# Patient Record
Sex: FEMALE | Race: WHITE | NOT HISPANIC OR LATINO | Employment: OTHER | URBAN - METROPOLITAN AREA
[De-identification: names, ages, dates, MRNs, and addresses within clinical notes are randomized per-mention and may not be internally consistent; named-entity substitution may affect disease eponyms.]

---

## 2016-11-17 LAB
ALT SERPL-CCNC: 19 U/L (ref 12–78)
AST SERPL-CCNC: 14 U/L (ref 15–37)
CHOLEST SERPL-MCNC: 193 MG/DL (ref 0–200)
CREAT SERPL-MCNC: 0.94 MG/DL (ref 0.55–1.02)
GFR SERPL CREATININE-BSD FRML MDRD: 56.73 ML/MIN/1.73M2
GLUCOSE SERPL-MCNC: 82 MG/DL (ref 70–100)
HDLC SERPL-MCNC: 50 MG/DL (ref 40–59)
LDLC SERPL CALC-MCNC: 125 MG/DL
NONHDLC SERPL-MCNC: 143 MG/DL
POTASSIUM SERPL-SCNC: 4.5 MMOL/ (ref 3.5–5.1)
TRIGL SERPL-MCNC: 90 MG/DL (ref 30–150)

## 2017-03-16 ENCOUNTER — TRANSFERRED RECORDS (OUTPATIENT)
Dept: HEALTH INFORMATION MANAGEMENT | Facility: CLINIC | Age: 82
End: 2017-03-16

## 2017-03-16 LAB
ALT SERPL-CCNC: 17 U/L (ref 12–78)
AST SERPL-CCNC: 16 U/L (ref 15–37)
CHOLEST SERPL-MCNC: 207 MG/DL (ref 0–200)
CREAT SERPL-MCNC: 0.93 MG/DL (ref 0.55–1.02)
GFR SERPL CREATININE-BSD FRML MDRD: 57.44 ML/MIN/1.73M2
GLUCOSE SERPL-MCNC: 92 MG/DL (ref 70–100)
HDLC SERPL-MCNC: 52 MG/DL (ref 40–59)
LDLC SERPL CALC-MCNC: ABNORMAL MG/DL
NONHDLC SERPL-MCNC: 155 MG/DL
POTASSIUM SERPL-SCNC: 4.6 MMOL/ (ref 3.5–5.1)
TRIGL SERPL-MCNC: 187 MG/DL (ref 30–150)

## 2017-05-30 ENCOUNTER — OFFICE VISIT (OUTPATIENT)
Dept: FAMILY MEDICINE | Facility: CLINIC | Age: 82
End: 2017-05-30
Payer: COMMERCIAL

## 2017-05-30 VITALS
OXYGEN SATURATION: 99 % | HEIGHT: 64 IN | TEMPERATURE: 97.1 F | DIASTOLIC BLOOD PRESSURE: 65 MMHG | SYSTOLIC BLOOD PRESSURE: 131 MMHG | BODY MASS INDEX: 22.53 KG/M2 | HEART RATE: 75 BPM | WEIGHT: 132 LBS

## 2017-05-30 DIAGNOSIS — H40.9 GLAUCOMA OF BOTH EYES, UNSPECIFIED GLAUCOMA: ICD-10-CM

## 2017-05-30 DIAGNOSIS — I10 BENIGN ESSENTIAL HYPERTENSION: ICD-10-CM

## 2017-05-30 DIAGNOSIS — M15.0 PRIMARY OSTEOARTHRITIS INVOLVING MULTIPLE JOINTS: Primary | ICD-10-CM

## 2017-05-30 DIAGNOSIS — Z79.899 CONTROLLED SUBSTANCE AGREEMENT SIGNED: ICD-10-CM

## 2017-05-30 DIAGNOSIS — K59.03 DRUG-INDUCED CONSTIPATION: ICD-10-CM

## 2017-05-30 DIAGNOSIS — G89.29 OTHER CHRONIC PAIN: ICD-10-CM

## 2017-05-30 DIAGNOSIS — Z79.890 HORMONE REPLACEMENT THERAPY: ICD-10-CM

## 2017-05-30 PROCEDURE — 99214 OFFICE O/P EST MOD 30 MIN: CPT | Performed by: INTERNAL MEDICINE

## 2017-05-30 RX ORDER — TRAMADOL HYDROCHLORIDE 200 MG/1
200 TABLET, EXTENDED RELEASE ORAL
Qty: 30 TABLET | Refills: 0 | Status: SHIPPED | OUTPATIENT
Start: 2017-08-30 | End: 2017-10-20

## 2017-05-30 RX ORDER — TRAMADOL HYDROCHLORIDE 200 MG/1
200 TABLET, EXTENDED RELEASE ORAL
Qty: 30 TABLET | Refills: 0 | Status: SHIPPED | OUTPATIENT
Start: 2017-07-30 | End: 2017-10-20

## 2017-05-30 RX ORDER — TRAMADOL HYDROCHLORIDE 200 MG/1
200 TABLET, EXTENDED RELEASE ORAL
Qty: 30 TABLET | Refills: 0 | Status: SHIPPED | OUTPATIENT
Start: 2017-06-30 | End: 2017-10-20

## 2017-05-30 NOTE — LETTER
[facility]    05[facility]17    Patient: Jamee Douglas  YOB: 1932  Medical Record Number: [id number]                                                                  Controlled Substance Agreement  I understand that my care provider has prescribed controlled substances (narcotics, tranquilizers, and/or stimulants) to help manage my condition(s).  I am taking this medicine to help me function or work.  I know that this is strong medicine.  It could have serious side effects and even cause a dependency on the drug.  If I stop these medicines suddenly, I could have severe withdrawal symptoms.    The risks, benefits, and side effects of these medication(s) were explained to me.  I agree that:  1. I will take part in other treatments as advised by my provider.  This may be psychiatry or counseling, physical therapy, behavioral therapy, group treatment, or a referral to a pain clinic.  I will reduce or stop my medicine when my provider tells me to do so.   2. I will take my medicines as prescribed.  I will not change the dose or schedule unless my provider tells me to.  There will be no refills if I  run out early.   I may be contacted at any time without warning and asked to complete a drug test or pill count.   3. I will keep all my appointments at the clinic.  If I miss appointments or fail to follow instructions, my provider may stop my medicine.  4. I will not ask other providers to prescribe controlled substances. And I will not accept controlled substances from other people. If I need another prescribed controlled substance for a new reason, I will notify my provider within one business day.  5. If I enroll in the [place] Medical Marijuana program, I will tell my provider.  I will also sign an agreement to share my medical records with my provider.  6. I will use one pharmacy to fill all of my controlled substance prescriptions.  If my prescription is mailed to my pharmacy, it may  take 5 to 7 days for my medicine to be ready.  7. I understand that my provider, clinic care team, and pharmacy can track controlled substance prescriptions from other providers through a central database (prescription monitoring program).  8. I will bring in my list of medications (or my medicine bottles) each time I come to the clinic.  REV- 04/2016                                                                                                                                            Page 1 of 2      [facility] [place]    05/30/17    Patient: Jamee Douglas  YOB: 1932  Medical Record Number: [id number]    9. Refills of controlled substances will be made only during office hours.  It is up to me to make sure that I do not run out of my medicines on weekends or holidays.    10. I am responsible for my prescriptions.  If the medicine is lost or stolen, it will not be replaced.   I also agree not to share these medicines with anyone.  11. I agree to not use ANY illegal or recreational drugs.  This includes marijuana, cocaine, bath salts or other drugs.  I agree not to use alcohol unless my provider says I may.  I agree to give urine samples whenever asked.  If I fail to give a urine sample, the provider may stop my medicine.     12. I will tell my nurse or provider right away if I become pregnant or have a new medical problem treated outside of [facility].  13. I understand that this medicine can affect my thinking and judgment.  It may be unsafe for me to drive, use machinery and do dangerous tasks.  I will not do any of these things until I know how the medicine affects me.  If my dose changes, I will wait to see how it affects me.  I will contact my provider if I have concerns about medicine side effects.  I understand that if I do not follow any of the conditions above, my prescriptions or treatment may be stopped.    I agree that my provider, clinic care team, and pharmacy may  work with any city, state or federal law enforcement agency that investigates the misuse, sale, or other diversion of my controlled medicine. I will allow my provider to discuss my care with or share a copy of this agreement with any other treating provider, pharmacy or emergency room where I receive care.  I agree to give up (waive) any right of privacy or confidentiality with respect to these authorizations.   I have read this agreement and have asked questions about anything I did not understand.   ___________________________________    ___________________________  Patient Signature                                                           Date and Time  ___________________________________     ____________________________  Witness                                                                            Date and Time  ___________________________________  Katlyn Mallory MD  REV-  04/2016                                                                                                                                                                 Page 2 of 2

## 2017-05-30 NOTE — PROGRESS NOTES
SUBJECTIVE:                                                    Jamee Douglas is a 84 year old female who presents to clinic today for the following health issues:      Pt would like to establish care. Previously living in [place] with her  but unfortunately he  from [facility] about 5 years ago. She now just relocated to [place] to be near her daughter.    1. Takes Tramadol for arthritis in her hips and low back. She says that she has been on this for years and has not had any problems with it. She is able to exercise regularly - 3 days per week and also does [employer] two days per week.     2. Has been on hormone replacement therapy for many years, since about 40 years old. Has tried coming off of it in the past. Her last mammogram was 2 years ago and was normal.    3.  Constipation  - been a problem for a long time. Has tried Miralax and colace.     4. Takes Diltiazem for hypertension.     Had blood work done just recently. Will bring in copies. She had her pneumonia shot recently.       Problem list and histories reviewed & adjusted, as indicated.  Additional history: as documented    Patient Active Problem List   Diagnosis     Low back pain     Hyperlipidemia with target LDL less than 130     Osteoarthritis     Glaucoma     Advanced directives, counseling/discussion     Past Surgical History:   Procedure Laterality Date     ARTHROPLASTY KNEE       HYSTERECTOMY       SURGICAL HISTORY OF -       rights shoulder     SURGICAL HISTORY OF -       glaucoma surgery     SURGICAL HISTORY OF -       back surgery       Social History   Substance Use Topics     Smoking status: Never Smoker     Smokeless tobacco: Never Used     Alcohol use Yes      Comment: Rare     Family History   Problem Relation Age of Onset     CANCER Mother      CANCER Father      CANCER Brother      CANCER Sister            Reviewed and updated as needed this visit by clinical staff  Allergies  Meds       Reviewed and updated as needed  "this visit by Provider         ROS:  Constitutional, HEENT, cardiovascular, pulmonary, gi and gu systems are negative, except as otherwise noted.    OBJECTIVE:                                                    /65 (BP Location: Left arm, Cuff Size: Adult Regular)  Pulse 75  Temp 97.1  F (36.2  C) (Oral)  Ht 5' 4.27\" (1.633 m)  Wt 132 lb (59.9 kg)  LMP 01/01/1970  SpO2 99%  BMI 22.47 kg/m2  Body mass index is 22.47 kg/(m^2).  GENERAL: healthy, alert and no distress  EYES: Eyes grossly normal to inspection, PERRL and conjunctivae and sclerae normal  HENT: ear canals and TM's normal, nose and mouth without ulcers or lesions  NECK: no adenopathy, no asymmetry, masses, or scars and thyroid normal to palpation  RESP: lungs clear to auscultation - no rales, rhonchi or wheezes  CV: regular rate and rhythm, normal S1 S2, no S3 or S4, no murmur, click or rub, no peripheral edema and peripheral pulses strong  MS: no gross musculoskeletal defects noted, no edema  SKIN: no suspicious lesions or rashes  NEURO: Normal strength and tone, mentation intact and speech normal  PSYCH: mentation appears normal, affect normal/bright    Diagnostic Test Results:  none      ASSESSMENT/PLAN:                                                      1. Primary osteoarthritis involving multiple joints  Discussed risks of controlled substances. Patient is aware. She exercises regularly and medication has been working well for her in that regards so will continue. Controlled substance agreement signed today.   - traMADol (ULTRAM-ER) 200 MG ER-tablet; Take 1 tablet (200 mg) by mouth nightly as needed for pain  Dispense: 30 tablet; Refill: 0  - traMADol (ULTRAM-ER) 200 MG ER-tablet; Take 1 tablet (200 mg) by mouth nightly as needed for pain  Dispense: 30 tablet; Refill: 0  - traMADol (ULTRAM-ER) 200 MG ER-tablet; Take 1 tablet (200 mg) by mouth nightly as needed for pain  Dispense: 30 tablet; Refill: 0    2. Benign essential " hypertension  Well controlled. Will refill diltiazem when needed.     3. Hormone replacement therapy  Discussed risks of HRT. Patient is aware. Has had normal mammograms.     4. Drug-induced constipation  Recommending Colace 200 mg BID plus Miralax PRN.     5. Controlled substance agreement signed    6. Other chronic pain    7. Glaucoma of both eyes, unspecified glaucoma      Patient will bring in records of her recent lab work and vaccinations. Will follow up after that.     Katlyn Mallory MD  [facility] [place]

## 2017-05-30 NOTE — MR AVS SNAPSHOT
"              After Visit Summary   5/30/2017    Jamee Douglas    MRN: [id number]           Patient Information     Date Of Birth          8/1/1932        Visit Information        Provider Department      5/30/2017 8:20 AM [facility]        Today's Diagnoses     Primary osteoarthritis involving multiple joints    -  1    Benign essential hypertension        Hormone replacement therapy        Drug-induced constipation        Controlled substance agreement signed        Other chronic pain        Glaucoma of both eyes, unspecified glaucoma           Follow-ups after your visit        Who to contact     If you have questions or need follow up information about today's clinic visit or your schedule please contact [facility] directly at [phone].  Normal or non-critical lab and imaging results will be communicated to you by MyChart, letter or phone within 4 business days after the clinic has received the results. If you do not hear from us within 7 days, please contact the clinic through MyChart or phone. If you have a critical or abnormal lab result, we will notify you by phone as soon as possible.  Submit refill requests through [employer] or call your pharmacy and they will forward the refill request to us. Please allow 3 business days for your refill to be completed.          Additional Information About Your Visit        MyChart Information     [employer] lets you send messages to your doctor, view your test results, renew your prescriptions, schedule appointments and more. To sign up, go to [email or web address] . Click on \"Log in\" on the left side of the screen, which will take you to the Welcome page. Then click on \"Sign up Now\" on the right side of the page.     You will be asked to enter the access code listed below, as well as some personal information. Please follow the directions to create your username and password.     Your access code is: " [email or web address]  Expires: 2017  8:53 AM     Your access code will  in 90 days. If you need help or a new code, please call your [place] clinic or [phone].        Care EveryWhere ID     This is your Care EveryWhere ID. This could be used by other organizations to access your [place] medical records  [id number]        Your Vitals Were     Pulse Temperature Height Last Period Pulse Oximetry BMI (Body Mass Index)    75 97.1  F (36.2  C) (Oral) 5' 4.27\" (1.633 m) 1970 99% 22.47 kg/m2       Blood Pressure from Last 3 Encounters:   17 131/65   14 124/70   14 120/77    Weight from Last 3 Encounters:   17 132 lb (59.9 kg)   14 136 lb (61.7 kg)   14 138 lb (62.6 kg)              Today, you had the following     No orders found for display         Today's Medication Changes          These changes are accurate as of: 17  8:53 AM.  If you have any questions, ask your nurse or doctor.               Start taking these medicines.        Dose/Directions    * traMADol 200 MG ER-tablet   Commonly known as:  ULTRAM-ER   Used for:  Primary osteoarthritis involving multiple joints   Started by:  Katlyn Mallory MD        Dose:  200 mg   Start taking on:  2017   Take 1 tablet (200 mg) by mouth nightly as needed for pain   Quantity:  30 tablet   Refills:  0       * traMADol 200 MG ER-tablet   Commonly known as:  ULTRAM-ER   Used for:  Primary osteoarthritis involving multiple joints   Started by:  Katlyn aMllory MD        Dose:  200 mg   Start taking on:  2017   Take 1 tablet (200 mg) by mouth nightly as needed for pain   Quantity:  30 tablet   Refills:  0       * traMADol 200 MG ER-tablet   Commonly known as:  ULTRAM-ER   Used for:  Primary osteoarthritis involving multiple joints   Started by:  Katlyn Mallory MD        Dose:  200 mg   Start taking on:  2017   Take 1 tablet (200 mg) by mouth nightly as needed for pain   Quantity:  30 tablet   Refills: "  0       * Notice:  This list has 3 medication(s) that are the same as other medications prescribed for you. Read the directions carefully, and ask your doctor or other care provider to review them with you.      Stop taking these medicines if you haven't already. Please contact your care team if you have questions.     traMADol-acetaminophen 37.5-325 MG per tablet   Commonly known as:  ULTRACET   Stopped by:  Katlyn Mallory MD                Where to get your medicines      Some of these will need a paper prescription and others can be bought over the counter.  Ask your nurse if you have questions.     Bring a paper prescription for each of these medications     traMADol 200 MG ER-tablet    traMADol 200 MG ER-tablet    traMADol 200 MG ER-tablet                Primary Care Provider Office Phone # Fax #    Katlyn Mallory [phone]       [facility] [address]  [place] [address]        Thank you!     Thank you for choosing [facility]  for your care. Our goal is always to provide you with excellent care. Hearing back from our patients is one way we can continue to improve our services. Please take a few minutes to complete the written survey that you may receive in the mail after your visit with us. Thank you!             Your Updated Medication List - Protect others around you: Learn how to safely use, store and throw away your medicines at www.disposemymeds.org.          This list is accurate as of: 5/30/17  8:53 AM.  Always use your most recent med list.                   Brand Name Dispense Instructions for use    aspirin 81 MG tablet      Take 1 tablet by mouth daily.       biotin 2.5 mg/mL Susp      Take by mouth daily       diltiazem 180 MG 24 hr capsule      1 capsule daily.       estradiol 0.5 MG tablet    ESTRACE     1 tablet daily.       Multi-vitamin Tabs tablet   Generic drug:  multivitamin, therapeutic with minerals      Take 1  tablet by mouth daily.       * traMADol 200 MG ER-tablet   Start taking on:  6/30/2017    ULTRAM-ER    30 tablet    Take 1 tablet (200 mg) by mouth nightly as needed for pain       * traMADol 200 MG ER-tablet   Start taking on:  7/30/2017    ULTRAM-ER    30 tablet    Take 1 tablet (200 mg) by mouth nightly as needed for pain       * traMADol 200 MG ER-tablet   Start taking on:  8/30/2017    ULTRAM-ER    30 tablet    Take 1 tablet (200 mg) by mouth nightly as needed for pain       TYLENOL PM EXTRA STRENGTH PO      Take 1 tablet by mouth At Bedtime.       VITAMIN B 12 PO      Take 1,000 mcg by mouth       VITAMIN D3 PO      Take by mouth daily       * Notice:  This list has 3 medication(s) that are the same as other medications prescribed for you. Read the directions carefully, and ask your doctor or other care provider to review them with you.

## 2017-10-20 ENCOUNTER — OFFICE VISIT (OUTPATIENT)
Dept: FAMILY MEDICINE | Facility: CLINIC | Age: 82
End: 2017-10-20
Payer: COMMERCIAL

## 2017-10-20 VITALS
TEMPERATURE: 98.1 F | HEART RATE: 74 BPM | DIASTOLIC BLOOD PRESSURE: 74 MMHG | OXYGEN SATURATION: 96 % | WEIGHT: 138 LBS | BODY MASS INDEX: 23.49 KG/M2 | SYSTOLIC BLOOD PRESSURE: 134 MMHG

## 2017-10-20 DIAGNOSIS — I10 BENIGN ESSENTIAL HYPERTENSION: Primary | ICD-10-CM

## 2017-10-20 DIAGNOSIS — Z23 NEED FOR VACCINATION: ICD-10-CM

## 2017-10-20 DIAGNOSIS — Z13.220 SCREENING FOR HYPERLIPIDEMIA: ICD-10-CM

## 2017-10-20 DIAGNOSIS — Z79.890 HORMONE REPLACEMENT THERAPY: ICD-10-CM

## 2017-10-20 PROCEDURE — G0009 ADMIN PNEUMOCOCCAL VACCINE: HCPCS | Performed by: INTERNAL MEDICINE

## 2017-10-20 PROCEDURE — 90732 PPSV23 VACC 2 YRS+ SUBQ/IM: CPT | Performed by: INTERNAL MEDICINE

## 2017-10-20 PROCEDURE — 99214 OFFICE O/P EST MOD 30 MIN: CPT | Mod: 25 | Performed by: INTERNAL MEDICINE

## 2017-10-20 ASSESSMENT — ANXIETY QUESTIONNAIRES
1. FEELING NERVOUS, ANXIOUS, OR ON EDGE: NOT AT ALL
6. BECOMING EASILY ANNOYED OR IRRITABLE: NOT AT ALL
IF YOU CHECKED OFF ANY PROBLEMS ON THIS QUESTIONNAIRE, HOW DIFFICULT HAVE THESE PROBLEMS MADE IT FOR YOU TO DO YOUR WORK, TAKE CARE OF THINGS AT HOME, OR GET ALONG WITH OTHER PEOPLE: NOT DIFFICULT AT ALL
GAD7 TOTAL SCORE: 0
7. FEELING AFRAID AS IF SOMETHING AWFUL MIGHT HAPPEN: NOT AT ALL
3. WORRYING TOO MUCH ABOUT DIFFERENT THINGS: NOT AT ALL
2. NOT BEING ABLE TO STOP OR CONTROL WORRYING: NOT AT ALL
5. BEING SO RESTLESS THAT IT IS HARD TO SIT STILL: NOT AT ALL

## 2017-10-20 ASSESSMENT — PATIENT HEALTH QUESTIONNAIRE - PHQ9
5. POOR APPETITE OR OVEREATING: NOT AT ALL
SUM OF ALL RESPONSES TO PHQ QUESTIONS 1-9: 0

## 2017-10-20 NOTE — MR AVS SNAPSHOT
"              After Visit Summary   10/20/2017    Jamee Douglas    MRN: [id number]           Patient Information     Date Of Birth          1932        Visit Information        Provider Department      10/20/2017 8:40 AM Katlyn Mallory MD [facility] [place]        Today's Diagnoses     Benign essential hypertension    -  1    Hormone replacement therapy        Need for vaccination        Screening for hyperlipidemia           Follow-ups after your visit        Who to contact     If you have questions or need follow up information about today's clinic visit or your schedule please contact [facility] [place] directly at [phone].  Normal or non-critical lab and imaging results will be communicated to you by MyChart, letter or phone within 4 business days after the clinic has received the results. If you do not hear from us within 7 days, please contact the clinic through [employer] or phone. If you have a critical or abnormal lab result, we will notify you by phone as soon as possible.  Submit refill requests through [employer] or call your pharmacy and they will forward the refill request to us. Please allow 3 business days for your refill to be completed.          Additional Information About Your Visit        MyChart Information     [employer] lets you send messages to your doctor, view your test results, renew your prescriptions, schedule appointments and more. To sign up, go to [email or web address] . Click on \"Log in\" on the left side of the screen, which will take you to the Welcome page. Then click on \"Sign up Now\" on the right side of the page.     You will be asked to enter the access code listed below, as well as some personal information. Please follow the directions to create your username and password.     Your access code is: [email or web address]  Expires: 2018  9:26 AM     Your access code will  in 90 days. If you need help or a new code, please call your [place] " clinic or [phone].        Care EveryWhere ID     This is your Care EveryWhere ID. This could be used by other organizations to access your [place] medical records  [id number]        Your Vitals Were     Pulse Temperature Last Period Pulse Oximetry BMI (Body Mass Index)       74 98.1  F (36.7  C) (Oral) 01/01/1970 96% 23.49 kg/m2        Blood Pressure from Last 3 Encounters:   10/20/17 134/74   05/30/17 131/65   08/01/14 124/70    Weight from Last 3 Encounters:   10/20/17 138 lb (62.6 kg)   05/30/17 132 lb (59.9 kg)   08/01/14 136 lb (61.7 kg)              We Performed the Following     ADMIN PNEUMOCOCCAL VACCINE (For MEDICARE Pt. ONLY) []     Pneumococcal vaccine 23 valent PPSV23  (Pneumovax) [69634]          Today's Medication Changes          These changes are accurate as of: 10/20/17  9:26 AM.  If you have any questions, ask your nurse or doctor.               Stop taking these medicines if you haven't already. Please contact your care team if you have questions.     traMADol 200 MG ER-tablet   Commonly known as:  ULTRAM-ER   Stopped by:  Katlyn Mallory MD                    Primary Care Provider Office Phone # Fax #    Katlyn Mallory [phone] [address]  [place] [address]        Equal Access to Services     [facility] AH: Hadii radha johnson hadasho Sosriramali, waaxda luqadaha, qaybta kaalmada adeegyada, shana valentino. So [facility] [phone].    ATENCIÓN: Si habla español, tiene a hills disposición servicios gratuitos de asistencia lingüística. Llame al [phone].    We comply with applicable federal civil rights laws and [place] laws. We do not discriminate on the basis of race, color, national origin, age, disability, sex, sexual orientation, or gender identity.            Thank you!     Thank you for choosing [facility] [place]  for your care. Our goal is always to provide you with excellent care. Hearing back from our  patients is one way we can continue to improve our services. Please take a few minutes to complete the written survey that you may receive in the mail after your visit with us. Thank you!             Your Updated Medication List - Protect others around you: Learn how to safely use, store and throw away your medicines at www.disposemymeds.org.          This list is accurate as of: 10/20/17  9:26 AM.  Always use your most recent med list.                   Brand Name Dispense Instructions for use Diagnosis    aspirin 81 MG tablet      Take 1 tablet by mouth daily.        biotin 2.5 mg/mL Susp      Take by mouth daily        diltiazem 180 MG 24 hr capsule      1 capsule daily.        estradiol 0.5 MG tablet    ESTRACE    90 tablet    TAKE 1 TABLET BY MOUTH EVERY DAY    Hormone replacement therapy       Multi-vitamin Tabs tablet   Generic drug:  multivitamin, therapeutic with minerals      Take 1 tablet by mouth daily.        Travoprost 0.004 % Soln      Apply 1 drop to eye        TYLENOL PM EXTRA STRENGTH PO      Take 1 tablet by mouth At Bedtime.        VITAMIN B 12 PO      Take 1,000 mcg by mouth        VITAMIN D3 PO      Take by mouth daily

## 2017-10-20 NOTE — PROGRESS NOTES
SUBJECTIVE:   Jamee Douglas is a 85 year old female who presents to clinic today for the following health issues:      Concern - lab results   Onset:  Pt coming in to show lab results taken in May and last November in [place]      Description:   Lab results     Intensity:     Progression of Symptoms:      Accompanying Signs & Symptoms:      Previous history of similar problem:       Precipitating factors:   Worsened by:     Alleviating factors:  Improved by:     Therapies Tried and outcome:     Labs done  Glucose 92  Creatinine 0.93    HDL 52  Uric acid 4.3  Hgb 13.8    Waking up every morning working out and does [employer]. Living at the [facility]. No longer taking Tramadol for her joint pains.     Problem list and histories reviewed & adjusted, as indicated.  Additional history: as documented    Patient Active Problem List   Diagnosis     Low back pain     Hyperlipidemia with target LDL less than 130     Osteoarthritis     Glaucoma     Advanced directives, counseling/discussion     Benign essential hypertension     Hormone replacement therapy     Drug-induced constipation     Controlled substance agreement signed     Other chronic pain     Past Surgical History:   Procedure Laterality Date     ARTHROPLASTY KNEE       HYSTERECTOMY       SURGICAL HISTORY OF -       rights shoulder     SURGICAL HISTORY OF -       glaucoma surgery     SURGICAL HISTORY OF -       back surgery       Social History   Substance Use Topics     Smoking status: Never Smoker     Smokeless tobacco: Never Used     Alcohol use Yes      Comment: Rare     Family History   Problem Relation Age of Onset     CANCER Mother      CANCER Father      CANCER Brother      CANCER Sister          Current Outpatient Prescriptions   Medication Sig Dispense Refill     Travoprost 0.004 % SOLN Apply 1 drop to eye       estradiol (ESTRACE) 0.5 MG tablet TAKE 1 TABLET BY MOUTH EVERY DAY 90 tablet 3     Cyanocobalamin (VITAMIN B 12 PO) Take 1,000 mcg by  mouth       biotin 2.5 mg/mL SUSP Take by mouth daily       Cholecalciferol (VITAMIN D3 PO) Take by mouth daily       diltiazem (CARDIZEM CD; CARTIA XT) 180 MG 24 hr capsule 1 capsule daily.       aspirin 81 MG tablet Take 1 tablet by mouth daily.       Diphenhydramine-APAP, sleep, (TYLENOL PM EXTRA STRENGTH PO) Take 1 tablet by mouth At Bedtime.       Multiple Vitamin (MULTI-VITAMIN) per tablet Take 1 tablet by mouth daily.           [facility] and [facility]  [facility]       Reviewed and updated as needed this visit by Provider         ROS:  Constitutional, HEENT, cardiovascular, pulmonary, gi and gu systems are negative, except as otherwise noted.      OBJECTIVE:   /74 (BP Location: Right arm, Cuff Size: Adult Regular)  Pulse 74  Temp 98.1  F (36.7  C) (Oral)  Wt 138 lb (62.6 kg)  LMP 01/01/1970  SpO2 96%  BMI 23.49 kg/m2  Body mass index is 23.49 kg/(m^2).  GENERAL: healthy, alert and no distress  NECK: no adenopathy, no asymmetry, masses, or scars and thyroid normal to palpation  RESP: lungs clear to auscultation - no rales, rhonchi or wheezes  CV: regular rate and rhythm, normal S1 S2, no S3 or S4, no murmur, click or rub, no peripheral edema and peripheral pulses strong  MS: no gross musculoskeletal defects noted, no edema  PSYCH: mentation appears normal, affect normal/bright    Diagnostic Test Results:  Reviewed from outside records (Abstracted into chart)    ASSESSMENT/PLAN:     1. Benign essential hypertension  Doing well on Diltiazem. No changes today.     2. Hormone replacement therapy  Continues on Estradiol. Have discussed risks and benefits.     3. Need for vaccination  - Pneumococcal vaccine 23 valent PPSV23  (Pneumovax) [07020]  - ADMIN PNEUMOCOCCAL VACCINE (For MEDICARE Pt. ONLY) []    4. Screening for hyperlipidemia  No indication for statin. Continue aspirin 81 mg for primary prevention.       Follow up in 1 year(s) or sooner if  needed      Katlyn Mallory MD  [facility] [place]

## 2017-10-21 ASSESSMENT — ANXIETY QUESTIONNAIRES: GAD7 TOTAL SCORE: 0

## 2018-02-14 ENCOUNTER — OFFICE VISIT (OUTPATIENT)
Dept: FAMILY MEDICINE | Facility: CLINIC | Age: 83
End: 2018-02-14
Payer: COMMERCIAL

## 2018-02-14 VITALS
SYSTOLIC BLOOD PRESSURE: 145 MMHG | BODY MASS INDEX: 24.28 KG/M2 | DIASTOLIC BLOOD PRESSURE: 68 MMHG | HEART RATE: 95 BPM | OXYGEN SATURATION: 98 % | TEMPERATURE: 96.9 F | WEIGHT: 142.2 LBS | HEIGHT: 64 IN

## 2018-02-14 DIAGNOSIS — I10 BENIGN ESSENTIAL HYPERTENSION: ICD-10-CM

## 2018-02-14 DIAGNOSIS — R30.0 DYSURIA: ICD-10-CM

## 2018-02-14 DIAGNOSIS — N30.00 ACUTE CYSTITIS WITHOUT HEMATURIA: Primary | ICD-10-CM

## 2018-02-14 DIAGNOSIS — R82.90 NONSPECIFIC FINDING ON EXAMINATION OF URINE: ICD-10-CM

## 2018-02-14 LAB
ALBUMIN UR-MCNC: NEGATIVE MG/DL
APPEARANCE UR: CLEAR
BACTERIA #/AREA URNS HPF: ABNORMAL /HPF
BILIRUB UR QL STRIP: NEGATIVE
COLOR UR AUTO: YELLOW
GLUCOSE UR STRIP-MCNC: NEGATIVE MG/DL
HGB UR QL STRIP: NEGATIVE
KETONES UR STRIP-MCNC: NEGATIVE MG/DL
LEUKOCYTE ESTERASE UR QL STRIP: ABNORMAL
NITRATE UR QL: NEGATIVE
NON-SQ EPI CELLS #/AREA URNS LPF: ABNORMAL /LPF
PH UR STRIP: 5.5 PH (ref 5–7)
RBC #/AREA URNS AUTO: ABNORMAL /HPF
SOURCE: ABNORMAL
SP GR UR STRIP: 1.01 (ref 1–1.03)
UROBILINOGEN UR STRIP-ACNC: 0.2 EU/DL (ref 0.2–1)
WBC #/AREA URNS AUTO: ABNORMAL /HPF

## 2018-02-14 PROCEDURE — 81001 URINALYSIS AUTO W/SCOPE: CPT | Performed by: FAMILY MEDICINE

## 2018-02-14 PROCEDURE — 87086 URINE CULTURE/COLONY COUNT: CPT | Performed by: FAMILY MEDICINE

## 2018-02-14 PROCEDURE — 99213 OFFICE O/P EST LOW 20 MIN: CPT | Performed by: FAMILY MEDICINE

## 2018-02-14 RX ORDER — SULFAMETHOXAZOLE/TRIMETHOPRIM 800-160 MG
1 TABLET ORAL 2 TIMES DAILY
Qty: 10 TABLET | Refills: 0 | Status: SHIPPED | OUTPATIENT
Start: 2018-02-14 | End: 2018-02-19

## 2018-02-14 NOTE — NURSING NOTE
"Chief Complaint   Patient presents with     UTI       Initial /68 (BP Location: Left arm, Patient Position: Chair, Cuff Size: Adult Regular)  Pulse 95  Temp 96.9  F (36.1  C)  Ht 5' 4.27\" (1.633 m)  Wt 142 lb 3.2 oz (64.5 kg)  LMP 01/01/1970  SpO2 98%  Breastfeeding? No  BMI 24.2 kg/m2 Estimated body mass index is 24.2 kg/(m^2) as calculated from the following:    Height as of this encounter: 5' 4.27\" (1.633 m).    Weight as of this encounter: 142 lb 3.2 oz (64.5 kg).  Medication Reconciliation: complete     Geneva Parham MA     "

## 2018-02-14 NOTE — PROGRESS NOTES
SUBJECTIVE:   Jamee Douglas is a 85 year old female who presents to clinic today for the following health issues:      URINARY TRACT SYMPTOMS    Onset: x 1 week     Description:   Painful urination (Dysuria): YES  Blood in urine (Hematuria): no   Delay in urine (Hesitency): YES    Intensity: 6 or 7/10    Progression of Symptoms:  Same     Accompanying Signs & Symptoms:  Fever/chills: no   Flank pain no   Nausea and vomiting: no   Any vaginal symptoms: vaginal discharge  Abdominal/Pelvic Pain: YES    History:   History of frequent UTI's: no   History of kidney stones: no   Sexually Active: no   Possibility of pregnancy: No    Precipitating factors:   None     Therapies Tried and outcome: None         Problem list and histories reviewed & adjusted, as indicated.  Additional history: as documented    Patient Active Problem List   Diagnosis     Low back pain     Hyperlipidemia with target LDL less than 130     Osteoarthritis     Glaucoma     Advanced directives, counseling/discussion     Benign essential hypertension     Hormone replacement therapy     Drug-induced constipation     Controlled substance agreement signed     Other chronic pain     Past Surgical History:   Procedure Laterality Date     ARTHROPLASTY KNEE       HYSTERECTOMY       SURGICAL HISTORY OF -       rights shoulder     SURGICAL HISTORY OF -       glaucoma surgery     SURGICAL HISTORY OF -       back surgery       Social History   Substance Use Topics     Smoking status: Never Smoker     Smokeless tobacco: Never Used     Alcohol use Yes      Comment: Rare     Family History   Problem Relation Age of Onset     CANCER Mother      CANCER Father      CANCER Brother      CANCER Sister          Current Outpatient Prescriptions   Medication Sig Dispense Refill     sulfamethoxazole-trimethoprim (BACTRIM DS/SEPTRA DS) 800-160 MG per tablet Take 1 tablet by mouth 2 times daily for 5 days 10 tablet 0     Travoprost 0.004 % SOLN Apply 1 drop to eye        "estradiol (ESTRACE) 0.5 MG tablet TAKE 1 TABLET BY MOUTH EVERY DAY 90 tablet 3     Cyanocobalamin (VITAMIN B 12 PO) Take 1,000 mcg by mouth       biotin 2.5 mg/mL SUSP Take by mouth daily       Cholecalciferol (VITAMIN D3 PO) Take by mouth daily       diltiazem (CARDIZEM CD; CARTIA XT) 180 MG 24 hr capsule 1 capsule daily.       aspirin 81 MG tablet Take 1 tablet by mouth daily.       Multiple Vitamin (MULTI-VITAMIN) per tablet Take 1 tablet by mouth daily.       Allergies   Allergen Reactions     Penicillins Swelling       Reviewed and updated as needed this visit by clinical staff  Tobacco  Allergies  Meds  Problems  Med Hx  Surg Hx  Fam Hx  Soc Hx        Reviewed and updated as needed this visit by Provider  Allergies  Meds  Problems         ROS:  C: NEGATIVE for fever, chills, change in weight  E/M: NEGATIVE for ear, mouth and throat problems  R: NEGATIVE for significant cough or SOB  CV: NEGATIVE for chest pain, palpitations or peripheral edema    OBJECTIVE:                                                    /68 (BP Location: Left arm, Patient Position: Chair, Cuff Size: Adult Regular)  Pulse 95  Temp 96.9  F (36.1  C)  Ht 5' 4.27\" (1.633 m)  Wt 142 lb 3.2 oz (64.5 kg)  LMP 01/01/1970  SpO2 98%  Breastfeeding? No  BMI 24.2 kg/m2  Body mass index is 24.2 kg/(m^2).   GENERAL: healthy, alert, well nourished, well hydrated, no distress  RESP: lungs clear to auscultation - no rales, no rhonchi, no wheezes  CV: regular rates and rhythm, normal S1 S2, no S3 or S4 and no murmur, no click or rub -  ABDOMEN: soft, no tenderness, no  hepatosplenomegaly, no masses, normal bowel sounds  MS: extremities- no gross deformities noted, no edema  BACK: no CVA tenderness, no paralumbar tenderness    Results for orders placed or performed in visit on 02/14/18   *UA reflex to Microscopic and Culture ([place] and [facility] (except [place] and [place])   Result Value Ref Range    Color Urine Yellow     " Appearance Urine Clear     Glucose Urine Negative NEG^Negative mg/dL    Bilirubin Urine Negative NEG^Negative    Ketones Urine Negative NEG^Negative mg/dL    Specific Gravity Urine 1.010 1.003 - 1.035    Blood Urine Negative NEG^Negative    pH Urine 5.5 5.0 - 7.0 pH    Protein Albumin Urine Negative NEG^Negative mg/dL    Urobilinogen Urine 0.2 0.2 - 1.0 EU/dL    Nitrite Urine Negative NEG^Negative    Leukocyte Esterase Urine Large (A) NEG^Negative    Source Midstream Urine    Urine Microscopic   Result Value Ref Range    WBC Urine 10-25 (A) OTO2^O - 2 /HPF    RBC Urine O - 2 OTO2^O - 2 /HPF    Squamous Epithelial /LPF Urine Few FEW^Few /LPF    Bacteria Urine Few (A) NEG^Negative /HPF          ASSESSMENT/PLAN:                                                        ICD-10-CM    1. Acute cystitis without hematuria N30.00 sulfamethoxazole-trimethoprim (BACTRIM DS/SEPTRA DS) 800-160 MG per tablet   2. Dysuria R30.0 *UA reflex to Microscopic and Culture ([place] and [facility] (except [place] and [place])     Urine Microscopic   3. Nonspecific finding on examination of urine R82.90 Urine Culture Aerobic Bacterial   4. Benign essential hypertension I10      Patient is noted to have urinary tract infection.  Recommended stay well-hydrated.  Starting on Bactrim DS twice daily for 5 days.  Await the culture results.  If symptoms are not improving in the next few days, instructed to notify us back.    Patient is noted to have elevated blood. She takes diltiazem for hypertension.  I have recommended to follow-up in 1 month for recheck on blood pressure and annual physical exam and fasting labs.      Farhat Casas MD  [facility]

## 2018-02-14 NOTE — MR AVS SNAPSHOT
"              After Visit Summary   2/14/2018    Jamee Douglas    MRN: [id number]           Patient Information     Date Of Birth          8/1/1932        Visit Information        Provider Department      2/14/2018 10:40 AM Farhat Casas MD [facility]        Today's Diagnoses     Acute cystitis without hematuria    -  1    Dysuria        Nonspecific finding on examination of urine           Follow-ups after your visit        Follow-up notes from your care team     Return in about 5 weeks (around 3/19/2018) for Annual Physical Exam.      Who to contact     If you have questions or need follow up information about today's clinic visit or your schedule please contact [facility] directly at [phone].  Normal or non-critical lab and imaging results will be communicated to you by [employer], letter or phone within 4 business days after the clinic has received the results. If you do not hear from us within 7 days, please contact the clinic through [employer] or phone. If you have a critical or abnormal lab result, we will notify you by phone as soon as possible.  Submit refill requests through [employer] or call your pharmacy and they will forward the refill request to us. Please allow 3 business days for your refill to be completed.          Additional Information About Your Visit        MyChart Information     [employer] lets you send messages to your doctor, view your test results, renew your prescriptions, schedule appointments and more. To sign up, go to [email or web address] . Click on \"Log in\" on the left side of the screen, which will take you to the Welcome page. Then click on \"Sign up Now\" on the right side of the page.     You will be asked to enter the access code listed below, as well as some personal information. Please follow the directions to create your username and password.     Your access code is: [email or web address]  Expires: 5/15/2018 10:52 AM     Your access code will " " in 90 days. If you need help or a new code, please call your [facility] clinic or [phone].        Care EveryWhere ID     This is your Care EveryWhere ID. This could be used by other organizations to access your [facility] medical records  [id number]        Your Vitals Were     Pulse Temperature Height Last Period Pulse Oximetry Breastfeeding?    95 96.9  F (36.1  C) 5' 4.27\" (1.633 m) 1970 98% No    BMI (Body Mass Index)                   24.2 kg/m2            Blood Pressure from Last 3 Encounters:   18 157/68   10/20/17 134/74   17 131/65    Weight from Last 3 Encounters:   18 142 lb 3.2 oz (64.5 kg)   10/20/17 138 lb (62.6 kg)   17 132 lb (59.9 kg)              We Performed the Following     *UA reflex to Microscopic and Culture ([place] and [facility] (except [place] and [place])     Urine Culture Aerobic Bacterial     Urine Microscopic          Today's Medication Changes          These changes are accurate as of 18 10:52 AM.  If you have any questions, ask your nurse or doctor.               Start taking these medicines.        Dose/Directions    sulfamethoxazole-trimethoprim 800-160 MG per tablet   Commonly known as:  BACTRIM DS/SEPTRA DS   Used for:  Acute cystitis without hematuria   Started by:  Farhat Casas MD        Dose:  1 tablet   Take 1 tablet by mouth 2 times daily for 5 days   Quantity:  10 tablet   Refills:  0         Stop taking these medicines if you haven't already. Please contact your care team if you have questions.     TYLENOL PM EXTRA STRENGTH PO   Stopped by:  Farhat Casas MD                Where to get your medicines      These medications were [facility] [address] [facility] [place] [facility] [place] [facility] [address]  [address] [place] [address]     Phone:  [phone]     sulfamethoxazole-trimethoprim 800-160 MG per tablet                Primary Care Provider Office Phone # Fax #    Katlyn Mallory [phone] " [phone]       [address]  [place] [address]        Equal Access to Services     DIMA GRACE : Hadii radha johnson zandrao Sosriramali, waaxda luqadaha, qaybta kaalmada lakeisha, shana juanjosein hayaanegro lindquistsurendra oxanazairayaquelin valentino. So [facility] [phone].    ATENCIÓN: Si habla español, tiene a hills disposición servicios gratuitos de asistencia lingüística. Llame al [phone].    We comply with applicable federal civil rights laws and [place] laws. We do not discriminate on the basis of race, color, national origin, age, disability, sex, sexual orientation, or gender identity.            Thank you!     Thank you for choosing [facility] [place]  for your care. Our goal is always to provide you with excellent care. Hearing back from our patients is one way we can continue to improve our services. Please take a few minutes to complete the written survey that you may receive in the mail after your visit with us. Thank you!             Your Updated Medication List - Protect others around you: Learn how to safely use, store and throw away your medicines at www.disposemymeds.org.          This list is accurate as of 2/14/18 10:52 AM.  Always use your most recent med list.                   Brand Name Dispense Instructions for use Diagnosis    aspirin 81 MG tablet      Take 1 tablet by mouth daily.        biotin 2.5 mg/mL Susp      Take by mouth daily        diltiazem 180 MG 24 hr capsule      1 capsule daily.        estradiol 0.5 MG tablet    ESTRACE    90 tablet    TAKE 1 TABLET BY MOUTH EVERY DAY    Hormone replacement therapy       Multi-vitamin Tabs tablet   Generic drug:  multivitamin, therapeutic with minerals      Take 1 tablet by mouth daily.        sulfamethoxazole-trimethoprim 800-160 MG per tablet    BACTRIM DS/SEPTRA DS    10 tablet    Take 1 tablet by mouth 2 times daily for 5 days    Acute cystitis without hematuria       Travoprost 0.004 % Soln      Apply 1 drop to eye        VITAMIN B 12 PO       Take 1,000 mcg by mouth        VITAMIN D3 PO      Take by mouth daily

## 2018-02-15 LAB
BACTERIA SPEC CULT: NORMAL
SPECIMEN SOURCE: NORMAL

## 2018-03-27 ENCOUNTER — OFFICE VISIT (OUTPATIENT)
Dept: FAMILY MEDICINE | Facility: CLINIC | Age: 83
End: 2018-03-27
Payer: COMMERCIAL

## 2018-03-27 ENCOUNTER — TELEPHONE (OUTPATIENT)
Dept: FAMILY MEDICINE | Facility: CLINIC | Age: 83
End: 2018-03-27

## 2018-03-27 VITALS
TEMPERATURE: 97.5 F | WEIGHT: 141 LBS | DIASTOLIC BLOOD PRESSURE: 77 MMHG | OXYGEN SATURATION: 99 % | HEART RATE: 77 BPM | BODY MASS INDEX: 24.07 KG/M2 | SYSTOLIC BLOOD PRESSURE: 139 MMHG | HEIGHT: 64 IN | RESPIRATION RATE: 14 BRPM

## 2018-03-27 DIAGNOSIS — Z00.00 ROUTINE GENERAL MEDICAL EXAMINATION AT A HEALTH CARE FACILITY: Primary | ICD-10-CM

## 2018-03-27 DIAGNOSIS — I10 BENIGN ESSENTIAL HYPERTENSION: ICD-10-CM

## 2018-03-27 DIAGNOSIS — Z79.890 HORMONE REPLACEMENT THERAPY: ICD-10-CM

## 2018-03-27 LAB
ALBUMIN SERPL-MCNC: 3.9 G/DL (ref 3.4–5)
ALP SERPL-CCNC: 51 U/L (ref 40–150)
ALT SERPL W P-5'-P-CCNC: 15 U/L (ref 0–50)
ANION GAP SERPL CALCULATED.3IONS-SCNC: 8 MMOL/L (ref 3–14)
AST SERPL W P-5'-P-CCNC: 18 U/L (ref 0–45)
BILIRUB SERPL-MCNC: 0.4 MG/DL (ref 0.2–1.3)
BUN SERPL-MCNC: 17 MG/DL (ref 7–30)
CALCIUM SERPL-MCNC: 9.5 MG/DL (ref 8.5–10.1)
CHLORIDE SERPL-SCNC: 105 MMOL/L (ref 94–109)
CHOLEST SERPL-MCNC: 238 MG/DL
CO2 SERPL-SCNC: 26 MMOL/L (ref 20–32)
CREAT SERPL-MCNC: 0.92 MG/DL (ref 0.52–1.04)
GFR SERPL CREATININE-BSD FRML MDRD: 58 ML/MIN/1.7M2
GLUCOSE SERPL-MCNC: 86 MG/DL (ref 70–99)
HDLC SERPL-MCNC: 53 MG/DL
HGB BLD-MCNC: 14 G/DL (ref 11.7–15.7)
LDLC SERPL CALC-MCNC: 157 MG/DL
NONHDLC SERPL-MCNC: 185 MG/DL
POTASSIUM SERPL-SCNC: 4.2 MMOL/L (ref 3.4–5.3)
PROT SERPL-MCNC: 7.3 G/DL (ref 6.8–8.8)
SODIUM SERPL-SCNC: 139 MMOL/L (ref 133–144)
TRIGL SERPL-MCNC: 141 MG/DL
TSH SERPL DL<=0.005 MIU/L-ACNC: 2.98 MU/L (ref 0.4–4)

## 2018-03-27 PROCEDURE — 84443 ASSAY THYROID STIM HORMONE: CPT | Performed by: FAMILY MEDICINE

## 2018-03-27 PROCEDURE — 80061 LIPID PANEL: CPT | Performed by: FAMILY MEDICINE

## 2018-03-27 PROCEDURE — 36415 COLL VENOUS BLD VENIPUNCTURE: CPT | Performed by: FAMILY MEDICINE

## 2018-03-27 PROCEDURE — 80053 COMPREHEN METABOLIC PANEL: CPT | Performed by: FAMILY MEDICINE

## 2018-03-27 PROCEDURE — 85018 HEMOGLOBIN: CPT | Performed by: FAMILY MEDICINE

## 2018-03-27 PROCEDURE — 99213 OFFICE O/P EST LOW 20 MIN: CPT | Mod: 25 | Performed by: FAMILY MEDICINE

## 2018-03-27 PROCEDURE — G0438 PPPS, INITIAL VISIT: HCPCS | Performed by: FAMILY MEDICINE

## 2018-03-27 RX ORDER — DILTIAZEM HYDROCHLORIDE 120 MG/1
120 CAPSULE, COATED, EXTENDED RELEASE ORAL DAILY
Qty: 90 CAPSULE | Refills: 3 | Status: SHIPPED | OUTPATIENT
Start: 2018-03-27 | End: 2019-03-11

## 2018-03-27 RX ORDER — DILTIAZEM HYDROCHLORIDE 180 MG/1
180 CAPSULE, COATED, EXTENDED RELEASE ORAL DAILY
Qty: 90 CAPSULE | Refills: 3 | Status: SHIPPED | OUTPATIENT
Start: 2018-03-27 | End: 2018-03-27

## 2018-03-27 NOTE — PROGRESS NOTES
SUBJECTIVE:   Jamee Dogulas is a 85 year old female who presents for Preventive Visit.      Are you in the first 12 months of your Medicare Part B coverage?  No    Healthy Habits:    Do you get at least three servings of calcium containing foods daily (dairy, green leafy vegetables, etc.)? no, taking calcium and/or vitamin D supplement: yes     Amount of exercise or daily activities, outside of work: 7 day(s) per week    Problems taking medications regularly No    Medication side effects: No    Have you had an eye exam in the past two years? yes    Do you see a dentist twice per year? yes    Do you have sleep apnea, excessive snoring or daytime drowsiness?no      Ability to successfully perform activities of daily living: Yes, no assistance needed    Home safety:  none identified     Hearing impairment: Yes, Difficulty understanding soft or whispered speech.    Fall risk:  Fallen 2 or more times in the past year?: No  Any fall with injury in the past year?: No        COGNITIVE SCREEN  1) Repeat 3 items (Banana, Sunrise, Chair)    2) Clock draw: NORMAL  3) 3 item recall: Recalls 3 objects  Results: 3 items recalled: COGNITIVE IMPAIRMENT LESS LIKELY    Mini-CogTM Copyright S Luz. Licensed by the author for use in [facility]; reprinted with permission (solindsay@.[facility]). All rights reserved.            Hypertension Follow-up      Outpatient blood pressures are being checked at home.  Results are normal .    Low Salt Diet: no added salt      Reviewed and updated as needed this visit by clinical staff  Tobacco  Allergies  Meds  Problems  Med Hx  Surg Hx  Fam Hx  Soc Hx          Reviewed and updated as needed this visit by Provider  Allergies  Problems  Med Hx  Surg Hx  Fam Hx        Social History   Substance Use Topics     Smoking status: Never Smoker     Smokeless tobacco: Never Used     Alcohol use Yes      Comment: Rare       If you drink alcohol do you typically have >3 drinks per  day or >7 drinks per week? No                        Today's PHQ-2 Score:   PHQ-2 ( 1999 [employer]) 3/27/2018 5/30/2017   Q1: Little interest or pleasure in doing things 0 0   Q2: Feeling down, depressed or hopeless 0 0   PHQ-2 Score 0 0       Do you feel safe in your environment - Yes    Do you have a Health Care Directive?: Yes: Patient states has Advance Directive and will bring in a copy to clinic.    Current providers sharing in care for this patient include:   Patient Care Team:  Katlyn Mallory MD as PCP - General (Pediatrics)    The following health maintenance items are reviewed in Epic and correct as of today:  Health Maintenance   Topic Date Due     WELLNESS VISIT Q1 YR  08/02/1932     FALL RISK ASSESSMENT  05/30/2018     INFLUENZA VACCINE (SYSTEM ASSIGNED)  09/01/2018     ADVANCE DIRECTIVE PLANNING Q5 YRS  07/11/2019     TETANUS IMMUNIZATION (SYSTEM ASSIGNED)  06/14/2024     PNEUMOCOCCAL  Completed     Labs reviewed in EPIC  BP Readings from Last 3 Encounters:   03/27/18 139/77   02/14/18 145/68   10/20/17 134/74    Wt Readings from Last 3 Encounters:   03/27/18 141 lb (64 kg)   02/14/18 142 lb 3.2 oz (64.5 kg)   10/20/17 138 lb (62.6 kg)                  Patient Active Problem List   Diagnosis     Low back pain     Hyperlipidemia with target LDL less than 130     Osteoarthritis     Glaucoma     Advanced directives, counseling/discussion     Benign essential hypertension     Past Surgical History:   Procedure Laterality Date     ARTHROPLASTY KNEE       HYSTERECTOMY       SURGICAL HISTORY OF -       rights shoulder     SURGICAL HISTORY OF -       glaucoma surgery     SURGICAL HISTORY OF -       back surgery       Social History   Substance Use Topics     Smoking status: Never Smoker     Smokeless tobacco: Never Used     Alcohol use Yes      Comment: Rare     Family History   Problem Relation Age of Onset     Other - See Comments Mother      POLYCYTHEMIA VERA     Prostate Cancer Father      Colon Cancer  "Brother      Lung Cancer Sister          Current Outpatient Prescriptions   Medication Sig Dispense Refill     diltiazem 180 MG 24 hr capsule Take 1 capsule (180 mg) by mouth daily 90 capsule 3     Travoprost 0.004 % SOLN Apply 1 drop to eye       Cyanocobalamin (VITAMIN B 12 PO) Take 1,000 mcg by mouth       biotin 2.5 mg/mL SUSP Take by mouth daily       Cholecalciferol (VITAMIN D3 PO) Take by mouth daily       aspirin 81 MG tablet Take 1 tablet by mouth daily.       Multiple Vitamin (MULTI-VITAMIN) per tablet Take 1 tablet by mouth daily.       [DISCONTINUED] diltiazem (CARDIZEM CD; CARTIA XT) 180 MG 24 hr capsule 1 capsule daily.       Allergies   Allergen Reactions     Penicillins Swelling           ROS:  CONSTITUTIONAL: NEGATIVE for fever, chills, change in weight  INTEGUMENTARY/SKIN: NEGATIVE for worrisome rashes, moles or lesions  EYES: NEGATIVE for vision changes or irritation  ENT/MOUTH: NEGATIVE for ear, mouth and throat problems  RESP: NEGATIVE for significant cough or SOB  BREAST: NEGATIVE for masses, tenderness or discharge  CV: NEGATIVE for chest pain, palpitations or peripheral edema  GI: NEGATIVE for nausea, abdominal pain, heartburn, or change in bowel habits  : NEGATIVE for frequency, dysuria, or hematuria  MUSCULOSKELETAL: NEGATIVE for significant arthralgias or myalgia  NEURO: NEGATIVE for weakness, dizziness or paresthesias  ENDOCRINE: NEGATIVE for temperature intolerance, skin/hair changes  HEME: NEGATIVE for bleeding problems  PSYCHIATRIC: NEGATIVE for changes in mood or affect    OBJECTIVE:   /77 (Cuff Size: Adult Regular)  Pulse 77  Temp 97.5  F (36.4  C) (Tympanic)  Resp 14  Ht 5' 3.5\" (1.613 m)  Wt 141 lb (64 kg)  LMP 01/01/1970  SpO2 99%  Breastfeeding? No  BMI 24.59 kg/m2 Estimated body mass index is 24.59 kg/(m^2) as calculated from the following:    Height as of this encounter: 5' 3.5\" (1.613 m).    Weight as of this encounter: 141 lb (64 kg).  EXAM:   GENERAL " APPEARANCE: healthy, alert and no distress  EYES: Eyes grossly normal to inspection, PERRL and conjunctivae and sclerae normal  HENT: ear canals and TM's normal, nose and mouth without ulcers or lesions, oropharynx clear and oral mucous membranes moist  NECK: no adenopathy, no asymmetry, masses, or scars and thyroid normal to palpation  RESP: lungs clear to auscultation - no rales, rhonchi or wheezes  BREAST: normal without masses, tenderness or nipple discharge and no palpable axillary masses or adenopathy  CV: regular rate and rhythm, normal S1 S2, no S3 or S4, no murmur, click or rub, no peripheral edema and peripheral pulses strong  ABDOMEN: soft, nontender, no hepatosplenomegaly, no masses and bowel sounds normal  MS: no musculoskeletal defects are noted and gait is age appropriate without ataxia  SKIN: no suspicious lesions or rashes  NEURO: Normal strength and tone, sensory exam grossly normal, mentation intact and speech normal  PSYCH: mentation appears normal and affect normal/bright    ASSESSMENT / PLAN:   1. Routine general medical examination at a health care facility  Screening labs ordered.  Patient tells that she has had the shingles vaccine when she was in [place].  Records are not available.  - Lipid Profile  - Comprehensive metabolic panel  - TSH with free T4 reflex  - Hemoglobin    2. Benign essential hypertension  Blood pressure is well controlled.  Resume diltiazem  - diltiazem 180 MG 24 hr capsule; Take 1 capsule (180 mg) by mouth daily  Dispense: 90 capsule; Refill: 3    3. Hormone replacement therapy  Recommended to stop estradiol.  Side effect profile of estradiol use reviewed.  Patient has been on hormone replacement therapy for several years now.  She is instructed to start taking estradiol tablets every other day for 2 weeks and then every third day for 2 weeks and then discontinue.  Patient is in agreement to that.      End of Life Planning:  Patient currently has an advanced directive:  "Yes.  Practitioner is supportive of decision.    COUNSELING:  Reviewed preventive health counseling, as reflected in patient instructions       Regular exercise       Healthy diet/nutrition        Estimated body mass index is 24.59 kg/(m^2) as calculated from the following:    Height as of this encounter: 5' 3.5\" (1.613 m).    Weight as of this encounter: 141 lb (64 kg).       reports that she has never smoked. She has never used smokeless tobacco.      Appropriate preventive services were discussed with this patient, including applicable screening as appropriate for cardiovascular disease, diabetes, osteopenia/osteoporosis, and glaucoma.  As appropriate for age/gender, discussed screening for colorectal cancer, prostate cancer, breast cancer, and cervical cancer. Checklist reviewing preventive services available has been given to the patient.    Reviewed patients plan of care and provided an AVS. The Intermediate Care Plan ( asthma action plan, low back pain action plan, and migraine action plan) for Jamee meets the Care Plan requirement. This Care Plan has been established and reviewed with the Patient.    Counseling Resources:  ATP IV Guidelines  Pooled Cohorts Equation Calculator  Breast Cancer Risk Calculator  FRAX Risk Assessment  ICSI Preventive Guidelines  Dietary Guidelines for Americans, 2010  [employer]'s MyPlate  ASA Prophylaxis  Lung CA Screening    Farhat Casas MD  [facility] [place]  "

## 2018-03-27 NOTE — TELEPHONE ENCOUNTER
Spoke with patient and informed of below. Patient/ parent verbalized understanding and agrees with plan.    Kelly Menard RN   [facility] - Triage

## 2018-03-27 NOTE — TELEPHONE ENCOUNTER
Patient calling, states she went to pharmacy to  diltiazem 180mg and pharmacy informed her she was previously taking 120mg. She is wondering if she should be on an increased dose or if provider would like to reorder what she was previously on? Please advise.     Triage to call with response [phone] okay to leave detailed message.     Kelly Menard RN   [facility] - Triage

## 2018-03-27 NOTE — LETTER
March 28, 2018      Jamee Douglas  [address]  [place] [address]        Dear ,    I have reviewed your recent labs. Here are the results:    -Liver and gallbladder tests (ALT,AST, Alk phos,bilirubin) are normal.  -Kidney function (GFR) is decreased, but stable as compared to before  ADVISE: recheck in 6 months  -Sodium is normal.  -Potassium is normal.  -Glucose (diabetic screening test) is normal.      -LDL(bad) cholesterol level is elevated,  A diet high in fat and simple carbohydrates, genetics and being overweight can contribute to this. ADVISE: Exercise, a low fat, low carbohydrate diet and weight control are helpful to improve this.  Rechecking your fasting cholesterol panel in 6 months is recommended     -TSH (thyroid stimulating hormone) level is normal which indicates normal thyroid function.  -Hemoglobin is normal.  There is no evidence of anemia.    Results for orders placed or performed in visit on 03/27/18   Lipid Profile   Result Value Ref Range    Cholesterol 238 (H) <200 mg/dL    Triglycerides 141 <150 mg/dL    HDL Cholesterol 53 >49 mg/dL    LDL Cholesterol Calculated 157 (H) <100 mg/dL    Non HDL Cholesterol 185 (H) <130 mg/dL   Comprehensive metabolic panel   Result Value Ref Range    Sodium 139 133 - 144 mmol/L    Potassium 4.2 3.4 - 5.3 mmol/L    Chloride 105 94 - 109 mmol/L    Carbon Dioxide 26 20 - 32 mmol/L    Anion Gap 8 3 - 14 mmol/L    Glucose 86 70 - 99 mg/dL    Urea Nitrogen 17 7 - 30 mg/dL    Creatinine 0.92 0.52 - 1.04 mg/dL    GFR Estimate 58 (L) >60 mL/min/1.7m2    GFR Estimate If Black 71 >60 mL/min/1.7m2    Calcium 9.5 8.5 - 10.1 mg/dL    Bilirubin Total 0.4 0.2 - 1.3 mg/dL    Albumin 3.9 3.4 - 5.0 g/dL    Protein Total 7.3 6.8 - 8.8 g/dL    Alkaline Phosphatase 51 40 - 150 U/L    ALT 15 0 - 50 U/L    AST 18 0 - 45 U/L   TSH with free T4 reflex   Result Value Ref Range    TSH 2.98 0.40 - 4.00 mU/L   Hemoglobin   Result Value Ref Range     Hemoglobin 14.0 11.7 - 15.7 g/dL         If you have any questions or concerns, please call the clinic at the number listed above.       Sincerely,        Farhat Casas MD

## 2018-03-27 NOTE — TELEPHONE ENCOUNTER
RN attempted to call the patient x 2 and patient was having difficulty hearing the RN.  WCB at a later time    Deann Yoo RN  Triage-[employer]

## 2018-03-27 NOTE — MR AVS SNAPSHOT
After Visit Summary   3/27/2018    Jamee Douglas    MRN: [id number]           Patient Information     Date Of Birth          8/1/1932        Visit Information        Provider Department      3/27/2018 8:40 AM Farhat Casas MD [facility]        Today's Diagnoses     Routine general medical examination at a health care facility    -  1    Benign essential hypertension          Care Instructions      Preventive Health Recommendations    Female Ages 65 +    Yearly exam:     See your health care provider every year in order to  o Review health changes.   o Discuss preventive care.    o Review your medicines if your doctor has prescribed any.      You no longer need a yearly Pap test unless you've had an abnormal Pap test in the past 10 years. If you have vaginal symptoms, such as bleeding or discharge, be sure to talk with your provider about a Pap test.      Every 1 to 2 years, have a mammogram.  If you are over 69, talk with your health care provider about whether or not you want to continue having screening mammograms.      Every 10 years, have a colonoscopy. Or, have a yearly FIT test (stool test). These exams will check for colon cancer.       Have a cholesterol test every 5 years, or more often if your doctor advises it.       Have a diabetes test (fasting glucose) every three years. If you are at risk for diabetes, you should have this test more often.       At age 65, have a bone density scan (DEXA) to check for osteoporosis (brittle bone disease).    Shots:    Get a flu shot each year.    Get a tetanus shot every 10 years.    Talk to your doctor about your pneumonia vaccines. There are now two you should receive - Pneumovax (PPSV 23) and Prevnar (PCV 13).    Talk to your doctor about the shingles vaccine.    Talk to your doctor about the hepatitis B vaccine.    Nutrition:     Eat at least 5 servings of fruits and vegetables each day.      Eat whole-grain bread, whole-wheat  "pasta and brown rice instead of white grains and rice.      Talk to your provider about Calcium and Vitamin D.     Lifestyle    Exercise at least 150 minutes a week (30 minutes a day, 5 days a week). This will help you control your weight and prevent disease.      Limit alcohol to one drink per day.      No smoking.       Wear sunscreen to prevent skin cancer.       See your dentist twice a year for an exam and cleaning.      See your eye doctor every 1 to 2 years to screen for conditions such as glaucoma, macular degeneration and cataracts.          Follow-ups after your visit        Follow-up notes from your care team     Return in about 1 year (around 3/27/2019) for Annual Physical Exam.      Who to contact     If you have questions or need follow up information about today's clinic visit or your schedule please contact [facility] [place] directly at [phone].  Normal or non-critical lab and imaging results will be communicated to you by Mapkinhart, letter or phone within 4 business days after the clinic has received the results. If you do not hear from us within 7 days, please contact the clinic through Mapkinhart or phone. If you have a critical or abnormal lab result, we will notify you by phone as soon as possible.  Submit refill requests through RebelMouse or call your pharmacy and they will forward the refill request to us. Please allow 3 business days for your refill to be completed.          Additional Information About Your Visit        MapkinharAddFleet Information     RebelMouse lets you send messages to your doctor, view your test results, renew your prescriptions, schedule appointments and more. To sign up, go to [email or web address] . Click on \"Log in\" on the left side of the screen, which will take you to the Welcome page. Then click on \"Sign up Now\" on the right side of the page.     You will be asked to enter the access code listed below, as well as some personal information. Please follow the " "directions to create your username and password.     Your access code is: [email or web address]  Expires: 5/15/2018 11:52 AM     Your access code will  in 90 days. If you need help or a new code, please call your [place] clinic or [phone].        Care EveryWhere ID     This is your [facility] EveryWhere ID. This could be used by other organizations to access your [place] medical records  [id number]        Your Vitals Were     Pulse Temperature Respirations Height Last Period Pulse Oximetry    77 97.5  F (36.4  C) (Tympanic) 14 5' 3.5\" (1.613 m) 1970 99%    Breastfeeding? BMI (Body Mass Index)                No 24.59 kg/m2           Blood Pressure from Last 3 Encounters:   18 139/77   18 145/68   10/20/17 134/74    Weight from Last 3 Encounters:   18 141 lb (64 kg)   18 142 lb 3.2 oz (64.5 kg)   10/20/17 138 lb (62.6 kg)              We Performed the Following     Comprehensive metabolic panel     Hemoglobin     Lipid Profile     TSH with free T4 reflex          Today's Medication Changes          These changes are accurate as of 3/27/18  9:06 AM.  If you have any questions, ask your nurse or doctor.               These medicines have changed or have updated prescriptions.        Dose/Directions    diltiazem 180 MG 24 hr capsule   This may have changed:  how to take this   Used for:  Benign essential hypertension   Changed by:  Farhat Casas MD        Dose:  180 mg   Take 1 capsule (180 mg) by mouth daily   Quantity:  90 capsule   Refills:  3         Stop taking these medicines if you haven't already. Please contact your care team if you have questions.     estradiol 0.5 MG tablet   Commonly known as:  ESTRACE   Stopped by:  Farhat Casas MD                Where to get your medicines      These medications were [facility] [id number] [address] [facility] [place] [facility] [place] [facility] [address]  [address] [address]     Phone:  [phone]     diltiazem 180 MG 24 hr capsule "                Primary Care Provider Office Phone # Fax #    Katlyn Mallory [phone] [address]  [place] [address]        Equal Access to Services     DIMA GRACE : Hadii radha ku zandrao Soomaali, waaxda luqadaha, qaybta kaalmada adeegyada, waxalthea reyezn lio duong laBevlynette valentino. So [facility] [phone].    ATENCIÓN: Si habla español, tiene a hills disposición servicios gratuitos de asistencia lingüística. Llame al [phone].    We comply with applicable federal civil rights laws and [place] laws. We do not discriminate on the basis of race, color, national origin, age, disability, sex, sexual orientation, or gender identity.            Thank you!     Thank you for choosing [facility] [place]  for your care. Our goal is always to provide you with excellent care. Hearing back from our patients is one way we can continue to improve our services. Please take a few minutes to complete the written survey that you may receive in the mail after your visit with us. Thank you!             Your Updated Medication List - Protect others around you: Learn how to safely use, store and throw away your medicines at www.disposemymeds.org.          This list is accurate as of 3/27/18  9:06 AM.  Always use your most recent med list.                   Brand Name Dispense Instructions for use Diagnosis    aspirin 81 MG tablet      Take 1 tablet by mouth daily.        biotin 2.5 mg/mL Susp      Take by mouth daily        diltiazem 180 MG 24 hr capsule     90 capsule    Take 1 capsule (180 mg) by mouth daily    Benign essential hypertension       Multi-vitamin Tabs tablet   Generic drug:  multivitamin, therapeutic with minerals      Take 1 tablet by mouth daily.        Travoprost 0.004 % Soln      Apply 1 drop to eye        VITAMIN B 12 PO      Take 1,000 mcg by mouth        VITAMIN D3 PO      Take by mouth daily

## 2018-03-27 NOTE — NURSING NOTE
"Chief Complaint   Patient presents with     Medicare Visit       Initial /77 (Cuff Size: Adult Regular)  Pulse 77  Temp 97.5  F (36.4  C) (Tympanic)  Resp 14  Ht 5' 3.5\" (1.613 m)  Wt 141 lb (64 kg)  LMP 01/01/1970  SpO2 99%  Breastfeeding? No  BMI 24.59 kg/m2 Estimated body mass index is 24.59 kg/(m^2) as calculated from the following:    Height as of this encounter: 5' 3.5\" (1.613 m).    Weight as of this encounter: 141 lb (64 kg).  Medication Reconciliation: complete   Idalia Gamez, CMA    "

## 2018-04-16 ENCOUNTER — TELEPHONE (OUTPATIENT)
Dept: FAMILY MEDICINE | Facility: CLINIC | Age: 83
End: 2018-04-16

## 2018-04-16 DIAGNOSIS — Z79.890 HORMONE REPLACEMENT THERAPY: ICD-10-CM

## 2018-04-16 NOTE — TELEPHONE ENCOUNTER
Left non-detailed message to call the clinic back at [phone] and ask to speak with a  triage nurse.   Ruthann Valles RN

## 2018-04-16 NOTE — TELEPHONE ENCOUNTER
Reason for Call:  Other call back    Detailed comments: pt would  like to talk to someone about being put back on estradiol    Phone Number Patient can be reached at: Home number on file [phone] (home)    Best Time: any    Can we leave a detailed message on this number? YES    Call taken on 4/16/2018 at 3:32 PM by Tricia Cook

## 2018-04-17 RX ORDER — ESTRADIOL 0.5 MG/1
0.5 TABLET ORAL DAILY
Qty: 90 TABLET | Refills: 3 | Status: SHIPPED | OUTPATIENT
Start: 2018-04-17 | End: 2019-07-03

## 2018-04-17 NOTE — TELEPHONE ENCOUNTER
Patient notified with information noted below from provider and agrees with plan.  Sheila Gordon RN - Triage  [facility]

## 2018-04-17 NOTE — TELEPHONE ENCOUNTER
Non-detailed message left to return our call.  Sheila Gordon RN - Triage  [facility]

## 2018-04-17 NOTE — TELEPHONE ENCOUNTER
Patient states that provider wanted her to get off of estriol and she notes that she tried for 3 weeks and states that she is too old to go though all this night sweats and stuff.  She reports that she does not feel as good as she used to.  States that she understands the risks and states that a provider in [place] told her that at her ae there is no reason for her to get off it.      She would like to go back on it.  Please advise, pharmacy and previous order pended  Sheila Gordon RN - Triage  [facility]

## 2018-05-21 ENCOUNTER — TRANSFERRED RECORDS (OUTPATIENT)
Dept: HEALTH INFORMATION MANAGEMENT | Facility: CLINIC | Age: 83
End: 2018-05-21

## 2019-03-11 DIAGNOSIS — I10 BENIGN ESSENTIAL HYPERTENSION: ICD-10-CM

## 2019-03-12 RX ORDER — DILTIAZEM HYDROCHLORIDE 120 MG/1
CAPSULE, COATED, EXTENDED RELEASE ORAL
Qty: 90 CAPSULE | Refills: 0 | Status: SHIPPED | OUTPATIENT
Start: 2019-03-12 | End: 2019-06-08

## 2019-03-12 NOTE — TELEPHONE ENCOUNTER
Prescription approved per [facility] Refill Protocol.    Tatum LOPEZ RN  EP Triage

## 2019-06-08 DIAGNOSIS — I10 BENIGN ESSENTIAL HYPERTENSION: ICD-10-CM

## 2019-06-10 RX ORDER — DILTIAZEM HYDROCHLORIDE 120 MG/1
CAPSULE, COATED, EXTENDED RELEASE ORAL
Qty: 30 CAPSULE | Refills: 0 | Status: SHIPPED | OUTPATIENT
Start: 2019-06-10 | End: 2021-01-29

## 2019-06-10 NOTE — TELEPHONE ENCOUNTER
Patient due for fasting office visit- 30 days supply given.  Routing to team to schedule appointment     Katelynn Jo RN  [facility]  [phone]

## 2019-06-10 NOTE — TELEPHONE ENCOUNTER
"Requested Prescriptions   Pending Prescriptions Disp Refills     diltiazem ER COATED BEADS (CARDIZEM CD/CARTIA XT) 120 MG 24 hr capsule [Pharmacy Med Name: DILTIAZEM 24HR  MG CAP] 90 capsule 0     Sig: TAKE 1 CAPSULE BY MOUTH DAILY       Calcium Channel Blockers Protocol  Failed - 6/8/2019 12:26 AM        Failed - Blood pressure under 140/90 in past 12 months     BP Readings from Last 3 Encounters:   03/27/18 139/77   02/14/18 145/68   10/20/17 134/74            Last Written Prescription Date:  3/12/2019  Last Fill Quantity: 90,  # refills: 0   Last office visit: 3/27/2018 with prescribing provider:  ALEXANDER Casas  Future Office Visit:         Failed - Normal ALT in past 12 months     Recent Labs   Lab Test 03/27/18  0909   ALT 15             Failed - Recent (12 mo) or future (30 days) visit within the authorizing provider's specialty     Patient had office visit in the last 12 months or has a visit in the next 30 days with authorizing provider or within the authorizing provider's specialty.  See \"Patient Info\" tab in inbasket, or \"Choose Columns\" in Meds & Orders section of the refill encounter.              Failed - Normal serum creatinine on file in past 12 months     Recent Labs   Lab Test 03/27/18  0909   CR 0.92             Passed - Medication is active on med list        Passed - Patient is age 18 or older        Passed - No active pregnancy on record        Passed - No positive pregnancy test in past 12 months          "

## 2019-06-12 NOTE — TELEPHONE ENCOUNTER
Spoke with patient over the phone and she informed me she will be getting her care from a different physician and refills in the future from that physician.  Mary Ann Zhao MA

## 2019-08-01 DIAGNOSIS — Z79.890 HORMONE REPLACEMENT THERAPY: ICD-10-CM

## 2019-08-01 NOTE — TELEPHONE ENCOUNTER
"Requested Prescriptions   Pending Prescriptions Disp Refills     estradiol (ESTRACE) 0.5 MG tablet [Pharmacy Med Name: ESTRADIOL 0.5 MG TABLET] 30 tablet 0     Sig: TAKE 1 TABLET BY MOUTH EVERY DAY       Hormone Replacement Therapy Failed - 8/1/2019  1:25 AM        Failed - Blood pressure under 140/90 in past 12 months     BP Readings from Last 3 Encounters:   03/27/18 139/77   02/14/18 145/68   10/20/17 134/74                 Failed - Recent (12 mo) or future (30 days) visit within the authorizing provider's specialty     Patient had office visit in the last 12 months or has a visit in the next 30 days with authorizing provider or within the authorizing provider's specialty.  See \"Patient Info\" tab in inbasket, or \"Choose Columns\" in Meds & Orders section of the refill encounter.              Passed - Medication is active on med list        Passed - Patient is 18 years of age or older        Passed - No active pregnancy on record        Passed - No positive pregnancy test on record in past 12 months        Last Written Prescription Date:  7/3/2019  Last Fill Quantity: 30,  # refills: 0   Last office visit: 3/27/2018 with prescribing provider:  3/27/2018   Future Office Visit:      "

## 2019-08-01 NOTE — TELEPHONE ENCOUNTER
Routing refill request to provider for review/approval because:  Yadira given x1 and patient did not follow up, please advise  Patient has already been called x3 and a letter sent from last request on 7/3/19 to come in for a fasting office visit.    PCP to make final decision.    Justyna Curtis RN, BSN  [facility]

## 2019-08-01 NOTE — TELEPHONE ENCOUNTER
I haven't see Jamee since 2017 so I will defer to Dr. Casas who has seen her twice more recently.

## 2019-08-02 RX ORDER — ESTRADIOL 0.5 MG/1
TABLET ORAL
Qty: 30 TABLET | Refills: 0 | OUTPATIENT
Start: 2019-08-02

## 2019-08-02 NOTE — TELEPHONE ENCOUNTER
On her annual examination in March, she was instructed to wean off of estradiol.  I am not comfortable with her being on Hormone replacement therapy at this age.  I would not be prescribing the medication.     She may need to follow-up with Dr. Mallory to get further refills.    Farhat Casas MD  [facility], [place] [place]

## 2021-01-29 ENCOUNTER — APPOINTMENT (OUTPATIENT)
Dept: CT IMAGING | Facility: CLINIC | Age: 86
DRG: 085 | End: 2021-01-29
Attending: EMERGENCY MEDICINE
Payer: COMMERCIAL

## 2021-01-29 ENCOUNTER — APPOINTMENT (OUTPATIENT)
Dept: GENERAL RADIOLOGY | Facility: CLINIC | Age: 86
DRG: 085 | End: 2021-01-29
Attending: EMERGENCY MEDICINE
Payer: COMMERCIAL

## 2021-01-29 ENCOUNTER — HOSPITAL ENCOUNTER (INPATIENT)
Facility: CLINIC | Age: 86
LOS: 6 days | Discharge: NURSING, REHAB OR LONG-TERM CARE | DRG: 085 | End: 2021-02-04
Attending: EMERGENCY MEDICINE | Admitting: INTERNAL MEDICINE
Payer: COMMERCIAL

## 2021-01-29 DIAGNOSIS — I63.9 ACUTE ISCHEMIC STROKE (H): Primary | ICD-10-CM

## 2021-01-29 DIAGNOSIS — S09.90XA INJURY OF HEAD, INITIAL ENCOUNTER: ICD-10-CM

## 2021-01-29 DIAGNOSIS — I60.9 SUBARACHNOID HEMORRHAGE (H): ICD-10-CM

## 2021-01-29 LAB
ANION GAP SERPL CALCULATED.3IONS-SCNC: 4 MMOL/L (ref 3–14)
BASOPHILS # BLD AUTO: 0.1 10E9/L (ref 0–0.2)
BASOPHILS NFR BLD AUTO: 0.7 %
BUN SERPL-MCNC: 13 MG/DL (ref 7–30)
CALCIUM SERPL-MCNC: 9 MG/DL (ref 8.5–10.1)
CHLORIDE SERPL-SCNC: 106 MMOL/L (ref 94–109)
CO2 SERPL-SCNC: 31 MMOL/L (ref 20–32)
CREAT SERPL-MCNC: 0.7 MG/DL (ref 0.52–1.04)
DIFFERENTIAL METHOD BLD: NORMAL
EOSINOPHIL # BLD AUTO: 0.5 10E9/L (ref 0–0.7)
EOSINOPHIL NFR BLD AUTO: 6.1 %
ERYTHROCYTE [DISTWIDTH] IN BLOOD BY AUTOMATED COUNT: 13.8 % (ref 10–15)
GFR SERPL CREATININE-BSD FRML MDRD: 77 ML/MIN/{1.73_M2}
GLUCOSE SERPL-MCNC: 101 MG/DL (ref 70–99)
HCT VFR BLD AUTO: 36.8 % (ref 35–47)
HGB BLD-MCNC: 11.7 G/DL (ref 11.7–15.7)
IMM GRANULOCYTES # BLD: 0 10E9/L (ref 0–0.4)
IMM GRANULOCYTES NFR BLD: 0.4 %
INR PPP: 1 (ref 0.86–1.14)
LABORATORY COMMENT REPORT: NORMAL
LYMPHOCYTES # BLD AUTO: 1.7 10E9/L (ref 0.8–5.3)
LYMPHOCYTES NFR BLD AUTO: 20.4 %
MCH RBC QN AUTO: 29.3 PG (ref 26.5–33)
MCHC RBC AUTO-ENTMCNC: 31.8 G/DL (ref 31.5–36.5)
MCV RBC AUTO: 92 FL (ref 78–100)
MONOCYTES # BLD AUTO: 0.6 10E9/L (ref 0–1.3)
MONOCYTES NFR BLD AUTO: 7.7 %
NEUTROPHILS # BLD AUTO: 5.4 10E9/L (ref 1.6–8.3)
NEUTROPHILS NFR BLD AUTO: 64.7 %
NRBC # BLD AUTO: 0 10*3/UL
NRBC BLD AUTO-RTO: 0 /100
PLATELET # BLD AUTO: 227 10E9/L (ref 150–450)
POTASSIUM SERPL-SCNC: 3.4 MMOL/L (ref 3.4–5.3)
RADIOLOGIST FLAGS: ABNORMAL
RBC # BLD AUTO: 3.99 10E12/L (ref 3.8–5.2)
SARS-COV-2 RNA RESP QL NAA+PROBE: NEGATIVE
SODIUM SERPL-SCNC: 141 MMOL/L (ref 133–144)
SPECIMEN SOURCE: NORMAL
WBC # BLD AUTO: 8.3 10E9/L (ref 4–11)

## 2021-01-29 PROCEDURE — 73562 X-RAY EXAM OF KNEE 3: CPT | Mod: RT

## 2021-01-29 PROCEDURE — 70450 CT HEAD/BRAIN W/O DYE: CPT

## 2021-01-29 PROCEDURE — 80048 BASIC METABOLIC PNL TOTAL CA: CPT | Performed by: EMERGENCY MEDICINE

## 2021-01-29 PROCEDURE — 87635 SARS-COV-2 COVID-19 AMP PRB: CPT | Performed by: EMERGENCY MEDICINE

## 2021-01-29 PROCEDURE — 120N000001 HC R&B MED SURG/OB

## 2021-01-29 PROCEDURE — 258N000003 HC RX IP 258 OP 636: Performed by: INTERNAL MEDICINE

## 2021-01-29 PROCEDURE — 99207 PR APP CREDIT; MD BILLING SHARED VISIT: CPT | Performed by: INTERNAL MEDICINE

## 2021-01-29 PROCEDURE — 85025 COMPLETE CBC W/AUTO DIFF WBC: CPT | Performed by: EMERGENCY MEDICINE

## 2021-01-29 PROCEDURE — 99285 EMERGENCY DEPT VISIT HI MDM: CPT | Mod: 25

## 2021-01-29 PROCEDURE — C9803 HOPD COVID-19 SPEC COLLECT: HCPCS

## 2021-01-29 PROCEDURE — 85610 PROTHROMBIN TIME: CPT | Performed by: EMERGENCY MEDICINE

## 2021-01-29 RX ORDER — HYDRALAZINE HYDROCHLORIDE 20 MG/ML
20 INJECTION INTRAMUSCULAR; INTRAVENOUS EVERY 4 HOURS PRN
Status: DISCONTINUED | OUTPATIENT
Start: 2021-01-29 | End: 2021-02-04

## 2021-01-29 RX ORDER — LIDOCAINE 40 MG/G
CREAM TOPICAL
Status: DISCONTINUED | OUTPATIENT
Start: 2021-01-29 | End: 2021-02-04 | Stop reason: HOSPADM

## 2021-01-29 RX ORDER — ONDANSETRON 2 MG/ML
4 INJECTION INTRAMUSCULAR; INTRAVENOUS EVERY 6 HOURS PRN
Status: DISCONTINUED | OUTPATIENT
Start: 2021-01-29 | End: 2021-02-04 | Stop reason: HOSPADM

## 2021-01-29 RX ORDER — ACETAMINOPHEN 325 MG/1
650 TABLET ORAL EVERY 4 HOURS PRN
Status: DISCONTINUED | OUTPATIENT
Start: 2021-01-29 | End: 2021-01-30

## 2021-01-29 RX ORDER — DORZOLAMIDE HYDROCHLORIDE AND TIMOLOL MALEATE 20; 5 MG/ML; MG/ML
1 SOLUTION/ DROPS OPHTHALMIC 2 TIMES DAILY
Status: ON HOLD | COMMUNITY
End: 2021-02-17

## 2021-01-29 RX ORDER — ONDANSETRON 4 MG/1
4 TABLET, ORALLY DISINTEGRATING ORAL EVERY 6 HOURS PRN
Status: DISCONTINUED | OUTPATIENT
Start: 2021-01-29 | End: 2021-02-04 | Stop reason: HOSPADM

## 2021-01-29 RX ORDER — DORZOLAMIDE HYDROCHLORIDE AND TIMOLOL MALEATE 20; 5 MG/ML; MG/ML
1 SOLUTION/ DROPS OPHTHALMIC 2 TIMES DAILY
Status: DISCONTINUED | OUTPATIENT
Start: 2021-01-29 | End: 2021-02-04 | Stop reason: HOSPADM

## 2021-01-29 RX ORDER — TRAVOPROST OPHTHALMIC SOLUTION 0.04 MG/ML
1 SOLUTION OPHTHALMIC AT BEDTIME
Status: DISCONTINUED | OUTPATIENT
Start: 2021-01-29 | End: 2021-02-04 | Stop reason: HOSPADM

## 2021-01-29 RX ORDER — SODIUM CHLORIDE 9 MG/ML
INJECTION, SOLUTION INTRAVENOUS CONTINUOUS
Status: DISCONTINUED | OUTPATIENT
Start: 2021-01-29 | End: 2021-01-31

## 2021-01-29 RX ORDER — ESTRADIOL 0.5 MG/1
0.5 TABLET ORAL
Status: ON HOLD | COMMUNITY
End: 2021-02-04

## 2021-01-29 RX ORDER — TRAVOPROST OPHTHALMIC SOLUTION 0.04 MG/ML
1 SOLUTION OPHTHALMIC AT BEDTIME
Status: ON HOLD | COMMUNITY
End: 2021-02-17

## 2021-01-29 RX ADMIN — SODIUM CHLORIDE: 9 INJECTION, SOLUTION INTRAVENOUS at 23:16

## 2021-01-29 ASSESSMENT — ENCOUNTER SYMPTOMS
NECK STIFFNESS: 0
ARTHRALGIAS: 1
BACK PAIN: 0
WOUND: 0
DIZZINESS: 0
NECK PAIN: 0

## 2021-01-29 ASSESSMENT — MIFFLIN-ST. JEOR: SCORE: 997.88

## 2021-01-30 ENCOUNTER — APPOINTMENT (OUTPATIENT)
Dept: CT IMAGING | Facility: CLINIC | Age: 86
DRG: 085 | End: 2021-01-30
Attending: PHYSICIAN ASSISTANT
Payer: COMMERCIAL

## 2021-01-30 ENCOUNTER — APPOINTMENT (OUTPATIENT)
Dept: OCCUPATIONAL THERAPY | Facility: CLINIC | Age: 86
DRG: 085 | End: 2021-01-30
Attending: INTERNAL MEDICINE
Payer: COMMERCIAL

## 2021-01-30 ENCOUNTER — APPOINTMENT (OUTPATIENT)
Dept: SPEECH THERAPY | Facility: CLINIC | Age: 86
DRG: 085 | End: 2021-01-30
Attending: INTERNAL MEDICINE
Payer: COMMERCIAL

## 2021-01-30 ENCOUNTER — APPOINTMENT (OUTPATIENT)
Dept: PHYSICAL THERAPY | Facility: CLINIC | Age: 86
DRG: 085 | End: 2021-01-30
Attending: INTERNAL MEDICINE
Payer: COMMERCIAL

## 2021-01-30 LAB
ANION GAP SERPL CALCULATED.3IONS-SCNC: 5 MMOL/L (ref 3–14)
BUN SERPL-MCNC: 10 MG/DL (ref 7–30)
CALCIUM SERPL-MCNC: 8.4 MG/DL (ref 8.5–10.1)
CHLORIDE SERPL-SCNC: 110 MMOL/L (ref 94–109)
CO2 SERPL-SCNC: 26 MMOL/L (ref 20–32)
CREAT SERPL-MCNC: 0.72 MG/DL (ref 0.52–1.04)
ERYTHROCYTE [DISTWIDTH] IN BLOOD BY AUTOMATED COUNT: 13.9 % (ref 10–15)
GFR SERPL CREATININE-BSD FRML MDRD: 75 ML/MIN/{1.73_M2}
GLUCOSE SERPL-MCNC: 90 MG/DL (ref 70–99)
HCT VFR BLD AUTO: 35.8 % (ref 35–47)
HGB BLD-MCNC: 11.4 G/DL (ref 11.7–15.7)
MCH RBC QN AUTO: 29.4 PG (ref 26.5–33)
MCHC RBC AUTO-ENTMCNC: 31.8 G/DL (ref 31.5–36.5)
MCV RBC AUTO: 92 FL (ref 78–100)
PLATELET # BLD AUTO: 215 10E9/L (ref 150–450)
POTASSIUM SERPL-SCNC: 3.5 MMOL/L (ref 3.4–5.3)
RBC # BLD AUTO: 3.88 10E12/L (ref 3.8–5.2)
SODIUM SERPL-SCNC: 141 MMOL/L (ref 133–144)
WBC # BLD AUTO: 6.8 10E9/L (ref 4–11)

## 2021-01-30 PROCEDURE — 99207 PR CDG-CHARGE REQUIRED MANUAL ENTRY: CPT | Performed by: INTERNAL MEDICINE

## 2021-01-30 PROCEDURE — 97165 OT EVAL LOW COMPLEX 30 MIN: CPT | Mod: GO

## 2021-01-30 PROCEDURE — 92526 ORAL FUNCTION THERAPY: CPT | Mod: GN

## 2021-01-30 PROCEDURE — 70450 CT HEAD/BRAIN W/O DYE: CPT

## 2021-01-30 PROCEDURE — 250N000013 HC RX MED GY IP 250 OP 250 PS 637: Performed by: INTERNAL MEDICINE

## 2021-01-30 PROCEDURE — 85027 COMPLETE CBC AUTOMATED: CPT | Performed by: INTERNAL MEDICINE

## 2021-01-30 PROCEDURE — 97530 THERAPEUTIC ACTIVITIES: CPT | Mod: GO

## 2021-01-30 PROCEDURE — 72125 CT NECK SPINE W/O DYE: CPT

## 2021-01-30 PROCEDURE — 258N000003 HC RX IP 258 OP 636: Performed by: INTERNAL MEDICINE

## 2021-01-30 PROCEDURE — 120N000001 HC R&B MED SURG/OB

## 2021-01-30 PROCEDURE — 36415 COLL VENOUS BLD VENIPUNCTURE: CPT | Performed by: INTERNAL MEDICINE

## 2021-01-30 PROCEDURE — 97116 GAIT TRAINING THERAPY: CPT | Mod: GP

## 2021-01-30 PROCEDURE — 97530 THERAPEUTIC ACTIVITIES: CPT | Mod: GP

## 2021-01-30 PROCEDURE — 80048 BASIC METABOLIC PNL TOTAL CA: CPT | Performed by: INTERNAL MEDICINE

## 2021-01-30 PROCEDURE — 99223 1ST HOSP IP/OBS HIGH 75: CPT | Performed by: PHYSICIAN ASSISTANT

## 2021-01-30 PROCEDURE — 97110 THERAPEUTIC EXERCISES: CPT | Mod: GP

## 2021-01-30 PROCEDURE — 92610 EVALUATE SWALLOWING FUNCTION: CPT | Mod: GN

## 2021-01-30 PROCEDURE — 99207 PR CDG-CHARGE REQUIRED MANUAL ENTRY: CPT | Performed by: STUDENT IN AN ORGANIZED HEALTH CARE EDUCATION/TRAINING PROGRAM

## 2021-01-30 PROCEDURE — 97161 PT EVAL LOW COMPLEX 20 MIN: CPT | Mod: GP

## 2021-01-30 PROCEDURE — 99222 1ST HOSP IP/OBS MODERATE 55: CPT | Mod: AI | Performed by: INTERNAL MEDICINE

## 2021-01-30 PROCEDURE — 250N000011 HC RX IP 250 OP 636: Performed by: INTERNAL MEDICINE

## 2021-01-30 RX ORDER — ACETAMINOPHEN 325 MG/1
650 TABLET ORAL EVERY 4 HOURS PRN
Status: DISCONTINUED | OUTPATIENT
Start: 2021-01-30 | End: 2021-02-04 | Stop reason: HOSPADM

## 2021-01-30 RX ORDER — ACETAMINOPHEN 650 MG/1
650 SUPPOSITORY RECTAL EVERY 4 HOURS PRN
Status: DISCONTINUED | OUTPATIENT
Start: 2021-01-30 | End: 2021-02-04 | Stop reason: HOSPADM

## 2021-01-30 RX ADMIN — HYDRALAZINE HYDROCHLORIDE 20 MG: 20 INJECTION INTRAMUSCULAR; INTRAVENOUS at 02:18

## 2021-01-30 RX ADMIN — DORZOLAMIDE HYDROCHLORIDE AND TIMOLOL MALEATE 1 DROP: 20; 5 SOLUTION/ DROPS OPHTHALMIC at 20:21

## 2021-01-30 RX ADMIN — SODIUM CHLORIDE: 9 INJECTION, SOLUTION INTRAVENOUS at 09:11

## 2021-01-30 RX ADMIN — ACETAMINOPHEN 650 MG: 325 TABLET, FILM COATED ORAL at 15:50

## 2021-01-30 RX ADMIN — ACETAMINOPHEN 650 MG: 650 SUPPOSITORY RECTAL at 02:06

## 2021-01-30 RX ADMIN — TRAVOPROST 1 DROP: 0.04 SOLUTION/ DROPS OPHTHALMIC at 20:21

## 2021-01-30 RX ADMIN — DORZOLAMIDE HYDROCHLORIDE AND TIMOLOL MALEATE 1 DROP: 20; 5 SOLUTION/ DROPS OPHTHALMIC at 08:28

## 2021-01-30 RX ADMIN — SODIUM CHLORIDE: 9 INJECTION, SOLUTION INTRAVENOUS at 20:21

## 2021-01-30 RX ADMIN — TRAVOPROST 1 DROP: 0.04 SOLUTION/ DROPS OPHTHALMIC at 00:02

## 2021-01-30 RX ADMIN — DORZOLAMIDE HYDROCHLORIDE AND TIMOLOL MALEATE 1 DROP: 20; 5 SOLUTION/ DROPS OPHTHALMIC at 00:02

## 2021-01-30 ASSESSMENT — ACTIVITIES OF DAILY LIVING (ADL)
ADLS_ACUITY_SCORE: 16
ADLS_ACUITY_SCORE: 16
ADLS_ACUITY_SCORE: 15
ADLS_ACUITY_SCORE: 13
ADLS_ACUITY_SCORE: 16
ADLS_ACUITY_SCORE: 13

## 2021-01-30 NOTE — PROGRESS NOTES
[facility]    Medicine Progress Note - Hospitalist Service       Date of Admission:  1/29/2021  Assessment & Plan       Jamee Douglas is 88 who had a stroke x2 in December was at rehab center and discharged back to her facility in assisted living, but who had a fall onto the back, striking her occipital area and sustaining multiple areas of subarachnoid hemorrhage, but overall stable neurological exam, being admitted for further treatment and monitoring.     Multiple acute SAH  Acute SDH  Noted on CT head 1/29/21 after presentation after a fall. Remains neurologically intact  - Appreciate neurosurgery consult  - hold ASA  - PT/OT/SLP    Mechanical Fall  Fall likely due to ongoing deficits from recent acute strokes in December. Nothing in history to suggest other cause.  - continue tele  - PT/OT    Recent acute ischemic stroke x2 in December 2020  Started on DAPT initially but transitioned to just ASA at time of admission.   - hold ASA in setting of acute SDH and SAH.     Glaucoma  - continue PTA eye gtts.        Diet: Regular Diet Adult    DVT Prophylaxis: Pneumatic Compression Devices  Pierce Catheter: not present  Code Status: Full Code           Disposition Plan   Expected discharge: 1-2days, recommended to TCU or ARU once safe disposition plan/ TCU bed available and neurosurg clearance  Entered: Jamari Ribeiro MD 01/30/2021, 3:08 PM       The patient's care was discussed with the Bedside Nurse and Patient.    >35min spent today with >50% in counseling or coordination of care.    Jamari Ribeiro MD  Hospitalist Service  [facility]  Contact information available via [facility] Paging/Directory    ______________________________________________________________________    Interval History   Patient feels well. No specific complaints. Only very mild HA.     ROS 8pt negative.    Data reviewed today: I reviewed all medications, new labs and imaging results over  the last 24 hours. I personally reviewed the head CT image(s) showing stable areas of acute hemorrhage.    Physical Exam   Vital Signs: Temp: 97.9  F (36.6  C) Temp src: Oral BP: (!) 148/67 Pulse: 78   Resp: 16 SpO2: 100 % O2 Device: None (Room air)    Weight: 132 lbs 0 oz  Constitutional: Awake, alert, cooperative, no apparent cardiopulmonary distress.  Eyes: Conjunctiva and pupils examined and normal.  HEENT: Moist mucous membranes, normal dentition.  Respiratory: Clear to auscultation bilaterally, no crackles or wheezing.  Cardiovascular: Regular rate and rhythm, normal S1 and S2, and no murmur noted.  GI: Soft, non-distended, non-tender, normal bowel sounds.  Lymph/Hematologic: No anterior cervical or supraclavicular adenopathy.  Skin: No rashes, no cyanosis, no edema noted on exposed skin.  Musculoskeletal: No joint swelling, erythema or tenderness. No gross bony abnormalities  Neurologic: Cranial nerves 2-12 grossly intact, normal strength and sensation.  Psychiatric: Alert, oriented to person, place and time, no obvious anxiety or depression.      Data   Recent Labs   Lab 01/30/21  1015 01/29/21 2010   WBC 6.8 8.3   HGB 11.4* 11.7   MCV 92 92    227   INR  --  1.00    141   POTASSIUM 3.5 3.4   CHLORIDE 110* 106   CO2 26 31   BUN 10 13   CR 0.72 0.70   ANIONGAP 5 4   POORNIMA 8.4* 9.0   GLC 90 101*     Recent Results (from the past 24 hour(s))   XR Knee Right 3 Views    Narrative    EXAM: XR KNEE RT 3 VW  LOCATION: [facility]  DATE/TIME: 1/29/2021 6:23 PM    INDICATION: Fall and pain.  COMPARISON: None.      Impression    IMPRESSION: Postoperative changes of right total knee arthroplasty and patellar resurfacing. Components appear well seated. No evidence for fracture. No significant effusion.   Head CT w/o contrast   Result Value    Radiologist flags Acute intracranial hemorrhage (AA)    Narrative    CT SCAN OF THE HEAD WITHOUT CONTRAST   1/29/2021 6:44 PM     HISTORY: Head trauma,  minor (Age >= 65y)    TECHNIQUE:  Axial images of the head and coronal reformations without  IV contrast material.  Radiation dose for this scan was reduced using  automated exposure control, adjustment of the mA and/or kV according  to patient size, or iterative reconstruction technique.    COMPARISON: None.    FINDINGS: Focal globular areas of acute appearing subarachnoid  hemorrhages are predominantly in the subarachnoid space are noted in  the medial inferior left frontal lobe, the anterior left frontal  region, the left frontoparietal vertex, and the right medial frontal  lobe are noted. There is also a small amount of acute subdural  hemorrhage along the right paramedian falx measuring approximately 3  mm in thickness. Mild to moderate cerebral atrophy is noted. There is  also some high density in the globus pallidus region which is felt to  be due to calcification. Moderately extensive white matter changes  also noted along with multiple old lacunar infarcts. There is no  evidence for acute infarct or skull fracture. Visualized paranasal  sinuses and mastoid air cells are clear. Calcification in proximal  right middle cerebral artery noted.      Impression    IMPRESSION:  1. Several acute-appearing areas of intracranial hemorrhage in  primarily the subarachnoid spaces. There is no associated mass effect.  2. Cerebral atrophy with chronic white matter changes and multiple old  lacunar infarcts. The latter most likely due to chronic small vessel  ischemic disease.  3.   [Critical Result: Acute intracranial hemorrhage]    Finding was identified on 1/29/2021 6:47 PM.     Dr. Earl was contacted by me on 1/29/2021 6:53 PM and verbalized  understanding of the critical result.     ROBERT HEREDIA MD   CT Head w/o Contrast    Narrative    CT HEAD WITHOUT CONTRAST 1/30/2021 9:51 AM    INDICATION: Parenchymal hemorrhage, follow-up.    TECHNIQUE: CT scan of the head without contrast. Dose reduction  techniques were  used.  CONTRAST: None.  COMPARISON: 1/29/2021    FINDINGS: Scattered foci of subarachnoid hemorrhage in the bifrontal  and left parietal region are stable versus prior. No new hyperdense  intracranial hemorrhage. Probably interval development of a shallow  subdural hygroma over the right frontal region measuring 2 mm in  thickness. Moderate presumed chronic small vessel ischemic changes.  Mild generalized volume loss. The ventricles are proportional to the  sulci. Osseous structures are intact. Unremarkable orbits. Paranasal  sinuses are free of significant disease. Clear mastoid air cells.      Impression    IMPRESSION:  1. Stable scattered foci of subarachnoid hemorrhage. No new hyperdense  hemorrhage.  2. Suspect interval development of a thin subdural hygroma over the  right frontal region. No intracranial mass effect.    TAIWO MILLER MD   CT Cervical Spine w/o Contrast    Narrative    CT CERVICAL SPINE WITHOUT CONTRAST 1/30/2021 9:52 AM    INDICATION: Neck trauma (Age >= 65y).    TECHNIQUE: CT scan of the cervical spine without contrast. Dose  reduction techniques were used.    COMPARISON: None.    FINDINGS: Normal alignment. Vertebral body heights preserved. No  fracture. No prevertebral soft tissue swelling. Multilevel  degenerative changes with central disc herniation at C3-C4 that  results in probably moderate central spinal canal narrowing. More mild  degenerative central spinal canal stenosis below this. Left-sided  neural foraminal narrowing most pronounced at C5-C6. Right-sided  neural foraminal narrowing most pronounced at C6-C7.      Impression    IMPRESSION:  No fracture or definite acute injury in the cervical  spine.    TAIWO MILLER MD     Medications     sodium chloride 100 mL/hr at 01/30/21 0911       dorzolamide-timolol  1 drop Right Eye BID     travoprost KORINA FREE  1 drop Both Eyes At Bedtime

## 2021-01-30 NOTE — PROGRESS NOTES
Patient's daughter Olinda would like to be updated with plan of care, as needed. Plans to visit patient in AM if possible.

## 2021-01-30 NOTE — PLAN OF CARE
Nursing shift note  Pt here with SAH after fall. A&O x4. Neuros intact except baseline L sided hemiparesis, L facial droop. Denies and N&T. VSS on RA and SBP <150. Tele NSR. Tolerating regular diet. Up A2/gb/walker. Voiding. Denies HA, c/o intermittent tenderness to knee. Discharge to TCU pending.   3-7 Addendum: Neuros remain unchanged. Voiding in BR, A2/gb/walker. Tylenol given for head tenderness from fall. Discharge to ARU pending insurance auth/approval.     Behavior & Aggression Tool color: Green      Pt's belongings:   (Belongings from admission day present in pt's room)               Home medications: N

## 2021-01-30 NOTE — ED NOTES
Bed: ED04  Expected date:   Expected time:   Means of arrival:   Comments:  436 - Fall, head lac 88F, ETA 12mins

## 2021-01-30 NOTE — PHARMACY-ADMISSION MEDICATION HISTORY
Pharmacy Medication History  Admission medication history interview status for the 1/29/2021  admission is complete. See EPIC admission navigator for prior to admission medications     Location of Interview: Phone  Medication history sources: Patient and Care Everywhere      Significant changes made to the medication list:  Removed: biotin, vitamin B12, diltiazem  Added: dorzolamide-timolol  Changed: estradiol frequency, travoprost instructions    In the past week, patient estimated taking medication this percent of the time: greater than 90%    Additional medication history information:   none    Medication reconciliation completed by provider prior to medication history? No    Time spent in this activity: 15 mins      Prior to Admission medications    Medication Sig Last Dose Taking? Auth Provider   aspirin 81 MG tablet Take 1 tablet by mouth daily. 1/29/2021 at AM Yes Reported, Patient   Cholecalciferol (VITAMIN D3) 10 MCG (400 UNIT) CAPS Take 400 Units by mouth daily  1/29/2021 at AM Yes Reported, Patient   dorzolamide-timolol (COSOPT) 2-0.5 % ophthalmic solution Place 1 drop into the right eye 2 times daily 1/29/2021 at AM Yes Unknown, Entered By History   estradiol (ESTRACE) 0.5 MG tablet Take 0.5 mg by mouth MWF 1/29/2021 at AM Yes Unknown, Entered By History   Multiple Vitamin (MULTI-VITAMIN) per tablet Take 1 tablet by mouth daily. 1/29/2021 at AM Yes Reported, Patient   travoprost KORINA FREE (TRAVATAN Z) 0.004 % ophthalmic solution Place 1 drop into both eyes At Bedtime 1/28/2021 at PM Yes Unknown, Entered By History     Deann Mcgee, SeD

## 2021-01-30 NOTE — PROVIDER NOTIFICATION
MD Notification    Notified Person: MD    Notified Person Name: valdo cortez    Notification Date/Time:0011    Notification Interaction: page    Purpose of Notification: Pt with Slight L facial droop on assessment. unsure if new or residual from previous CVA. all other neuros unchanged. thanks!    Orders Received:    Comments:

## 2021-01-30 NOTE — PROGRESS NOTES
Patient arrived from ED around 2300 for a SAH after a fall at home. VSS with the exception of slightly elevated BP. (Dr. Bañuelos paged for PRN meds). Denies pain. A&0x4. Bedrest. Seizure pads placed. Report given to oncoming RN.

## 2021-01-30 NOTE — PROGRESS NOTES
Contacted regarding small ICH.    Head CT:  IMPRESSION:  1. Several acute-appearing areas of intracranial hemorrhage in  primarily the subarachnoid spaces. There is no associated mass effect.  2. Cerebral atrophy with chronic white matter changes and multiple old  lacunar infarcts. The latter most likely due to chronic small vessel  ischemic disease.    RECOMMENDATIONS:  Admit for observation.  Every 2 hour neuro checks.  BP less than 150  Repeat head CT tomorrow morning.    EVAN Barron  [facility]  [address] [address]    Tel [phone]  Pager [phone]

## 2021-01-30 NOTE — ED NOTES
MD notified that patient does not have an IV placed at this time. Per MD, RN to wait to place IV; MD to speak to neuro then will speak with patient and family.

## 2021-01-30 NOTE — H&P
Admitted:     01/29/2021      PRIMARY CARE PHYSICIAN:  Sang Antonio MD       CHIEF COMPLAINT:  Fall with head injury to the back.      HISTORY OF PRESENT ILLNESS:  Jamee Douglas is an 88-year-old  female who was living independently.  The patient had multiple strokes in December of last year.  She was seen at an outside hospital where she was admitted with left hemiplegia and frontal headaches and diagnosed with a right ROBBY ischemic stroke confirmed on MRI.  She was outside the TPA window.  She was treated with Plavix.  The patient was at a rehab center when she unfortunately had another stroke a week later, at which time her Plavix was stopped and she was discharged to a rehab center.  The patient has been there for about 4 weeks and was working with therapy.  Her therapist told her that she needs probably 2 more weeks of therapy.  She also had another fall while she was in the therapy center.  She apparently got indication from her insurance company that she has improved enough to be discharged, so the patient was subsequently discharged home and was staying at an assisted living facility.  The patient has been home for the last 2 days and today the patient was using her walker and she fell backwards, lost her balance and hit the back of her head.  The patient complained of pain in the back of her head.  She denied any loss of consciousness.  She was brought to [facility] for further assessment.      In the Emergency Department, the patient was seen by Dr. Pamela Earl.  The patient was afebrile with elevated blood pressure, normal oxygen saturation at presentation.  The patient underwent blood work including BMP, which was normal with borderline low potassium 3.4.  CBC was normal.  Glucose is 101.  COVID test was negative.  She had complained of right knee pain.  X-ray revealed right knee arthroplasty and patellar resurfacing, no evidence of fracture.  No effusion.  Head CT was done  without contrast.  This showed several acute-appearing areas of intracranial hemorrhage, primarily in the subarachnoid space, no associated mass effect.  There is cerebral atrophy noted as well as small vessel ischemic disease.  This case was discussed with Neurosurgical PA, who recommended inpatient admission with frequent neuro checks and neurosurgical followup in the morning.      The patient was interviewed.  Currently, other than pain in the back of her scalp, she denies any nausea, vision changes, chest pain, shortness of breath or any other areas of discomfort other than the knee discomfort.  The patient is being admitted to neuro special care.      PAST MEDICAL HISTORY:   1.  History of multiple strokes in 12/2020, also with multiple falls.   2.  Glaucoma.   3.  Hyperlipidemia.   4.  Osteoarthritis.      PAST SURGICAL HISTORY:   1.  Back surgery.   2.  Cataract removal.   3.  Cholecystectomy.   4.  Hysterectomy.   5.  Right total knee arthroplasty.   6.  Shoulder surgery.   7.  Tonsillectomy.   8.  Thumb surgery.      FAMILY HISTORY:  Father with prostate cancer.  Sister with cancer.  Brother, colon cancer.      ALLERGIES:  PENICILLIN.      SOCIAL HISTORY:  No tobacco, no alcohol.  Next of kin is her daughter.  She is full code.      CURRENT MEDICATIONS:   1.  Aspirin.   2.  Vitamin D.   3.  Cosopt eyedrops.   4.  Estradiol.   5.  Multivitamin.   6. Travoprost eyedrops.      REVIEW OF SYSTEMS:  Ten points reviewed and as dictated in history of present illness.      PHYSICAL EXAMINATION:   HEENT:  The patient has some scalp tenderness in the back.  Head is otherwise atraumatic.  Pupils equal.  Slight facial droop.  Tongue is midline.  Mucous membranes are moist.   NECK:  No neck discomfort and intact range of motion.   CHEST:  Clear to auscultation.   CARDIOVASCULAR:  S1, S2, regular rate and rhythm.  No murmurs, gallops or rubs.   GASTROINTESTINAL:  Soft, nontender, normoactive bowel sounds.    MUSCULOSKELETAL:  No edema.  Some tenderness of the right knee.  No arm trauma.   NEUROLOGIC:  She is able to move all 4 extremities.  Cranial nerves are grossly intact, other than some slight droop.   PSYCHIATRIC:  Mood and affect, normal and stable.   SKIN:  Warm, dry, well perfused.      LABORATORY AND IMAGING STUDIES:  As dictated in the history of present illness.      ASSESSMENT:  Jamee Ramirez is 88 who had a stroke x2 in December was at rehab center and discharged back to her facility in assisted living, but who had a fall onto the back, striking her occipital area and sustaining what appears to be multiple areas of subarachnoid hemorrhage, but overall stable neurological exam, being admitted for further treatment and monitoring.      PLAN:   1.  Fall with subarachnoid hemorrhage.  The patient will be admitted to the neuro floor.  She will have neuro checks every 2 hours under neuro special care.  We will repeat a head CT in the morning and obtain a neurosurgical consultation.  The patient was seen by therapies.  Once again, would likely need to go back to a rehab facility.   2.  Recent strokes.  The patient had 2 strokes in the month of December.  She is on aspirin.  We are going to hold further blood thinning medications given her fall and subarachnoid hemorrhage.  As stated, the patient will be seen by PT and OT.   3.  Glaucoma.  The patient will continue her eyedrops.   4.  Deep venous thrombosis prophylaxis.  The patient will receive compression boots.      CODE STATUS:  Full.         JOSE LUIS SPARROW MD             D: 2021   T: 2021   MT: STALIN      Name:     JAMEE RAMIREZ   MRN:      [id number]        Account:      [id number]   :      1932        Admitted:     2021                   Document: [id number]       cc: Sagn Antonio MD

## 2021-01-30 NOTE — ED PROVIDER NOTES
History   Chief Complaint:  Fall and Head Injury     The history is provided by the patient.      Mason Douglas is a 88 year old female with history of stroke on 12/9 and 12/15, not on blood thinners, who presents with fall and head injury. EMS notes that the patient recent change from her rehab center for her prior stroke to her nursing home 2 days ago. She reports that she had fell today and hit her head but denies bleeding, wound or syncope. EMS report that mason had fell at her rehab center as well. She notes here that her right knee has some pain but denies any hip pain or neck pain.    Per chart review, the patient was seen on 12/9 for left sided weakness with noted stroke and was hospitalized until 12/11. She was then seen again on 12/17 for new stroke as well and her Plavix was stopped and was discharged to her rehab center on 12/18.  She is on no other blood thinning medication.  She takes a baby aspirin daily.      Review of Systems   Musculoskeletal: Positive for arthralgias (right knee pain). Negative for back pain, gait problem, neck pain and neck stiffness.   Skin: Negative for wound.   Neurological: Negative for dizziness and syncope.   All other systems reviewed and are negative.    Allergies:  Penicillins    Medications:  Aspirin   Diltiazem    Estrace     Past Medical History:    Glaucoma   Hyperlipidemia   Osteoarthritis   CVA x2    Past Surgical History:    Arthroplasty knee   Hysterectomy    Right shoulder surgery   Glaucoma surgery   Back surgery    Family History:    Prostate cancer, father   Colon cancer, brother   Lung cancer, sister    Social History:  Smoking status: never smoker  Alcohol use: no  Drug use: no  PCP: Katlyn Aguayo [place]  Presents to the ED via EMS with daughter on the way    Physical Exam     Patient Vitals for the past 24 hrs:   BP Temp Temp src Pulse Resp SpO2 Height Weight   01/29/21 2015 132/59 -- -- 76 -- 97 % -- --   01/29/21 1815 106/69 98.4  F (36.9  C)  "Temporal 81 16 98 % 1.6 m (5' 3\") 59.9 kg (132 lb)       Physical Exam  General: Well-nourished,  appears to be resting comfortably when I enter the room  Eyes: PERRL, conjunctivae pink no scleral icterus or conjunctival injection  ENT:  Moist mucus membranes, posterior oropharynx clear without erythema or exudates  Respiratory:  Lungs clear to auscultation bilaterally, no crackles/rubs/wheezes.  Good air movement  CV: Normal rate and rhythm, no murmurs/rubs/gallops  GI:  Abdomen soft and non-distended.  Normoactive BS.  No tenderness, guarding or rebound  Skin: Warm, dry.  No rashes or petechiae.   Musculoskeletal: No peripheral edema or calf tenderness.  Contusion over right occiput.  No lacerations  Neuro: Alert and oriented to person/place/time. PERRL, EOMI no nystagmus, no aphasia/facial droop/dysarthria, tongue midline, symmetric palatal elevation, normal strength at SCM/trapezius/BUE/BLE, normal coordination to FNF at BUE, gait deferred, negative romberg, sensation intact to LT over face/BUE/BLE  Psychiatric: Normal affect    Emergency Department Course     Imaging:  Head CT:  1. Several acute-appearing areas of intracranial hemorrhage in   primarily the subarachnoid spaces. There is no associated mass effect.   2. Cerebral atrophy with chronic white matter changes and multiple old   lacunar infarcts. The latter most likely due to chronic small vessel   ischemic disease.   3.   [Critical Result: Acute intracranial hemorrhage]     Finding was identified on 1/29/2021 6:47 PM.     Dr. Earl was contacted by me on 1/29/2021 6:53 PM and verbalized   understanding of the critical result.       Reading per radiology     XR knee right:  Postoperative changes of right total knee arthroplasty and patellar resurfacing. Components appear well seated. No evidence for fracture. No significant effusion.     Reading per radiology      Laboratory:  CBC: WBC: 8.3, HGB: 11.7, PLT: 227  BMP: Glucose 101(H0, o/w WNL (Creatinine: " 0.70)  INR: 1.00  COVID-19 virus Swab PCR: pending      Emergency Department Course:  Reviewed:  I reviewed the patient's nursing notes, vitals, past medical records, Care Everywhere.     Assessments:  1813 I performed an assessment and examination of the patient as noted above.     1951 Findings and plan explained to the Patient and daughter who consents to admission. Discussed the patient with Dr. Bañuelos, who will admit the patient to a ICU bed for further monitoring, evaluation, and treatment.     Consults:   1854 I spoke with radiology regarding CT head findings with Dr. Lynn     1857 I spoke with Dr. Lynn of the Radiology service regarding patient's presentation, findings, and plan of care.     1943 I spoke with JO Arevalo of the Neurosurgery service regarding patient's presentation, findings, and plan of care.     2124  I spoke to Dr. Bañuelos of the hospitalist service who accepts the patient for admission.      Disposition:  The patient was admitted to the hospital under the care of Dr. Bañuelos.       Impression & Plan   Medical Decision Making:  Jamee Douglas is a 88 year old female who presents with a fall and head injury.  She has a complicated past medical history including recurrent strokes, recurrent falls and subarachnoid hemorrhage anatomy was apparent head CT today appears to show new subarachnoid hemorrhage.  She has no signs of mass-effect.  She is neurologically intact.  I spoke with neurosurgery and they recommended admission with every 2 hours neuro checks.  She is not on anticoagulations to be reversed.  She and her daughter were in agreement with the plan for admission.  Dr. Bañuelos graciously agreed to admit her.    Covid-19  Jamee Douglas was evaluated during a global COVID-19 pandemic, which necessitated consideration that the patient might be at risk for infection with the SARS-CoV-2 virus that causes COVID-19.   Applicable protocols for evaluation were followed during the  patient's care.   COVID-19 was considered as part of the patient's evaluation. The plan for testing is:  a test was obtained during this visit.     National Institutes of Health Stroke Scale (Baseline)  Time Performed: 6:15pm     Score    Level of consciousness: (0)   Alert, keenly responsive    LOC questions: (0)   Answers both questions correctly    LOC commands: (0)   Performs both tasks correctly    Best gaze: (0)   Normal    Visual: (0)   No visual loss    Facial palsy: (0)   Normal symmetrical movements    Motor arm (left): (0)   No drift    Motor arm (right): (0)   No drift    Motor leg (left): (0)   No drift    Motor leg (right): (0)   No drift    Limb ataxia: (0)   Absent    Sensory: (0)   Normal- no sensory loss    Best language: (0)   Normal- no aphasia    Dysarthria: (0)   Normal    Extinction and inattention: (0)   No abnormality        Total Score:  0      Diagnosis:    ICD-10-CM    1. Subarachnoid hemorrhage (H)  I60.9 Basic metabolic panel   2. Injury of head, initial encounter  S09.90XA        Scribe Disclosure:  Rosalie OLIVARES, am serving as a scribe at 6:13 PM on 1/29/2021 to document services personally performed by Pamela Earl MD based on my observations and the provider's statements to me.              Pamela Earl MD  01/30/21 0136

## 2021-01-30 NOTE — DISCHARGE INSTRUCTIONS
Follow up with Dr. Brooke Finnegan at [facility] neurology within 4 weeks    You are discharging to:    [facility]  [address] [place]  [address]    [phone]    Your risk factors for stroke or TIA (transient ischemic attack):    Your Risk Factors Your Results Normal Ranges   High blood pressure BP Readings from Last 1 Encounters:   02/04/21 136/74    Less than 120/80   Cholesterol              Total Lab Results   Component Value Date    CHOL 238 03/27/2018      Less than 150    Triglycerides   Lab Results   Component Value Date    TRIG 141 03/27/2018    Less than 150   LDL Lab Results   Component Value Date     03/27/2018       Less than 70   HDL Lab Results   Component Value Date    HDL 53 03/27/2018            Greater than 40 (men)  Greater than 50 (women)   Diabetes Recent Labs   Lab 02/01/21  1006   GLC 85    Fasting blood glucose    Smoking/tobacco use  Quit smoking and tobacco   Overweight  Lose 1-2 pounds a week   Lack of exercise  30 minutes moderate activity each day   Other risk factors include carotid (neck) artery disease, atrial fibrillation and stress. You may be on new medicine to treat high blood pressure, cholesterol, diabetes or atrial fibrillation.    Understanding Stroke Booklet given to patient. Please refer to booklet for further information.    Stroke warning signs and symptoms - CALL 911 right away for:  - Sudden numbness or weakness in the face, arm or leg (often on one side of the body).  - Sudden confusion or trouble understanding what is going on.  - Sudden blurred or decreased vision in one or both eyes.  - Sudden trouble speaking, loss of balance, dizziness or problems with coordination.  - Sudden, severe headache for no reason.  - Fainting or seizures.  - Symptoms may go away then come back suddenly.

## 2021-01-30 NOTE — CONSULTS
Consult Date:  01/30/2021      ASSESSMENT:     1.  An 88-year-old female presenting after a fall with imaging findings revealing several acute-appearing intracranial hemorrhages, primarily in the subarachnoid spaces with a small right falx subdural hematoma   2.  Cervical pain.      PLAN:  From the neurosurgical standpoint for her intracranial hemorrhages will repeat a head CT, however no operative neurosurgical intervention indicated. For the cervical pain will obtain a cervical CT.     TYPE OF VISIT:  Neurosurgical Service was consulted to see Ms. Douglas for intracranial hemorrhage.      HISTORY OF PRESENT ILLNESS:  Ms. Douglas is an 88-year-old female with a past medical history of multiple strokes 12/2020.  Previously anticoagulated on Plavix, but that has been discontinued prior to this admission.  Also has a history of multiple falls.  Nevertheless, chart review indicates she was admitted to [facility] 12/2020 with left hemiplegia and frontal headache, diagnosed with a right ROBBY ischemic stroke, treated with Plavix.  She was discharged to rehab and had a second stroke approximately a week after the first one.  Plavix was stopped and she was again discharged to rehabilitation.  The patient was transferred back to rehab for about a month or so working with therapy.  She was discharged home to an assisted living facility where she had an unwitnessed fall and was brought to the emergency room.  Head CT revealed intracranial hemorrhage and Neurosurgery was consulted.  Currently, her primary complaint is posterior neck pain but denies headache or other complaints.      PAST MEDICAL HISTORY:   1.  Multiple strokes as described above, 12/2020.   2.  Frequent falls.   3.  Glaucoma.   4.  Osteoarthritis.   5.  Hyperlipidemia.      PAST SURGICAL HISTORY:  Hysterectomy, right total knee arthroplasty, back surgery, cataract surgery, cholecystectomy, tonsillectomy.      FAMILY HISTORY:  Father with prostate  cancer.  Sister with cancer, brother with colon cancer.      SOCIAL HISTORY:  Currently living in an assisted living facility.  Her daughter is involved in her care.  No tobacco or alcohol use.      ALLERGIES:  PENICILLIN.      MEDICATIONS:  Include aspirin.      PHYSICAL EXAMINATION:   VITAL SIGNS:  Temperature is 98.1, respiratory rate is 18, pulse is 82, blood pressure is 119/68.   GENERAL:  She is awake and alert, in no acute distress, pleasant during the exam.   MUSCULOSKELETAL:  She has pain on palpation of the posterior cervical spine, but otherwise she actually is able to move all 4 extremities fairly well.  She only has minimal left leg weakness primarily on hip flexion.     NEUROLOGIC: Otherwise, she is awake and alert and intact.      IMAGING STUDIES:  Included a head CT performed yesterday in the emergency room where she has acute subarachnoid hemorrhages, primarily in the subarachnoid space of the medial inferior left frontal lobe, anterior left frontal lobe and left frontotemporal vertex as well as right medial frontal lobe, small acute right falx subdural hematoma measuring 3 mm.      LABORATORY DATA:  COVID negative.  INR is 1.  Basic metabolic panel with sodium of 141.  CBC with a white count of 8.3 and a platelet count of 227.      Total time spent with the patient 70 minutes, including 50 minutes of counseling and coordination of care.  Discussed with Dr. Allison Nash.      As dictated by JO MULLER            D: 2021   T: 2021   MT: STALIN      Name:     ALVA RAMIREZ   MRN:      [id number]        Account:       [id number]   :      1932           Consult Date:  2021      Document: [id number]

## 2021-01-30 NOTE — PROGRESS NOTES
01/30/21 1427   Quick Adds   Type of Visit Initial PT Evaluation   Living Environment   People in home alone   Current Living Arrangements assisted living   Home Accessibility no concerns   Transportation Anticipated family or friend will provide   Living Environment Comments Pt recently moved to [facility]. Pt had been hospitalized in Dec 2020 with stroke x2, had been at TCU for rehab and moved to [facility] after, had previously lived independently in her own apartment.    Self-Care   Usual Activity Tolerance moderate   Current Activity Tolerance fair   Equipment Currently Used at Home shower chair;walker, rolling;wheelchair, manual   Activity/Exercise/Self-Care Comment Prior to strokes in Dec 2020, pt exercised daily (walking) and was very active. Completed all ADLs/IADLs independently. Reports using a walker recently.   Disability/Function   Hearing Difficulty or Deaf no   Patient's preferred means of communication English speaker with hearing loss, no speech problems.  (has hearing aids )   Use of hearing assistive devices bilateral hearing aids   Wear Glasses or Blind yes   Fall history within last six months yes   Number of times patient has fallen within last six months 2   Change in Functional Status Since Onset of Current Illness/Injury yes   General Information   Onset of Illness/Injury or Date of Surgery 01/29/21   Referring Physician Colby Bañuelos MD   Patient/Family Therapy Goals Statement (PT) To return to home   Pertinent History of Current Problem (include personal factors and/or comorbidities that impact the POC) 88 who had a stroke x2 in December was at rehab center and discharged back to her facility in assisted living, but who had a fall onto the back, striking her occipital area and sustaining what appears to be multiple areas of subarachnoid hemorrhage, but overall stable neurological exam, being admitted for further treatment and monitoring.    Existing Precautions/Restrictions fall   General  Observations Greeted patient supine in bed.   Cognition   Orientation Status (Cognition) oriented x 4   Affect/Mental Status (Cognition) WFL   Follows Commands (Cognition) WFL   Pain Assessment   Patient Currently in Pain No   Integumentary/Edema   Integumentary/Edema Comments scattered bruising from falls   Posture    Posture Forward head position;Protracted shoulders   Range of Motion (ROM)   ROM Comment B LE WFL   Strength   Strength Comments B LE functionally weak - L leg weaker than R leg   Bed Mobility   Comment (Bed Mobility) supine > EOB at modA   Transfers   Transfer Safety Comments sit <> stand with FWW at Anthony; posterior LOB, requires multiple attemps to stand due to difficulty with anterior weight shift   Gait/Stairs (Locomotion)   Comment (Gait/Stairs) 10ft with FWW at CGA; R sided path deviation and neglect of obstacles in R environment   Balance   Balance Comments requires use of FWW; a couple of minor LOB requiring Anthony to recover during session   Sensory Examination   Sensory Perception patient reports no sensory changes   Clinical Impression   Criteria for Skilled Therapeutic Intervention yes, treatment indicated   PT Diagnosis (PT) impaired functional mobility   Influenced by the following impairments B LE weakness (L side greater than R side); impaired balance; decreased activity tolerance   Functional limitations due to impairments bed mobility, transfers, ambulation   Clinical Presentation Stable/Uncomplicated   Clinical Presentation Rationale PMH, current presentation, clinical judgement   Clinical Decision Making (Complexity) low complexity   Therapy Frequency (PT) 2x/day   Predicted Duration of Therapy Intervention (days/wks) 3 days   Planned Therapy Interventions (PT) balance training;bed mobility training;gait training;home exercise program;patient/family education;neuromuscular re-education;strengthening;transfer training   Risk & Benefits of therapy have been explained  evaluation/treatment results reviewed;care plan/treatment goals reviewed;patient   PT Discharge Planning    PT Discharge Recommendation (DC Rec) Acute Rehab Center-Motivated patient will benefit from intensive, interdisciplinary therapy.  Anticipate will be able to tolerate 3 hours of therapy per day   PT Rationale for DC Rec PT - Patient has good family support, demonstrates motivation for participating in therapy and has potential to regain prior level of function to baseline independence. Patient appropriate for ARU discharge and would benefit from intense physical therapy in order to improve functional mobility to baseline, increase lower extremity strength and improve balance for decreasing fall risk. Anticipate that patient will be able to tolerate 3hrs of therapy per day.    Total Evaluation Time   Total Evaluation Time (Minutes) 8

## 2021-01-30 NOTE — PROGRESS NOTES
Bedside Swallow Evaluation      01/30/21 1100   General Information   Onset of Illness/Injury or Date of Surgery 01/29/21   Referring Physician Maricruz Yancey RN   Patient/Family Therapy Goal Statement (SLP) To re-assess swallow function   Pertinent History of Current Problem Jamee Douglas is 88 who had a stroke x2 in December was at rehab center and discharged back to her facility in assisted living, but who had a fall onto the back, striking her occipital area and sustaining what appears to be multiple areas of subarachnoid hemorrhage, but overall stable neurological exam, being admitted for further treatment and monitoring.    General Observations Pleasant and chatting with family member   Past History of Dysphagia Hx of speech therapy secondary to CVA. Last on regular diet with thin liquids at TCU. Per paperwork that daughter had, ST targeting exercises and swallow strategies,       Present no   Type of Evaluation   Type of Evaluation Swallow Evaluation   Oral Motor   Oral Musculature generally intact   Dentition (Oral Motor)   Dentition (Oral Motor) natural dentition   Facial Symmetry (Oral Motor)   Facial Symmetry (Oral Motor) WNL   Lip Function (Oral Motor)   Lip Range of Motion (Oral Motor) WNL   Tongue Function (Oral Motor)   Tongue ROM (Oral Motor) WNL   Jaw Function (Oral Motor)   Jaw Function (Oral Motor) WNL   Cough/Swallow/Gag Reflex (Oral Motor)   Soft Palate/Velum (Oral Motor) WNL   Volitional Throat Clear/Cough (Oral Motor) WNL   Vocal Quality/Secretion Management (Oral Motor)   Vocal Quality (Oral Motor) WFL   Secretion Management (Oral Motor) WNL   General Swallowing Observations   Current Diet/Method of Nutritional Intake (General Swallowing Observations, NIS) NPO   Respiratory Support (General Swallowing Observations) none   Swallowing Evaluation Clinical swallow evaluation   Clinical Swallow Evaluation   Feeding Assistance no assistance needed   Clinical Swallow  Evaluation Textures Trialed Thin Liquids;Puree Textures;Solid Foods   Clinical Swallow Eval: Thin Liquid Texture Trial   Mode of Presentation, Thin Liquids cup;straw;self-fed   Volume of Liquid or Food Presented 2 oz   Oral Phase of Swallow other (see comments)  (holding of bolus in oral cavity (baseline per daughter))   Pharyngeal Phase of Swallow intact   Diagnostic Statement per daughter, always holds liquids in her mouth before she swallows. no coughing, choking, or change in voice/respiratory status    Clinical Swallow Evaluation: Puree Solid Texture Trial   Mode of Presentation, Puree spoon;self-fed   Volume of Puree Presented 2 bites   Oral Phase, Puree WFL   Pharyngeal Phase, Puree intact   Diagnostic Statement dislike of pudding, no difficulties   Clinical Swallow Evaluation: Solid Food Texture Trial   Mode of Presentation, Solid self-fed   Volume of Solid Food Presented 1 cracker   Oral Phase, Solid Residue in oral cavity   Oral Residue, Solid soft palate   Pharyngeal Phase, Solid intact   Diagnostic Statement mild oral residue in teeth and palate, cleared with liquid rinse, adequate mastication, no coughing with liquid rinse   Esophageal Phase of Swallow   Patient reports or presents with symptoms of esophageal dysphagia No   Swallowing Recommendations   Diet Consistency Recommendations regular diet;thin liquids   Supervision Level for Intake patient independent   Mode of Delivery Recommendations slow rate of intake   Swallowing Maneuver Recommendations alternate food and liquid intake   Recommended Feeding/Eating Techniques (Swallow Eval) maintain upright sitting position for eating   Medication Administration Recommendations, Swallowing (SLP) Whole, 1 at a time   Comment, Swallowing Recommendations Tolerated thins via cup and straw, no coughing. She consumed 1 cracker with appropriate oral and pharyngeal phase.    SLP Therapy Assessment/Plan   Criteria for Skilled Therapeutic Interventions Met (SLP  Eval) no problems identified which require skilled intervention   SLP Diagnosis functional swallow   Rehab Potential (SLP Eval) good, to achieve stated therapy goals   Therapy Frequency (SLP Eval) one time eval and treatment only   Comment, Therapy Assessment/Plan (SLP) Patient presents with functional swallow during evaluation today. She consumed thin liquids, purees, and solids with no difficulties, coughing, or choking. Per daughter, no change since they had lunch yesterday. Recommend regular diet with thin liquids. Continue to eat slow, alternate consistencies, and sit upright when eating.   Therapy Plan Review/Discharge Plan (SLP)   Therapy Plan Review (SLP) evaluation/treatment results reviewed;care plan/treatment goals reviewed;risks/benefits reviewed;current/potential barriers reviewed;participants voiced agreement with care plan;participants included;patient;daughter   SLP Discharge Planning    SLP Discharge Recommendation (DC Rec) Transitional Care Facility   SLP Rationale for DC Rec No dysphagia need, may need cognitive/lingusitic eval and tx pending progress   SLP Brief overview of current status  Regular diet with thin liquids. Alternate consistencies, slow rate, upright for PO.     Total Evaluation Time   Total Evaluation Time (Minutes) 18

## 2021-01-30 NOTE — ED TRIAGE NOTES
Arrives via EMS from her assisted living home. Moved there 2 days ago. Daughter was visiting and within 2 minutes of when she left patient had a fall. Hit back of head. Has history of a prior stroke as well as a fall while at rehab a couple weeks ago. Now reports mild head pain and some left knee pain

## 2021-01-30 NOTE — PROGRESS NOTES
RECEIVING UNIT ED HANDOFF REVIEW    ED Nurse Handoff Report was reviewed by: Afua Felton RN on January 29, 2021 at 9:53 PM

## 2021-01-30 NOTE — ED NOTES
"[facility]  ED Nurse Handoff Report    ED Chief complaint: Fall and Head Injury      ED Diagnosis:   Final diagnoses:   Subarachnoid hemorrhage (H)   Injury of head, initial encounter       Code Status: Full Code    Allergies:   Allergies   Allergen Reactions     Penicillins Swelling       Patient Story: Per MD's note: \"Mason Douglas is a 88 year old female with history of stroke on 12/9 and 12/15, not on blood thinners, who presents with fall and head injury. EMS notes that the patient recent change from her rehab center for her prior stroke to her nursing home 2 days ago. She reports that she had fell today and hit her head but denies bleeding, wound or syncope. EMS report that mason had fell at her rehab center as well. She notes here that her right knee has some pain but denies any hip pain or neck pain.     Per chart review, the patient was seen on 12/9 for left sided weakness with noted stroke and was hospitalized until 12/11. She was then seen again on 12/17 for new stroke as well and her Plavix was stopped and was discharged to her rehab center on 12/18. \"    Focused Assessment:    Neuro; WNL, a&ox4, mild deficits to right side from previous strokes, report no new deficits today, denies HA   Resp : WNL  Cards: WNL  GI: WNL  : WNL  Musculo: uses wheelchair at rehab, has been bedrest in ED due to CT results of head bleed  Head: left jaw/temporal ecchymosis, pt reports she has bruising to her left cheek from previous fall    Treatments and/or interventions provided: labs, IV, CT, Xray of R knee  Patient's response to treatments and/or interventions: +    To be done/followed up on inpatient unit:  inpatient orders, neuro checks    Does this patient have any cognitive concerns?: Hx Head Trauma and A&Ox4    Activity level - Baseline/Home:  Wheelchair /pt was at rehab center   Activity Level - Current:   Total Care and bedrest in ED    Patient's Preferred language: English   " "Needed?: No    Isolation: None  Infection: Not Applicable  Patient tested for COVID 19 prior to admission: YES  Bariatric?: No    Vital Signs:   Vitals:    01/29/21 1815 01/29/21 2015   BP: 106/69 132/59   Pulse: 81 76   Resp: 16    Temp: 98.4  F (36.9  C)    TempSrc: Temporal    SpO2: 98% 97%   Weight: 59.9 kg (132 lb)    Height: 1.6 m (5' 3\")        Cardiac Rhythm:     Was the PSS-3 completed:   Yes  What interventions are required if any?               Family Comments: Daughter Olinda present at admission and while in ED  OBS brochure/video discussed/provided to patient/family: N/A              Name of person given brochure if not patient: n/a              Relationship to patient: n/a    For the majority of the shift this patient's behavior was Green.   Behavioral interventions performed were n/a.    ED NURSE PHONE NUMBER: [phone]         "

## 2021-01-30 NOTE — PROGRESS NOTES
01/30/21 1100   Quick Adds   Type of Visit Initial Occupational Therapy Evaluation   Living Environment   People in home alone   Current Living Arrangements assisted living   Home Accessibility no concerns   Transportation Anticipated family or friend will provide   Living Environment Comments Pt recently moved to [facility]. Pt had been hospitalized in Dec 2020 with stroke x2, had been at TCU for rehab and moved to [facility] after, had previously lived independently in her own apartment.    Self-Care   Usual Activity Tolerance moderate   Current Activity Tolerance fair   Regular Exercise Other (see comments)   Equipment Currently Used at Home shower chair;walker, rolling;wheelchair, manual   Activity/Exercise/Self-Care Comment Pt's daughter present for first part of eval, reports that prior to strokes in Dec 2020, pt exercised daily (walking) and was very active. Completed all ADLs/IADLs independently.    Disability/Function   Hearing Difficulty or Deaf no   Patient's preferred means of communication English speaker with hearing loss, no speech problems.  (has hearing aids )   Use of hearing assistive devices bilateral hearing aids   Wear Glasses or Blind yes   Vision Management glasses at all times    Fall history within last six months yes   Number of times patient has fallen within last six months 2   Change in Functional Status Since Onset of Current Illness/Injury yes   General Information   Onset of Illness/Injury or Date of Surgery 01/29/21   Referring Physician Colby Bañuelos MD   Patient/Family Therapy Goal Statement (OT) To go to TCU for further therapy after dc    Additional Occupational Profile Info/Pertinent History of Current Problem Jamee Douglas is 88 who had a stroke x2 in December was at rehab center and discharged back to her facility in assisted living, but who had a fall onto the back, striking her occipital area and sustaining what appears to be multiple areas of subarachnoid hemorrhage, but  overall stable neurological exam, being admitted for further treatment and monitoring.    Performance Patterns (Routines, Roles, Habits) Prior to Dec 2020 stroke, pt completing all ADL/IADL and mobility independently. Since then, pt has required assist with all ADL/IADL and mobility from either TCU or [facility] staff.    Existing Precautions/Restrictions fall;seizures   General Observations and Info Pt deconditioned, unsteady on feet, LUE with decreased strength and coordination compared to RUE.    Cognitive Status Examination   Orientation Status orientation to person, place and time   Affect/Mental Status (Cognitive) WFL   Follows Commands follows one-step commands   Safety Deficit insight into deficits/self-awareness   Cognitive Status Comments Pt's cognition appears to be grossly intact; however, further cognitive assessment warranted d/t multiple falls, at least one of which she hit her head.    Visual Perception   Visual Impairment/Limitations corrective lenses full-time  (Pt reports no visual changes at this time )   Sensory   Sensory Quick Adds No deficits were identified   Pain Assessment   Patient Currently in Pain No   Integumentary/Edema   Integumentary/Edema other (describe)   Integumentary/Edema Comments Pt with bruising on L side of face, per pt and daughter report this occurred after pt fell out of her wheelchair    Posture   Posture not impaired   Range of Motion Comprehensive   General Range of Motion bilateral upper extremity ROM WFL   Strength Comprehensive (MMT)   Comment, General Manual Muscle Testing (MMT) Assessment Pt with LUE slightly weaker than RUE, 4-/5 LUE and 4/5 RUE    Muscle Tone Assessment   Muscle Tone Quick Adds No deficits were identified   Coordination   Upper Extremity Coordination Left UE impaired   Coordination Comments Pt with mildly impaired LUE finger to thumb opposition    Bed Mobility   Assistive Device (Bed Mobility) bed rails   Comment (Bed Mobility) Pt moved supine to sit  EOB with max A at shoulders and verbal cues for sequencing.    Transfers   Transfer Comments Pt stood with mod A and GB, recommend walker for further mobility    Balance   Balance Comments Pt unsteady on feet when standing but no overt LOB noted    Activities of Daily Living   BADL Assessment/Intervention lower body dressing   Lower Body Dressing Assessment/Training   [place] Level (Lower Body Dressing) maximum assist (25% patient effort)   Position (Lower Body Dressing) edge of bed sitting   Comment (Lower Body Dressing) OT provided max A for pt to don shoes while seated EOB (already wearing socks.) Pt reports she has fallen forward out of her wheelchair and is unsteady with sitting balance during ADL tasks.    Instrumental Activities of Daily Living (IADL)   IADL Comments Pt recently moved to [facility] and has assist with all IADL    Clinical Impression   Criteria for Skilled Therapeutic Interventions Met (OT) yes;meets criteria   OT Diagnosis Decreased independence and safety with ADL/IADL and functional mobility    OT Problem List-Impairments impacting ADL activity tolerance impaired;balance;coordination;mobility;strength   Assessment of Occupational Performance 1-3 Performance Deficits   Identified Performance Deficits ADL, IADL, functional mobility    Planned Therapy Interventions (OT) ADL retraining;balance training;bed mobility training;cognition;fine motor coordination training;neuromuscular re-education;ROM;strengthening;stretching;transfer training;progressive activity/exercise   Clinical Decision Making Complexity (OT) low complexity   Therapy Frequency (OT) 5x/week   Predicted Duration of Therapy 1 week    Anticipated Equipment Needs Upon Discharge (OT) shower chair;walker, rolling   Risk & Benefits of therapy have been explained evaluation/treatment results reviewed;care plan/treatment goals reviewed;risks/benefits reviewed;current/potential barriers reviewed;participants voiced agreement with care  plan;participants included;patient;daughter   Comment-Clinical Impression Pt pleasant and cooperative, motivated to participate in therapy. Pt presents with overall weakness, deconditioning, impairments with functional mobility and ability to safely perform ADLs independently. Pt's LUE slightly weaker than R.    OT Discharge Planning    OT Discharge Recommendation (DC Rec) Transitional Care Facility   OT Rationale for DC Rec Pt is below baseline level of function with ADLs and functional mobility and would benefit from further therapy in TCU setting to maximize safety and independence in these areas. Pt reports she fell on her first night in her new [facility] and requires assist with all tasks at this time.    Total Evaluation Time (Minutes)   Total Evaluation Time (Minutes) 15       Filomena Robles, OT

## 2021-01-30 NOTE — PLAN OF CARE
Pt here with multiple traumatic SAH s/p fall at assisted living. A&Ox4.  Confused and forgetful. Slow to respond. Neuros with baseline R pupil oval and reactive, L pupil round and reactive and residual LUE/ LLE weakness from prior CVAs, LUE 4/5, LLE 3-4/5, RLE 4-5/5, L facial droop. VSS on RA: low grade temp,BP parameters less than 150- IV hydralazine given x1,. Tele NSR. NPO due to facial droop.  Up with A2- not up on shift. Turn and reposition q2 hr. Incontinent of urine. Tylenol and ice given for headache and ice given for back pain. Pt scoring green on the Aggression Stop Light Tool. Plan for head CT this am and nuerosurg consult, PT/OT/SLP. Seizure precaution no witnessed or reported seizure activity. Discharge pending PT/OT recs and work up. Green on stoplight tool.    Belongings: clothes, cell phone, , glasses visualized in room.

## 2021-01-31 ENCOUNTER — APPOINTMENT (OUTPATIENT)
Dept: PHYSICAL THERAPY | Facility: CLINIC | Age: 86
DRG: 085 | End: 2021-01-31
Payer: COMMERCIAL

## 2021-01-31 ENCOUNTER — APPOINTMENT (OUTPATIENT)
Dept: OCCUPATIONAL THERAPY | Facility: CLINIC | Age: 86
DRG: 085 | End: 2021-01-31
Payer: COMMERCIAL

## 2021-01-31 PROCEDURE — 120N000001 HC R&B MED SURG/OB

## 2021-01-31 PROCEDURE — 97112 NEUROMUSCULAR REEDUCATION: CPT | Mod: GP

## 2021-01-31 PROCEDURE — 97535 SELF CARE MNGMENT TRAINING: CPT | Mod: GO

## 2021-01-31 PROCEDURE — 97530 THERAPEUTIC ACTIVITIES: CPT | Mod: GO

## 2021-01-31 PROCEDURE — 250N000013 HC RX MED GY IP 250 OP 250 PS 637: Performed by: STUDENT IN AN ORGANIZED HEALTH CARE EDUCATION/TRAINING PROGRAM

## 2021-01-31 PROCEDURE — 97116 GAIT TRAINING THERAPY: CPT | Mod: GP

## 2021-01-31 PROCEDURE — 250N000011 HC RX IP 250 OP 636: Performed by: INTERNAL MEDICINE

## 2021-01-31 PROCEDURE — 99233 SBSQ HOSP IP/OBS HIGH 50: CPT | Performed by: STUDENT IN AN ORGANIZED HEALTH CARE EDUCATION/TRAINING PROGRAM

## 2021-01-31 PROCEDURE — 250N000013 HC RX MED GY IP 250 OP 250 PS 637: Performed by: INTERNAL MEDICINE

## 2021-01-31 PROCEDURE — 258N000003 HC RX IP 258 OP 636: Performed by: INTERNAL MEDICINE

## 2021-01-31 RX ORDER — SENNOSIDES 8.6 MG
8.6 TABLET ORAL 2 TIMES DAILY PRN
Status: DISCONTINUED | OUTPATIENT
Start: 2021-01-31 | End: 2021-02-04 | Stop reason: HOSPADM

## 2021-01-31 RX ORDER — AMLODIPINE BESYLATE 2.5 MG/1
2.5 TABLET ORAL DAILY
Status: DISCONTINUED | OUTPATIENT
Start: 2021-01-31 | End: 2021-02-02

## 2021-01-31 RX ORDER — POLYETHYLENE GLYCOL 3350 17 G/17G
17 POWDER, FOR SOLUTION ORAL 2 TIMES DAILY PRN
Status: DISCONTINUED | OUTPATIENT
Start: 2021-01-31 | End: 2021-02-04 | Stop reason: HOSPADM

## 2021-01-31 RX ADMIN — SODIUM CHLORIDE: 9 INJECTION, SOLUTION INTRAVENOUS at 06:12

## 2021-01-31 RX ADMIN — ACETAMINOPHEN 650 MG: 325 TABLET, FILM COATED ORAL at 11:40

## 2021-01-31 RX ADMIN — ONDANSETRON 4 MG: 2 INJECTION INTRAMUSCULAR; INTRAVENOUS at 10:53

## 2021-01-31 RX ADMIN — ACETAMINOPHEN 650 MG: 325 TABLET, FILM COATED ORAL at 16:13

## 2021-01-31 RX ADMIN — TRAVOPROST 1 DROP: 0.04 SOLUTION/ DROPS OPHTHALMIC at 21:09

## 2021-01-31 RX ADMIN — DORZOLAMIDE HYDROCHLORIDE AND TIMOLOL MALEATE 1 DROP: 20; 5 SOLUTION/ DROPS OPHTHALMIC at 08:12

## 2021-01-31 RX ADMIN — DORZOLAMIDE HYDROCHLORIDE AND TIMOLOL MALEATE 1 DROP: 20; 5 SOLUTION/ DROPS OPHTHALMIC at 21:09

## 2021-01-31 RX ADMIN — ACETAMINOPHEN 650 MG: 325 TABLET, FILM COATED ORAL at 06:43

## 2021-01-31 RX ADMIN — AMLODIPINE BESYLATE 2.5 MG: 2.5 TABLET ORAL at 11:39

## 2021-01-31 RX ADMIN — HYDRALAZINE HYDROCHLORIDE 20 MG: 20 INJECTION INTRAMUSCULAR; INTRAVENOUS at 08:09

## 2021-01-31 ASSESSMENT — ACTIVITIES OF DAILY LIVING (ADL)
ADLS_ACUITY_SCORE: 16
ADLS_ACUITY_SCORE: 17
ADLS_ACUITY_SCORE: 16
ADLS_ACUITY_SCORE: 17

## 2021-01-31 NOTE — PLAN OF CARE
Pt here with SAH post fall at assisted living. A&O, forgetful, slow to respond at times. Neuros with L droop, LUE/LLE 4/5, R pupil oval. VSS on RA- BP parameters less than 150 systolic. Tele NSR. Regular diet, thin liquids. Takes pills whole with water. Up with A1-2 GBW. Voiding bedpan overnight. Turn and reposition every 2 hours. Continent incontinent of urine. Denies pain. Pt scoring green on the Aggression Stop Light Tool. Plan pending progress. Discharge pending progress.

## 2021-01-31 NOTE — PROGRESS NOTES
Nursing shift note  Pt here with SAH after fall. Pt A&O x4. Neuros intact except baseline left sided hemiparesis and left facial droop, can be slow to respond at times, forgetful. VSS on RA and below given parameters of SBP <150. Tele NSR. Tolerating regular diet with thin liquid. Takes pill whole with water. Up A1-2/gb/walker. Negative BM, positive flatus.  Voiding without difficulty. C/o generalized pain, decreased with tylenol. Seizure precautions maintained, no activity noted.   Discharge to ARU pending insurance auth.     Behavior & Aggression Tool color: Green      Pt's belongings:   (Belongings from admission day present in pt's room)               Home medications: N

## 2021-01-31 NOTE — PLAN OF CARE
Nursing shift note  Pt here with SAH secondary to fall. Pt A&O x4, forgetful. Elevated BP, IV hydrazine given x1 for SBP>150, MD notified, oral Norvasc started. Tele NSR. Can be slow to respond at times. CMS and Neuro's intact except for baseline deficits from previous stroke include L facial droop and LUE/LLE weakness, 4/5 strength and has a R oval shaped pupil from previous eye surgery. Tylenol given for generalized pain, relief. Zofran given for nausea, relief. Up A1-2 GB and walker to BSC /chair. Had some voiding frequency in AM, MD notified, continue to monitor, Saline locked. Voiding adequately, can be incontinent at times. +BS, passing flatus, no BM, bowel meds requested. T&R q2hrs.  Blanchable redness, Mepilex CDI. Reg diet, takes pills whole with water. Seizure precaution maintained. Pt scoring green on Aggression Stop Light Tool. Recommending ARC, pending insurance auth., see SW note.     Behavior & Aggression Tool color:   Green      Pt's belongings:   (Belongings from admission day present in pt's room)                Home medications: No

## 2021-01-31 NOTE — PLAN OF CARE
Patient here with SAH after a fall. A&0x4, a little slow to respond at times. VSS on room air. CMS intact. Neuros with residual left hemiparesis and left facial droop from previous stoke, otherwise intact. Denies pain or headache this shift. Up with Ax2/GB/W. Tele NSR. Discharge pending to TCU vs ARU.

## 2021-01-31 NOTE — PROGRESS NOTES
Care Management Initial Consult    General Information  Assessment completed with: Care Team Member, Family,    Type of CM/SW Visit: Initial Assessment    Primary Care Provider verified and updated as needed:     Readmission within the last 30 days:        Reason for Consult: discharge planning  Advance Care Planning:            Communication Assessment  Patient's communication style: spoken language (English or Bilingual)    Hearing Difficulty or Deaf: no   Wear Glasses or Blind: yes    Cognitive  Cognitive/Neuro/Behavioral: WDL  Level of Consciousness: alert  Arousal Level: opens eyes spontaneously  Orientation: oriented x 4  Mood/Behavior: calm, cooperative  Best Language: 0 - No aphasia  Speech: slow(slow to respond )    Living Environment:   People in home: facility resident     Current living Arrangements: assisted living      Able to return to prior arrangements: no    Family/Social Support:  Care provided by:    Provides care for:       Description of Support System:    Daughter, facility staff     Current Resources:   Skilled Home Care Services:    Community Resources:    Equipment currently used at home: shower chair, walker, rolling, wheelchair, manual  Supplies currently used at home:      Emp       Lifestyle & Psychosocial Needs:        Socioeconomic History     Marital status:      Spouse name: Not on file     Number of children: Not on file     Years of education: Not on file     Highest education level: Not on file     Tobacco Use     Smoking status: Never Smoker     Smokeless tobacco: Never Used   Substance and Sexual Activity     Alcohol use: Yes     Comment: Rare     Drug use: No     Sexual activity: Never       Values/Beliefs:  Spiritual, Cultural Beliefs, Scientology Practices, Values that affect care:                 Additional Information:  Per  protocol for discharge planning. Patient admitted inpatient on 01/29/2020. Tentative date of discharge is TBD. Reviewed chart,  spoke with patient's daughter Melania to introduce self/role and discuss discharge planning. Per daughter, patient was discharged back to her correction following a 6 week stay at [facility]. Daughter notes that she does not feel patient got much therapy and is looking forward to ARU for patient. Patient was reportedly very active prior to this and patient's daughter hopes intensive therapy will assist in returning to baseline. SW sent referral via [facility], to check bed availability.     BRITTANI Pat    Addendum:  JUNE spoke with admissions at  ARU. Per Jose, patient looks appropriate pending insurance authorization.

## 2021-01-31 NOTE — PROGRESS NOTES
[facility]    Medicine Progress Note - Hospitalist Service       Date of Admission:  1/29/2021  Assessment & Plan       Jamee Douglas is 88 who had a stroke x2 in December was at rehab center and discharged back to her facility in assisted living, but who had a fall onto the back, striking her occipital area and sustaining multiple areas of subarachnoid hemorrhage, but overall stable neurological exam, being admitted for further treatment and monitoring.     Multiple acute SAH  Acute SDH  Noted on CT head 1/29/21 after presentation after a fall. Remains neurologically intact  - Appreciate neurosurgery consult, now signed off. Recommend f/u in 1 month with CT day of appt  - hold ASA  - PT/OT/SLP  - therapies recommending ARU due to recurrent falls, recent strokes. Prior to recent events she was extremely mobile/independent.    Mechanical Fall  Fall likely due to ongoing deficits from recent acute strokes in December. Nothing in history to suggest other cause.  - continue tele  - PT/OT    Accelerated Hypertension  Recently started on diltiazem 120mg daily after recent strokes. Developed some orthostatic symptoms and had some recrudescence of stroke symptoms with exertion. Diltiazem stopped and symptoms resolved. BP elevated here requiring hydralazine.  - start low dose amlodipine 2.5mg  - continue hydralazine PRN    Urinary frequency  Likely related to IVF. No other symptoms  - stop IVF, if ongoing frequency or other symptoms, will get UA.    Recent acute ischemic stroke x2 in December 2020  Started on DAPT initially but transitioned to just ASA at time of admission.   - hold ASA in setting of acute SDH and SAH.     Glaucoma  - continue PTA eye gtts.        Diet: Regular Diet Adult    DVT Prophylaxis: Pneumatic Compression Devices  Pierce Catheter: not present  Code Status: Full Code           Disposition Plan   Expected discharge: 1-2days, recommended to TCU or ARU once safe disposition  plan/ TCU bed available and neurosurg clearance  Entered: Jamari Ribeiro MD 01/31/2021, 9:20 AM       The patient's care was discussed with the Bedside Nurse, Patient, Patient's Family and therapy.      Jamari Ribeiro MD  Hospitalist Service  [facility]  Contact information available via [facility] Paging/Directory    ______________________________________________________________________    Interval History   Had some mild very transient change reported in her right visual field on exam today. Completely resolved. No other complaints. Would like to discharge ASAP to safe place.    RN reports urinary frequency. Urinary ROS neg.    ROS 8pt negative.    Data reviewed today: I reviewed all medications, new labs and imaging results over the last 24 hours. I personally reviewed the head CT image(s) showing stable areas of acute hemorrhage.    Physical Exam   Vital Signs: Temp: 98  F (36.7  C) Temp src: Oral BP: 106/51 Pulse: 102   Resp: 18 SpO2: 98 % O2 Device: None (Room air)    Weight: 132 lbs 0 oz  Constitutional: Awake, alert, cooperative, no apparent cardiopulmonary distress.  Eyes: Conjunctiva and pupils examined and normal.  HEENT: Moist mucous membranes, normal dentition.  Respiratory: Clear to auscultation bilaterally, no crackles or wheezing.  Cardiovascular: Regular rate and rhythm, normal S1 and S2, and no murmur noted.  GI: Soft, non-distended, non-tender, normal bowel sounds.  Lymph/Hematologic: No anterior cervical or supraclavicular adenopathy.  Skin: No rashes, no cyanosis, no edema noted on exposed skin.  Musculoskeletal: No joint swelling, erythema or tenderness. No gross bony abnormalities  Neurologic: visual fields intact to direct testing, CN II-XII intact to direct testing other than mild baseline L facial droop.   Psychiatric: Alert, oriented to person, place and time, no obvious anxiety or depression.      Data   Recent Labs   Lab 01/30/21  1015 01/29/21 2010   WBC  6.8 8.3   HGB 11.4* 11.7   MCV 92 92    227   INR  --  1.00    141   POTASSIUM 3.5 3.4   CHLORIDE 110* 106   CO2 26 31   BUN 10 13   CR 0.72 0.70   ANIONGAP 5 4   POORNIMA 8.4* 9.0   GLC 90 101*     Recent Results (from the past 24 hour(s))   CT Head w/o Contrast    Narrative    CT HEAD WITHOUT CONTRAST 1/30/2021 9:51 AM    INDICATION: Parenchymal hemorrhage, follow-up.    TECHNIQUE: CT scan of the head without contrast. Dose reduction  techniques were used.  CONTRAST: None.  COMPARISON: 1/29/2021    FINDINGS: Scattered foci of subarachnoid hemorrhage in the bifrontal  and left parietal region are stable versus prior. No new hyperdense  intracranial hemorrhage. Probably interval development of a shallow  subdural hygroma over the right frontal region measuring 2 mm in  thickness. Moderate presumed chronic small vessel ischemic changes.  Mild generalized volume loss. The ventricles are proportional to the  sulci. Osseous structures are intact. Unremarkable orbits. Paranasal  sinuses are free of significant disease. Clear mastoid air cells.      Impression    IMPRESSION:  1. Stable scattered foci of subarachnoid hemorrhage. No new hyperdense  hemorrhage.  2. Suspect interval development of a thin subdural hygroma over the  right frontal region. No intracranial mass effect.    TAIWO MILLER MD   CT Cervical Spine w/o Contrast    Narrative    CT CERVICAL SPINE WITHOUT CONTRAST 1/30/2021 9:52 AM    INDICATION: Neck trauma (Age >= 65y).    TECHNIQUE: CT scan of the cervical spine without contrast. Dose  reduction techniques were used.    COMPARISON: None.    FINDINGS: Normal alignment. Vertebral body heights preserved. No  fracture. No prevertebral soft tissue swelling. Multilevel  degenerative changes with central disc herniation at C3-C4 that  results in probably moderate central spinal canal narrowing. More mild  degenerative central spinal canal stenosis below this. Left-sided  neural foraminal narrowing  most pronounced at C5-C6. Right-sided  neural foraminal narrowing most pronounced at C6-C7.      Impression    IMPRESSION:  No fracture or definite acute injury in the cervical  spine.    TAIWO MILLER MD     Medications       dorzolamide-timolol  1 drop Right Eye BID     travoprost KORINA FREE  1 drop Both Eyes At Bedtime

## 2021-01-31 NOTE — PROGRESS NOTES
Patient denies complaints today.    Head CT 1/30/21  IMPRESSION:  1. Stable scattered foci of subarachnoid hemorrhage. No new hyperdense  hemorrhage.  2. Suspect interval development of a thin subdural hygroma over the  right frontal region. No intracranial mass effect.    Cervical CT without acute findings.    On exam, awake and pleasant, moving extremities well.    RECOMMENDATIONS:  Neurosurgery will sign off.  Follow up in NS clinic in one month with non-contrast head CT day of appt.    EVAN Barron  [facility] Neurosurgery  [address] [address]    Tel [phone]  Pager [phone]

## 2021-02-01 ENCOUNTER — APPOINTMENT (OUTPATIENT)
Dept: CT IMAGING | Facility: CLINIC | Age: 86
DRG: 085 | End: 2021-02-01
Attending: STUDENT IN AN ORGANIZED HEALTH CARE EDUCATION/TRAINING PROGRAM
Payer: COMMERCIAL

## 2021-02-01 ENCOUNTER — APPOINTMENT (OUTPATIENT)
Dept: SPEECH THERAPY | Facility: CLINIC | Age: 86
DRG: 085 | End: 2021-02-01
Attending: PHYSICIAN ASSISTANT
Payer: COMMERCIAL

## 2021-02-01 ENCOUNTER — APPOINTMENT (OUTPATIENT)
Dept: MRI IMAGING | Facility: CLINIC | Age: 86
DRG: 085 | End: 2021-02-01
Attending: PHYSICIAN ASSISTANT
Payer: COMMERCIAL

## 2021-02-01 ENCOUNTER — APPOINTMENT (OUTPATIENT)
Dept: PHYSICAL THERAPY | Facility: CLINIC | Age: 86
DRG: 085 | End: 2021-02-01
Payer: COMMERCIAL

## 2021-02-01 LAB
ALBUMIN UR-MCNC: NEGATIVE MG/DL
ANION GAP SERPL CALCULATED.3IONS-SCNC: 7 MMOL/L (ref 3–14)
APPEARANCE UR: CLEAR
BACTERIA #/AREA URNS HPF: ABNORMAL /HPF
BASOPHILS # BLD AUTO: 0.1 10E9/L (ref 0–0.2)
BASOPHILS NFR BLD AUTO: 0.7 %
BILIRUB UR QL STRIP: NEGATIVE
BUN SERPL-MCNC: 7 MG/DL (ref 7–30)
CALCIUM SERPL-MCNC: 8.6 MG/DL (ref 8.5–10.1)
CHLORIDE SERPL-SCNC: 109 MMOL/L (ref 94–109)
CO2 SERPL-SCNC: 25 MMOL/L (ref 20–32)
COLOR UR AUTO: YELLOW
CREAT SERPL-MCNC: 0.73 MG/DL (ref 0.52–1.04)
DIFFERENTIAL METHOD BLD: NORMAL
EOSINOPHIL # BLD AUTO: 0.2 10E9/L (ref 0–0.7)
EOSINOPHIL NFR BLD AUTO: 2.4 %
ERYTHROCYTE [DISTWIDTH] IN BLOOD BY AUTOMATED COUNT: 14.1 % (ref 10–15)
GFR SERPL CREATININE-BSD FRML MDRD: 74 ML/MIN/{1.73_M2}
GLUCOSE BLDC GLUCOMTR-MCNC: 88 MG/DL (ref 70–99)
GLUCOSE SERPL-MCNC: 85 MG/DL (ref 70–99)
GLUCOSE UR STRIP-MCNC: NEGATIVE MG/DL
HBA1C MFR BLD: 5.8 % (ref 0–5.6)
HCT VFR BLD AUTO: 35.9 % (ref 35–47)
HGB BLD-MCNC: 11.7 G/DL (ref 11.7–15.7)
HGB UR QL STRIP: NEGATIVE
IMM GRANULOCYTES # BLD: 0 10E9/L (ref 0–0.4)
IMM GRANULOCYTES NFR BLD: 0.2 %
INR PPP: 1.06 (ref 0.86–1.14)
KETONES UR STRIP-MCNC: 10 MG/DL
LEUKOCYTE ESTERASE UR QL STRIP: NEGATIVE
LYMPHOCYTES # BLD AUTO: 2.2 10E9/L (ref 0.8–5.3)
LYMPHOCYTES NFR BLD AUTO: 23.8 %
MCH RBC QN AUTO: 29.8 PG (ref 26.5–33)
MCHC RBC AUTO-ENTMCNC: 32.6 G/DL (ref 31.5–36.5)
MCV RBC AUTO: 91 FL (ref 78–100)
MONOCYTES # BLD AUTO: 0.6 10E9/L (ref 0–1.3)
MONOCYTES NFR BLD AUTO: 6.8 %
NEUTROPHILS # BLD AUTO: 6.1 10E9/L (ref 1.6–8.3)
NEUTROPHILS NFR BLD AUTO: 66.1 %
NITRATE UR QL: NEGATIVE
NRBC # BLD AUTO: 0 10*3/UL
NRBC BLD AUTO-RTO: 0 /100
PH UR STRIP: 5.5 PH (ref 5–7)
PLATELET # BLD AUTO: 257 10E9/L (ref 150–450)
POTASSIUM SERPL-SCNC: 3.1 MMOL/L (ref 3.4–5.3)
POTASSIUM SERPL-SCNC: 3.1 MMOL/L (ref 3.4–5.3)
RBC # BLD AUTO: 3.93 10E12/L (ref 3.8–5.2)
RBC #/AREA URNS AUTO: 1 /HPF (ref 0–2)
SODIUM SERPL-SCNC: 141 MMOL/L (ref 133–144)
SOURCE: ABNORMAL
SP GR UR STRIP: 1.01 (ref 1–1.03)
TROPONIN I SERPL-MCNC: 0.06 UG/L (ref 0–0.04)
UROBILINOGEN UR STRIP-MCNC: 0 MG/DL (ref 0–2)
WBC # BLD AUTO: 9.2 10E9/L (ref 4–11)
WBC #/AREA URNS AUTO: 1 /HPF (ref 0–5)

## 2021-02-01 PROCEDURE — 120N000001 HC R&B MED SURG/OB

## 2021-02-01 PROCEDURE — 80048 BASIC METABOLIC PNL TOTAL CA: CPT | Performed by: NURSE PRACTITIONER

## 2021-02-01 PROCEDURE — 83036 HEMOGLOBIN GLYCOSYLATED A1C: CPT | Performed by: PHYSICIAN ASSISTANT

## 2021-02-01 PROCEDURE — 250N000011 HC RX IP 250 OP 636: Performed by: PHYSICIAN ASSISTANT

## 2021-02-01 PROCEDURE — 99291 CRITICAL CARE FIRST HOUR: CPT | Performed by: NURSE PRACTITIONER

## 2021-02-01 PROCEDURE — 70553 MRI BRAIN STEM W/O & W/DYE: CPT

## 2021-02-01 PROCEDURE — A9585 GADOBUTROL INJECTION: HCPCS | Performed by: INTERNAL MEDICINE

## 2021-02-01 PROCEDURE — 250N000013 HC RX MED GY IP 250 OP 250 PS 637: Performed by: INTERNAL MEDICINE

## 2021-02-01 PROCEDURE — 81001 URINALYSIS AUTO W/SCOPE: CPT | Performed by: NURSE PRACTITIONER

## 2021-02-01 PROCEDURE — 84484 ASSAY OF TROPONIN QUANT: CPT | Performed by: NURSE PRACTITIONER

## 2021-02-01 PROCEDURE — 93005 ELECTROCARDIOGRAM TRACING: CPT

## 2021-02-01 PROCEDURE — 84132 ASSAY OF SERUM POTASSIUM: CPT | Performed by: STUDENT IN AN ORGANIZED HEALTH CARE EDUCATION/TRAINING PROGRAM

## 2021-02-01 PROCEDURE — 255N000002 HC RX 255 OP 636: Performed by: INTERNAL MEDICINE

## 2021-02-01 PROCEDURE — 97116 GAIT TRAINING THERAPY: CPT | Mod: GP

## 2021-02-01 PROCEDURE — 250N000011 HC RX IP 250 OP 636: Performed by: STUDENT IN AN ORGANIZED HEALTH CARE EDUCATION/TRAINING PROGRAM

## 2021-02-01 PROCEDURE — 92610 EVALUATE SWALLOWING FUNCTION: CPT | Mod: GN | Performed by: SPEECH-LANGUAGE PATHOLOGIST

## 2021-02-01 PROCEDURE — 0042T CT HEAD PERFUSION WITH CONTRAST: CPT

## 2021-02-01 PROCEDURE — 250N000009 HC RX 250: Performed by: INTERNAL MEDICINE

## 2021-02-01 PROCEDURE — 99233 SBSQ HOSP IP/OBS HIGH 50: CPT | Performed by: STUDENT IN AN ORGANIZED HEALTH CARE EDUCATION/TRAINING PROGRAM

## 2021-02-01 PROCEDURE — 97530 THERAPEUTIC ACTIVITIES: CPT | Mod: GP

## 2021-02-01 PROCEDURE — 36415 COLL VENOUS BLD VENIPUNCTURE: CPT | Performed by: STUDENT IN AN ORGANIZED HEALTH CARE EDUCATION/TRAINING PROGRAM

## 2021-02-01 PROCEDURE — 999N001017 HC STATISTIC GLUCOSE BY METER IP

## 2021-02-01 PROCEDURE — 83036 HEMOGLOBIN GLYCOSYLATED A1C: CPT | Performed by: NURSE PRACTITIONER

## 2021-02-01 PROCEDURE — 93010 ELECTROCARDIOGRAM REPORT: CPT | Performed by: INTERNAL MEDICINE

## 2021-02-01 PROCEDURE — 85610 PROTHROMBIN TIME: CPT | Performed by: NURSE PRACTITIONER

## 2021-02-01 PROCEDURE — 70450 CT HEAD/BRAIN W/O DYE: CPT

## 2021-02-01 PROCEDURE — 250N000011 HC RX IP 250 OP 636: Performed by: INTERNAL MEDICINE

## 2021-02-01 PROCEDURE — 36415 COLL VENOUS BLD VENIPUNCTURE: CPT | Performed by: NURSE PRACTITIONER

## 2021-02-01 PROCEDURE — 258N000003 HC RX IP 258 OP 636: Performed by: STUDENT IN AN ORGANIZED HEALTH CARE EDUCATION/TRAINING PROGRAM

## 2021-02-01 PROCEDURE — 250N000013 HC RX MED GY IP 250 OP 250 PS 637: Performed by: STUDENT IN AN ORGANIZED HEALTH CARE EDUCATION/TRAINING PROGRAM

## 2021-02-01 PROCEDURE — 97750 PHYSICAL PERFORMANCE TEST: CPT | Mod: GP

## 2021-02-01 PROCEDURE — 85025 COMPLETE CBC W/AUTO DIFF WBC: CPT | Performed by: NURSE PRACTITIONER

## 2021-02-01 PROCEDURE — 99222 1ST HOSP IP/OBS MODERATE 55: CPT | Performed by: PSYCHIATRY & NEUROLOGY

## 2021-02-01 PROCEDURE — 250N000013 HC RX MED GY IP 250 OP 250 PS 637: Performed by: PHYSICIAN ASSISTANT

## 2021-02-01 PROCEDURE — 70496 CT ANGIOGRAPHY HEAD: CPT

## 2021-02-01 RX ORDER — IOPAMIDOL 755 MG/ML
120 INJECTION, SOLUTION INTRAVASCULAR ONCE
Status: COMPLETED | OUTPATIENT
Start: 2021-02-01 | End: 2021-02-01

## 2021-02-01 RX ORDER — ATORVASTATIN CALCIUM 40 MG/1
40 TABLET, FILM COATED ORAL EVERY EVENING
Status: DISCONTINUED | OUTPATIENT
Start: 2021-02-01 | End: 2021-02-04 | Stop reason: HOSPADM

## 2021-02-01 RX ORDER — GADOBUTROL 604.72 MG/ML
6 INJECTION INTRAVENOUS ONCE
Status: COMPLETED | OUTPATIENT
Start: 2021-02-01 | End: 2021-02-01

## 2021-02-01 RX ORDER — POTASSIUM CHLORIDE 1500 MG/1
20 TABLET, EXTENDED RELEASE ORAL ONCE
Status: COMPLETED | OUTPATIENT
Start: 2021-02-01 | End: 2021-02-01

## 2021-02-01 RX ORDER — LABETALOL HYDROCHLORIDE 5 MG/ML
10 INJECTION, SOLUTION INTRAVENOUS ONCE
Status: COMPLETED | OUTPATIENT
Start: 2021-02-01 | End: 2021-02-01

## 2021-02-01 RX ORDER — SODIUM CHLORIDE 9 MG/ML
INJECTION, SOLUTION INTRAVENOUS CONTINUOUS
Status: DISCONTINUED | OUTPATIENT
Start: 2021-02-01 | End: 2021-02-04 | Stop reason: HOSPADM

## 2021-02-01 RX ORDER — POTASSIUM CHLORIDE 7.45 MG/ML
10 INJECTION INTRAVENOUS
Status: COMPLETED | OUTPATIENT
Start: 2021-02-01 | End: 2021-02-01

## 2021-02-01 RX ADMIN — SODIUM CHLORIDE 100 ML: 9 INJECTION, SOLUTION INTRAVENOUS at 10:15

## 2021-02-01 RX ADMIN — POTASSIUM CHLORIDE 20 MEQ: 1500 TABLET, EXTENDED RELEASE ORAL at 14:32

## 2021-02-01 RX ADMIN — DORZOLAMIDE HYDROCHLORIDE AND TIMOLOL MALEATE 1 DROP: 20; 5 SOLUTION/ DROPS OPHTHALMIC at 20:51

## 2021-02-01 RX ADMIN — DORZOLAMIDE HYDROCHLORIDE AND TIMOLOL MALEATE 1 DROP: 20; 5 SOLUTION/ DROPS OPHTHALMIC at 12:34

## 2021-02-01 RX ADMIN — SODIUM CHLORIDE 1000 ML: 9 INJECTION, SOLUTION INTRAVENOUS at 12:20

## 2021-02-01 RX ADMIN — AMLODIPINE BESYLATE 2.5 MG: 2.5 TABLET ORAL at 12:34

## 2021-02-01 RX ADMIN — POTASSIUM CHLORIDE 10 MEQ: 7.46 INJECTION, SOLUTION INTRAVENOUS at 22:56

## 2021-02-01 RX ADMIN — GADOBUTROL 6 ML: 604.72 INJECTION INTRAVENOUS at 18:17

## 2021-02-01 RX ADMIN — ATORVASTATIN CALCIUM 40 MG: 40 TABLET, FILM COATED ORAL at 20:45

## 2021-02-01 RX ADMIN — LABETALOL HYDROCHLORIDE 10 MG: 5 INJECTION, SOLUTION INTRAVENOUS at 12:34

## 2021-02-01 RX ADMIN — SODIUM CHLORIDE: 9 INJECTION, SOLUTION INTRAVENOUS at 14:02

## 2021-02-01 RX ADMIN — TRAVOPROST 1 DROP: 0.04 SOLUTION/ DROPS OPHTHALMIC at 21:48

## 2021-02-01 RX ADMIN — IOPAMIDOL 120 ML: 755 INJECTION, SOLUTION INTRAVENOUS at 10:15

## 2021-02-01 RX ADMIN — HYDRALAZINE HYDROCHLORIDE 20 MG: 20 INJECTION INTRAMUSCULAR; INTRAVENOUS at 01:21

## 2021-02-01 RX ADMIN — POTASSIUM CHLORIDE 10 MEQ: 7.46 INJECTION, SOLUTION INTRAVENOUS at 21:48

## 2021-02-01 ASSESSMENT — ACTIVITIES OF DAILY LIVING (ADL)
ADLS_ACUITY_SCORE: 16
ADLS_ACUITY_SCORE: 20
ADLS_ACUITY_SCORE: 20
ADLS_ACUITY_SCORE: 15

## 2021-02-01 NOTE — PROGRESS NOTES
PT holding PM session. AM stroke code given increased left hemiparesis and speech changes: CT noting suspicion for small acute SDHs, SDH from before without change.

## 2021-02-01 NOTE — CODE/RAPID RESPONSE
[facility]    Code Stroke  House Officer Note    ////////////////////////////////////////////////////////////////////////////////////////////////////////////////////////////////  Acute stroke evaluation:  Time of arrival: 0948   Time called: 0947   Glucose level 88mg/dL   Time patient seen by neurology: 0954am   Time onset of stroke symptoms / witnessed onset: 0945   Time last known well: 0830   IV tPA admistered: No    IA / Thrombolectomy no   Stroke Presentation Type Inhouse Stroke   Stroke Thrombolytic Ineligible Reason SAH or Intracranial, too distal location   Stroke Thrombolytic Treatments TBD   Neurologists Dr. Jazmyne Wilburn, Louise Vang, PAJOSSIE   Stroke Type of Vascular Event Cerebral Infarct, Ischemic, also SDH and SAH   Pre TPa Wts entered? na   Post TPa VS Neuro Checks na      IV tPA:  Patient was not treated with rt-TP due to the following reason(s):  History of any prior intracranial hemorrhage      IA thrombolectomy:  IA intervention was performed      [employer] Stroke Scale  Exam Interval: Baseline   Score    Level of consciousness: (0)   Alert, keenly responsive    LOC questions: (0)   Answers both questions correctly    LOC commands: (0)   Performs both tasks correctly    Best gaze: (0)   Normal    Visual: (0)   No visual loss    Facial palsy: (1)   Minor paralysis (flat nasolabial fold, smile asymmetry)    Motor arm (left): (1)   Drift    Motor arm (right): (0)   No drift    Motor leg (left): (0)   No drift    Motor leg (right): (3)   No effort against gravity    Limb ataxia: (1)   Present in one limb    Sensory: (0)   Normal- no sensory loss    Best language: (0)   Normal- no aphasia    Dysarthria: (0)   Normal    Extinction and inattention: (0)   No abnormality        Total Score:  6     Imaging:  No results found for this or any previous visit (from the past 24 hour(s)).    Physical Exam   Vital Signs with Ranges:  Temp:  [97.4  F (36.3  C)-99.3  F (37.4   C)] 99.3  F (37.4  C)  Pulse:  [83-99] 93  Resp:  [16-18] 16  BP: (128-156)/(56-67) 154/66  SpO2:  [97 %-100 %] 100 %    Assessment & Plan     Jamee Douglas is 89yo F w/ PMH HTN who had a stroke x2 in December 2020 completed, rehab and discharged back to her facility in assisted living but who had a fall onto the back, striking her occipital area and sustaining multiple areas of subarachnoid hemorrhage    On am 02/01/21, pt was able to walk a short distance with PT at 0830, RN paged nsgy and then hospitalist attending saw pt, noted L sided weakness and called code stroke.      On my arrival pt is minimally verbal and seemed to have difficulty following commands such as close eyes, ?aphasia, receptive & expressive.  She has a notable L facial droop and L sided weakness.  On arrival of stroke neuro team, speech much more fluent now able to FC. She's not on any asa, antiplt, anticoagulants and hasn't recently received any CNS affecting meds.  Pt does c/o frontal headache.    After imaging completed, pt's dght arrived to bedside, describes multiple periods of recurrence of similar events (L sided weakness, d/w speech) of unknown duration, that usually resolves spontaneously.  Dght did note that BP was closer to 115 systolic or other similarly relatively low BPs when these events occurred previously.     Stroke risk factors  HTN, prior strokes 12/2020    INTERVENTIONS:  -gluc 88mg/dL  -/64  -NIHSS as above  -seen by stroke neuro team who wished to proceed w/ CT imaging series.   -stat BMP, CBC, PT/INR  -will order UA given near sudden incontinence (no dysuria) which is relatively new per dght.  -code stroke deescalated at 1114am by stroke neuro team    Discussed with and defer further cares to hospitalist attending physician Dr. Ribeiro.  I've asked the stroke neuro team to review imaging results w/ pt & dght, given anticipated complexity in light of prior recent strokes and recent SAH and SDH.     Interval  History     Code Status: Full Code    Physical Exam   Constitutional: elderly woman lying in bed w/ minimal distress  Deferred further physical exam to focus on neuro exam & completion of neuro imaging    Data   Recent Labs   Lab 01/30/21  1015 01/29/21 2010   WBC 6.8 8.3   HGB 11.4* 11.7   MCV 92 92    227   INR  --  1.00       Time Spent on this Encounter   I spent 70 minutes ([id number]) of critical care time on the unit/floor managing the care of Jamee Douglas. Upon evaluation, this patient had a high probability of imminent or life-threatening deterioration due to need to rule out acute stroke, which required my direct attention, intervention, and personal management. 100% of my time was spent at the bedside counseling the patient and/or coordinating care regarding services listed in this note.    Kay Wolfe, CNP  Hospitalist - [place] [facility]  [phone]     Text Page           .

## 2021-02-01 NOTE — CONSULTS
"[facility]    Stroke Consult Note    Reason for Consult: Stroke Code    Chief Complaint: Fall and Head Injury      HPI  Jamee Douglas is a 88 year old female admitted after a fall. She has a known history of traumatic SAH as well as R ROBBY infarct in December. She was last seen normal at 0815 today while walking with PT, and around 0950 found to have left leg>arm weakness, and also left face weakness.    Stroke Evaluation summarized:  MRI/Head CT: see below in data section  Intracranial Vascular Imaging: see below, R ROBBY occlusion  Cervical Carotid and Vertebral Artery Vascular Imaging: L ICA 50-60% stenosis, R ICA with linear filling defect, see below for details  Echocardiogram: not yet done  EKG/Telemetry: not checked*  LDL: LDL 53 on 12/18/20 at [facility]  A1c: 5.8  Troponin: 0.058   Other testing: Not Applicable    TPA Treatment   Not given due to recent ICH.    Endovascular Treatment  ROBBY occlusion present, but endovascular surgical neuroradiology reviewed films and clot is too distal for intervention    Impression  Ischemic Stroke due to undetermined etiology    New R ROBBY occlusion causing R frontal stroke    Recommendations  - SBP <160  - q4 hr neuro checks  - MRI brain  - Echo, no bubble due to age  - Resume home lipitor 40mg (was on it as of [facility] neuro visit 1/21/21)  - no antiplatelet agents due to bleed  - repeat head CT in am  - EKG    Patient Follow-up    - final recommendation pending work-up    Thank you for this consult. We will continue to follow.     Katlyn Vang PA-C  Neurology  02/01/2021 6:44 PM  To page me or covering stroke neurology team member, click here: AMCOM   Choose \"On Call\" tab at top, then search dropdown box for \"Neurology Adult\", select location, press Enter, then look for stroke/neuro ICU/telestroke.    ______________________________________________________    Past Medical History   Past Medical History:   Diagnosis Date     " Glaucoma      Hyperlipidemia LDL goal < 130      LBP (low back pain)      Osteoarthritis      Past Surgical History   Past Surgical History:   Procedure Laterality Date     ARTHROPLASTY KNEE       HYSTERECTOMY       SURGICAL HISTORY OF -       rights shoulder     SURGICAL HISTORY OF -       glaucoma surgery     SURGICAL HISTORY OF -       back surgery     Medications   Home Meds  Prior to Admission medications    Medication Sig Start Date End Date Taking? Authorizing Provider   aspirin 81 MG tablet Take 1 tablet by mouth daily.   Yes Reported, Patient   Cholecalciferol (VITAMIN D3) 10 MCG (400 UNIT) CAPS Take 400 Units by mouth daily    Yes Reported, Patient   dorzolamide-timolol (COSOPT) 2-0.5 % ophthalmic solution Place 1 drop into the right eye 2 times daily   Yes Unknown, Entered By History   estradiol (ESTRACE) 0.5 MG tablet Take 0.5 mg by mouth MWF   Yes Unknown, Entered By History   Multiple Vitamin (MULTI-VITAMIN) per tablet Take 1 tablet by mouth daily.   Yes Reported, Patient   travoprost KORINA FREE (TRAVATAN Z) 0.004 % ophthalmic solution Place 1 drop into both eyes At Bedtime   Yes Unknown, Entered By History       Scheduled Meds    amLODIPine  2.5 mg Oral Daily     atorvastatin  40 mg Oral QPM     dorzolamide-timolol  1 drop Right Eye BID     travoprost KORINA FREE  1 drop Both Eyes At Bedtime       Infusion Meds    sodium chloride 100 mL/hr at 02/01/21 1402       PRN Meds  acetaminophen **OR** acetaminophen, hydrALAZINE, lidocaine 4%, lidocaine (buffered or not buffered), melatonin, ondansetron **OR** ondansetron, polyethylene glycol, sennosides, sodium chloride (PF), sodium chloride (PF)    Allergies   Allergies   Allergen Reactions     Penicillins Swelling     Family History   Family History   Problem Relation Age of Onset     Other - See Comments Mother         POLYCYTHEMIA VERA     Prostate Cancer Father      Colon Cancer Brother      Lung Cancer Sister      Social History   Social History     Tobacco  Use     Smoking status: Never Smoker     Smokeless tobacco: Never Used   Substance Use Topics     Alcohol use: Yes     Comment: Rare     Drug use: No       Review of Systems   Review of systems not obtained due to patient factors - confusion       PHYSICAL EXAMINATION  Temp:  [97.6  F (36.4  C)-99.3  F (37.4  C)] 97.6  F (36.4  C)  Pulse:  [] 93  Resp:  [16-18] 18  BP: (115-156)/(54-69) 115/54  SpO2:  [95 %-100 %] 95 %     General:  patient lying in bed without any acute distress    HEENT:  normocephalic/atraumatic  Pulmonary:  no respiratory distress    Neurologic  Mental Status:  alert, oriented x 3, follows commands, speech clear and fluent, naming and repetition normal  Cranial Nerves:  visual fields intact, PERRL, EOMI with normal smooth pursuit, facial sensation intact and symmetric, hearing not formally tested but intact to conversation, palate elevation symmetric and uvula midline, no dysarthria, shoulder shrug strong bilaterally, tongue protrusion midline, left facial droop  Motor:  normal muscle tone and bulk, no abnormal movements, L arm 4-/5, L leg not antigravity, trace toe wiggle. strength normal on R  Reflexes:  toes down-going  Sensory:  L side sensory neglect. normal equal sensation in all extremities otherwise  Coordination:  no ataxia on R. no ataxia LUE out of proportion to weakness  Station/Gait:  deferred      Dysphagia Screen  Per Nursing    Stroke Scales    NIHSS  Interval     Interval Comments     1a. Level of Consciousness 0-->Alert, keenly responsive   1b. LOC Questions 0-->Answers both questions correctly   1c. LOC Commands 1-->Performs one task correctly   2.   Best Gaze 0-->Normal   3.   Visual 0-->No visual loss   4.   Facial Palsy 1-->Minor paralysis (flattened nasolabial fold, asymmetry on smiling)   5a. Motor Arm, Left 2-->Some effort against gravity, limb cannot get to or maintain (if cued) 90 (or 45) degrees, drifts down to bed, but has some effort against gravity   5b.  Motor Arm, Right 0-->No drift, limb holds 90 (or 45) degrees for full 10 secs   6a. Motor Leg, Left 3-->No effort against gravity, leg falls to bed immediately   6b. Motor Leg, right 0-->No drift, leg holds 30 degree position for full 5 secs   7.   Limb Ataxia 0-->Absent   8.   Sensory 0-->Normal, no sensory loss   9.   Best Language 0-->No aphasia, normal   10. Dysarthria 0-->Normal   11. Extinction and Inattention  2-->Profound pollo-inattention/extinction more than 1 modality   Total 9 (02/01/21 1000)       Imaging  Head CT:    IMPRESSION:  1. Mildly increased prominence of the bilateral extra-axial spaces  along the frontal convexities/frontal poles, suspicious for small  recent/acute hypodense subdural hematomas.  2. Unchanged extent of small volume scattered subarachnoid hemorrhage.  3. Otherwise unchanged chronic intracranial findings, as detailed    CTA  IMPRESSION:  1. Focal occlusion of the right anterior cerebral artery precallosal  segment.  2. Advanced intracranial atherosclerosis with multifocal stenoses,  including severe focal stenoses involving the left middle cerebral  artery M1 segment and both posterior cerebral arteries.  3. Bilateral carotid bifurcation atherosclerosis. There is a linear  filling defect in the proximal cervical right internal carotid artery  that could represent a superimposed web or linear atherosclerosis, or  less likely a chronic intimal flap/dissection. There is no definite  flow-limiting stenosis in the right carotid artery. There is 50-60%  stenosis of the proximal cervical LEFT internal carotid artery.  4. The left vertebral artery arises directly from the aortic arch and  demonstrates mild to moderate stenosis at its origin. The cervical  vertebral arteries are otherwise patent.    CTP:  IMPRESSION: There is a small core infarct involving the right anterior  cerebral artery territory with a medium-sized area of  oligemia/penumbra (mismatch volume 23 mL, mismatch ratio  4.8 according  to the RAPID maps). No other gross focal or regional cerebral  perfusion asymmetry identified.      Lab Results Data   CBC  Recent Labs   Lab 02/01/21  1006 01/30/21  1015 01/29/21 2010   WBC 9.2 6.8 8.3   RBC 3.93 3.88 3.99   HGB 11.7 11.4* 11.7   HCT 35.9 35.8 36.8    215 227     Basic Metabolic Panel    Recent Labs   Lab 02/01/21  1006 01/30/21  1015 01/29/21 2010    141 141   POTASSIUM 3.1* 3.5 3.4   CHLORIDE 109 110* 106   CO2 25 26 31   BUN 7 10 13   CR 0.73 0.72 0.70   GLC 85 90 101*   POORNIMA 8.6 8.4* 9.0     Liver Panel  No results for input(s): PROTTOTAL, ALBUMIN, BILITOTAL, ALKPHOS, AST, ALT, BILIDIRECT in the last 168 hours.  INR    Recent Labs   Lab Test 02/01/21  1006 01/29/21 2010   INR 1.06 1.00      Lipid Profile    Recent Labs   Lab Test 03/27/18  0909 03/16/17 11/17/16   CHOL 238* 207* 193   HDL 53 52 50   * HIGH 125*   TRIG 141 187* 90     A1C    Recent Labs   Lab Test 02/01/21  1006   A1C 5.8*     Troponin I    Recent Labs   Lab 02/01/21  1006   TROPI 0.058*          Stroke Code / Stroke Consult Data Data   Stroke Code Data  (for stroke code without tele)  Stroke code activated 02/01/21   0948   First stroke provider response 02/01/21   0950   Last known normal 02/01/21   0815   Time of discovery   (or onset of symptoms) 02/01/21   0930   Head CT read by me 02/01/21   1019   Was stroke code de-escalated? Yes 02/01/21 1115  presence of contraindications for both intravenous and intra-arterial stroke treatments

## 2021-02-01 NOTE — PROGRESS NOTES
Daughter Melania notified of patient's neuro changes. Melania opted to come to the hospital for better communication with care team and to be at patient's bedside.

## 2021-02-01 NOTE — CONSULTS
Stroke Education Note    The following information has been reviewed with the patient and family:    1. Warning signs of stroke    2. Calling 911 if having warning signs of stroke    3. All modifiable risk factors: hypertension, CAD, atrial fib, diabetes, hypercholesterolemia, smoking, substance abuse, diet, physical inactivity, obesity, sleep apnea.    4. Patient's risk factors for stroke which include: previous stroke, HTN    5. Follow-up plan for after discharge    6. Discharge medications which include: norvasc, lipitor?    In addition, the PLC Stroke Class Handout has been given to the patient and family.    Learner's response to risk factors / lifestyle modification education: Taking steps     Nessa Patrick RN    Met with patient and daughter via iPad for stroke teaching. Patient had strokes in 12/2020 at another hospital, but never received education. Good participants of learning, able to teach back.

## 2021-02-01 NOTE — PROGRESS NOTES
[facility]    Medicine Progress Note - Hospitalist Service       Date of Admission:  1/29/2021  Assessment & Plan       Jamee Douglas is 88 who had a stroke x2 in December was at rehab center and discharged back to her facility in assisted living, but who had a fall onto the back, striking her occipital area and sustaining multiple areas of subarachnoid hemorrhage, but overall stable neurological exam, being admitted for further treatment and monitoring.     Acute ischemic stroke, uncertain etiology  Developed new dense hemiparesis on left with some speech difficulties on 2/1 in the AM. Stroke code called, identified acute stroke.  - options limited due to recent strokes/SAH/SDH, location of lesions preclude IR, etc.   - consulted neuro    Multiple acute SAH  Acute SDH  Noted on CT head 1/29/21 after presentation after a fall. Remains neurologically intact  - Appreciate neurosurgery consult, now signed off. Recommend f/u in 1 month with CT day of appt  - hold ASA  - PT/OT/SLP    Mechanical Fall  Fall likely due to ongoing deficits from recent acute strokes in December. Nothing in history to suggest other cause.  - continue tele  - PT/OT    Accelerated Hypertension  Recently started on diltiazem 120mg daily after recent strokes. Developed some orthostatic symptoms and had some recrudescence of stroke symptoms with exertion. Diltiazem stopped and symptoms resolved. BP elevated here requiring hydralazine.  - start low dose amlodipine 2.5mg  - continue hydralazine PRN    Urinary frequency  Likely related to IVF. No other symptoms  - stop IVF, if ongoing frequency or other symptoms, will get UA.    Recent acute ischemic stroke x2 in December 2020  Started on DAPT initially but transitioned to just ASA at time of admission.   - hold ASA in setting of acute SDH and SAH.     Glaucoma  - continue PTA eye gtts.        Diet: Regular Diet Adult    DVT Prophylaxis: Pneumatic Compression  Devices  Pierec Catheter: not present  Code Status: No CPR- Do NOT Intubate           Disposition Plan   Expected discharge: TBD, recommended to TBD once safe disposition plan/ TCU bed available and neuro clearance  Entered: Jamari Ribeiro MD 02/01/2021, 3:37 PM       The patient's care was discussed with the Bedside Nurse, Patient, Patient's Family, neurology Consultant and therapy.    >65minutes spent today in care with >50% at bedside in counseling and coordination. Seen multiple times, discussed with family, goals of care discussions, patient and multiple consultants.       Jamari Ribeiro MD  Hospitalist Service  [facility]  Contact information available via [facility] Paging/Directory    ______________________________________________________________________    Interval History   Noted in AM to have new L sided deficits with some speech changes. Stroke code called. L arm improved through day by L leg is very weak. Discussed in PM that other than weakness, she has no symptoms she can note.     ROS 8pt negative.    Data reviewed today: I reviewed all medications, new labs and imaging results over the last 24 hours. I personally reviewed the head CT image(s) showing area of acute ischemic stroke.    Physical Exam   Vital Signs: Temp: 97.6  F (36.4  C) Temp src: Oral BP: 115/54 Pulse: 93   Resp: 18 SpO2: 95 % O2 Device: None (Room air)    Weight: 132 lbs 0 oz  Constitutional: Awake, alert, cooperative, no apparent cardiopulmonary distress.  Eyes: Conjunctiva and pupils examined and normal.  HEENT: Moist mucous membranes, normal dentition.  Respiratory: Clear to auscultation bilaterally, no crackles or wheezing.  Cardiovascular: Regular rate and rhythm, normal S1 and S2, and no murmur noted.  GI: Soft, non-distended, non-tender, normal bowel sounds.  Lymph/Hematologic: No anterior cervical or supraclavicular adenopathy.  Skin: No rashes, no cyanosis, no edema noted on exposed  skin.  Musculoskeletal: No joint swelling, erythema or tenderness. No gross bony abnormalities  Neurologic: visual fields intact to direct testing, CN II-XII intact to direct testing other than mild baseline L facial droop. Dense left hemiparesis of arm and leg.  Psychiatric: Alert, oriented to person, place and time, no obvious anxiety or depression.      Data   Recent Labs   Lab 02/01/21  1006 01/30/21  1015 01/29/21 2010   WBC 9.2 6.8 8.3   HGB 11.7 11.4* 11.7   MCV 91 92 92    215 227   INR 1.06  --  1.00    141 141   POTASSIUM 3.1* 3.5 3.4   CHLORIDE 109 110* 106   CO2 25 26 31   BUN 7 10 13   CR 0.73 0.72 0.70   ANIONGAP 7 5 4   POORNIMA 8.6 8.4* 9.0   GLC 85 90 101*   TROPI 0.058*  --   --      Recent Results (from the past 24 hour(s))   CT Head w/o Contrast    Narrative    CT SCAN OF THE HEAD WITHOUT CONTRAST  February 1, 2021 10:19 AM     HISTORY: Code Stroke.    TECHNIQUE: Axial images of the head and coronal reformations without  IV contrast material. Radiation dose for this scan was reduced using  automated exposure control, adjustment of the mA and/or kV according  to patient size, or iterative reconstruction technique.    COMPARISON: CT head dated 1/30/2021.    FINDINGS: Small volume scattered subarachnoid hemorrhage is again  present. This does not appear overall changed in extent compared to  the most recent studies. There is mildly increased prominence of the  extra-axial space along the left frontal pole, concerning for a small  hypodense subdural fluid collection. This extends along the left  frontal convexity. There also appears to be a small right frontal  convexity subdural fluid collection. These are both somewhat  suspicious for subdural hematomas. There is mild associated mass  effect on the bilateral frontal convexities and frontal poles without  midline shift/herniation.    No definite CT findings of extended acute infarct. Bilateral chronic  basal ganglia lacunar infarcts are  again noted. Unchanged scattered  moderately extensive patchy areas of hypoattenuation in the  subcortical and deep cerebral white matter, likely sequela of chronic  small vessel ischemic disease. The ventricles are normal in size and  configuration. Scattered intracranial atherosclerotic calcifications.    Stigmata of previous left scleral buckle procedure. Bilateral lens  implants. Visualized orbits otherwise appear normal. Trace scattered  paranasal sinus mucosal thickening. The mastoid and middle ear  cavities are clear. The bony calvarium and bones of the skull base  appear intact.       Impression    IMPRESSION:  1. Mildly increased prominence of the bilateral extra-axial spaces  along the frontal convexities/frontal poles, suspicious for small  acute hypodense subdural hematomas.  2. Unchanged extent of small volume scattered subarachnoid hemorrhage.  3. Otherwise unchanged chronic intracranial findings, as detailed.   CTA Head Neck with Contrast    Narrative    CT ANGIOGRAM OF THE HEAD AND NECK WITH CONTRAST  2/1/2021 10:20 AM     HISTORY: Code Stroke     TECHNIQUE:  CT angiography with an injection of 70 mL Isovue-370 IV  with scans through the head and neck. Images were transferred to a  separate 3-D workstation where multiplanar reformations and 3-D images  were created. Estimates of carotid stenoses are made relative to the  distal internal carotid artery diameters except as noted. Radiation  dose for this scan was reduced using automated exposure control,  adjustment of the mA and/or kV according to patient size, or iterative  reconstruction technique.      COMPARISON: CT head of same day.    CT ANGIOGRAM HEAD FINDINGS:  There is focal occlusion of the  precallosal segment of the right anterior cerebral artery (series 9  image 544). There does appear to be some reconstitution of flow into  the more distal aspect of the right anterior cerebral artery. The left  anterior cerebral artery appears grossly  patent with mild to moderate  scattered luminal irregularity and stenosis. There several areas of  significant stenosis involving the anterior and posterior circulation.  This is likely related to intracranial atherosclerosis. Specifically,  there is a moderate to severe focal stenosis involving the M1 segment  of the left middle cerebral artery (series 9 image 508). There are  also moderate to severe stenosis involving a small proximal M2 branch  of the superior division of the left MCA (series 9 image 509).  Mild/moderate multifocal stenoses involving the M1 and M2 branches of  the right middle cerebral artery. Mild to moderate diffuse stenosis of  the left vertebral artery intracranially. There is a severe stenosis  involving the P2 segment of the left posterior cerebral artery (series  9 image 497). There are moderate/moderate to severe multifocal  stenoses elsewhere involving the P2 and P2 segments of both posterior  cerebral arteries.    CT ANGIOGRAM NECK FINDINGS:  There is a four-vessel arch, with the  left vertebral artery arising directly from the aortic arch. Scattered  atherosclerotic disease involving the aortic arch with at least mild  to moderate stenosis at the left vertebral artery origin. No  significant stenoses of the other origins of the great vessels.    Right carotid artery: There is mixed atherosclerotic disease of the  right carotid bifurcation and proximal cervical internal carotid  artery, without definite flow-limiting stenosis. There is a linear  filling defect in the proximal cervical right internal carotid artery  that could be a superimposed web or linear plaque (series 9 image  341). Tortuous internal carotid artery.    Left carotid artery: There is mixed atherosclerosis involving the left  carotid bifurcation and proximal cervical internal carotid artery  resulting in up to approximately 50-60% stenosis by NASCET criteria.  Retropharyngeal course of the left internal carotid  artery.    Vertebral arteries: The left vertebral artery arises directly from the  aortic arch, a normal variant. Mild to moderate stenosis at the origin  of the left vertebral artery. Otherwise, no significant/definite  flow-limiting stenosis identified involving the cervical vertebral  arteries.    Other findings: There appear to be small bilateral pleural effusions  with some dependent atelectasis. Atherosclerosis of the coronary  arteries is partially visualized. Multilevel degenerative changes in  the spine.      Impression    IMPRESSION:  1. Focal occlusion of the right anterior cerebral artery precallosal  segment.  2. Advanced intracranial atherosclerosis with multifocal stenoses,  including severe focal stenoses involving the left middle cerebral  artery M1 segment and both posterior cerebral arteries.  3. Bilateral carotid bifurcation atherosclerosis. There is a linear  filling defect in the proximal cervical right ICA that could represent  a superimposed web or linear atherosclerosis. There is no definite  flow-limiting stenosis in the right carotid artery. There is 50-60%  stenosis of the proximal cervical LEFT internal carotid artery.  4. The left vertebral artery arises directly from the aortic arch and  demonstrates mild to moderate stenosis at its origin. The cervical  vertebral arteries are otherwise patent.   CT Head Perfusion w Contrast    Narrative    CT BRAIN PERFUSION February 1, 2021 10:36 AM    HISTORY: Code Stroke.    TECHNIQUE: Time sequential axial CT images of the head were acquired  during the administration of 120mL Isovue-370 IV. Color perfusion maps  of the brain were created from this time sequential axial source data.      Radiation dose for this scan was reduced using automated exposure  control, adjustment of the mA and/or kV according to patient size, or  iterative reconstruction technique.    COMPARISON: CTA head and neck of same day.      Impression    IMPRESSION: There is a  small core infarct involving the right anterior  cerebral artery territory with a medium-sized area of  oligemia/penumbra (mismatch volume 23 mL, mismatch ratio 4.8 according  to the RAPID maps). No other gross focal or regional cerebral  perfusion asymmetry identified.    The findings on the noncontrast head CT, CTA head and neck and CT head  perfusion were discussed with Dr. Wilburn by myself at approximately 10:38  AM on 2/1/2021.     Medications     sodium chloride 100 mL/hr at 02/01/21 1402       amLODIPine  2.5 mg Oral Daily     atorvastatin  40 mg Oral QPM     dorzolamide-timolol  1 drop Right Eye BID     travoprost KORINA FREE  1 drop Both Eyes At Bedtime

## 2021-02-01 NOTE — PROVIDER NOTIFICATION
"Text page to Louise Vang: \"FYI patient appearing to have neuro changes. LUE and LLE significantly weaker, difficulty command following, unable to walk with PT--PT noted same changes. Please advise. Thanks\"  "

## 2021-02-01 NOTE — PLAN OF CARE
A&O. Forgetful. Prn Hydralazine given for SBP > 150, improved. Neuro's unchanged. Tele SR-ST. Repositioned in bed q2h.

## 2021-02-01 NOTE — INTERIM SUMMARY
[facility] Pre-Admission Screen    Referral Source:  [place] [address] [facility]  [address] [facility] [address]  Admit date to referring facility: 1/29/2021    Physical Medicine and Rehab Consult Completed: No    Rehab Diagnosis:    Stroke Ischemic 01.1 (L) Body Involvement (R) Brain Stroke; s/p Right ROBBY strokes    Justification for Acute Inpatient Rehabilitation  Jamee Douglas is an 88-year-old  female who was living independently who admitted to the hospital on 1/29/2021 following a fall at her Assisted Living facility in which she hit the back of her head. In December, pt was admitted to the hospital with multiple strokes and was discharged to a TCU at that time. She was at the TCU for about 4 weeks and had been home for 2 days PTA and was using her walker and lost her balance, fell backwards, and hit the back of her head. Upon admission, pt found to have imaging findings revealing several acute-appearing intracranial hemorrhages, primarily in the subarachnoid spaces with a small right falx subdural hematoma. Per Neurosurgery, she was not a candidate for surgical intervention.     On Monday 2/1/2021 the patient was noted to have an increase in L sided weakness, control, and placement during her PT session. RN was notified, and a code stroke was called. The patient was found to have a new Subacute infarct involving the right anterior cerebral artery distribution with diffusion restriction and enhancement as well as multiple smaller infarcts involving the left frontal lobe, bilateral parietal lobes, and bilateral temporal lobes. Neurology was consulted and recs were made to maintain SBP <160, EKG and ultimately a 14 day monitor, resume statin, but that the pt is not appropriate for antiplatelet agents due to recent bleed.     The patient is medically ready for transfer to [facility] for rehabilitation. Patient requires an intensive inpatient rehab program to address  the following acute impairments:impaired ROM, impaired strength, impaired activity tolerance, impaired tone, impaired balance, impaired coordination, impaired cognition, impaired judgement and safety awareness, impulsiveness, impaired weight shifting and dysarthria    Current Active Medical Management Needs/Risks for Clinical Complications  The patient requires the high level of rehabilitation physician supervision that accompanies the provision of intensive rehabilitation therapy.  The patient needs the services of the rehabilitation physician to assess the patient medically and functionally and to modify the course of treatment as needed to maximize the patient's capacity to benefit from the rehabilitation process. The patient requires physician involvement at least 3 days per week to manage:   Neurologic: in patient with Hemiplegia since strokes in December 2020, worsened after new stroke on 2/1/2021, assess neuro status regularly-- currently on aspirin   Cardiac: in setting of HLD, HTN at risk for further strokes, assess HTN regularly to ensure within parameters-- currently on Lipitor, will need Ziopatch placed at hospital prior to admit to [facility]   The patient is at risk for further falls, further strokes, hospital acquired confusion, skin breakdown due to decreased mobility.       Past Medical/Surgical History   Surgery in the past 100 days: No   Additional relevant past medical history: 2 strokes in December with mild residual L sided weakness, glaucoma, HLD, OA, falls    Level of Functioning Prior to Admission:    LIVING ENVIRONMENT   People in home: alone   Current Living Arrangements: assisted living   Home Accessibility: no concerns   Transportation Anticipated: family or friend will provide   Living Environment Comments: Pt recently moved to [facility]. Pt had been hospitalized in Dec 2020 with stroke x2, [facility] and moved to [facility] after, had previously lived independently in her own apartment.      SELF-CARE   Usual Activity Tolerance: moderate   Regular Exercise: Other (see comments)   Equipment Currently Used at Home: shower chair, walker, rolling, wheelchair, manual   Activity/Exercise/Self-Care Comment: Posterior LOBs / poor bioawareness. At [facility] 2 days and had 2 falls, was walking ~10ft bouts with RW Nayeli in the morning, but by the evening was getting Ax2 given fatigue from basic in-unit mobility (no PT yet). Had DC'd from rehab at [facility] to [facility] this last time walking 40' RW - tx and gait with Ax1. The [facility] was a la carte: contracted in AM & PM for clothes & bed mobility.     DISABILITY/FUNCTION   Concentrating, Remembering or Making Decisions Difficulty: yes   Difficulty Communicating: no   Difficulty Eating/Swallowing: no   Walking or Climbing Stairs Difficulty: no   Dressing/Bathing Difficulty: no   Toileting issues: no   Doing Errands Independently Difficulty (such as shopping): no   Fall history within last six months: yes; Number of times patient has fallen within last six months: 2   Change in Functional Status Since Onset of Current Illness/Injury: yes  Additional Comments: Prior to strokes in Dec 2020, pt exercised daily (walking) and was very active. Completed all ADLs/IADLs independently. Reports using a walker recently.      Level of Function: GG Scale (Section GG Functional Ability and Goals; CMS's HANNA Version 3.0 Manual effective 10.1.2019):  PT Current Function Goals for Rehab   Bed Rolling 2 Substantial/maximal assistance 6 Independent   Supine to Sit 2 Substantial/maximal assistance 6 Independent   Sit to Stand 3 Partial/moderate assistance 6 Independent   Transfer 3 Partial/moderate assistance 6 Independent   Ambulation 1 Dependent 6 Independent   Stairs Not completed Not Applicable     OT Current Function Goals for Rehab   Feeding 5 Setup or clean-up assistance 6 Independent   Grooming 3 Partial/moderate assistance 6 Independent   Bathing 3 Partial/moderate assistance 4  Supervision or touching assitance   Upper Body Dressing 3 Partial/moderate assistance 6 Independent   Lower Body Dressing 2 Substantial/maximal assistance 3 Partial/moderate assistance   Toileting 1 Dependent 3 Partial/moderate assistance   Toilet Transfer 1 Dependent 3 Partial/moderate assistance   Tub/Shower Transfer Not completed 3 Partial/moderate assistance   Cognition Not Assessed Independent     SLP Current Function Goals for Rehab   Swallow Not Impaired Not applicable   Communication Not Assessed Communicate basic needs effectively       Current Diet:  Regular diet and Thin liquids    Summary Statement:  The patient is participating well in PT/OT/SLP and will tolerate 3 hours of therapy per day. The patient currently performs bed mobility with Max/Mod A, sitting balance with SBA/Min A, and sit to stands with Min/Mod A. She is able to ambulate 50 feet with Min A, FWW, and WC follow. She requires Min A with navigating the FWW, heavy cues for staying inside the walker. She performs seated grooming and hygiene tasks with min verbal cues for sequencing. She requires Mod A for standing tasks. SLP assessed swallow which is WFL, however recommends a full cognitive/linguistic assessment at next level of care.     Expected Therapies and Services Required During Inpatient Rehab Admission  Intensity of Therapy: Patient requires intensive therapies not available in a lesser level of care. Patient is motivated, making gains, and can tolerate 3 hours of therapy a day.  Physical Therapy: 60 minutes per day, 7 days a week for 18 days  Occupational Therapy: 60 minutes per day, 7 days a week for 18 days  Speech and Language Therapy: 60 minutes per day, 7 days a week for 18 days  Rehabilitation Nursing Needs: Patient requires 24 hour Rehab Nursing to manage vitals, medication education, tone management, positioning, carryover of new rehab techniques, care coordination, assess neurologic status, stroke education and provide safe  environment for patient at falls risk.    Precautions/restrictions/special needs:   Precautions: fall precautions   Restrictions: NA   Special Needs: NA   Designated Visitor: Melania Cohen (daughter)    Expected Level of Improvement: Anticipate with intensive therapies, close medical management, and rehabilitative nursing the patient will improve mobility to ensure independence with basic mobility and ADLs, assist for bathing, or mobility outside the [facility] apartment.   Expected Length of time to achieve: 18 days    Anticipated Discharge Needs:  Anticipated Discharge Destination: Assisted Living Facility, increased services  Anticipated Discharge Support: Family member and PCA  24/7 support available : Yes  Identified caregiver(s):  [facility] staff, daughter  Anticipated Discharge Needs: Home with homecare    Identified challenges/barriers:  SLADE    Liaison Signature/date/time:       Physician statement of review and agreement:  I have reviewed and am in agreement of the need for IRF stay to address above functional and medical needs. In addition to above statements address, Patient requires intensive active and ongoing therapeutic intervention and multiple therapies; Patient requires medical supervision; Expected to actively participate in the intensive rehab program; Sufficiently stable to actively participate; Expectation for measurable improvement in functional capacity or adaption to impairments.    MD signature/date/time:

## 2021-02-01 NOTE — PROGRESS NOTES
Neuro IR note    Paged about stroke code.  Reviewed imaging and discussed with staff.  Right ROBBY occlusion appears too distal for safe, low risk intervention.  Arch with calcification may add to higher risk of intervention.  At this time, neuro IR will defer intervention.      Discussed with staff on call.  Relayed information to neurology team.     Sylvia Riley   Neuro-endovascular fellow  Neurosurgery Graduate      Please, for questions or concerns, do not hesitate to call NeuroIR on call pager.    * * * [phone] then job code [phone]

## 2021-02-01 NOTE — PROGRESS NOTES
02/01/21 1159   General Information   Onset of Illness/Injury or Date of Surgery 01/29/21   Referring Physician Maricruz Yancey RN   Patient/Family Therapy Goal Statement (SLP) To re-assess swallow function   Pertinent History of Current Problem Jamee Douglas is 88 who had a stroke x2 in December was at rehab center and discharged back to her facility in assisted living, but who had a fall onto the back, striking her occipital area and sustaining what appears to be multiple areas of subarachnoid hemorrhage, but overall stable neurological exam, being admitted for further treatment and monitoring.    General Observations New neuro findings. Asked to re-assess swallow. Pleasant and chatting with family member   Type of Evaluation   Type of Evaluation Swallow Evaluation   Oral Motor   Oral Musculature anomalies present   Dentition (Oral Motor)   Dentition (Oral Motor) natural dentition   Facial Symmetry (Oral Motor)   Facial Symmetry (Oral Motor) left side impairment   Left Side Facial Asymmetry minimal impairment   Right Side Facial Asymmetry minimal impairment   Comment, Facial Symmetry (Oral Motor) left side facial droop; minimal impairment   Lip Function (Oral Motor)   Lip Range of Motion (Oral Motor) WNL   Tongue Function (Oral Motor)   Tongue ROM (Oral Motor) WNL   Jaw Function (Oral Motor)   Jaw Function (Oral Motor) WNL   Cough/Swallow/Gag Reflex (Oral Motor)   Soft Palate/Velum (Oral Motor) WNL   Vocal Quality/Secretion Management (Oral Motor)   Vocal Quality (Oral Motor) WFL   General Swallowing Observations   Current Diet/Method of Nutritional Intake (General Swallowing Observations, NIS) NPO   Clinical Swallow Evaluation   Feeding Assistance minimal assistance required   Clinical Swallow Evaluation Textures Trialed Thin Liquids;Solid Foods   Clinical Swallow Eval: Thin Liquid Texture Trial   Mode of Presentation, Thin Liquids cup;straw;self-fed   Volume of Liquid or Food Presented 2 oz   Oral  Phase of Swallow other (see comments)   Pharyngeal Phase of Swallow intact   Diagnostic Statement per daughter, always holds liquids in her mouth before she swallows. no coughing, choking, or change in voice/respiratory status    Clinical Swallow Evaluation: Solid Food Texture Trial   Mode of Presentation, Solid self-fed   Volume of Solid Food Presented 1 cracker   Oral Phase, Solid Residue in oral cavity   Oral Residue, Solid soft palate   Pharyngeal Phase, Solid intact   Diagnostic Statement mild oral residue in teeth and palate, cleared with liquid rinse, adequate mastication, no coughing with liquid rinse   Esophageal Phase of Swallow   Patient reports or presents with symptoms of esophageal dysphagia No   Swallowing Recommendations   Diet Consistency Recommendations regular diet;thin liquids   Supervision Level for Intake patient independent   Mode of Delivery Recommendations slow rate of intake;bolus size, small   Swallowing Maneuver Recommendations alternate food and liquid intake   Recommended Feeding/Eating Techniques (Swallow Eval) maintain upright sitting position for eating   Medication Administration Recommendations, Swallowing (SLP) whole with water   Comment, Swallowing Recommendations Tolerated thing liquids via straw, no coughing. She consumed 1 cracker with approrpaite oral and pharyngeal phase. Tolerated one ice chip via spoon. no s/sx of aspiration.   SLP Therapy Assessment/Plan   Criteria for Skilled Therapeutic Interventions Met (SLP Eval) no problems identified which require skilled intervention   SLP Diagnosis functional swallow   Rehab Potential (SLP Eval) good, to achieve stated therapy goals   Therapy Frequency (SLP Eval) one time eval and treatment only   Comment, Therapy Assessment/Plan (SLP) New neuro findings, asked to re-assess swallow. Pt presents with functional swallow during evaluation. She consumed thin liquids, one ice chip, and solids with no difficulties, coughing or choking.  Per daughter, no change since yesterday. Recommend regular diet with thin liquids. Continue to eat slow, alternate consistencies, and sit upright during meals.    Therapy Plan Review/Discharge Plan (SLP)   Therapy Plan Review (SLP) evaluation/treatment results reviewed;care plan/treatment goals reviewed;risks/benefits reviewed;current/potential barriers reviewed;participants voiced agreement with care plan;participants included;patient;daughter   SLP Discharge Planning    SLP Discharge Recommendation (DC Rec) Transitional Care Facility   SLP Rationale for DC Rec No dysphagia need, may need cognitive/lingusitic eval and tx pending progress   SLP Brief overview of current status  Regular diet with thin liquids. Alternate consistencies, slow rate, upright for PO.     Total Evaluation Time   Total Evaluation Time (Minutes) 10

## 2021-02-01 NOTE — PLAN OF CARE
OT Note: Unable to see patient this AM during scheduled time due to Code Stroke called. Will schedule for next date. Thank you.

## 2021-02-01 NOTE — PROGRESS NOTES
Pt. With appeared neuro changes, contacted neurosurg and discussed with Dr. Ribeiro. Code stroke called.

## 2021-02-01 NOTE — PROGRESS NOTES
Pt here with SAH after fall. Pt A&O x4. Neuros intact except baseline left sided hemiparesis and left facial droop, can be slow to respond at times, forgetful. A2 GB W. VSS on RA. SBP <150 hydralazine avaliable. Tele NSR. Regular diet. Takes pill whole with water.  +BS. Denies pain: Tylenol available. Seizure precautions.  Discharge to ARU pending insurance auth.

## 2021-02-01 NOTE — PLAN OF CARE
Nursing shift note  Pt here with SAH. A&O. Neuros intact ex for left droop, LUE and LLE weakness. LLE 2-3/5 and LUE 4/5. VSS. Tele ST. Regular diet, thin liquids. Takes pills whole with water. Incontinent, turn and reposition Q2hrs. Up with A2/GB/W. Denies pain. Code stroke called for new onset LLE weakness/WFD. Potassium replaced, recheck at 6:30pm. Pt scoring green on the Aggression Stop Light Tool. Plan to discharge pending workup.     Behavior & Aggression Tool color:  green    Pt's belongings:   remains with patient      Home medications: none

## 2021-02-02 ENCOUNTER — APPOINTMENT (OUTPATIENT)
Dept: OCCUPATIONAL THERAPY | Facility: CLINIC | Age: 86
DRG: 085 | End: 2021-02-02
Payer: COMMERCIAL

## 2021-02-02 ENCOUNTER — APPOINTMENT (OUTPATIENT)
Dept: CARDIOLOGY | Facility: CLINIC | Age: 86
DRG: 085 | End: 2021-02-02
Attending: PHYSICIAN ASSISTANT
Payer: COMMERCIAL

## 2021-02-02 ENCOUNTER — APPOINTMENT (OUTPATIENT)
Dept: PHYSICAL THERAPY | Facility: CLINIC | Age: 86
DRG: 085 | End: 2021-02-02
Payer: COMMERCIAL

## 2021-02-02 LAB — POTASSIUM SERPL-SCNC: 3.5 MMOL/L (ref 3.4–5.3)

## 2021-02-02 PROCEDURE — 97116 GAIT TRAINING THERAPY: CPT | Mod: GP

## 2021-02-02 PROCEDURE — 36415 COLL VENOUS BLD VENIPUNCTURE: CPT | Performed by: STUDENT IN AN ORGANIZED HEALTH CARE EDUCATION/TRAINING PROGRAM

## 2021-02-02 PROCEDURE — 99233 SBSQ HOSP IP/OBS HIGH 50: CPT | Performed by: STUDENT IN AN ORGANIZED HEALTH CARE EDUCATION/TRAINING PROGRAM

## 2021-02-02 PROCEDURE — 250N000013 HC RX MED GY IP 250 OP 250 PS 637: Performed by: STUDENT IN AN ORGANIZED HEALTH CARE EDUCATION/TRAINING PROGRAM

## 2021-02-02 PROCEDURE — 84132 ASSAY OF SERUM POTASSIUM: CPT | Performed by: STUDENT IN AN ORGANIZED HEALTH CARE EDUCATION/TRAINING PROGRAM

## 2021-02-02 PROCEDURE — 999N000208 ECHOCARDIOGRAM COMPLETE

## 2021-02-02 PROCEDURE — 120N000001 HC R&B MED SURG/OB

## 2021-02-02 PROCEDURE — 93306 TTE W/DOPPLER COMPLETE: CPT | Mod: 26 | Performed by: INTERNAL MEDICINE

## 2021-02-02 PROCEDURE — 97530 THERAPEUTIC ACTIVITIES: CPT | Mod: GO

## 2021-02-02 PROCEDURE — 250N000013 HC RX MED GY IP 250 OP 250 PS 637: Performed by: PHYSICIAN ASSISTANT

## 2021-02-02 PROCEDURE — 97530 THERAPEUTIC ACTIVITIES: CPT | Mod: GP

## 2021-02-02 PROCEDURE — 258N000003 HC RX IP 258 OP 636: Performed by: STUDENT IN AN ORGANIZED HEALTH CARE EDUCATION/TRAINING PROGRAM

## 2021-02-02 RX ORDER — ASPIRIN 81 MG/1
81 TABLET, CHEWABLE ORAL DAILY
Status: DISCONTINUED | OUTPATIENT
Start: 2021-02-02 | End: 2021-02-04 | Stop reason: HOSPADM

## 2021-02-02 RX ADMIN — TRAVOPROST 1 DROP: 0.04 SOLUTION/ DROPS OPHTHALMIC at 23:14

## 2021-02-02 RX ADMIN — DORZOLAMIDE HYDROCHLORIDE AND TIMOLOL MALEATE 1 DROP: 20; 5 SOLUTION/ DROPS OPHTHALMIC at 23:14

## 2021-02-02 RX ADMIN — DORZOLAMIDE HYDROCHLORIDE AND TIMOLOL MALEATE 1 DROP: 20; 5 SOLUTION/ DROPS OPHTHALMIC at 10:44

## 2021-02-02 RX ADMIN — ASPIRIN 81 MG CHEWABLE TABLET 81 MG: 81 TABLET CHEWABLE at 16:04

## 2021-02-02 RX ADMIN — SODIUM CHLORIDE: 9 INJECTION, SOLUTION INTRAVENOUS at 05:38

## 2021-02-02 RX ADMIN — SODIUM CHLORIDE: 9 INJECTION, SOLUTION INTRAVENOUS at 16:02

## 2021-02-02 RX ADMIN — AMLODIPINE BESYLATE 2.5 MG: 2.5 TABLET ORAL at 10:45

## 2021-02-02 RX ADMIN — ATORVASTATIN CALCIUM 40 MG: 40 TABLET, FILM COATED ORAL at 19:18

## 2021-02-02 ASSESSMENT — ACTIVITIES OF DAILY LIVING (ADL)
ADLS_ACUITY_SCORE: 17
ADLS_ACUITY_SCORE: 17
ADLS_ACUITY_SCORE: 16
ADLS_ACUITY_SCORE: 20
ADLS_ACUITY_SCORE: 16
ADLS_ACUITY_SCORE: 16

## 2021-02-02 NOTE — PLAN OF CARE
A&OX4, VSS on RA. Denies pain, n&V. Poor appetite. IVF at 100mL/hr. K+ recheck was low again. Being replaced, recheck an hour after infusion complete. Neuro deficit noted in the LLE, left hemiparesis, left pronator drift, left facial droop. Some movement in the LUE though weaker than the RUE. Purewick in place. MRI done, please see results, repeat CT in the AM. Tele has been SR. Echo pending. Turned and repoed Q 2hrs. Continue to monitor.

## 2021-02-02 NOTE — PLAN OF CARE
A&O. Neuros L droop, LUE/LLE extremity weakness (LUE 3/5, LLE2/5), L leg pronator drift. VSS. Tele NSR. Reg diet, thin liquids. Takes pills whole. Up with Ax2, GB/walker; Q2H turn and repo. Purewick in place with adequate urine output. Denies pain. Pt scoring green on the Aggression Stop Light Tool. Plan to continue to monitor.

## 2021-02-02 NOTE — PROGRESS NOTES
Care Management Follow Up    Length of Stay (days): 4    Expected Discharge Date: 02/02/21(ARU)     Concerns to be Addressed: all concerns addressed in this encounter, discharge planning     Patient plan of care discussed at interdisciplinary rounds: yes    Anticipated Discharge Disposition: Acute Rehab, Transitional Care     Anticipated Discharge Services:  [facility] is following pt and will need to assess for appropriateness of ARU following code stroke from yesterday per ARU liaison.    Patient/family educated on Medicare website which has current facility and service quality ratings: yes  Referrals Placed by CM/JUNE:  ARU    Additional Information:  JUNE continuing.    BRITTANI Walter  [facility]  [phone]

## 2021-02-02 NOTE — PLAN OF CARE
Ischemic Stroke(R ROBBY territory, R frontal infarct). Neuros - alert &oriented/forgetful, making her needs known, right eyelid weakness/slight left facial weakness noted,lower jaw quiver noted (hospitalist stated present since admission & aware) left lower extremity significantly impaired/weak dorsi plantar flexion, right field cut/baseline vision deficits/corrective lenses utilized. VSS.  Tele monitored-SR. Regular diet/set up provided/takes pills whole with water. Up with  moderate 2 assist/gait belt/walker to bedside commode/tolerated up in chair for a few hours over lunchtime.  Incontinent of urine/purwik utilized overnight. Denies pain. Pt scoring green on the Aggression Stop Light Tool. TCU at discharge. Seizure precautions maintained.

## 2021-02-02 NOTE — PROGRESS NOTES
[facility]    Stroke Progress Note    Interval Events  No events overnight. BP has been in 120-160 range. PT reports her strength is much better today. MRI showed stroke last night as expected    Stroke Evaluation summarized:  MRI/Head CT: MRI brain shows R ROBBY infarct, with multiple areas of smaller infarcts, and unchanged bilateral subdural hematomas and areas of scattered SAH  Intracranial Vascular Imaging: R ROBBY occlusion, advanced intracranial atherosclerosis with multifcal severe stenoses  Cervical Carotid and Vertebral Artery Vascular Imaging: L ICA 50-60% stenosis, R ICA with linear filling defect: web, linear atherosclerosis, or less likely chronic dissection flap. No definite flow-limiting stenosis on the R however  Echocardiogram: EF 60%, normal LA size, bubble study negative  EKG/Telemetry: Sinus rhythm  LDL: [facility]  A1c: 5.8  Troponin: 0.058   Other testing: Not Applicable    Impression  Ischemic Stroke (R ROBBY territory, R frontal infarct) due to symptomatic intracranial atherosclerosis versus atheroembolic from severe aortic arch plaque.   Could be due to symptomatic intracranial atherosclerotic disease given her multifocal intracranial stenoses, however the additional areas of stroke seen suggest central embolic source   Unfortunately no antiplatelets yet due to traumatic SDH/SAH    Plan  - Resume home lipitor 40mg (was on it as of [facility] neuro visit 1/21/21)  - SBP <160. Would not recommend treating aggressively as outpatient given her multiple intracranial stenoses, d/c'd norvasc today, recommend restarting at 2/5 at soonest if BP trending up, treat with PRN meds until then  - 14 day ziopatch on discharge to screen for afib  - neuro checks q4 hours  - Start aspirin 81mg daily, stat head CT if any change in neuro exam    Follow up:  Follow up with Dr. Brooke Finnegan at [facility] neurology within 4 weeks    Will sign off, please  "call with any questions    Katlyn Vang PA-C  Neurology  02/02/2021 7:42 AM  To page me or covering stroke neurology team member, click here: AMCOM   Choose \"On Call\" tab at top, then search dropdown box for \"Neurology Adult\", select location, press Enter, then look for stroke/neuro ICU/telestroke.    ______________________________________________________    Medications   Home Meds  Prior to Admission medications    Medication Sig Start Date End Date Taking? Authorizing Provider   aspirin 81 MG tablet Take 1 tablet by mouth daily.   Yes Reported, Patient   Cholecalciferol (VITAMIN D3) 10 MCG (400 UNIT) CAPS Take 400 Units by mouth daily    Yes Reported, Patient   dorzolamide-timolol (COSOPT) 2-0.5 % ophthalmic solution Place 1 drop into the right eye 2 times daily   Yes Unknown, Entered By History   estradiol (ESTRACE) 0.5 MG tablet Take 0.5 mg by mouth MWF   Yes Unknown, Entered By History   Multiple Vitamin (MULTI-VITAMIN) per tablet Take 1 tablet by mouth daily.   Yes Reported, Patient   travoprost BAK FREE (TRAVATAN Z) 0.004 % ophthalmic solution Place 1 drop into both eyes At Bedtime   Yes Unknown, Entered By History       Scheduled Meds    amLODIPine  2.5 mg Oral Daily     atorvastatin  40 mg Oral QPM     dorzolamide-timolol  1 drop Right Eye BID     travoprost BAK FREE  1 drop Both Eyes At Bedtime       Infusion Meds    sodium chloride 100 mL/hr at 02/02/21 0538       PRN Meds  acetaminophen **OR** acetaminophen, hydrALAZINE, lidocaine 4%, lidocaine (buffered or not buffered), melatonin, ondansetron **OR** ondansetron, polyethylene glycol, sennosides, sodium chloride (PF), sodium chloride (PF)       PHYSICAL EXAMINATION  Temp:  [97.6  F (36.4  C)-99.3  F (37.4  C)] 97.8  F (36.6  C)  Pulse:  [] 84  Resp:  [16-18] 16  BP: (115-164)/(54-70) 164/70  SpO2:  [95 %-100 %] 97 %     General:  patient lying in bed without any acute distress    HEENT:  normocephalic/atraumatic  Pulmonary:  no respiratory " distress     Neurologic  Mental Status:  alert, oriented x 3, follows commands, speech clear and fluent, naming and repetition normal  Cranial Nerves:  visual fields intact, PERRL, EOMI with normal smooth pursuit, facial sensation intact and symmetric, hearing not formally tested but intact to conversation, palate elevation symmetric and uvula midline, no dysarthria, shoulder shrug strong bilaterally, tongue protrusion midline, left facial droop  Motor:  normal muscle tone and bulk, no abnormal movements, L arm 4+/5, L leg 4-/5, good toe wiggle. strength normal on R  Reflexes:  toes down-going  Sensory:  L side sensory neglect resolved- now normal. normal equal sensation in all extremities otherwise  Coordination:  no ataxia on R. no ataxia LUE out of proportion to weakness  Station/Gait:  deferred      Imaging  I personally reviewed all imaging; relevant findings per HPI.     Lab Results Data   CBC  Recent Labs   Lab 02/01/21  1006 01/30/21  1015 01/29/21 2010   WBC 9.2 6.8 8.3   RBC 3.93 3.88 3.99   HGB 11.7 11.4* 11.7   HCT 35.9 35.8 36.8    215 227     Basic Metabolic Panel    Recent Labs   Lab 02/02/21  0111 02/01/21  2030 02/01/21  1006 01/30/21  1015 01/29/21 2010   NA  --   --  141 141 141   POTASSIUM 3.5 3.1* 3.1* 3.5 3.4   CHLORIDE  --   --  109 110* 106   CO2  --   --  25 26 31   BUN  --   --  7 10 13   CR  --   --  0.73 0.72 0.70   GLC  --   --  85 90 101*   POORNIMA  --   --  8.6 8.4* 9.0     Liver Panel  No results for input(s): PROTTOTAL, ALBUMIN, BILITOTAL, ALKPHOS, AST, ALT, BILIDIRECT in the last 168 hours.  INR    Recent Labs   Lab Test 02/01/21  1006 01/29/21 2010   INR 1.06 1.00      Lipid Profile    Recent Labs   Lab Test 03/27/18  0909 03/16/17 11/17/16   CHOL 238* 207* 193   HDL 53 52 50   * HIGH 125*   TRIG 141 187* 90     A1C    Recent Labs   Lab Test 02/01/21  1006   A1C 5.8*     Troponin I    Recent Labs   Lab 02/01/21  1006   TROPI 0.058*

## 2021-02-03 ENCOUNTER — APPOINTMENT (OUTPATIENT)
Dept: OCCUPATIONAL THERAPY | Facility: CLINIC | Age: 86
DRG: 085 | End: 2021-02-03
Payer: COMMERCIAL

## 2021-02-03 ENCOUNTER — APPOINTMENT (OUTPATIENT)
Dept: PHYSICAL THERAPY | Facility: CLINIC | Age: 86
DRG: 085 | End: 2021-02-03
Payer: COMMERCIAL

## 2021-02-03 DIAGNOSIS — I60.9 SUBARACHNOID HEMORRHAGE (H): Primary | ICD-10-CM

## 2021-02-03 LAB
GLUCOSE BLDC GLUCOMTR-MCNC: 78 MG/DL (ref 70–99)
INTERPRETATION ECG - MUSE: NORMAL

## 2021-02-03 PROCEDURE — 97112 NEUROMUSCULAR REEDUCATION: CPT | Mod: GP

## 2021-02-03 PROCEDURE — 97112 NEUROMUSCULAR REEDUCATION: CPT | Mod: GO

## 2021-02-03 PROCEDURE — 97530 THERAPEUTIC ACTIVITIES: CPT | Mod: GP

## 2021-02-03 PROCEDURE — 99232 SBSQ HOSP IP/OBS MODERATE 35: CPT | Performed by: INTERNAL MEDICINE

## 2021-02-03 PROCEDURE — 250N000013 HC RX MED GY IP 250 OP 250 PS 637: Performed by: STUDENT IN AN ORGANIZED HEALTH CARE EDUCATION/TRAINING PROGRAM

## 2021-02-03 PROCEDURE — 250N000011 HC RX IP 250 OP 636: Performed by: INTERNAL MEDICINE

## 2021-02-03 PROCEDURE — 97116 GAIT TRAINING THERAPY: CPT | Mod: GP

## 2021-02-03 PROCEDURE — 258N000003 HC RX IP 258 OP 636: Performed by: STUDENT IN AN ORGANIZED HEALTH CARE EDUCATION/TRAINING PROGRAM

## 2021-02-03 PROCEDURE — 999N001017 HC STATISTIC GLUCOSE BY METER IP

## 2021-02-03 PROCEDURE — 250N000013 HC RX MED GY IP 250 OP 250 PS 637: Performed by: PHYSICIAN ASSISTANT

## 2021-02-03 PROCEDURE — 120N000001 HC R&B MED SURG/OB

## 2021-02-03 PROCEDURE — 97535 SELF CARE MNGMENT TRAINING: CPT | Mod: GO

## 2021-02-03 RX ADMIN — POLYETHYLENE GLYCOL 3350 17 G: 17 POWDER, FOR SOLUTION ORAL at 15:57

## 2021-02-03 RX ADMIN — DORZOLAMIDE HYDROCHLORIDE AND TIMOLOL MALEATE 1 DROP: 20; 5 SOLUTION/ DROPS OPHTHALMIC at 22:00

## 2021-02-03 RX ADMIN — SODIUM CHLORIDE: 9 INJECTION, SOLUTION INTRAVENOUS at 02:12

## 2021-02-03 RX ADMIN — SODIUM CHLORIDE: 9 INJECTION, SOLUTION INTRAVENOUS at 12:34

## 2021-02-03 RX ADMIN — DORZOLAMIDE HYDROCHLORIDE AND TIMOLOL MALEATE 1 DROP: 20; 5 SOLUTION/ DROPS OPHTHALMIC at 08:31

## 2021-02-03 RX ADMIN — ASPIRIN 81 MG CHEWABLE TABLET 81 MG: 81 TABLET CHEWABLE at 08:31

## 2021-02-03 RX ADMIN — TRAVOPROST 1 DROP: 0.04 SOLUTION/ DROPS OPHTHALMIC at 22:00

## 2021-02-03 RX ADMIN — ATORVASTATIN CALCIUM 40 MG: 40 TABLET, FILM COATED ORAL at 21:59

## 2021-02-03 RX ADMIN — HYDRALAZINE HYDROCHLORIDE 20 MG: 20 INJECTION INTRAMUSCULAR; INTRAVENOUS at 16:06

## 2021-02-03 ASSESSMENT — ACTIVITIES OF DAILY LIVING (ADL)
ADLS_ACUITY_SCORE: 16
ADLS_ACUITY_SCORE: 21
ADLS_ACUITY_SCORE: 21
ADLS_ACUITY_SCORE: 20

## 2021-02-03 NOTE — PLAN OF CARE
Nursing shift note  Pt here with R. Ischemic CVA and a SAH following a fall. Alert, oriented x4. Neuros intact ex: L. Sided weakness, L. Pronator drift and facial droop on the L. side. VSS on RA ex: HTN, but within parameters of SBP <160. Tele NSR. Regular diet, thin liquids. Takes pills whole with water. Up with A1-2 using GB/walker. Purewick in place with adequate output. Denies pain. Pt scoring green on the Aggression Stop Light Tool. Plan to continue working with therapies. Discharge to TCU pending.  Behavior & Aggression Tool color:  Green    Pt's belongings:   (Belongings from admission day present in pt's room)            Home medications: (N)

## 2021-02-03 NOTE — PROGRESS NOTES
Care Management Follow Up    Length of Stay (days): 5    Expected Discharge Date: 02/03/21(ARU)     Concerns to be Addressed: all concerns addressed in this encounter, discharge planning     Patient plan of care discussed at interdisciplinary rounds: Yes    Anticipated Discharge Disposition: Acute Rehab, Transitional Care   Pt has been assessed by  ARU and found to be appropriate for ARU placement.  SW contacted  ARU liaison regarding admission status and they were able to obtain insurance authorization for pt's stay this am.  SW called for transportation and the earliest available ride is tomorrow at 11:45 am.  Anticipated Discharge Services:   ARU   Anticipated Discharge DME:      Patient/family educated on Medicare website which has current facility and service quality ratings: yes  Education Provided on the Discharge Plan:  SW met with pt' and her daughter regarding discharge plan and they are in agreement.  Pt has had a difficult recovery so far but is committed to do her best to get back to independence  Patient/Family in Agreement with the Plan: yes    Referrals Placed by CM/SW:  W/C transport through E  Private pay costs discussed: transportation costs    Additional Information:   ARU liaison and hospitalist updated of discharge pending tomorrow.    BRITTANI Walter  [facility]  [phone]

## 2021-02-03 NOTE — PROGRESS NOTES
[facility]    Medicine Progress Note - Hospitalist Service       Date of Admission:  1/29/2021  Assessment & Plan       Jamee Douglas is 88 who had a stroke x2 in December was at rehab center and discharged back to her facility in assisted living, but who had a fall onto the back, striking her occipital area and sustaining multiple areas of subarachnoid hemorrhage, but overall stable neurological exam, being admitted for further treatment and monitoring.     Acute ischemic stroke, R ROBBY, due to symptomatic intracranial atherosclerosis vs atheroembolic event from aortic arch plaque   Developed new dense hemiparesis on left with some speech difficulties on 2/1 in the AM. Stroke code called, identified acute stroke.  - options limited due to recent strokes/SAH/SDH, location of lesions preclude IR, etc.   - consulted neuro, now signed off  - start ASA per neuro  - 14 day ziopatch at discharge  - SBP <160, hold scheduled BP meds until 2/5 at the soonest, PRN meds until then.  - plan 4 week follow up with Dr. Finnegan at [facility] on discharge.     Multiple acute SAH  Acute SDH  Noted on CT head 1/29/21 after presentation after a fall. Remains neurologically intact  - Appreciate neurosurgery consult, now signed off. Recommend f/u in 1 month with CT day of appt  - PT/OT/SLP    Mechanical Fall  Fall likely due to ongoing deficits from recent acute strokes in December. Nothing in history to suggest other cause.  - continue tele  - PT/OT    Accelerated Hypertension  Recently started on diltiazem 120mg daily after recent strokes. Developed some orthostatic symptoms and had some recrudescence of stroke symptoms with exertion. Diltiazem stopped and symptoms resolved. BP elevated here requiring hydralazine.  - start low dose amlodipine 2.5mg, now stopped after stroke on 2/1  - continue hydralazine PRN    Urinary frequency, resolved  Likely related to IVF. No other symptoms  - stop IVF, if  ongoing frequency or other symptoms, will get UA.    Recent acute ischemic stroke x2 in December 2020  Started on DAPT initially but transitioned to just ASA at time of admission.   - hold ASA in setting of acute SDH and SAH.     Glaucoma  - continue PTA eye gtts.        Diet: Regular Diet Adult    DVT Prophylaxis: Pneumatic Compression Devices  Pierce Catheter: not present  Code Status: No CPR- Do NOT Intubate           Disposition Plan   Expected discharge: TBD, recommended to TBD once safe disposition plan/ TCU bed available and neuro clearance  Entered: Jamari Ribeiro MD 02/02/2021, 6:05 PM       The patient's care was discussed with the Bedside Nurse, Patient, Patient's Family and therapy.    >35minutes spent today in care with >50% at bedside in counseling and coordination.        Jamari Ribeiro MD  Hospitalist Service  [facility]  Contact information available via [facility] Paging/Directory    ______________________________________________________________________    Interval History   Seen in AM. Able to move the Left leg much better. Working with OT oat beginning of exam. Very tearful today about her mortality and fear of leaving her family.     ROS 8pt negative.    Data reviewed today: I reviewed all medications, new labs and imaging results over the last 24 hours. I personally reviewed the head CT image(s) showing area of acute ischemic stroke and the brain MRI image(s) showing ischemic stroke, multivessel stenosis.    Physical Exam   Vital Signs: Temp: 98.4  F (36.9  C) Temp src: Oral BP: (!) 141/71 Pulse: 82   Resp: 16 SpO2: 99 % O2 Device: None (Room air)    Weight: 132 lbs 0 oz  Constitutional: Awake, alert, cooperative, no apparent cardiopulmonary distress.  Eyes: Conjunctiva and pupils examined and normal.  HEENT: Moist mucous membranes, normal dentition.  Respiratory: Clear to auscultation bilaterally, no crackles or wheezing.  Cardiovascular: Regular rate and rhythm,  normal S1 and S2, and no murmur noted.  GI: Soft, non-distended, non-tender, normal bowel sounds.  Lymph/Hematologic: No anterior cervical or supraclavicular adenopathy.  Skin: No rashes, no cyanosis, no edema noted on exposed skin.  Musculoskeletal: No joint swelling, erythema or tenderness. No gross bony abnormalities  Neurologic: visual fields intact to direct testing, CN II-XII intact to direct testing other than mild baseline L facial droop. Left sided weakness but improved from prior.  Psychiatric: Alert, oriented to person, place and time, no obvious anxiety or depression.      Data   Recent Labs   Lab 02/02/21  0111 02/01/21  2030 02/01/21  1006 01/30/21  1015 01/29/21 2010   WBC  --   --  9.2 6.8 8.3   HGB  --   --  11.7 11.4* 11.7   MCV  --   --  91 92 92   PLT  --   --  257 215 227   INR  --   --  1.06  --  1.00   NA  --   --  141 141 141   POTASSIUM 3.5 3.1* 3.1* 3.5 3.4   CHLORIDE  --   --  109 110* 106   CO2  --   --  25 26 31   BUN  --   --  7 10 13   CR  --   --  0.73 0.72 0.70   ANIONGAP  --   --  7 5 4   POORNIMA  --   --  8.6 8.4* 9.0   GLC  --   --  85 90 101*   TROPI  --   --  0.058*  --   --      Recent Results (from the past 24 hour(s))   MR Brain w/o & w Contrast    Narrative    MRI BRAIN WITHOUT AND WITH CONTRAST  2/1/2021 7:08 PM    HISTORY:  Stroke, follow-up. New left leg weakness.    TECHNIQUE:  Multiplanar, multisequence MRI of the brain without and  with 6 mL Gadavist.    COMPARISON: Head CT 2/1/2021    FINDINGS:  Patchy/linear areas of diffusion restriction are present corresponding  to the right anterior cerebral artery distribution involving the right  cingulate gyrus, superior frontal gyrus, and superior parietal lobule  with areas of postcontrast enhancement. Additional subtle areas of  intrinsic T1 hyperintensity suggesting components of laminar necrosis.    Small area of diffusion restriction is present involving the left  superior parietal lobule and left frontal white matter.  Small areas of  diffusion restriction are present corresponding to the bilateral  temporal lobes near the hippocampus on the right and within the left  temporal stem on the left.    Small areas of subarachnoid hemorrhage are present along the left  frontal pole bilaterally along the vertex. Small volume hemorrhage  within the anterior interhemispheric fissure. Thin bilateral subdural  fluid collections are present which demonstrate a few scattered areas  of susceptibility related signal loss consistent with subdural  hematomas. The collections measure up to approximately 5 to 6 mm  bilaterally. Bilateral dural thickening and enhancement, likely  reactive.    Multiple old basal ganglia and thalamic lacunar infarcts are present.  Patchy and confluent white matter T2 hyperintensities likely represent  advanced chronic small vessel ischemic change. Parenchyma is otherwise  unremarkable.    The visualized calvarium is unremarkable. Trace opacification of the  bilateral mastoid cavities, likely inflammatory. Trace paranasal sinus  mucosal thickening. Bilateral lens replacements are present.      Impression    IMPRESSION:    1. Subacute infarct involving the right anterior cerebral artery  distribution with diffusion restriction and enhancement.  2. Multiple smaller infarcts involving the left frontal lobe,  bilateral parietal lobes, and bilateral temporal lobes.  3. Scattered areas of subarachnoid hemorrhage, not significantly  changed.  4. Bilateral subdural hematomas with mild mass effect, not  significantly changed.  5. Multiple old lacunar infarcts.  6. Patchy confluent white matter T2 hyperintensities likely represent  advanced chronic small vessel ischemic change.    GABY ADASM MD   Echocardiogram Complete    Narrative    [id number]  [facility]  [id number]  [id number]^^[id number]^AMY           [facility]  Echocardiography Laboratory  [address] [place] [id number]        Name: ASHLEY  ALVA MARQUIS  MRN: [id number]  : 1932  Study Date: 2021 08:44 AM  Age: 88 yrs  Gender: Female  Patient Location: [facility]  Reason For Study: CVA  Ordering Physician: GARTH GARCIA  Referring Physician: <No Referring Physician Selected>  Performed By: Tess Yancey     BSA: 1.6 m2  Height: 63 in  Weight: 132 lb  HR: 83  BP: 164/70 mmHg  _____________________________________________________________________________  __        Procedure  Complete Portable Bubble Echo Adult.  _____________________________________________________________________________  __        Interpretation Summary     1. The left ventricle is normal in structure, function and size. The visual  ejection fraction is estimated at 60%.  2. The right ventricle is normal in structure, function and size.  3. There is no atrial shunt seen. A contrast injection (Bubble Study) was  performed that was negative for flow across the interatrial septum.  4. No valve disease.     No previous echo for comparison.  _____________________________________________________________________________  __        Left Ventricle  The left ventricle is normal in structure, function and size. There is mild  concentric left ventricular hypertrophy. The visual ejection fraction is  estimated at 60%. Grade I or early diastolic dysfunction. Normal left  ventricular wall motion.     Right Ventricle  The right ventricle is normal in structure, function and size.     Atria  Normal left atrial size. Right atrial size is normal. There is no atrial shunt  seen. A contrast injection (Bubble Study) was performed that was negative for  flow across the interatrial septum.     Mitral Valve  The mitral valve is normal in structure and function.        Tricuspid Valve  There is mild (1+) tricuspid regurgitation. The right ventricular systolic  pressure is approximated at 22.8 mmHg plus the right atrial pressure.     Aortic Valve  The aortic valve is normal in structure and function.      Pulmonic Valve  The pulmonic valve is normal in structure and function.     Vessels  Normal ascending, transverse (arch), and descending aorta. The inferior vena  cava was normal in size with preserved respiratory variability.     Pericardium  There is no pericardial effusion.        Rhythm  Sinus rhythm was noted.  _____________________________________________________________________________  __  MMode/2D Measurements & Calculations  IVSd: 1.3 cm     LVIDd: 3.9 cm  LVIDs: 2.0 cm  LVPWd: 1.4 cm  FS: 47.5 %  LV mass(C)d: 182.0 grams  LV mass(C)dI: 112.3 grams/m2  Ao root diam: 3.2 cm  LA dimension: 2.8 cm  asc Aorta Diam: 3.4 cm  LA/Ao: 0.86  LVOT diam: 2.3 cm  LVOT area: 4.2 cm2  LA Volume (BP): 45.8 ml  LA Volume Index (BP): 28.3 ml/m2  RWT: 0.71           Doppler Measurements & Calculations  MV E max wilbert: 74.7 cm/sec  MV A max wilbert: 102.8 cm/sec  MV E/A: 0.73  MV max P.7 mmHg  MV mean P.8 mmHg  MV V2 VTI: 28.9 cm  MV dec slope: 402.9 cm/sec2  PA acc time: 0.12 sec  TR max wilbert: 238.9 cm/sec  TR max P.8 mmHg  E/E' av.2  Lateral E/e': 15.3  Medial E/e': 13.2           _____________________________________________________________________________  __           Report approved by: Xiang Santiago 2021 10:56 AM        Medications     sodium chloride 100 mL/hr at 21 1602       aspirin  81 mg Oral Daily     atorvastatin  40 mg Oral QPM     dorzolamide-timolol  1 drop Right Eye BID     travoprost KORINA FREE  1 drop Both Eyes At Bedtime

## 2021-02-03 NOTE — PROGRESS NOTES
Rehab Admissions:  Spoke with pt's daughter Melania via telephone at request of SW who states the pt's daughter will likely be the designated visitor. Melania was educated in benefits of ARC including intensive therapies, close medical management, and rehabilitative nursing care. Educated her on the location of the unit, parking options, and visitor policy. Melania states understanding and reports she and her mother are in agreement with ARC upon discharge.     Thank you for the referral, we will continue to follow this patient for post acute placement.     Determination of admission is based upon the patient's need for an intensive, interdisciplinary approach to rehabilitation, their ability to progress, their ability to tolerate intensive therapies, their need for daily physician supervision, their need for twenty four hour nursing assistance, and their ability and willingness to participate in such a program.    Jose Stringer CM  Rehab Liaison/  [facility]  2/3/2021    4:12 PM

## 2021-02-03 NOTE — PLAN OF CARE
Ischemic Stroke(R ROBBY territory, R frontal infarct). Neuros - alert &oriented/forgetful, making her needs known, right eyelid weakness/left facial weakness noted present/correctable - lower jaw quiver noted intermittently (hospitalist aware yesterday), left lower extremity significantly impaired/weak dorsi plantar flexion/able to lift leg off bed today, right field cut/baseline vision deficits/corrective lenses utilized. VSS.  Tele monitored-SR. Regular diet/set up provided/takes pills whole with water - declined lunch. Up with  moderate 1 assist/gait belt & walker to bathroom this am/sat in chair for breakfast. Incontinent of urine & continent. Denies pain. Pt scoring green on the Aggression Stop Light Tool. TCU at discharge. Seizure precautions maintained. Daughter Olinda visited/supportive at bedside. Plan for Acute Rehab tomorrow 2/4/21, patient agrees with plan.

## 2021-02-03 NOTE — PROGRESS NOTES
[facility]    Medicine Progress Note - Hospitalist Service       Date of Admission:  1/29/2021  Assessment & Plan   Jamee Douglas is an 89 yo female with history of recent stroke x2 in December who presents with fall. Admitted 1/29/2021.     Acute ischemic stroke, right ROBBY, due to symptomatic intracranial atherosclerosis with possible atheroembolic event from aortic arch plaque   Developed new dense hemiparesis on left with some speech difficulties on 2/1 in the AM. Stroke code called, identified acute stroke. Options limited due to recent strokes/SAH/SDH, location of lesions preclude IR, etc.   * Neurology consulted, now signed off  - Continue aspirin  - 14 day Ziopatch at discharge  - SBP goal <160, though would not treat more aggressively than this due to risk of precipitating recurrent stroke symptoms. Start BP meds 2/5 at the earliest, PRN meds until then.  - Plan 4 week follow up with Dr. Finnegan at [facility] neurology on discharge.   - PT/OT/SLP. Anticipate discharge to ARU if authorization obtained.      Multiple acute SAH  Acute SDH  Noted on CT head 1/29/21 after presentation after a fall. Remains neurologically intact  * Neurosurgery consult, now signed off. Recommend f/u in 1 month with CT day of appt  - PT/OT/SLP. Anticipate discharge to ARU if authorization obtained.      Mechanical Fall  Fall likely due to ongoing deficits from recent acute strokes in December. Nothing in history to suggest other cause.  - Telemetry while hospitalized   - PT/OT     Accelerated Hypertension  Recently started on diltiazem 120mg daily after recent strokes. Developed some orthostatic symptoms and had some recrudescence of stroke symptoms with exertion. Diltiazem stopped and symptoms resolved. BP elevated here requiring hydralazine intermittently. Re-challenged with amlodipine here, now stopped after stroke on 2/1  - Blood pressure managed as above      Urinary frequency,  resolved  Likely related to IVF. No other symptoms  - Check UA if develops new symptoms.     Recent acute ischemic stroke x2 in December 2020  Started on DAPT initially but transitioned to just ASA at time of admission.   - Aspirin as above     Glaucoma  Continue prior to admission eye drops     Diet: Regular Diet Adult    DVT Prophylaxis: Pneumatic Compression Devices  Pierce Catheter: not present  Code Status: No CPR- Do NOT Intubate      Disposition Plan   Expected discharge: Anticipate discharge to ARU if authorization obtained. Otherwise, anticipate discharge to TCU. Medically stable for discharge once placement found.   Entered: Russ Be MD 02/03/2021, 12:13 PM       The patient's care was discussed with the Care Coordinator/, Patient and Patient's daughter .    Russ Be MD  Hospitalist Service  [facility]    ______________________________________________________________________    Interval History   No acute events overnight. Denies any new vision changes, focal numbness, tingling, weakness.  Denies any chest pain or shortness of breath.  Tolerating diet.  Continues to be emotional about her recurrent symptoms.    Data reviewed today: I reviewed all medications, new labs and imaging results over the last 24 hours. I personally reviewed no images or EKG's today.    Physical Exam   Vital Signs: Temp: 97.7  F (36.5  C) Temp src: Oral BP: 134/56 Pulse: 85   Resp: 16 SpO2: 96 % O2 Device: None (Room air)    Weight: 132 lbs 0 oz    Constitutional: Well-appearing, NAD  Respiratory: Clear to auscultation bilaterally, good air movement bilaterally  Cardiovascular: RRR, no m/r/g. No peripheral edema.  GI: Soft, non-tender, non-distended.   Skin/Integumen: Warm, dry  Neuro: Alert.  Responding to questions appropriately.  Left upper and lower extremity weakness compared to the right.    Data   Recent Labs   Lab 02/02/21  0111 02/01/21  2030 02/01/21  1006  01/30/21  1015 01/29/21 2010   WBC  --   --  9.2 6.8 8.3   HGB  --   --  11.7 11.4* 11.7   MCV  --   --  91 92 92   PLT  --   --  257 215 227   INR  --   --  1.06  --  1.00   NA  --   --  141 141 141   POTASSIUM 3.5 3.1* 3.1* 3.5 3.4   CHLORIDE  --   --  109 110* 106   CO2  --   --  25 26 31   BUN  --   --  7 10 13   CR  --   --  0.73 0.72 0.70   ANIONGAP  --   --  7 5 4   POORNIMA  --   --  8.6 8.4* 9.0   GLC  --   --  85 90 101*   TROPI  --   --  0.058*  --   --        No results found for this or any previous visit (from the past 24 hour(s)).  Medications     sodium chloride 100 mL/hr at 02/03/21 0808       aspirin  81 mg Oral Daily     atorvastatin  40 mg Oral QPM     dorzolamide-timolol  1 drop Right Eye BID     travoprost KORINA FREE  1 drop Both Eyes At Bedtime

## 2021-02-03 NOTE — PLAN OF CARE
Nursing shift note  Pt here with subarachnoid hemorrhage ischemic stroke.  A&O x4. Neuros intact.  Has left sided weakness, weak plantar/dorsiflexion noted, tingling in blilateral UE, denies numbness/tingling in LE bilaterally. VSS. Tele NSR.  Regular diet, thin liquids. Takes pills whole with water. Up with Assist x2 GBW. C/o slight headache 2/10 pain but resolved with scheduled aspirin, denied pain shortly after administration and 0/10 throughout remainder of shift.  Incontinent of b/b, ambulated to bathroom twice on shift and purewick in place at HS.   Seizure precautions maintained on shift.  Plan to discharge to TCU.      Behavior & Aggression Tool color:  Pt scoring green on the Aggression Stop Light Tool.     Pt's belongings:   Belongings from admission day present in pt's room      Home medications: NA

## 2021-02-04 ENCOUNTER — HOSPITAL ENCOUNTER (INPATIENT)
Facility: CLINIC | Age: 86
LOS: 14 days | Discharge: HOME HEALTH | DRG: 057 | End: 2021-02-18
Attending: PHYSICAL MEDICINE & REHABILITATION | Admitting: PHYSICAL MEDICINE & REHABILITATION
Payer: COMMERCIAL

## 2021-02-04 ENCOUNTER — APPOINTMENT (OUTPATIENT)
Dept: CARDIOLOGY | Facility: CLINIC | Age: 86
DRG: 085 | End: 2021-02-04
Attending: INTERNAL MEDICINE
Payer: COMMERCIAL

## 2021-02-04 VITALS
RESPIRATION RATE: 16 BRPM | TEMPERATURE: 97.7 F | HEIGHT: 63 IN | SYSTOLIC BLOOD PRESSURE: 136 MMHG | HEART RATE: 98 BPM | DIASTOLIC BLOOD PRESSURE: 74 MMHG | BODY MASS INDEX: 23.39 KG/M2 | OXYGEN SATURATION: 98 % | WEIGHT: 132 LBS

## 2021-02-04 DIAGNOSIS — H40.50X0 GLAUCOMA ASSOCIATED WITH ANOMALIES OF IRIS: Primary | ICD-10-CM

## 2021-02-04 DIAGNOSIS — I63.511 ACUTE ISCHEMIC RIGHT MCA STROKE (H): ICD-10-CM

## 2021-02-04 DIAGNOSIS — K59.00 CONSTIPATION, UNSPECIFIED CONSTIPATION TYPE: ICD-10-CM

## 2021-02-04 DIAGNOSIS — I63.9 ACUTE ISCHEMIC STROKE (H): ICD-10-CM

## 2021-02-04 DIAGNOSIS — Q13.2 GLAUCOMA ASSOCIATED WITH ANOMALIES OF IRIS: Primary | ICD-10-CM

## 2021-02-04 DIAGNOSIS — Z00.00 PREVENTATIVE HEALTH CARE: ICD-10-CM

## 2021-02-04 DIAGNOSIS — I60.9 SUBARACHNOID HEMORRHAGE (H): ICD-10-CM

## 2021-02-04 PROCEDURE — 93248 EXT ECG>7D<15D REV&INTERPJ: CPT | Performed by: INTERNAL MEDICINE

## 2021-02-04 PROCEDURE — 93246 EXT ECG>7D<15D RECORDING: CPT

## 2021-02-04 PROCEDURE — 258N000003 HC RX IP 258 OP 636: Performed by: STUDENT IN AN ORGANIZED HEALTH CARE EDUCATION/TRAINING PROGRAM

## 2021-02-04 PROCEDURE — 250N000013 HC RX MED GY IP 250 OP 250 PS 637: Performed by: PHYSICIAN ASSISTANT

## 2021-02-04 PROCEDURE — 99239 HOSP IP/OBS DSCHRG MGMT >30: CPT | Performed by: INTERNAL MEDICINE

## 2021-02-04 PROCEDURE — 250N000011 HC RX IP 250 OP 636: Performed by: INTERNAL MEDICINE

## 2021-02-04 PROCEDURE — 128N000003 HC R&B REHAB

## 2021-02-04 PROCEDURE — 99223 1ST HOSP IP/OBS HIGH 75: CPT | Performed by: PHYSICAL MEDICINE & REHABILITATION

## 2021-02-04 RX ORDER — MULTIPLE VITAMINS W/ MINERALS TAB 9MG-400MCG
1 TAB ORAL DAILY
Status: DISCONTINUED | OUTPATIENT
Start: 2021-02-05 | End: 2021-02-18 | Stop reason: HOSPADM

## 2021-02-04 RX ORDER — POLYETHYLENE GLYCOL 3350 17 G/17G
1 POWDER, FOR SOLUTION ORAL 2 TIMES DAILY PRN
Status: ON HOLD | COMMUNITY
End: 2021-02-17

## 2021-02-04 RX ORDER — ATORVASTATIN CALCIUM 40 MG/1
40 TABLET, FILM COATED ORAL EVERY EVENING
Status: ON HOLD | DISCHARGE
Start: 2021-02-04 | End: 2021-02-17

## 2021-02-04 RX ORDER — ATORVASTATIN CALCIUM 40 MG/1
40 TABLET, FILM COATED ORAL EVERY EVENING
Status: DISCONTINUED | OUTPATIENT
Start: 2021-02-04 | End: 2021-02-18 | Stop reason: HOSPADM

## 2021-02-04 RX ORDER — ASPIRIN 81 MG/1
81 TABLET ORAL DAILY
Status: DISCONTINUED | OUTPATIENT
Start: 2021-02-05 | End: 2021-02-18 | Stop reason: HOSPADM

## 2021-02-04 RX ORDER — ACETAMINOPHEN 325 MG/1
650 TABLET ORAL EVERY 4 HOURS PRN
Status: DISCONTINUED | OUTPATIENT
Start: 2021-02-04 | End: 2021-02-18 | Stop reason: HOSPADM

## 2021-02-04 RX ORDER — POLYETHYLENE GLYCOL 3350 17 G/17G
17 POWDER, FOR SOLUTION ORAL DAILY
Status: DISCONTINUED | OUTPATIENT
Start: 2021-02-04 | End: 2021-02-04

## 2021-02-04 RX ORDER — TRAVOPROST OPHTHALMIC SOLUTION 0.04 MG/ML
1 SOLUTION OPHTHALMIC AT BEDTIME
Status: DISCONTINUED | OUTPATIENT
Start: 2021-02-04 | End: 2021-02-18 | Stop reason: HOSPADM

## 2021-02-04 RX ORDER — ONDANSETRON 4 MG/1
4 TABLET, ORALLY DISINTEGRATING ORAL EVERY 8 HOURS PRN
Status: DISCONTINUED | OUTPATIENT
Start: 2021-02-04 | End: 2021-02-18 | Stop reason: HOSPADM

## 2021-02-04 RX ORDER — POLYETHYLENE GLYCOL 3350 17 G/17G
17 POWDER, FOR SOLUTION ORAL DAILY PRN
Status: DISCONTINUED | OUTPATIENT
Start: 2021-02-04 | End: 2021-02-18 | Stop reason: HOSPADM

## 2021-02-04 RX ORDER — ONDANSETRON 4 MG/1
4 TABLET, ORALLY DISINTEGRATING ORAL EVERY 6 HOURS PRN
Status: ON HOLD | COMMUNITY
End: 2021-02-17

## 2021-02-04 RX ORDER — DORZOLAMIDE HYDROCHLORIDE AND TIMOLOL MALEATE 20; 5 MG/ML; MG/ML
1 SOLUTION/ DROPS OPHTHALMIC 2 TIMES DAILY
Status: DISCONTINUED | OUTPATIENT
Start: 2021-02-04 | End: 2021-02-18 | Stop reason: HOSPADM

## 2021-02-04 RX ORDER — ESTRADIOL 0.5 MG/1
0.5 TABLET ORAL
Status: CANCELLED | OUTPATIENT
Start: 2021-02-05

## 2021-02-04 RX ORDER — AMOXICILLIN 250 MG
1-2 CAPSULE ORAL 2 TIMES DAILY PRN
Status: DISCONTINUED | OUTPATIENT
Start: 2021-02-04 | End: 2021-02-18 | Stop reason: HOSPADM

## 2021-02-04 RX ADMIN — ASPIRIN 81 MG CHEWABLE TABLET 81 MG: 81 TABLET CHEWABLE at 09:15

## 2021-02-04 RX ADMIN — DORZOLAMIDE HYDROCHLORIDE AND TIMOLOL MALEATE 1 DROP: 20; 5 SOLUTION/ DROPS OPHTHALMIC at 20:13

## 2021-02-04 RX ADMIN — ONDANSETRON 4 MG: 4 TABLET, ORALLY DISINTEGRATING ORAL at 11:01

## 2021-02-04 RX ADMIN — ATORVASTATIN CALCIUM 40 MG: 40 TABLET, FILM COATED ORAL at 20:12

## 2021-02-04 RX ADMIN — SODIUM CHLORIDE: 9 INJECTION, SOLUTION INTRAVENOUS at 00:40

## 2021-02-04 RX ADMIN — DORZOLAMIDE HYDROCHLORIDE AND TIMOLOL MALEATE 1 DROP: 20; 5 SOLUTION/ DROPS OPHTHALMIC at 09:15

## 2021-02-04 RX ADMIN — TRAVOPROST 1 DROP: 0.04 SOLUTION/ DROPS OPHTHALMIC at 21:42

## 2021-02-04 ASSESSMENT — ACTIVITIES OF DAILY LIVING (ADL)
ADLS_ACUITY_SCORE: 21

## 2021-02-04 ASSESSMENT — MIFFLIN-ST. JEOR: SCORE: 1015.57

## 2021-02-04 NOTE — PHARMACY-MEDICATION REGIMEN REVIEW
Pharmacy Medication Regimen Review  Jamee Douglas is a 88 year old female who is currently in the Acute Rehab Unit.    Assessment: All medications have an appropriate indications, durations and no unnecessary use was found    Plan:   No actions needed at this time.  Attending provider will be sent this note for review.  If there are any emergent issues noted above, pharmacist will contact provider directly by phone.      Pharmacy will periodically review the resident's medication regimen for any PRN medications not administered in > 72 hours and discontinue them. The pharmacist will discuss gradual dose reductions of psychopharmacologic medications with interdisciplinary team on a regular basis.    Please contact pharmacy if the above does not answer specific medication questions/concerns.    Background:  A pharmacist has reviewed all medications and pertinent medical history today.  Medications were reviewed for appropriate use and any irregularities found are listed with recommendations.      Current Facility-Administered Medications:      acetaminophen (TYLENOL) tablet 650 mg, 650 mg, Oral, Q4H PRN, Rosalie Stokes PA-C     [START ON 2/5/2021] aspirin EC tablet 81 mg, 81 mg, Oral, Daily, Rosalie Stokes PA-C     atorvastatin (LIPITOR) tablet 40 mg, 40 mg, Oral, QPM, Rosalie Stokes PA-C     [START ON 2/5/2021] cholecalciferol (VITAMIN D3) 10 mcg (400 units) tablet 400 Units, 400 Units, Oral, Daily, Rosalie Stokes PA-C     dorzolamide-timolol (COSOPT) ophthalmic solution 1 drop, 1 drop, Right Eye, BID, Rosalie Stokes PA-C     [START ON 2/5/2021] multivitamin w/minerals (THERA-VIT-M) tablet 1 tablet, 1 tablet, Oral, Daily, Rosalie Stokes PA-C     ondansetron (ZOFRAN-ODT) ODT tab 4 mg, 4 mg, Oral, Q8H PRN, Rosalie Stokes PA-C     polyethylene glycol (MIRALAX) Packet 17 g, 17 g, Oral, Daily PRN, Rosalie Stokes PA-C     senna-docusate (SENOKOT-S/PERICOLACE) 8.6-50 MG  per tablet 1-2 tablet, 1-2 tablet, Oral, BID PRN, Rosalie Stokes PA-C     travoprost KORINA FREE (TRAVATAN Z) 0.004 % ophthalmic solution 1 drop, 1 drop, Both Eyes, At Bedtime, Rosalie Stokes PA-C  No current outpatient prescriptions on file.   CC: Acute ischemic stroke, right ROBBY, due to symptomatic intracranial atherosclerosis with possible atheroembolic event from aortic arch plaque      MPH:   Multiple acute SAH   Acute SDH   Mechanical fall   Accelerated hypertension   Urinary frequency   Recent acute ischemic stroke   Glaucoma     Emperatriz Garcia, PharmD, BCPS February 4, 2021

## 2021-02-04 NOTE — PLAN OF CARE
Problem: Goal/Outcome  Goal: Goal Outcome Summary  Outcome: No Change   FOCUS/GOAL  Nutrition/Feeding/Swallowing precautions and Mobility    ASSESSMENT, INTERVENTIONS AND CONTINUING PLAN FOR GOAL:  Pt is new admit to unit from [facility]. Arrived via w/c transport. She needed assist of 1 with transfer. Pt alert and oriented x 4. She does admit that she is forgetful at times. Speaker needs to speak louder as pt is [place] and is not wearing her keerthi HAs. She doesn't remember if it is at home in her KAYLIE or in her suitcase. Nted left sided weakness, left facial droop and left sided visual neglect. Needed cuing to scan left side. Given orientation to room and unit routine. She had a late lunch and then wanted to lay down to rest.

## 2021-02-04 NOTE — PLAN OF CARE
Nursing shift note  Pt here with subarachnoid hemorrhage.  A&O x4. Left sided weakness, weak plantar/dorsiflexion noted, tingling in blilateral UE, denies numbness/tingling in LE bilaterally. VSS. Tele NSR.  Regular diet, thin liquids. Takes pills whole with water. Up with Assist x2 GBW.  Incontinent of b/b, ambulated to bathroom twice on shift, voided without difficulty. Seizure precautions maintained on shift.  Plan to discharge tomorrow to TCU.       Behavior & Aggression Tool color:  Pt scoring green on the Aggression Stop Light Tool.      Pt's belongings:   Belongings from admission day present in pt's room        Home medications: NA

## 2021-02-04 NOTE — H&P
[facility]   Acute Rehabilitation Unit  Admission History and Physical    CHIEF COMPLAINT   Stroke Ischemic 01.1 (L) Body Involvement (R) Brain Stroke; s/p Right ROBBY strokes    HISTORY OF PRESENT ILLNESS  Jamee Douglas is an 88-year-old R-hand dominant female who was living independently who admitted to the hospital on 1/29/2021 following a fall at her Assisted Living facility in which she hit the back of her head. In December, pt was admitted to the hospital with multiple strokes (mentined having leg weakness after these strokes) and was discharged to a TCU at that time. She was at the TCU for about 4 weeks and had been home for 2 days PTA and was using her walker and lost her balance, fell backwards, and hit the back of her head. Upon admission, pt found to have imaging findings revealing several acute-appearing intracranial hemorrhages, primarily in the subarachnoid spaces with a small right falx subdural hematoma. Per Neurosurgery, she was not a candidate for surgical intervention.      On Monday 2/1/2021 the patient was noted to have an increase in L sided weakness, control, and placement during her PT session. RN was notified, and a code stroke was called. The patient was found to have a new Subacute infarct involving the right anterior cerebral artery distribution with diffusion restriction and enhancement as well as multiple smaller infarcts involving the left frontal lobe, bilateral parietal lobes, and bilateral temporal lobes. Neurology was consulted and recs were made to maintain SBP <160, EKG and ultimately a 14 day monitor, resume statin, but that the pt is not appropriate for antiplatelet agents due to recent bleed.      During acute hospitalization, patient was seen and evaluated by PT and OT, who collectively recommended that patient would benefit from ongoing therapies in the acute inpatient rehabilitation setting. The patient is medically ready for  "transfer to [facility] for rehabilitation. Patient requires an intensive inpatient rehab program to address the following acute impairments:impaired ROM, impaired strength, impaired activity tolerance, impaired tone, impaired balance, impaired coordination, impaired cognition, impaired judgement and safety awareness, impulsiveness, impaired weight shifting and dysarthria.    Today, she reports that she is \"good\" and that she is looking forward to starting her therapies as she used to bike a lot before her strokes back in December. She denies fever, chills, vomiting, chest pain, or dyspnea. She endorses some nausea and was given Zofran before coming to the acute rehab unit today.     In review of the therapy notes:  The patient is participating well in PT/OT/SLP and will tolerate 3 hours of therapy per day. The patient currently performs bed mobility with Max/Mod A, sitting balance with SBA/Min A, and sit to stands with Min/Mod A. She is able to ambulate 50 feet with Min A, FWW, and WC follow. She requires Min A with navigating the FWW, heavy cues for staying inside the walker. She performs seated grooming and hygiene tasks with min verbal cues for sequencing. She requires Mod A for standing tasks. SLP assessed swallow which is WFL, however recommends a full cognitive/linguistic assessment at next level of care.       PAST MEDICAL HISTORY   Reviewed and updated in Epic.  Past Medical History:   Diagnosis Date     Glaucoma      Hyperlipidemia LDL goal < 130      LBP (low back pain)      Osteoarthritis        SURGICAL HISTORY  Reviewed and updated in Epic.  Past Surgical History:   Procedure Laterality Date     ARTHROPLASTY KNEE       HYSTERECTOMY       SURGICAL HISTORY OF -       rights shoulder     SURGICAL HISTORY OF -       glaucoma surgery     SURGICAL HISTORY OF -       back surgery       SOCIAL HISTORY  Reviewed and updated in Epic.  Marital Status:   Living situation: alone in assisted living. There are no " stairs to get in her building and there are elevators inside. She has a walk-in shower in her living space.  Family support: family and friends will provide support  Vocational History: retired  Tobacco use: none  Alcohol use: yes, rarely  Illicit drug use: no  Social History     Socioeconomic History     Marital status:      Spouse name: Not on file     Number of children: Not on file     Years of education: Not on file     Highest education level: Not on file   Occupational History     Not on file   Social Needs     Financial resource strain: Not on file     Food insecurity     Worry: Not on file     Inability: Not on file     Transportation needs     Medical: Not on file     Non-medical: Not on file   Tobacco Use     Smoking status: Never Smoker     Smokeless tobacco: Never Used   Substance and Sexual Activity     Alcohol use: Yes     Comment: Rare     Drug use: No     Sexual activity: Never   Lifestyle     Physical activity     Days per week: Not on file     Minutes per session: Not on file     Stress: Not on file   Relationships     Social connections     Talks on phone: Not on file     Gets together: Not on file     Attends Holiness service: Not on file     Active member of club or organization: Not on file     Attends meetings of clubs or organizations: Not on file     Relationship status: Not on file     Intimate partner violence     Fear of current or ex partner: Not on file     Emotionally abused: Not on file     Physically abused: Not on file     Forced sexual activity: Not on file   Other Topics Concern     Parent/sibling w/ CABG, MI or angioplasty before 65F 55M? Not Asked   Social History Narrative     Not on file       FAMILY HISTORY  Reviewed and updated in Epic.  Family History   Problem Relation Age of Onset     Other - See Comments Mother         POLYCYTHEMIA VERA     Prostate Cancer Father      Colon Cancer Brother      Lung Cancer Sister          PRIOR FUNCTIONAL HISTORY   Posterior LOBs  "/ poor bioawareness. At [facility] 2 days and had 2 falls, was walking ~10ft bouts with RW Nayeli in the morning, but by the evening was getting Ax2 given fatigue from basic in-unit mobility (no PT yet). Had DC'd from rehab at [facility] to [facility] this last time walking 40' RW - tx and gait with Ax1. The [facility] was a la carte: contracted in AM & PM for clothes & bed mobility.     MEDICATIONS  Scheduled meds  Medications Prior to Admission   Medication Sig Dispense Refill Last Dose     aspirin 81 MG tablet Take 1 tablet by mouth daily.   2/4/2021 at 0915     atorvastatin (LIPITOR) 40 MG tablet Take 1 tablet (40 mg) by mouth every evening   2/3/2021 at 2159     dorzolamide-timolol (COSOPT) 2-0.5 % ophthalmic solution Place 1 drop into the right eye 2 times daily   2/4/2021 at 0915     hydrALAZINE (APRESOLINE) 20 mg/mL Inject 20 mg into the vein every 4 hours as needed for high blood pressure   2/3/2021 at Unknown time     ondansetron (ZOFRAN-ODT) 4 MG ODT tab Take 4 mg by mouth every 6 hours as needed for nausea   2/4/2021 at Unknown time     polyethylene glycol (MIRALAX) 17 g packet Take 1 packet by mouth 2 times daily as needed for constipation   2/3/2021 at Unknown time     travoprost BAK FREE (TRAVATAN Z) 0.004 % ophthalmic solution Place 1 drop into both eyes At Bedtime   2/3/2021 at 2200       ALLERGIES     Allergies   Allergen Reactions     Penicillins Swelling         REVIEW OF SYSTEMS  A 10 point ROS was performed and negative unless otherwise noted in HPI.    PHYSICAL EXAM  VITAL SIGNS:  BP (!) 148/60 (BP Location: Right arm)   Pulse 81   Temp 96.9  F (36.1  C) (Oral)   Resp 16   Ht 1.6 m (5' 3\")   Wt 61.6 kg (135 lb 14.4 oz)   LMP 01/01/1970   SpO2 95%   BMI 24.07 kg/m    BMI:  Estimated body mass index is 24.07 kg/m  as calculated from the following:    Height as of this encounter: 1.6 m (5' 3\").    Weight as of this encounter: 61.6 kg (135 lb 14.4 oz).     General: alert, pleasant, in NAD  HEENT: NCAT, " MMM, clouding of R eye.  Pulmonary: non-labored on room air, lungs CTA bilaterally  Cardiovascular: RRR, no murmurs, has Ziopatch  Abdominal: soft, non-tender, bowel sounds present  Extremities: warm, well perfused, no edema or calf tenderness bilaterally.  MSK/neuro:               Mental Status:  alert and oriented x3               Cranial Nerves:   1. 2nd CN: pupils reactive to light. R eye cloudy.  2. 3rd,4th,6th CN: unable to follow finger with eyes in any direction. Impaired smooth pursuit of eyes. Does not follow finger with eyes but can move eyes when asked to look either R or L. Misses top quadrant of visual fields on both sides.  3. 5th CN: facial sensation intact  4. 7th CN: slight left-sided facial drooping  5. 8th CN: functional hearing bilaterally  6. 9th, 10th CN: palate elevates symmetrically   7. 11th CN: trapezii strong   8. 12th CN: tongue mdline, without fasciculations                 Sensory: Normal to light touch in bilateral upper and lower extremities               Strength:                SF         EF         EE         WE        G         HF         KE         DF         EHL      PF   R           5           5           5           5           5             5            5            5           5          5  L            4          5           5           4           5             4            3             3           3         3               Reflexes: brachioradialis, biceps, and triceps 2+ bilaterally. Patellar reflex 1+ bilaterally. Achilles 0 bilaterally.               Dumas's test: negative bilaterally               Abnormal movements: None               Coordination: intact on the R, but noted LUE incoordination               Speech: no dysarthria               Cognition: responds appropriately, follows commands               Gait: not tested  Skin: no rashes or bleeding on visible skin. Visible green bruise on L side of face near the jaw      LABS  Pertinent labs :  Potassium 3.1  (2/1/2021) 3.5 (2/2/2021)  Hemoglobin A1C 5.8 on 2/1/2021  Troponin I ED 0.058 on 2/1/2021  Urine ketones 10 on 2/1/2021      IMPRESSION/PLAN:  Jamee Douglas is an 88-year-old R-hand dominant female who was living independently who admitted to the hospital on 1/29/2021 following a fall at her Assisted Living facility in which she hit the back of her head. In December, pt was admitted to the hospital with multiple strokes (mentined having leg weakness after these strokes) and was discharged to a TCU at that time. She was at the TCU for about 4 weeks and had been home for 2 days PTA and was using her walker and lost her balance, fell backwards, and hit the back of her head. Upon admission, pt found to have imaging findings revealing several acute-appearing intracranial hemorrhages, primarily in the subarachnoid spaces with a small right falx subdural hematoma. Per Neurosurgery, she was not a candidate for surgical intervention. On Monday 2/1/2021 the patient was noted to have an increase in L sided weakness, control, and placement during her PT session. RN was notified, and a code stroke was called. The patient was found to have a new Subacute infarct involving the right anterior cerebral artery distribution with diffusion restriction and enhancement as well as multiple smaller infarcts involving the left frontal lobe, bilateral parietal lobes, and bilateral temporal lobes. Neurology was consulted and recs were made to maintain SBP <160, EKG and ultimately a 14 day monitor, resume statin, but that the pt is not appropriate for antiplatelet agents due to recent bleed.      Patient requires an intensive inpatient rehab program to address the following acute impairments:impaired ROM, impaired strength, impaired activity tolerance, impaired tone, impaired balance, impaired coordination, impaired cognition, impaired judgement and safety awareness, impulsiveness, impaired weight shifting and dysarthria.      Admission  to acute inpatient rehab: February 4, 2021  Impairment group code: 01.1      1. PT, OT and SLP 60 minutes of each on a daily basis, in addition to rehab nursing and close management of physiatrist.    2. Impairment of ADL's: Noted to have impaired strength, impaired activity tolerance, and impaired grooming, bathing, dressing, and toileting, leading to decreased ability to independently complete ADL's.  Will benefit from ongoing OT with goal for MOD I with basic ADLs. assist for bathing, or mobility outside the longterm apartment.    3. Impairment of mobility:  Noted to have impaired strength, impaired activity tolerance, impaired bed rolling, impaired supine to sit, impaired transfers and sit to stand, and impaired ambulation leading to decreased mobility.  Will benefit from ongoing PT with goal for JIMMIE with basic mobility, assist for bathing, or mobility outside the longterm apartment.    4. Impairment of cognition/language/swallow:  Noted to have impaired concentrating, remembering, and making decisions. Will benefit from ongoing SLP to improve these deficits.    5. Medical Conditions  Acute ischemic stroke, right ROBBY, due to symptomatic intracranial atherosclerosis with possible atheroembolic event from aortic arch plaque   Developed new dense hemiparesis on left with some speech difficulties on 2/1 in the AM. Stroke code called, identified acute stroke. Options limited due to recent strokes/SAH/SDH, location of lesions preclude IR, etc.   * Neurology consulted, now signed off  - Continue aspirin  - 14 day Ziopatch ordered discharge  - SBP goal <160, though would not treat more aggressively than this due to risk of precipitating recurrent stroke symptoms. Start BP meds 2/5 at the earliest if persistent elevations.  - Plan 4 week follow up with Dr. Finnegan at [facility] neurology on discharge.   - PT/OT. Discharged to ARU for ongoing therapies  - Regular diet      Multiple acute SAH  Acute SDH  Noted on CT head 1/29/21  after presentation after a fall. Remains neurologically intact  * Neurosurgery consult, now signed off. Recommend f/u in 1 month with CT day of appt  - Discharged to ARU for ongoing therapies     Mechanical fall  Fall likely due to ongoing deficits from recent acute strokes in December. Nothing in history to suggest other cause.  - Discharged to ARU for ongoing therapies     Accelerated hypertension  Recently started on diltiazem 120mg daily after recent strokes. Developed some orthostatic symptoms and had some recrudescence of stroke symptoms with exertion. Diltiazem stopped and symptoms resolved. BP elevated here requiring hydralazine intermittently. Re-challenged with amlodipine here, now stopped after stroke on 2/1  - Blood pressure managed as above      Urinary frequency, resolved  Likely related to IVF. No other symptoms  - Check UA if develops new symptoms.     Recent acute ischemic stroke x2 in December 2020  Started on DAPT initially but transitioned to just ASA at time of admission.   - Aspirin as above     Glaucoma  Continue prior to admission eye drops    6. Adjustment to disability:  Clinical psychology to eval and treat as indicated  7. FEN: Regular diet and Thin liquids  8. Bowel: continent  9. Bladder: continent  10. DVT Prophylaxis: mechanical  11. GI Prophylaxis: none  12. Code: no CPR-Do NOT Intubate  13. Disposition: assisted living  14. ELOS:  18 days  15. Rehab prognosis: good  16. Follow up Appointments on Discharge: Follow up with ARU physician. - Plan 4 week follow up with Dr. Finnegan at [facility] neurology on discharge. Neurosurgery consult recommended f/u in 1 month with CT day of appt.      Britta Bran, MS3  Discussed with Dr. Melody Haywood, PM&R staff physician    Physician Attestation   I, Melody Haywood, was present with the medical/MICHEL student who participated in the service and in the documentation of the note.  I have verified the history and personally performed the  physical exam and medical decision making.  I agree with the assessment and plan of care as documented in the note.      I personally reviewed vital signs, medications, labs, imaging and other providers' and consultants' notes.    Jamee Douglas is a 89 yo female who was admitted on 1/29 after a fall resulting in SAH, SDH and IPH. 2-3 days after admission she suffered a new infarct involving the right anterior cerebral artery distribution as well as multiple smaller infarcts involving the left frontal lobe, bilateral parietal lobes, and bilateral temporal lobes.    She has had very complicated course since 12/2020 with 3 strokes and 2 episodes of traumatic IPH/SAH. She is definitely at risk for more complications and re-admission. Functional deficits include L hemiparesis, incoordination, dysconjugate eye movement, visual deficits and cognitive / linguistic deficits. She clearly had slow processing speech, likely due to frontal lobe involvement.     She can potentially improve to mod I level and return to retirement. Will need supervision with iADLs and close follow up after discharge.     Medical prognosis is fair. Good rehab potentials as above. ELOS is 18 days.       Melody Haywood MD  Date of Service (when I saw the patient): 2/4/21

## 2021-02-04 NOTE — LETTER
Health Information Management Services               Recipient: Nghia           Sender: Julia Verdugo SW PH:           Date: February 18, 2021  Patient Name:  Jamee Douglas  Routing Message:            The documents accompanying this notice contain confidential information belonging to the sender.  This information is intended only for the use of the individual or entity named above.  The authorized recipient of this information is prohibited from disclosing this information to any other party and is required to destroy the information after its stated need has been fulfilled, unless otherwise required by state law.      If you are not the intended recipient, you are hereby notified that any disclosure, copy, distribution or action taken in reliance on the contents of these documents is strictly prohibited.  If you have received this document in error, please return it by fax to [phone] with a note on the cover sheet explaining why you are returning it (e.g. not your patient, not your provider, etc.).  If you need further assistance, please call [facility] Centralized Transcription at [phone].  Documents may also be returned by mail to [employer], , [facility] [address], [place], [place] [address].

## 2021-02-04 NOTE — PLAN OF CARE
Nursing shift note  Pt here with right ROBBY CVA, acute SDH, and multiple SAH. A&O x4. Neuros include left sided weakness, left facial droop, and left field cut. VSS. Tele SR. Regular diet, thin liquids. Takes pills whole. Up with A1 + GB + walker. Denies pain. Pt scoring green on the Aggression Stop Light Tool. Plan to discharge to ARU at 1145am.     Behavior & Aggression Tool color:  green    Pt's belongings:   Belongings from admission day present in pt's room sent with patient to ARU      Home medications: no

## 2021-02-04 NOTE — PHARMACY
dmission Medication History Completed by Pharmacy    See [facility] Admission Navigator for allergy information, preferred outpatient pharmacy, prior to admission medications and immunization status.     Medication history sources:  previous unit's MAR    Changes made to PTA medication list (reason)  Added: hydralazine, Zofran, Miralax   Deleted: cholecalciferol, estradiol, MVI  Changed: none    Additional medication history information:   none  - Patient denies taking any additional Rx/OTC medications other than the ones listed below.    Prior to Admission medications    Medication Sig Last Dose Taking? Auth Provider   aspirin 81 MG tablet Take 1 tablet by mouth daily. 2/4/2021 at 0915 Yes Reported, Patient   atorvastatin (LIPITOR) 40 MG tablet Take 1 tablet (40 mg) by mouth every evening 2/3/2021 at 2159 Yes Russ Be MD   dorzolamide-timolol (COSOPT) 2-0.5 % ophthalmic solution Place 1 drop into the right eye 2 times daily 2/4/2021 at 0915 Yes Unknown, Entered By History   hydrALAZINE (APRESOLINE) 20 mg/mL Inject 20 mg into the vein every 4 hours as needed for high blood pressure 2/3/2021 at Unknown time Yes Unknown, Entered By History   ondansetron (ZOFRAN-ODT) 4 MG ODT tab Take 4 mg by mouth every 6 hours as needed for nausea 2/4/2021 at Unknown time Yes Unknown, Entered By History   polyethylene glycol (MIRALAX) 17 g packet Take 1 packet by mouth 2 times daily as needed for constipation 2/3/2021 at Unknown time Yes Unknown, Entered By History   travoprost BAK FREE (TRAVATAN Z) 0.004 % ophthalmic solution Place 1 drop into both eyes At Bedtime 2/3/2021 at 2200 Yes Unknown, Entered By History          Date completed: 02/04/21    Doris Garcia [facility]

## 2021-02-04 NOTE — PLAN OF CARE
Occupational Therapy Discharge Summary    Reason for therapy discharge:    Discharged to acute rehabilitation facility.    Progress towards therapy goal(s). See goals on Care Plan in [facility] electronic health record for goal details.  Goals not met.  Barriers to achieving goals:   discharge from facility.    Therapy recommendation(s):    Continued therapy is recommended.  Rationale/Recommendations:  at ARU to improve ind/safety w/ ADL's.

## 2021-02-04 NOTE — DISCHARGE SUMMARY
[facility]  Hospitalist Discharge Summary       Date of Admission:  1/29/2021  Date of Discharge:  2/4/2021  Discharging Provider: Russ Be MD      Discharge Diagnoses   Acute ischemic stroke, right ROBBY, due to symptomatic intracranial atherosclerosis with possible atheroembolic event from aortic arch plaque   Multiple acute SAH  Acute SDH  Mechanical fall  Accelerated hypertension  Urinary frequency  Recent acute ischemic stroke  Glaucoma    Follow-ups Needed After Discharge   Follow-up Appointments     Follow Up and recommended labs and tests      Follow up with ARU physician.  No follow up labs or test are needed.             Hospital Course   Jamee Douglas is an 89 yo female with history of recent stroke x2 in December who presents with fall. Admitted 1/29/2021.     Acute ischemic stroke, right ROBBY, due to symptomatic intracranial atherosclerosis with possible atheroembolic event from aortic arch plaque   Developed new dense hemiparesis on left with some speech difficulties on 2/1 in the AM. Stroke code called, identified acute stroke. Options limited due to recent strokes/SAH/SDH, location of lesions preclude IR, etc.   * Neurology consulted, now signed off  - Continue aspirin  - 14 day Ziopatch ordered discharge  - SBP goal <160, though would not treat more aggressively than this due to risk of precipitating recurrent stroke symptoms. Start BP meds 2/5 at the earliest if persistent elevations.  - Plan 4 week follow up with Dr. Finnegan at [facility] neurology on discharge.   - PT/OT. Discharged to ARU for ongoing therapies  - Regular diet      Multiple acute SAH  Acute SDH  Noted on CT head 1/29/21 after presentation after a fall. Remains neurologically intact  * Neurosurgery consult, now signed off. Recommend f/u in 1 month with CT day of appt  - Discharged to ARU for ongoing therapies     Mechanical fall  Fall likely due to ongoing deficits from recent acute strokes  in December. Nothing in history to suggest other cause.  - Discharged to ARU for ongoing therapies     Accelerated hypertension  Recently started on diltiazem 120mg daily after recent strokes. Developed some orthostatic symptoms and had some recrudescence of stroke symptoms with exertion. Diltiazem stopped and symptoms resolved. BP elevated here requiring hydralazine intermittently. Re-challenged with amlodipine here, now stopped after stroke on 2/1  - Blood pressure managed as above      Urinary frequency, resolved  Likely related to IVF. No other symptoms  - Check UA if develops new symptoms.     Recent acute ischemic stroke x2 in December 2020  Started on DAPT initially but transitioned to just ASA at time of admission.   - Aspirin as above     Glaucoma  Continue prior to admission eye drops     Consultations This Hospital Stay   PHYSICAL THERAPY ADULT IP CONSULT  OCCUPATIONAL THERAPY ADULT IP CONSULT  NEUROSURGERY IP CONSULT  SWALLOW EVAL SPEECH PATH AT BEDSIDE IP CONSULT  PATIENT LEARNING CENTER IP CONSULT  SWALLOW EVAL SPEECH PATH AT BEDSIDE IP CONSULT  PHYSICAL THERAPY ADULT IP CONSULT  OCCUPATIONAL THERAPY ADULT IP CONSULT    Code Status   No CPR- Do NOT Intubate    Time Spent on this Encounter   I, Russ Be MD, personally saw the patient today and spent greater than 30 minutes discharging this patient.       Russ Be MD  [facility]  ______________________________________________________________________    Physical Exam   Vital Signs: Temp: 98.5  F (36.9  C) Temp src: Oral BP: 136/72 Pulse: 88   Resp: 15 SpO2: 95 % O2 Device: None (Room air)    Weight: 132 lbs 0 oz    Constitutional: NAD  Respiratory: Clear to auscultation bilaterally, good air movement bilaterally  Cardiovascular: RRR. Trace pedal edema.  GI: Soft, non-tender, non-distended.   Skin/Integumen: Warm, dry  Neuro: Alert. Responding to questions appropriately. Left facial droop. Left upper and lower  extremity weakness compared to the right.        Primary Care Physician   ERINN BRYAN    Discharge Disposition   Discharged to ARU  Condition at discharge: Stable      Discharge Orders      Mantoux instructions    Give two-step Mantoux (PPD) Per Facility Policy Yes     Reason for your hospital stay    You were hospitalized for a fall with a brain bleed, subsequently you had a stroke.     Follow Up and recommended labs and tests    Follow up with ARU physician.  No follow up labs or test are needed.     Activity - Up with nursing assistance     No CPR- Do NOT Intubate    Per previous documentation     Physical Therapy Adult Consult    Evaluate and treat as clinically indicated.    Reason:  Physical deconditioning     Occupational Therapy Adult Consult    Evaluate and treat as clinically indicated.    Reason:  Physical deconditioning     Advance Diet as Tolerated    Follow this diet upon discharge: Orders Placed This Encounter      Regular Diet Adult     Discharge Medications   Current Discharge Medication List      START taking these medications    Details   atorvastatin (LIPITOR) 40 MG tablet Take 1 tablet (40 mg) by mouth every evening  Qty:      Associated Diagnoses: Acute ischemic stroke (H)         CONTINUE these medications which have NOT CHANGED    Details   aspirin 81 MG tablet Take 1 tablet by mouth daily.      Cholecalciferol (VITAMIN D3) 10 MCG (400 UNIT) CAPS Take 400 Units by mouth daily       dorzolamide-timolol (COSOPT) 2-0.5 % ophthalmic solution Place 1 drop into the right eye 2 times daily      estradiol (ESTRACE) 0.5 MG tablet Take 0.5 mg by mouth MWF      Multiple Vitamin (MULTI-VITAMIN) per tablet Take 1 tablet by mouth daily.      travoprost KROINA FREE (TRAVATAN Z) 0.004 % ophthalmic solution Place 1 drop into both eyes At Bedtime             Significant Results and Procedures   Most Recent 3 CBC's:  Recent Labs   Lab Test 02/01/21  1006 01/30/21  1015 01/29/21 2010   WBC 9.2 6.8 8.3   HGB  11.7 11.4* 11.7   MCV 91 92 92    215 227     Most Recent 3 BMP's:  Recent Labs   Lab Test 02/02/21  0111 02/01/21  2030 02/01/21  1006 01/30/21  1015 01/29/21 2010   NA  --   --  141 141 141   POTASSIUM 3.5 3.1* 3.1* 3.5 3.4   CHLORIDE  --   --  109 110* 106   CO2  --   --  25 26 31   BUN  --   --  7 10 13   CR  --   --  0.73 0.72 0.70   ANIONGAP  --   --  7 5 4   POORNIMA  --   --  8.6 8.4* 9.0   GLC  --   --  85 90 101*     Most Recent 2 LFT's:  Recent Labs   Lab Test 03/27/18  0909 03/16/17   AST 18 16   ALT 15 17   ALKPHOS 51  --    BILITOTAL 0.4  --      Most Recent 3 INR's:  Recent Labs   Lab Test 02/01/21  1006 01/29/21 2010   INR 1.06 1.00     Most Recent 3 Troponin's:  Recent Labs   Lab Test 02/01/21  1006   TROPI 0.058*     Most Recent 3 BNP's:No lab results found.  Most Recent Cholesterol Panel:  Recent Labs   Lab Test 03/27/18  0909   CHOL 238*   *   HDL 53   TRIG 141     Most Recent 6 Bacteria Isolates From Any Culture (See EPIC Reports for Culture Details):  Recent Labs   Lab Test 02/14/18  1036   CULT >100,000 colonies/mL  mixed urogenital parth       Most Recent TSH and T4:  Recent Labs   Lab Test 03/27/18  0909   TSH 2.98     Most Recent Hemoglobin A1c:  Recent Labs   Lab Test 02/01/21  1006   A1C 5.8*     Most Recent Urinalysis:  Recent Labs   Lab Test 02/01/21  1730 02/14/18  1031   COLOR Yellow Yellow   APPEARANCE Clear Clear   URINEGLC Negative Negative   URINEBILI Negative Negative   URINEKETONE 10* Negative   SG 1.010 1.010   UBLD Negative Negative   URINEPH 5.5 5.5   PROTEIN Negative Negative   UROBILINOGEN  --  0.2   NITRITE Negative Negative   LEUKEST Negative Large*   RBCU 1 O - 2   WBCU 1 10-25*     Most Recent ABG:No lab results found.  Most Recent ESR & CRP:No lab results found.,   Results for orders placed or performed during the hospital encounter of 01/29/21   Head CT w/o contrast     Value    Radiologist flags Acute intracranial hemorrhage (AA)    Narrative    CT SCAN OF  THE HEAD WITHOUT CONTRAST   1/29/2021 6:44 PM     HISTORY: Head trauma, minor (Age >= 65y)    TECHNIQUE:  Axial images of the head and coronal reformations without  IV contrast material.  Radiation dose for this scan was reduced using  automated exposure control, adjustment of the mA and/or kV according  to patient size, or iterative reconstruction technique.    COMPARISON: None.    FINDINGS: Focal globular areas of acute appearing subarachnoid  hemorrhages are predominantly in the subarachnoid space are noted in  the medial inferior left frontal lobe, the anterior left frontal  region, the left frontoparietal vertex, and the right medial frontal  lobe are noted. There is also a small amount of acute subdural  hemorrhage along the right paramedian falx measuring approximately 3  mm in thickness. Mild to moderate cerebral atrophy is noted. There is  also some high density in the globus pallidus region which is felt to  be due to calcification. Moderately extensive white matter changes  also noted along with multiple old lacunar infarcts. There is no  evidence for acute infarct or skull fracture. Visualized paranasal  sinuses and mastoid air cells are clear. Calcification in proximal  right middle cerebral artery noted.      Impression    IMPRESSION:  1. Several acute-appearing areas of intracranial hemorrhage in  primarily the subarachnoid spaces. There is no associated mass effect.  2. Cerebral atrophy with chronic white matter changes and multiple old  lacunar infarcts. The latter most likely due to chronic small vessel  ischemic disease.  3.   [Critical Result: Acute intracranial hemorrhage]    Finding was identified on 1/29/2021 6:47 PM.     Dr. Earl was contacted by me on 1/29/2021 6:53 PM and verbalized  understanding of the critical result.     ROBERT HEREDIA MD   XR Knee Right 3 Views    Narrative    EXAM: XR KNEE RT 3 VW  LOCATION: [facility]  DATE/TIME: 1/29/2021 6:23 PM    INDICATION: Fall and  pain.  COMPARISON: None.      Impression    IMPRESSION: Postoperative changes of right total knee arthroplasty and patellar resurfacing. Components appear well seated. No evidence for fracture. No significant effusion.   CT Head w/o Contrast    Narrative    CT HEAD WITHOUT CONTRAST 1/30/2021 9:51 AM    INDICATION: Parenchymal hemorrhage, follow-up.    TECHNIQUE: CT scan of the head without contrast. Dose reduction  techniques were used.  CONTRAST: None.  COMPARISON: 1/29/2021    FINDINGS: Scattered foci of subarachnoid hemorrhage in the bifrontal  and left parietal region are stable versus prior. No new hyperdense  intracranial hemorrhage. Probably interval development of a shallow  subdural hygroma over the right frontal region measuring 2 mm in  thickness. Moderate presumed chronic small vessel ischemic changes.  Mild generalized volume loss. The ventricles are proportional to the  sulci. Osseous structures are intact. Unremarkable orbits. Paranasal  sinuses are free of significant disease. Clear mastoid air cells.      Impression    IMPRESSION:  1. Stable scattered foci of subarachnoid hemorrhage. No new hyperdense  hemorrhage.  2. Suspect interval development of a thin subdural hygroma over the  right frontal region. No intracranial mass effect.    TAIWO MILLER MD   CT Cervical Spine w/o Contrast    Narrative    CT CERVICAL SPINE WITHOUT CONTRAST 1/30/2021 9:52 AM    INDICATION: Neck trauma (Age >= 65y).    TECHNIQUE: CT scan of the cervical spine without contrast. Dose  reduction techniques were used.    COMPARISON: None.    FINDINGS: Normal alignment. Vertebral body heights preserved. No  fracture. No prevertebral soft tissue swelling. Multilevel  degenerative changes with central disc herniation at C3-C4 that  results in probably moderate central spinal canal narrowing. More mild  degenerative central spinal canal stenosis below this. Left-sided  neural foraminal narrowing most pronounced at C5-C6.  Right-sided  neural foraminal narrowing most pronounced at C6-C7.      Impression    IMPRESSION:  No fracture or definite acute injury in the cervical  spine.    TAIWO MILLER MD   CT Head w/o Contrast    Narrative    CT SCAN OF THE HEAD WITHOUT CONTRAST  February 1, 2021 10:19 AM     HISTORY: Code Stroke.    TECHNIQUE: Axial images of the head and coronal reformations without  IV contrast material. Radiation dose for this scan was reduced using  automated exposure control, adjustment of the mA and/or kV according  to patient size, or iterative reconstruction technique.    COMPARISON: CT head dated 1/30/2021.    FINDINGS: Small volume scattered subarachnoid hemorrhage is again  present. This does not appear overall changed in extent compared to  the most recent studies. There is mildly increased prominence of the  extra-axial space along the left frontal pole, concerning for a small  hypodense subdural fluid collection. This extends along the left  frontal convexity. There also appears to be a small right frontal  convexity subdural fluid collection. These are both somewhat  suspicious for subdural hematomas. There is mild associated mass  effect on the bilateral frontal convexities and frontal poles without  midline shift/herniation.    No definite CT findings of extended acute infarct. Bilateral chronic  basal ganglia lacunar infarcts are again noted. Unchanged scattered  moderately extensive patchy areas of hypoattenuation in the  subcortical and deep cerebral white matter, likely sequela of chronic  small vessel ischemic disease. The ventricles are normal in size and  configuration. Scattered intracranial atherosclerotic calcifications.    Stigmata of previous left scleral buckle procedure. Bilateral lens  implants. Visualized orbits otherwise appear normal. Trace scattered  paranasal sinus mucosal thickening. The mastoid and middle ear  cavities are clear. The bony calvarium and bones of the skull  base  appear intact.       Impression    IMPRESSION:  1. Mildly increased prominence of the bilateral extra-axial spaces  along the frontal convexities/frontal poles, suspicious for small  recent/acute hypodense subdural hematomas.  2. Unchanged extent of small volume scattered subarachnoid hemorrhage.  3. Otherwise unchanged chronic intracranial findings, as detailed.     JOSELUIS CADET MD   CT Head Perfusion w Contrast    Narrative    CT BRAIN PERFUSION February 1, 2021 10:36 AM    HISTORY: Code Stroke.    TECHNIQUE: Time sequential axial CT images of the head were acquired  during the administration of 120mL Isovue-370 IV. Color perfusion maps  of the brain were created from this time sequential axial source data.      Radiation dose for this scan was reduced using automated exposure  control, adjustment of the mA and/or kV according to patient size, or  iterative reconstruction technique.    COMPARISON: CTA head and neck of same day.      Impression    IMPRESSION: There is a small core infarct involving the right anterior  cerebral artery territory with a medium-sized area of  oligemia/penumbra (mismatch volume 23 mL, mismatch ratio 4.8 according  to the RAPID maps). No other gross focal or regional cerebral  perfusion asymmetry identified.    The findings on the noncontrast head CT, CTA head and neck and CT head  perfusion were discussed with Dr. Wilburn by myself at approximately 10:38  AM on 2/1/2021.    JOSELUIS CADET MD   CTA Head Neck with Contrast    Narrative    CT ANGIOGRAM OF THE HEAD AND NECK WITH CONTRAST  2/1/2021 10:20 AM     HISTORY: Code Stroke     TECHNIQUE:  CT angiography with an injection of 70 mL Isovue-370 IV  with scans through the head and neck. Images were transferred to a  separate 3-D workstation where multiplanar reformations and 3-D images  were created. Estimates of carotid stenoses are made relative to the  distal internal carotid artery diameters except as noted. Radiation  dose for  this scan was reduced using automated exposure control,  adjustment of the mA and/or kV according to patient size, or iterative  reconstruction technique.      COMPARISON: CT head of same day.    CT ANGIOGRAM HEAD FINDINGS:  There is focal occlusion of the  precallosal segment of the right anterior cerebral artery (series 9  image 544). There does appear to be some reconstitution of flow into  the more distal aspect of the right anterior cerebral artery. The left  anterior cerebral artery appears grossly patent with mild to moderate  scattered luminal irregularity and stenosis. There several areas of  significant stenosis involving the anterior and posterior circulation.  This is likely related to intracranial atherosclerosis. Specifically,  there is a moderate to severe focal stenosis involving the M1 segment  of the left middle cerebral artery (series 9 image 508). There are  also moderate to severe stenosis involving a small proximal M2 branch  of the superior division of the left MCA (series 9 image 509).  Mild/moderate multifocal stenoses involving the M1 and M2 branches of  the right middle cerebral artery. Mild to moderate diffuse stenosis of  the left vertebral artery intracranially. There is a severe stenosis  involving the P2 segment of the left posterior cerebral artery (series  9 image 497). There are moderate/moderate to severe multifocal  stenoses elsewhere involving the P2 and P2 segments of both posterior  cerebral arteries.    CT ANGIOGRAM NECK FINDINGS:  There is a four-vessel arch, with the  left vertebral artery arising directly from the aortic arch. Scattered  atherosclerotic disease involving the aortic arch with at least mild  to moderate stenosis at the left vertebral artery origin. No  significant stenoses of the other origins of the great vessels.    Right carotid artery: There is mixed atherosclerotic disease of the  right carotid bifurcation and proximal cervical internal carotid  artery,  without definite flow-limiting stenosis. There is a linear  filling defect in the proximal cervical right internal carotid artery  that could be a superimposed web or linear plaque (series 9 image  341). Tortuous internal carotid artery.    Left carotid artery: There is mixed atherosclerosis involving the left  carotid bifurcation and proximal cervical internal carotid artery  resulting in up to approximately 50-60% stenosis by NASCET criteria.  Retropharyngeal course of the left internal carotid artery.    Vertebral arteries: The left vertebral artery arises directly from the  aortic arch, a normal variant. Mild to moderate stenosis at the origin  of the left vertebral artery. Otherwise, no significant/definite  flow-limiting stenosis identified involving the cervical vertebral  arteries.    Other findings: There appear to be small bilateral pleural effusions  with some dependent atelectasis. Atherosclerosis of the coronary  arteries is partially visualized. Multilevel degenerative changes in  the spine.      Impression    IMPRESSION:  1. Focal occlusion of the right anterior cerebral artery precallosal  segment.  2. Advanced intracranial atherosclerosis with multifocal stenoses,  including severe focal stenoses involving the left middle cerebral  artery M1 segment and both posterior cerebral arteries.  3. Bilateral carotid bifurcation atherosclerosis. There is a linear  filling defect in the proximal cervical right internal carotid artery  that could represent a superimposed web or linear atherosclerosis, or  less likely a chronic intimal flap/dissection. There is no definite  flow-limiting stenosis in the right carotid artery. There is 50-60%  stenosis of the proximal cervical LEFT internal carotid artery.  4. The left vertebral artery arises directly from the aortic arch and  demonstrates mild to moderate stenosis at its origin. The cervical  vertebral arteries are otherwise patent.    JOSELUIS CADET MD   MR  Brain w/o & w Contrast    Narrative    MRI BRAIN WITHOUT AND WITH CONTRAST  2/1/2021 7:08 PM    HISTORY:  Stroke, follow-up. New left leg weakness.    TECHNIQUE:  Multiplanar, multisequence MRI of the brain without and  with 6 mL Gadavist.    COMPARISON: Head CT 2/1/2021    FINDINGS:  Patchy/linear areas of diffusion restriction are present corresponding  to the right anterior cerebral artery distribution involving the right  cingulate gyrus, superior frontal gyrus, and superior parietal lobule  with areas of postcontrast enhancement. Additional subtle areas of  intrinsic T1 hyperintensity suggesting components of laminar necrosis.    Small area of diffusion restriction is present involving the left  superior parietal lobule and left frontal white matter. Small areas of  diffusion restriction are present corresponding to the bilateral  temporal lobes near the hippocampus on the right and within the left  temporal stem on the left.    Small areas of subarachnoid hemorrhage are present along the left  frontal pole bilaterally along the vertex. Small volume hemorrhage  within the anterior interhemispheric fissure. Thin bilateral subdural  fluid collections are present which demonstrate a few scattered areas  of susceptibility related signal loss consistent with subdural  hematomas. The collections measure up to approximately 5 to 6 mm  bilaterally. Bilateral dural thickening and enhancement, likely  reactive.    Multiple old basal ganglia and thalamic lacunar infarcts are present.  Patchy and confluent white matter T2 hyperintensities likely represent  advanced chronic small vessel ischemic change. Parenchyma is otherwise  unremarkable.    The visualized calvarium is unremarkable. Trace opacification of the  bilateral mastoid cavities, likely inflammatory. Trace paranasal sinus  mucosal thickening. Bilateral lens replacements are present.      Impression    IMPRESSION:    1. Subacute infarct involving the right  anterior cerebral artery  distribution with diffusion restriction and enhancement.  2. Multiple smaller infarcts involving the left frontal lobe,  bilateral parietal lobes, and bilateral temporal lobes.  3. Scattered areas of subarachnoid hemorrhage, not significantly  changed.  4. Bilateral subdural hematomas with mild mass effect, not  significantly changed.  5. Multiple old lacunar infarcts.  6. Patchy confluent white matter T2 hyperintensities likely represent  advanced chronic small vessel ischemic change.    GABY ADAMS MD   Echocardiogram Complete    Narrative    [id number]  [facility]  [id number]  [id number]^^GARTH^E           [facility]  Echocardiography Laboratory  [address] [place] [address]        Name: ALAV RAMIREZ  MRN: [id number]  : 1932  Study Date: 2021 08:44 AM  Age: 88 yrs  Gender: Female  Patient Location: [facility]  Reason For Study: CVA  Ordering Physician: GARTH GARCIA  Referring Physician: <No Referring Physician Selected>  Performed By: Tess Yancey     BSA: 1.6 m2  Height: 63 in  Weight: 132 lb  HR: 83  BP: 164/70 mmHg  _____________________________________________________________________________  __        Procedure  Complete Portable Bubble Echo Adult.  _____________________________________________________________________________  __        Interpretation Summary     1. The left ventricle is normal in structure, function and size. The visual  ejection fraction is estimated at 60%.  2. The right ventricle is normal in structure, function and size.  3. There is no atrial shunt seen. A contrast injection (Bubble Study) was  performed that was negative for flow across the interatrial septum.  4. No valve disease.     No previous echo for comparison.  _____________________________________________________________________________  __        Left Ventricle  The left ventricle is normal in structure, function and size. There is mild  concentric  left ventricular hypertrophy. The visual ejection fraction is  estimated at 60%. Grade I or early diastolic dysfunction. Normal left  ventricular wall motion.     Right Ventricle  The right ventricle is normal in structure, function and size.     Atria  Normal left atrial size. Right atrial size is normal. There is no atrial shunt  seen. A contrast injection (Bubble Study) was performed that was negative for  flow across the interatrial septum.     Mitral Valve  The mitral valve is normal in structure and function.        Tricuspid Valve  There is mild (1+) tricuspid regurgitation. The right ventricular systolic  pressure is approximated at 22.8 mmHg plus the right atrial pressure.     Aortic Valve  The aortic valve is normal in structure and function.     Pulmonic Valve  The pulmonic valve is normal in structure and function.     Vessels  Normal ascending, transverse (arch), and descending aorta. The inferior vena  cava was normal in size with preserved respiratory variability.     Pericardium  There is no pericardial effusion.        Rhythm  Sinus rhythm was noted.  _____________________________________________________________________________  __  MMode/2D Measurements & Calculations  IVSd: 1.3 cm     LVIDd: 3.9 cm  LVIDs: 2.0 cm  LVPWd: 1.4 cm  FS: 47.5 %  LV mass(C)d: 182.0 grams  LV mass(C)dI: 112.3 grams/m2  Ao root diam: 3.2 cm  LA dimension: 2.8 cm  asc Aorta Diam: 3.4 cm  LA/Ao: 0.86  LVOT diam: 2.3 cm  LVOT area: 4.2 cm2  LA Volume (BP): 45.8 ml  LA Volume Index (BP): 28.3 ml/m2  RWT: 0.71           Doppler Measurements & Calculations  MV E max wilbert: 74.7 cm/sec  MV A max wilbert: 102.8 cm/sec  MV E/A: 0.73  MV max P.7 mmHg  MV mean P.8 mmHg  MV V2 VTI: 28.9 cm  MV dec slope: 402.9 cm/sec2  PA acc time: 0.12 sec  TR max wilbert: 238.9 cm/sec  TR max P.8 mmHg  E/E' av.2  Lateral E/e': 15.3  Medial E/e': 13.2            _____________________________________________________________________________  __           Report approved by: Xiang Santiago 02/02/2021 10:56 AM          Allergies   Allergies   Allergen Reactions     Penicillins Swelling

## 2021-02-04 NOTE — PLAN OF CARE
OT: attempted, pt getting cleaned up in bed w/ nursing assistants for prolonged time. Per chart, pt to discharge today to ARU today at 11:45

## 2021-02-04 NOTE — PROGRESS NOTES
Care Management Discharge Note    Discharge Date: 02/04/21(ARU)       Discharge Disposition: Acute Rehab, Transitional Care    Discharge Services:      Discharge DME:      Discharge Transportation: family or friend will provide    Private pay costs discussed: transportation costs    PAS Confirmation Code:  N/A  Patient/family educated on Medicare website which has current facility and service quality ratings: yes    Education Provided on the Discharge Plan:  Yes  Persons Notified of Discharge Plans: Yes  Patient/Family in Agreement with the Plan: yes    Handoff Referral Completed: Yes    Additional Information:    Orders and discharge summary have been placed. Pt in agreement w/plan and has agreed to transport at 11:45 via [facility] w/c, accepting costs. No further  interventions anticipated.          BRITTANI Cosme   [facility]  [phone]

## 2021-02-04 NOTE — CONSULTS
" 21 1300   Living Arrangements   People in home alone   Able to Return to Prior Arrangements other (see comments)   Living Arrangement Comments [facility] with AM/PM cares, unclear what level of assist pt will require at D/C   Home Safety   Patient Feels Safe Living in Home? yes   CM/SW Discharge Planning   Patient/Family Anticipates Transition to   (Pending progress)   Concerns to be Addressed discharge planning   Patient/family educated on Medicare website which has current facility and service quality ratings no   Transportation Anticipated family or friend will provide   Anticipated Discharge Disposition (CM/SW) Long Term Care;Transitional Care;Assisted Living       Social Work: Initial Assessment with Discharge Plan    Patient Name: Jamee Douglas  : 1932  Age: 88 year old  MRN: [id number]  Completed assessment with: Chart review and interview with pt at bedside   Admitted to ARU: TODAY 2021    Presenting Information   Date of SW assessment: 2021  Health Care Directive: Health Care Directive Agent (if patient not able to make decisions) and Power of  for Health Care  Primary Health Care Agent: Patient/self until further assessed   Secondary Health Care Agent: Pt stated that she has completed a POA document and that her dtr is her decision-maker. Will f/u with dtr to get copy for this ppwk. Pt dtr manages finances.   Living Situation: [facility] ([facility]) alone with support from staff. There are no stairs to get in her building and there are elevators inside. She has a walk-in shower in her living space.  Previous Functional Status: Prior to first stroke in December pt was described as \"super woman\", living alone and very independent with ADLs and IADLs. Used to bike often and stated that she exercised daily for 1 hour in the morning. Had 6 week LOS at [facility] and moved into [facility]. Was getting AM/PM cares for clothing and bed mobility assistance. 2 falls recently. Was managing her " "own medications but dtr manages her finances. Not driving, dtr provides her with assistance with transport.   DME available: shower chair, walker, rolling, wheelchair, manual  Patient and family understanding of hospitalization: Appropriate, appears A&Ox4 and pleasant. Pt scored 15/15 on BIMs.   Cultural/Language/Spiritual Considerations: 87 y/o woman, , , english-speaking and [employer].       Physical Health  Reason for admission: Stroke Ischemic 01.1 (L) Body Involvement (R) Brain Stroke; s/p Right ROBBY strokes    Jamee Douglas is an 88-year-old R-hand dominant female who was living independently who admitted to the hospital on 1/29/2021 following a fall at her Assisted Living facility in which she hit the back of her head. In December, pt was admitted to the hospital with multiple strokes (mentined having leg weakness after these strokes) and was discharged to a TCU at that time. She was at the TCU for about 4 weeks and had been home for 2 days PTA and was using her walker and lost her balance, fell backwards, and hit the back of her head. Upon admission, pt found to have imaging findings revealing several acute-appearing intracranial hemorrhages, primarily in the subarachnoid spaces with a small right falx subdural hematoma. Per Neurosurgery, she was not a candidate for surgical intervention.     Provider Information   Primary Care Physician:Sang Antonio  [address], [place] [address]  PH: [phone]  : None reported     Mental Health/Chemical Dependency:   Diagnosis: Pt denied. When asked about her mood, pt stated \"up and down\" in regards to her long rehab course and recent hospitalizations. Pt stated that her family is very supportive and facetimes with her daily.   Alcohol/Tobacco/Narcotis: None reported  Support/Services in Place: None reported   Services Needed/Recommended: Supportive services available by consult (Health Psychology and " "Ham services)   Sexuality/Intimacy: Not discussed     Support System  Marital Status:    Family support: Dtr Melania Cohen (designated visitor) lives local and pt stated \"does everything for me\". Pt has 3 sons who live out-of-state. 10 grandchildren and pt stated 3 of them are local. Siblings have all passed away.   Other support available: Multiple friends, has not had opportunity to meet neighbors or other residence in her building     Community Resources  Current in home services: [employer] [facility] was set up after TCU stay  Previous services: Recent 6 week stay at [facility]. Pt stated that her dtr is fighting with Medicare because Medicare told her she needed to discharge but pt, family and therapist did not feel that she was ready. Pt son is an  who is assisting with this process.     Financial/Employment/Education  Employment Status: Retired  Income Source: [employer]  Education: Some college   Financial Concerns:  None reported   Insurance: [facility] MEDICARE ADVANTAGE      Discharge Plan   Patient and family discharge goal: Pending progress  Provided Education on discharge plan: Yes  Patient agreeable to discharge plan:  Yes  Provided education and attained signature for Medicare IM and IRF Patient Rights and Privacy Information provided to patient : YES  Provided patient with [place] [facility] Resources: Stroke info added to AVS  Barriers to discharge: None at this time     Discharge Recommendations   Disposition: See above   Transportation Needs: Family assistance   Name of Transportation Company and Phone: N/A     Additional comments   STEVIE 18 days. Met with pt at bedside. Pt appeared A&Ox4 and pleasant. Pt denied any immediate needs or concerns at this time. Pt anxious to get therapy and work on road to recovery. Pt excited to hear about team rounds and the opportunities at [facility] vs her experience at [facility]. JUNE plans to call pt dtr early next week to " introduce self, pt plans to notify her dtr of this. Discharge plans are pending. Evals to take place tomorrow. No immediate needs, SW will continue to follow.     Please invite to Care Conference:  Dtr STEPHANIE RAMIREZ PH: [phone]    RYAN Santos, [facility] Acute Rehab Unit   Phone: [phone]  I   Pager: [phone]

## 2021-02-04 NOTE — PLAN OF CARE
Physical Therapy Discharge Summary    Reason for therapy discharge:    Discharged to acute rehabilitation facility.    Progress towards therapy goal(s). See goals on Care Plan in [facility] electronic health record for goal details.  Goals not met.  Barriers to achieving goals:   discharge from facility.    Therapy recommendation(s):    Continued therapy is recommended.  Rationale/Recommendations:  Patient would benefit from continued skilled PT to progress her safety and independence with all mobility prior to discharging to home. .

## 2021-02-04 NOTE — PLAN OF CARE
Nursing shift note  Pt here with fall leading to a SAH and a R. Ischemic CVA. Lethargic around midnight, but more alert upon second assessment, oriented x4, but forgetful at times. Neuros intact ex: L. Sided weakness, facial droop on the L. Side and R. field cut. Quivering of the lower jaw noted on this shift. VSS on RA. Tele NSR. Regular diet, thin liquids. Takes pills whole with water. Up with A1-2 using GB/walker. Encouraging pt to get OOB to use the bathroom,  but can be incontinent of B/B. Denied pain overnight. Pt scoring green on the Aggression Stop Light Tool. Plan to continue working with therapies. Pt will discharge to ARU today.    Behavior & Aggression Tool color:  Green    Pt's belongings:   (Belongings from admission day present in pt's room)              Home medications: (N)

## 2021-02-05 ENCOUNTER — APPOINTMENT (OUTPATIENT)
Dept: OCCUPATIONAL THERAPY | Facility: CLINIC | Age: 86
End: 2021-02-05
Payer: COMMERCIAL

## 2021-02-05 ENCOUNTER — APPOINTMENT (OUTPATIENT)
Dept: SPEECH THERAPY | Facility: CLINIC | Age: 86
End: 2021-02-05
Payer: COMMERCIAL

## 2021-02-05 ENCOUNTER — APPOINTMENT (OUTPATIENT)
Dept: PHYSICAL THERAPY | Facility: CLINIC | Age: 86
End: 2021-02-05
Attending: PHYSICIAN ASSISTANT
Payer: COMMERCIAL

## 2021-02-05 LAB
ANION GAP SERPL CALCULATED.3IONS-SCNC: 6 MMOL/L (ref 3–14)
BUN SERPL-MCNC: 7 MG/DL (ref 7–30)
CALCIUM SERPL-MCNC: 8.4 MG/DL (ref 8.5–10.1)
CHLORIDE SERPL-SCNC: 110 MMOL/L (ref 94–109)
CO2 SERPL-SCNC: 26 MMOL/L (ref 20–32)
CREAT SERPL-MCNC: 0.6 MG/DL (ref 0.52–1.04)
GFR SERPL CREATININE-BSD FRML MDRD: 81 ML/MIN/{1.73_M2}
GLUCOSE SERPL-MCNC: 93 MG/DL (ref 70–99)
MAGNESIUM SERPL-MCNC: 1.7 MG/DL (ref 1.6–2.3)
POTASSIUM SERPL-SCNC: 3.2 MMOL/L (ref 3.4–5.3)
SODIUM SERPL-SCNC: 142 MMOL/L (ref 133–144)

## 2021-02-05 PROCEDURE — 128N000003 HC R&B REHAB

## 2021-02-05 PROCEDURE — 36415 COLL VENOUS BLD VENIPUNCTURE: CPT | Performed by: PHYSICIAN ASSISTANT

## 2021-02-05 PROCEDURE — 250N000013 HC RX MED GY IP 250 OP 250 PS 637: Performed by: PHYSICIAN ASSISTANT

## 2021-02-05 PROCEDURE — 92523 SPEECH SOUND LANG COMPREHEN: CPT | Mod: GN | Performed by: SPEECH-LANGUAGE PATHOLOGIST

## 2021-02-05 PROCEDURE — 97162 PT EVAL MOD COMPLEX 30 MIN: CPT | Mod: GP

## 2021-02-05 PROCEDURE — 83735 ASSAY OF MAGNESIUM: CPT | Performed by: PHYSICIAN ASSISTANT

## 2021-02-05 PROCEDURE — 97530 THERAPEUTIC ACTIVITIES: CPT | Mod: GP

## 2021-02-05 PROCEDURE — 97129 THER IVNTJ 1ST 15 MIN: CPT | Performed by: SPEECH-LANGUAGE PATHOLOGIST

## 2021-02-05 PROCEDURE — 80048 BASIC METABOLIC PNL TOTAL CA: CPT | Performed by: PHYSICIAN ASSISTANT

## 2021-02-05 PROCEDURE — 97165 OT EVAL LOW COMPLEX 30 MIN: CPT | Mod: GO | Performed by: OCCUPATIONAL THERAPIST

## 2021-02-05 PROCEDURE — 97535 SELF CARE MNGMENT TRAINING: CPT | Mod: GO | Performed by: OCCUPATIONAL THERAPIST

## 2021-02-05 PROCEDURE — 97130 THER IVNTJ EA ADDL 15 MIN: CPT | Performed by: SPEECH-LANGUAGE PATHOLOGIST

## 2021-02-05 PROCEDURE — 99232 SBSQ HOSP IP/OBS MODERATE 35: CPT | Performed by: PHYSICAL MEDICINE & REHABILITATION

## 2021-02-05 RX ORDER — POTASSIUM CHLORIDE 1.5 G/1.58G
20 POWDER, FOR SOLUTION ORAL DAILY
Status: DISCONTINUED | OUTPATIENT
Start: 2021-02-05 | End: 2021-02-06

## 2021-02-05 RX ADMIN — TRAVOPROST 1 DROP: 0.04 SOLUTION/ DROPS OPHTHALMIC at 21:00

## 2021-02-05 RX ADMIN — ATORVASTATIN CALCIUM 40 MG: 40 TABLET, FILM COATED ORAL at 20:55

## 2021-02-05 RX ADMIN — MULTIPLE VITAMINS W/ MINERALS TAB 1 TABLET: TAB at 07:56

## 2021-02-05 RX ADMIN — CHOLECALCIFEROL TAB 10 MCG (400 UNIT) 400 UNITS: 10 TAB at 07:56

## 2021-02-05 RX ADMIN — ASPIRIN 81 MG: 81 TABLET, COATED ORAL at 07:56

## 2021-02-05 RX ADMIN — POTASSIUM CHLORIDE 20 MEQ: 1.5 POWDER, FOR SOLUTION ORAL at 07:56

## 2021-02-05 RX ADMIN — DORZOLAMIDE HYDROCHLORIDE AND TIMOLOL MALEATE 1 DROP: 20; 5 SOLUTION/ DROPS OPHTHALMIC at 20:55

## 2021-02-05 RX ADMIN — DORZOLAMIDE HYDROCHLORIDE AND TIMOLOL MALEATE 1 DROP: 20; 5 SOLUTION/ DROPS OPHTHALMIC at 07:57

## 2021-02-05 ASSESSMENT — ACTIVITIES OF DAILY LIVING (ADL): PREVIOUS_RESPONSIBILITIES: MEDICATION MANAGEMENT

## 2021-02-05 NOTE — PROGRESS NOTES
Pt gave SW permission yesterday during assessment to call dtr and introduce self. SWer called dtr Melania PH: [phone]. SW introduced self, provided RN and SW contact information, notified dtr about team rounds and SW role RE: discharge planning. Pt dtr stated that she plans to move pt out of the [facility] where pt was recently moved to due to staffing issues. Pt dtr stated that when she originally moved there, pt dtr 'didn't ask enough questions' and now has a better understanding of questions to ask and care that pt may need. Dtr does not plan to coordinate this until she has a better understanding from ARU staff RE: pt needs. Pt dtr very pleasant. Stated that 'whatever she needs, I get her'. Notified pt dtr that pt spoke very highly of her dtr and the support she provides and pt dtr became tearful. Pt dtr stated that prior to her stroke, pt was very active and exercised 6 days/week for an hour. She enjoys yoga, card games and did a lot of baking. Pt family has been in good contact with pt. Dtr notified that pt has a break from 12-1:15pm daily and that would be a good time to call. Pt dtr plans to visit with pt this afternoon and drop off some items that pt requested. Pt dtr stated that pt has difficulty seeing out of her R eye and that was prior to the stroke. Pt dtr stated that she may not be able to read her whiteboard. SW notified RN and asked that her therapy schedule be written down on paper so that pt is able to read better. Pt dtr appreciative. Pt dtr also stated that pt is not typically a crier but on T/W of this last week and leading up to ARU admission, pt was very tearful and anxious. Pt dtr stated that the pt called her dtr and asked her to follow the transport to ARU and wanted to make sure family knew where she was going. SW notified dtr that Ham has been consulted and plans to meet with pt, pt dtr very happy to hear that. Pt dtr stated that pt has a very wonderful experience at the TCU and when she  left, the staff all hugged and celebrated with her. SWer notified dtr that ARU staff can do a similar celebration with the bell and confetti. Pt dtr appreciative of time, denied any major concerns or needs at this time. SW plans to f/u with pt and pt dtr as needs arise.     Julia Verdugo, RYAN, [facility] Acute Rehab Unit   Phone: [phone]  I   Pager: [phone]

## 2021-02-05 NOTE — PROGRESS NOTES
02/05/21 0827   Quick Adds   Type of Visit Initial Occupational Therapy Evaluation   Living Environment   People in home alone   Current Living Arrangements assisted living   Home Accessibility no concerns   Transportation Anticipated family or friend will provide   Living Environment Comments Pt recently moved to [facility]. Pt had been hospitalized in Dec 2020 with stroke x2, had been at U for rehab and moved to [facility] after, had previously lived independently in her own apartment.  walk in shower with grab bars and shower chair at AL.  May d/c to daughters home after rehab dtr. has a step in shower.    Self-Care   Usual Activity Tolerance good   Current Activity Tolerance fair   Regular Exercise Yes   Activity/Exercise Type biking;strength training   Exercise Amount/Frequency 3-5 times/wk   Equipment Currently Used at Home shower chair;walker, rolling;grab bar, toilet;grab bar, tub/shower   Activity/Exercise/Self-Care Comment prior to Dec. biked 3miles, wt. machines 5X/wk. at the gym   Disability/Function   Hearing Difficulty or Deaf yes   Patient's preferred means of communication English speaker with hearing loss, no speech problems.   Describe hearing loss bilateral hearing loss   Use of hearing assistive devices bilateral hearing aids   Hearing Management hearing aids   Wear Glasses or Blind yes   Vision Management glasses   Concentrating, Remembering or Making Decisions Difficulty yes   Difficulty Communicating no   Difficulty Eating/Swallowing no   Walking or Climbing Stairs Difficulty no   Dressing/Bathing Difficulty no   Toileting issues no   Doing Errands Independently Difficulty (such as shopping) no   Fall history within last six months yes   Number of times patient has fallen within last six months 2   Change in Functional Status Since Onset of Current Illness/Injury yes   General Information   Onset of Illness/Injury or Date of Surgery 02/04/21   Referring Physician Rosalie Stokes PA-C   Additional  Occupational Profile Info/Pertinent History of Current Problem  per chart, pt found to have imaging findings revealing several acute-appearing intracranial hemorrhages, primarily in the subarachnoid spaces with a small right falx subdural hematoma. Per Neurosurgery, she was not a candidate for surgical intervention.    Existing Precautions/Restrictions fall;seizures   General Observations and Info activity orders; up with A   Cognitive Status Examination   Orientation Status orientation to person, place and time   Affect/Mental Status (Cognitive) WFL   Follows Commands WFL   Cognitive Status Comments continue to assess cogntion prn   Visual Perception   Impact of Vision Impairment on Function (Vision) wears glasses, continue to assess prn   Pain Assessment   Patient Currently in Pain No   Strength Comprehensive (MMT)   Comment, General Manual Muscle Testing (MMT) Assessment BUE grossly 4/5   Coordination   Upper Extremity Coordination Left UE impaired   ARC Assessment Only   Acute Rehab Functional Assessment See IP Rehab Daily Documentation Flowsheet for Functional Mobility/ADL Assessment   Instrumental Activities of Daily Living (IADL)   Previous Responsibilities medication management  (per chart dtr. A with finances, driving; Pt. I prior to Dec.)   Clinical Impression   Criteria for Skilled Therapeutic Interventions Met (OT) yes   OT Diagnosis impaired ADls/mobility   OT Problem List-Impairments impacting ADL activity tolerance impaired;balance;cognition;strength   ADL comments/analysis below baseline with ADls/mobility   Assessment of Occupational Performance 3-5 Performance Deficits   Identified Performance Deficits dressing, toileting, bathing, home mgmt.   Planned Therapy Interventions (OT) ADL retraining;IADL retraining;balance training;cognition;strengthening;transfer training;visual perception;home program guidelines;progressive activity/exercise;motor coordination training   Clinical Decision Making  Complexity (OT) low complexity   Therapy Frequency (OT) Daily   Predicted Duration of Therapy ~ 2 weeks/18 days   Anticipated Equipment Needs Upon Discharge (OT)   (TBD)   Risk & Benefits of therapy have been explained evaluation/treatment results reviewed;care plan/treatment goals reviewed;risks/benefits reviewed;current/potential barriers reviewed;participants voiced agreement with care plan;patient   Total Evaluation Time (Minutes)   Total Evaluation Time (Minutes) 15

## 2021-02-05 NOTE — PROGRESS NOTES
CLINICAL NUTRITION SERVICES - ASSESSMENT NOTE     Nutrition Prescription    RECOMMENDATIONS FOR MDs/PROVIDERS TO ORDER:  None today    Malnutrition Status:    Patient does not meet two of the established criteria necessary for diagnosing malnutrition    Recommendations already ordered by Registered Dietitian (RD):  RD will order a one time send of cherry Gelatein     Future/Additional Recommendations:  Monitor meal intakes, snack and supplement acceptance  Monitor weight trends      REASON FOR ASSESSMENT  Alva Ramirez is a/an 88 year old female assessed by the dietitian for RN Consult - Pt stated she is not often hungry, eating less than 50% of meals since admission    NUTRITION/MEDICAL HISTORY  Per chart review: Pt admits to ARU for rehabilitation in the setting of acute ischemic stroke, right ROBBY, due to symptomatic intracranial atherosclerosis with possible atheroembolic event from aortic arch plaque, multiple acute SAH, acute SDH and mechanical fall prior to hospital admit. Past medical history noted for HTN, stroke in Dec 2020, hyperlipidemia, osteoarthritis, and glaucoma.     Per pt visit: RD reviewed reason for visit, discussed nursing concern of less than adequate po intakes, pt reports attempt meals as best she can, enjoys foods on menu, reports likely consuming 50% of meals thus far. Reviewed weight trends below, no noted weight loss. RD reviewed the importance of adequate nutritional intakes for working with therapies, reviewed options for supplement or snack, pt has tried some supplements in the past and had dislike for them, is agreeing to trying Gelatein, reviewed option for food snack, pt agreeing to rotating food snack as ordered above.     CURRENT NUTRITION ORDERS  Diet: Regular  Intake/Tolerance: 25-50% thus far per flow sheets     LABS  Results for ALVA RAMIREZ (MRN [id number]) as of 2/5/2021 11:03   2/1/2021 10:06 2/1/2021 20:30 2/2/2021 01:11 2/5/2021 05:21   Sodium 141   142  "  Potassium 3.1 (L) 3.1 (L) 3.5 3.2 (L)   Urea Nitrogen 7   7   Creatinine 0.73   0.60   Calcium 8.6   8.4 (L)   Magnesium    1.7   Hemoglobin A1C 5.8 (H)        MEDICATIONS  Lipitor, Klor-con, MVI, Vit D    ANTHROPOMETRICS  Height: 160 cm (5' 3\")  Most Recent Weight: 61.6 kg (135 lb 14.4 oz)    IBW: 52.3 kg  BMI: Normal BMI  UBW: Pt reports around 132 lbs   Weight History:   Wt Readings from Last 10 Encounters:   02/04/21 61.6 kg (135 lb 14.4 oz)   01/29/21 59.9 kg (132 lb)     Dosing Weight: 61.6 kg    ASSESSED NUTRITION NEEDS  Estimated Energy Needs: 4678-0883 kcals/day (25 - 30 kcals/kg)  Justification: Maintenance  Estimated Protein Needs: 50-60 grams protein/day (0.8 - 1 grams of pro/kg)  Justification: Maintenance  Estimated Fluid Needs: 1893-4815 mL/day (25 - 30 mL/kg)   Justification: Maintenance    MALNUTRITION  % Intake: Decreased intake does not meet criteria  % Weight Loss: None noted  Subcutaneous Fat Loss: Facial region: mild vs age related   Muscle Loss: Facial & jaw region: mild vs age related   Fluid Accumulation/Edema: None noted  Malnutrition Diagnosis: Patient does not meet two of the established criteria necessary for diagnosing malnutrition    NUTRITION DIAGNOSIS  Predicted inadequate nutrient intake related to decreased appetite as evidenced by pt taking 25-50% of meals thus far       INTERVENTIONS  Implementation  Nutrition Education: RD reviewed the importance of adequate nutritional intakes for improved health status    Medical food supplement therapy - ordered as above  Modify composition of meals/snacks - ordered as above     Goals  Patient to consume % of nutritionally adequate meal trays TID, or the equivalent with supplements/snacks.     Monitoring/Evaluation  Progress toward goals will be monitored and evaluated per protocol.    Luz Kang RD, CNSC, LD  ARU RD pager: [phone]  "

## 2021-02-05 NOTE — PLAN OF CARE
FOCUS/GOAL  Medical management, Mobility, and Safety management    ASSESSMENT, INTERVENTIONS AND CONTINUING PLAN FOR GOAL:    Pt is alert and oriented x3. Disoriented to time. Is forgetful, reminded of using the call light for assistance. Pt would be awake during rounds and call at staff for the bathroom. Incontinent of bladder this shift. Unable to remember last LBM. Ambulates to the bathroom for toileting. A mod A1 w/ the walker and gait belt for transfers. Needing directions and guidance w/ walker. Tends to walk towards the left. Unsteady gait noted. Denies any pain, sob, dizziness and light headedness. BP is slightly elevated at 148/60 (asymptomatic) and all other vitals are stable. Will continue POC.

## 2021-02-05 NOTE — PLAN OF CARE
Discharge Planner Post-Acute Rehab SLP:     Discharge Plan: likely HH sptx    Precautions: mild to mod cognition     Current Status:  Communication: language skills intact  Cognition: mild  to moderate  impairments in ST memory and problem solving and rasoning   Swallow: regular diet and thin lqiuids    Assessment: Compelted cognitive linguistic eval per MD santos. Pt presents with intact language skills and mild to moderate cognitive impairments in the dioni of St memory and mod to complex level reasoning and problem solving . Pt's current level of cognitive function appears to be below baseline and pt will benefit from skilled SLP intervention to imrpove functional cognition for safety and independence.    Pt had swallow eval completed while in hospital and found to have functional oral pharyngeal swallow- no further swallow intervention was indicated. Pt is noted to have delayed swallow initiaion but despite this is managing thin liquids well-- pt states that she has had this since her old strokes so appears to not be new. Will continue to monitor informally     Other Barriers to Discharge (Family Training, etc): TBD

## 2021-02-05 NOTE — PROGRESS NOTES
SPIRITUAL HEALTH SERVICES  SPIRITUAL ASSESSMENT Progress Note  [facility] ([facility]) 5R ARU     REFERRAL SOURCE: Consult Order    While visiting with Jamee, she shared her story of challenges over the past couple of months.  At times she was teary; especially as she described her falls and the lack of help. She said the doctors are hopeful that she will regain her strength and she said she's hopeful too.  And, she wonders if it's true. Jamee shared that it was good for her to talk and invited me back.  I offered to pray, which she accepted.    PLAN: I will continue to follow Jamee.  SHS remains available per pt/request.    Ro Rosario  Chaplain Resident  Pager: [phone]

## 2021-02-05 NOTE — PLAN OF CARE
Discharge Planner Post-Acute Rehab PT:     Discharge Plan: daughter's house versus new [facility] facility,  PT    Precautions: falls    Current Status:  Bed Mobility: NT, pt up in chair   Transfer: min-modA sit<>stands to FWW; benefits from cues in set up for stand. Noted difficulty with safe approach to sit, LOB x2 in session needing up to modA to correct. Pt cued to fully align self with chair prior to sit and instructed to wait for cues prior to initiating sit.   Gait: grossly Anthony w/ FWW, difficulty problem solving turns and navigating tight spaces  Stairs: NT  Balance: SBA sitting balance, CGA-modA for dynamic balance, variable.     Assessment:  Pt presents with fair LLE strength (grossly 4/5) but impaired functional strength, balance and coordination 2/2 cognitive deficits, L sided apraxia and inattention. Pt noted to be variable with level of A, is CGA-Anthony for forward ambulation w/ FWW but needs step by step cues for turns and problem solving approach to sit, needing up to modA for LOB 2/2 sitting prior to safe positioning. Pt oriented and demonstrates good safety awareness but functionally challenged by mild impulsivity and apraxia; does respond to safety cueing. ELOS 18 days. Pt's goal for mod I mobility, anticipate will require some level of supervision with mobility at discharge.     Other Barriers to Discharge (DME, Family Training, etc): Has FWW, will need family training.

## 2021-02-05 NOTE — PROGRESS NOTES
02/05/21 1000   General Information   Onset of Illness/Injury or Date of Surgery 02/01/21   Referring Physician Dr Yobany MD   Patient/Family Therapy Goal Statement (SLP) to retrun to former level of independence   Pertinent History of Current Problem Jamee Douglas is an 88-year-old R-hand dominant female who was living independently who admitted to the hospital on 1/29/2021 following a fall at her Assisted Living facility in which she hit the back of her head. In December, pt was admitted to the hospital with multiple strokes (mentined having leg weakness after these strokes) and was discharged to a TCU at that time. She was at the TCU for about 4 weeks and had been home for 2 days PTA and was using her walker and lost her balance, fell backwards, and hit the back of her head. Upon admission, pt found to have imaging findings revealing several acute-appearing intracranial hemorrhages, primarily in the subarachnoid spaces with a small right falx subdural hematoma. Per Neurosurgery, she was not a candidate for surgical intervention. On Monday 2/1/2021 the patient was noted to have an increase in L sided weakness, control, and placement during her PT session. RN was notified, and a code stroke was called. The patient was found to have a new Subacute infarct involving the right anterior cerebral artery distribution with diffusion restriction and enhancement as well as multiple smaller infarcts involving the left frontal lobe, bilateral parietal lobes, and bilateral temporal lobes. Neurology was consulted and recs were made to maintain SBP <160, EKG and ultimately a 14 day monitor, resume statin, but that the pt is not appropriate for antiplatelet agents due to recent bleed.    General Observations flat affect but pleasaant and cooperative    Type of Evaluation   Type of Evaluation Speech, Language, Cognition   Auditory Comprehension   Paragraph Comprehension (Auditory Comprehension) WFL  (paragraph level  comprehension - complex at 7/8 )   Functional Assessment Scale (Auditory Comprehension) No Impairment   Verbal Expression   Word Finding Skills (Verbal Expression) WFL  (10/10 complex level defining words)   Conversational Speech (Verbal Expression) WFL   Functional Assessment Scale (Verbal Expression) No Impairment   Reading Comprehension   Comprehension Level (Reading Comprehension) complex/lengthy information   Functional Assessment Scale (Reading Comprehension) No Impairment   Complex/Lengthy Information, Comprehension Level (Reading Comprehension) intact  (9/10 Sentences and paragraphs)   Cognition   Cognitive Function executive function deficit;memory deficit   Additional cognitive-linguistic evaluation indicated  Recommended   Cognitive Status Exam Comments pt scored 100% with sustained attention; 2 errors with flexible attention and 100% with alternating attention. With ST memory- completed delayed recall task with 2/3 accuracy and able to recall 5/15 details with paragraph level recall. Completed 4/4 verbal probelm sovling- mod level; with mod to complex verbal reasoning- pt at 4/6 and 1/2 for numerical reasoning   Executive Function Deficit (Cognition) problem-solving/reasoning   Memory Deficit (Cognition) moderate deficit;short-term memory   General Therapy Interventions   Planned Therapy Interventions Cognitive Treatment   Cognitive treatment Internal memory strategy training;External memory strategy training   SLP Therapy Assessment/Plan   Criteria for Skilled Therapeutic Interventions Met (SLP Eval) yes;treatment indicated   SLP Diagnosis Mld to moderate cognitive impairements   Rehab Potential (SLP Eval) good, to achieve stated therapy goals   Therapy Frequency (SLP Eval) daily   Predicted Duration of Therapy Intervention (SLP Eval) 2 weeks   Comment, Therapy Assessment/Plan (SLP) Compelted cognitive linguistic eval per MD ordchris. Pt presents with intact language skills and mild to moderate cognitive  impairments in the dioni of St memory and mod to complex level reasoning and problem solving . Pt's current level of cognitive function appears to be below baseline and pt will benefit from skilled SLP intervention to imrpove functional cognition for safety and iindependence   Therapy Plan Review/Discharge Plan (SLP)   Therapy Plan Review (SLP) evaluation/treatment results reviewed;care plan/treatment goals reviewed;risks/benefits reviewed;current/potential barriers reviewed;participants voiced agreement with care plan;participants included;patient   Demonstrates Need for Referral to Another Service (SLP) occupational therapist;physical therapist    Total Evaluation Time   Total Evaluation Time (Minutes) 60

## 2021-02-05 NOTE — PROGRESS NOTES
"  [facility]   Acute Rehabilitation Unit  Daily progress note    INTERVAL HISTORY  Jamee Douglas was seen and examined today while she was sitting in her chair. Nursing note mentions that she was incontinent of her bladder this morning, her blood pressure was up to 148/60 (asymptomatic), and she was a bit forgetful. She reports that she is feeling \"good\" and slept well last night. She had two therapy session this morning before we saw her. She denies nausea, headaches, fever, chills, or fatigue. She has no questions for us today and is looking forward to her therapies and getting better. Her labs noted a decreased potassium of 3.2, so daily potassium chloride was ordered.    Functionally, she is being evaluated by PT, OT, and SP today. Her functioning at her hospital discharge were as follows:  Currently performs bed mobility with Max/Mod A, sitting balance with SBA/Min A, and sit to stands with Min/Mod A. She is able to ambulate 50 feet with Min A, FWW, and WC follow. She requires Min A with navigating the FWW, heavy cues for staying inside the walker. She performs seated grooming and hygiene tasks with min verbal cues for sequencing. She requires Mod A for standing tasks. SLP assessed swallow which is WFL, however recommends a full cognitive/linguistic assessment at next level of care.         MEDICATIONS    aspirin  81 mg Oral Daily     atorvastatin  40 mg Oral QPM     cholecalciferol  400 Units Oral Daily     dorzolamide-timolol  1 drop Right Eye BID     multivitamin w/minerals  1 tablet Oral Daily     potassium chloride  20 mEq Oral Daily     travoprost KORINA FREE  1 drop Both Eyes At Bedtime        acetaminophen, ondansetron, polyethylene glycol, senna-docusate     PHYSICAL EXAM  /50 (BP Location: Right arm)   Pulse 90   Temp 97.9  F (36.6  C) (Oral)   Resp 16   Ht 1.6 m (5' 3\")   Wt 61.6 kg (135 lb 14.4 oz)   LMP 01/01/1970   SpO2 97%   BMI 24.07 kg/m  "   Gen: alert, pleasant, sitting upright in her chair, no acute distress  HEENT: R eye cloudy, NCAT,, MMM  Cardio: RRR  Pulm: CTA bilaterally, non-labored breathing on room air  Ext: warm, well-perfused  Neuro/MSK: left-sided weakness. Had trouble wiggling toes on the L side. Strength of L hip flexor 4/5, dorsiflexion 3/5. Speech clear/fluent, responds appropriately, follows commands  Skin: small green bruise on L side of face near the jaw    LABS  K 3.2 on 2/5/2021  Ca 8.4 On 2/5/2021    Rehabilitation - continue comprehensive acute inpatient rehabilitation program with multidisciplinary approach including therapies, rehab nursing, and physiatry following. See interval history for updates.      ASSESSMENT AND PLAN  Jamee Dogulas is an 88-year-old R-hand dominant female who was living independently who admitted to the hospital on 1/29/2021 following a fall at her Assisted Living facility in which she hit the back of her head. In December, pt was admitted to the hospital with multiple strokes (mentined having leg weakness after these strokes) and was discharged to a TCU at that time. She was at the TCU for about 4 weeks and had been home for 2 days PTA and was using her walker and lost her balance, fell backwards, and hit the back of her head. Upon admission, pt found to have imaging findings revealing several acute-appearing intracranial hemorrhages, primarily in the subarachnoid spaces with a small right falx subdural hematoma. Per Neurosurgery, she was not a candidate for surgical intervention. On Monday 2/1/2021 the patient was noted to have an increase in L sided weakness, control, and placement during her PT session. RN was notified, and a code stroke was called. The patient was found to have a new Subacute infarct involving the right anterior cerebral artery distribution with diffusion restriction and enhancement as well as multiple smaller infarcts involving the left frontal lobe, bilateral parietal  lobes, and bilateral temporal lobes. Neurology was consulted and recs were made to maintain SBP <160, EKG and ultimately a 14 day monitor, resume statin, but that the pt is not appropriate for antiplatelet agents due to recent bleed.     Medical Conditions  Acute ischemic stroke, right ROBBY, due to symptomatic intracranial atherosclerosis with possible atheroembolic event from aortic arch plaque   Developed new dense hemiparesis on left with some speech difficulties on 2/1 in the AM. Stroke code called, identified acute stroke. Options limited due to recent strokes/SAH/SDH, location of lesions preclude IR, etc.   * Neurology consulted, now signed off  - Continue aspirin  - 14 day Ziopatch ordered discharge  - SBP goal <160, though would not treat more aggressively than this due to risk of precipitating recurrent stroke symptoms. Start BP meds 2/5 at the earliest if persistent elevations.  - Plan 4 week follow up with Dr. Finnegan at [facility] neurology on discharge.   - PT/OT. Discharged to ARU for ongoing therapies  - Regular diet   - Started potassium chloride on 2/5 for low potassium level. Recheck K tomorrow (2/6).    Multiple acute SAH  Acute SDH  Noted on CT head 1/29/21 after presentation after a fall. Remains neurologically intact  * Neurosurgery consult, now signed off. Recommend f/u in 1 month with CT day of appt  - Discharged to ARU for ongoing therapies     Mechanical fall  Fall likely due to ongoing deficits from recent acute strokes in December. Nothing in history to suggest other cause.  - Discharged to ARU for ongoing therapies     Accelerated hypertension  Recently started on diltiazem 120mg daily after recent strokes. Developed some orthostatic symptoms and had some recrudescence of stroke symptoms with exertion. Diltiazem stopped and symptoms resolved. BP elevated here requiring hydralazine intermittently. Re-challenged with amlodipine here, now stopped after stroke on 2/1  - Blood pressure  managed as above  - Discussed her blood pressure with the discharge team on 2/5. They did not want her to take any new scheduled or PRN medications for her blood pressure as long as her systolic pressure stays below 160.     Urinary frequency, resolved  Likely related to IVF. No other symptoms  - Check UA if develops new symptoms.     Recent acute ischemic stroke x2 in December 2020  Started on DAPT initially but transitioned to just ASA at time of admission.   - Aspirin as above     Glaucoma  Continue prior to admission eye drops     1. Adjustment to disability:  Clinical psychology to eval and treat as indicated  2. FEN: Regular diet and Thin liquids  3. Bowel: continent  4. Bladder: continent  5. DVT Prophylaxis: mechanical  6. GI Prophylaxis: none  7. Code: no CPR-Do NOT Intubate  8. Disposition: assisted living  9. ELOS:  18 days  10. Rehab prognosis: good  11. Follow up Appointments on Discharge: Follow up with ARU physician. - Plan 4 week follow up with Dr. Finnegan at [facility] neurology on discharge. Neurosurgery consult recommended f/u in 1 month with CT day of appt.      Britta Bran, MS3    Patient seen and discussed with Dr. Casimiro Saldivar, PM&R Staff Physician      Physician Attestation   I, Casimiro Saldivar, was present with the medical/MICHEL student who participated in the service and in the documentation of the note.  I have verified the history and personally performed the physical exam and medical decision making.  I agree with the assessment and plan of care as documented in the note.      I personally reviewed vital signs, medications, labs and imaging.    Patient awake and alert, interacting quite well.  There is L hemiparesis, leg worse than arm today.  Some left neglect as well.  Looking forward to getting stronger and safer.     Casimiro Saldivar,   Date of Service (when I saw the patient): 02/05/21      I spent a total of 25 minutes face to face and coordinating care of  Jamee Douglas. Over 50% of my time on the unit was spent counseling the patient and /or coordinating care regarding recent CVA.

## 2021-02-05 NOTE — PLAN OF CARE
FOCUS/GOAL  Bowel management, Bladder management, Pain management, Mobility, Skin integrity, and Safety management    ASSESSMENT, INTERVENTIONS AND CONTINUING PLAN FOR GOAL:  A/O, VS stable. Ate 25% of meal. Continent of bladder, no BM this shift. No complaints of pain this shift. Minimum to moderate assist of 1 with walker and gaitbelt. Facial bruise from fall, and scabs on arm, all on the right side. Other skin clear. Calls appropriately with call light. Continue POC.

## 2021-02-05 NOTE — PLAN OF CARE
Discharge Planner Post-Acute Rehab OT:     Discharge Plan: pt. reporting that she may discharge to dtr.s home initially, but would eventually like to find her own place.    Precautions: fall, seizure    Current Status:  ADLs: min A with UB /LB dressing, Anthony with transf.s/mobility using FWW and cues for safety/sequencing task.  IADLs: not yet tested  Vision/Cognition: continue to assess    Assessment: Pt. Min A with BADLs and functional mobility, amb. with FWW.  Pt. Anthony with mod v/cs for toilet transf./turning with FWW. Notable L lean with standing at sink for g/h tasks, cues and use of mirror to stand up/midline.    Other Barriers to Discharge (DME, Family Training, etc): TBD

## 2021-02-06 ENCOUNTER — APPOINTMENT (OUTPATIENT)
Dept: PHYSICAL THERAPY | Facility: CLINIC | Age: 86
End: 2021-02-06
Payer: COMMERCIAL

## 2021-02-06 ENCOUNTER — APPOINTMENT (OUTPATIENT)
Dept: SPEECH THERAPY | Facility: CLINIC | Age: 86
End: 2021-02-06
Payer: COMMERCIAL

## 2021-02-06 ENCOUNTER — APPOINTMENT (OUTPATIENT)
Dept: OCCUPATIONAL THERAPY | Facility: CLINIC | Age: 86
End: 2021-02-06
Payer: COMMERCIAL

## 2021-02-06 LAB — POTASSIUM SERPL-SCNC: 3.4 MMOL/L (ref 3.4–5.3)

## 2021-02-06 PROCEDURE — 97116 GAIT TRAINING THERAPY: CPT | Mod: GP

## 2021-02-06 PROCEDURE — 84132 ASSAY OF SERUM POTASSIUM: CPT | Performed by: PHYSICIAN ASSISTANT

## 2021-02-06 PROCEDURE — 99231 SBSQ HOSP IP/OBS SF/LOW 25: CPT | Performed by: PHYSICAL MEDICINE & REHABILITATION

## 2021-02-06 PROCEDURE — 97130 THER IVNTJ EA ADDL 15 MIN: CPT

## 2021-02-06 PROCEDURE — 97750 PHYSICAL PERFORMANCE TEST: CPT | Mod: GP

## 2021-02-06 PROCEDURE — 128N000003 HC R&B REHAB

## 2021-02-06 PROCEDURE — 36415 COLL VENOUS BLD VENIPUNCTURE: CPT | Performed by: PHYSICIAN ASSISTANT

## 2021-02-06 PROCEDURE — 250N000013 HC RX MED GY IP 250 OP 250 PS 637: Performed by: PHYSICIAN ASSISTANT

## 2021-02-06 PROCEDURE — 97110 THERAPEUTIC EXERCISES: CPT | Mod: GP

## 2021-02-06 PROCEDURE — 97535 SELF CARE MNGMENT TRAINING: CPT | Mod: GO | Performed by: OCCUPATIONAL THERAPIST

## 2021-02-06 PROCEDURE — 97530 THERAPEUTIC ACTIVITIES: CPT | Mod: GP

## 2021-02-06 PROCEDURE — 97129 THER IVNTJ 1ST 15 MIN: CPT

## 2021-02-06 RX ORDER — POTASSIUM CHLORIDE 750 MG/1
20 TABLET, EXTENDED RELEASE ORAL DAILY
Status: DISCONTINUED | OUTPATIENT
Start: 2021-02-07 | End: 2021-02-11

## 2021-02-06 RX ADMIN — CHOLECALCIFEROL TAB 10 MCG (400 UNIT) 400 UNITS: 10 TAB at 08:35

## 2021-02-06 RX ADMIN — TRAVOPROST 1 DROP: 0.04 SOLUTION/ DROPS OPHTHALMIC at 20:54

## 2021-02-06 RX ADMIN — SENNOSIDES AND DOCUSATE SODIUM 2 TABLET: 8.6; 5 TABLET ORAL at 17:30

## 2021-02-06 RX ADMIN — MULTIPLE VITAMINS W/ MINERALS TAB 1 TABLET: TAB at 08:35

## 2021-02-06 RX ADMIN — ATORVASTATIN CALCIUM 40 MG: 40 TABLET, FILM COATED ORAL at 20:45

## 2021-02-06 RX ADMIN — DORZOLAMIDE HYDROCHLORIDE AND TIMOLOL MALEATE 1 DROP: 20; 5 SOLUTION/ DROPS OPHTHALMIC at 20:45

## 2021-02-06 RX ADMIN — ASPIRIN 81 MG: 81 TABLET, COATED ORAL at 08:35

## 2021-02-06 RX ADMIN — DORZOLAMIDE HYDROCHLORIDE AND TIMOLOL MALEATE 1 DROP: 20; 5 SOLUTION/ DROPS OPHTHALMIC at 08:36

## 2021-02-06 RX ADMIN — POTASSIUM CHLORIDE 20 MEQ: 1.5 POWDER, FOR SOLUTION ORAL at 08:35

## 2021-02-06 NOTE — PROGRESS NOTES
Repeatable Battery for the Assessment of Neuropsychological Status (RBANS) FORM A   Immediate Memory Visuospatial/  Constructional Language Attention Delayed Memory Total Scale   Index Score 106 84 89 85 101 90   Percentile Rank 66 14 23 16 53 25               SLP:  Pt seen for administration of RBANS Form A. Results are based on a mean of 100 and a standard deviation of +/- 15.   Interpretation: Patient recognizing slower processing speed which correlates to lower scores in attention subtests. Suspect current function is below her baseline level of function.   Face to Face Administration: 35 minutes  Scoring/Interpretation: 20 minutes  Total Time: 55 minutes

## 2021-02-06 NOTE — PROGRESS NOTES
Individualized Overall Plan Of Care (IOPOC)      Rehab diagnosis/Impairment Group Code: Stroke ischemic 01.1 (l) body involvement (r) brain stroke; s/p right dale strokes  Acute ischemic right mca stroke (h)       Expected functional outcome: improvement to mod I level with mobility and ADLs  Clinical Impression Comments:     Mobility: PT: Pt presents with fair LLE strength (grossly 4/5) but impaired functional strength, balance and coordination 2/2 cognitive deficits, L sided apraxia and inattention. Will benefit from skilled PT to progress towards safe discharge.     ADL:  Pt. Min A with BADLs and functional mobility, amb. with FWW.  Pt. Leo with mod v/cs for toilet transf./turning with FWW. Notable L lean with standing at sink for g/h tasks, cues and use of mirror to stand up/midline.    Communication/Cognition/Swallow: Compelted cognitive linguistic eval per MD ordes. Pt presents with intact language skills and mild to moderate cognitive impairments in the dioni of St memory and mod to complex level reasoning and problem solving . Pt's current level of cognitive function appears to be below baseline and pt will benefit from skilled SLP intervention to imrpove functional cognition for safety and iindependence     Intensity of therapy:   PT 60 minutes, Daily(60min), for 18 days  OT 60 minutes, Daily, for ~ 2 weeks/18 days  SLP 60 minutes, daily, for 2 weeks    Orthotics none for now  Education stroke, anticoagulation and fall risk  Neuropsychology Testing: Yes; maybe as outpatient pending her progress      Medical Prognosis: she is very high risk given her multiples strokes and fall with traumatic SAH.     Physician summary statement: She can potentially improve to mod I level and return to [facility]. Will need supervision with iADLs and close follow up after discharge.      Discharge destination: family and [facility]  Discharge rehabilitation needs: home care, RN, PT, OT and SLP      Estimated length of stay: 14-18  days      Rehabilitation Physician Melody Haywood MD

## 2021-02-06 NOTE — PROGRESS NOTES
02/05/21 1603   Quick Adds   Type of Visit Initial PT Evaluation       Present no   Living Environment   People in home alone   Current Living Arrangements assisted living   Home Accessibility no concerns   Transportation Anticipated family or friend will provide   Living Environment Comments PT: Pt was living alone in [facility] up until her first strokes in Dec 2020. Pt completed course of rehab at [facility] and discharged to [facility] for 2 days prior to falls leading to most recent admit. Pt reports will likely discharge to daughter's house intially who has 1 KIRAN and then all needs met on main level, but would eventually like to find her own place.    Self-Care   Usual Activity Tolerance good   Current Activity Tolerance fair   Regular Exercise Yes   Activity/Exercise Type biking;strength training   Exercise Amount/Frequency 3-5 times/wk   Equipment Currently Used at Home walker, rolling;grab bar, toilet;grab bar, tub/shower   Activity/Exercise/Self-Care Comment PT: Prior to Dec 2020 pt very active, worked out 5 x/week and IND. Has FWW, has been using recently post discharge from TCU. Also has a w/c   Disability/Function   Hearing Difficulty or Deaf yes   Patient's preferred means of communication English speaker with hearing loss, no speech problems.   Describe hearing loss bilateral hearing loss   Use of hearing assistive devices bilateral hearing aids   Were auxiliary aids offered? no   Hearing Management hearing aids   Wear Glasses or Blind yes   Vision Management glasses  (baseline R eye vision deficits)   Concentrating, Remembering or Making Decisions Difficulty yes   Difficulty Communicating no   Difficulty Eating/Swallowing no   Walking or Climbing Stairs Difficulty no   Dressing/Bathing Difficulty no   Toileting issues no   Doing Errands Independently Difficulty (such as shopping) no   Fall history within last six months yes   Number of times patient has fallen within last six months 4  (twice at  TCU and twice at [facility])   Change in Functional Status Since Onset of Current Illness/Injury yes   General Information   Onset of Illness/Injury or Date of Surgery 02/04/21   Referring Physician Dr. Saldivar   Patient/Family Therapy Goals Statement (PT) to walk independently and not have falls at home   Pertinent History of Current Problem (include personal factors and/or comorbidities that impact the POC) PMH of multiple strokes in Dec 2020, recently admitted for falls and acute-appearing intracranial hemorrhages   Existing Precautions/Restrictions fall   Heart Disease Risk Factors Age;Medical history;High blood pressure   General Observations PT: up w/ assist   Cognition   Orientation Status (Cognition) oriented x 4   Affect/Mental Status (Cognition) WNL   Follows Commands (Cognition) 75-90% accuracy;follows one-step commands;follows two-step commands;delayed response/completion;repetition of directions required;verbal cues/prompting required   Safety Deficit (Cognition) moderate deficit;at risk behavior observed;problem-solving;safety precautions follow-through/compliance   Cognitive Status Comments PT: Pt noted to have apraxia and difficulty problem solving transfers. intermittent L sided inattention   Pain Assessment   Patient Currently in Pain No   Integumentary/Edema   Integumentary/Edema Comments small bruises over body (cheek, shoulder) from recent falls   Posture    Posture Forward head position   Range of Motion (ROM)   ROM Comment to neutral in ankle DF, otherwise grossly WFL   Strength Comprehensive (MMT)   Comment, General Manual Muscle Testing (MMT) Assessment diminished L  strength but otherwise grossly 4/5 BUEs. RLE 5/5. LLE MMT impacted by apraxia    MMT: Hip, Rehab Eval   Hip Flexion - Left Side (3-/5) fair minus, left  (limited by apraxia)   MMT: Knee, Rehab Eval   Knee Flexion - Left Side (3/5) fair, left   Knee Extension - Left Side (4/5) good, left   MMT: Ankle, Rehab Eval   Ankle  Dorsiflexion - Left Side (4/5) good, left   ARC Assessment Only   Acute Rehab Functional Assessment See IP Rehab Daily Documentation Flowsheet for Functional Mobility/ADL Assessment   Gait/Stairs (Locomotion)   Comment (Gait/Stairs) mild L trunk lean, decreased toe clearance LLE.    Balance   Balance Comments SBA for static sitting balance, CGA-Anthony needed to static standing and CGA-modA for dynamic standing balance/ gait w/ FWW   Sensory Examination   Sensory Perception Comments intact light touch assessment BLEs. baseline neuropathy BLEs   Coordination   Coordination Comments intact proprioception L ankle, Impaired alternating movements, does not initiate toe tapping on LLE and dysmetria, dysdiadokokinesia noted with LUE in alternating UE task   Muscle Tone   Muscle Tone Comments appears WFL   Clinical Impression   Criteria for Skilled Therapeutic Intervention yes, treatment indicated   PT Diagnosis (PT) impaired functional mobility   Influenced by the following impairments cognition, apraxia, activity tolerance, functional strength, functional balance   Functional limitations due to impairments decreased (I) w/ transfers, gait. decreased safety with ADLs   Clinical Presentation Evolving/Changing   Clinical Presentation Rationale current status, clinical judgement, [facility] and home set up, social support   Clinical Decision Making (Complexity) moderate complexity   Therapy Frequency (PT) Daily  (60min)   Predicted Duration of Therapy Intervention (days/wks) 18 days   Planned Therapy Interventions (PT) balance training;bed mobility training;gait training;home exercise program;neuromuscular re-education;patient/family education;ROM (range of motion);stair training;strengthening;stretching;transfer training   Anticipated Equipment Needs at Discharge (PT)   (has FWW)   Risk & Benefits of therapy have been explained evaluation/treatment results reviewed;care plan/treatment goals reviewed;risks/benefits  reviewed;current/potential barriers reviewed;participants voiced agreement with care plan;participants included;patient   Clinical Impression Comments PT: Pt presents with fair LLE strength (grossly 4/5) but impaired functional strength, balance and coordination 2/2 cognitive deficits, L sided apraxia and inattention. Will benefit from skilled PT to progress towards safe discharge.    Total Evaluation Time   Total Evaluation Time (Minutes) 20

## 2021-02-06 NOTE — PLAN OF CARE
Timed Up and Go (TUG): TUG is a test of basic mobility skills. It is used to screen individuals prone to falls.  Gait assistive device used: FWW     Patient score 43 seconds  ?13.5 seconds indicate at risk for falls in older adults according to Alvaro et al 2000.  ?30 seconds - indicates dependency in most ADL and mobility skills according to Carine & Lino 1991  >11.5 seconds indicate at risk for falls in adults with Parkinson's Disease    Minimal Detectable Change for patients with Alzheimer s = 4.09 sec   Minimal Detectable Change for patients with Parkinson s Disease = 3.5 sec   according to Doris & Julio Cesar Langley 2011    Assessment (rationale for performing, application to patient s function & care plan): To assess falls risk and gait speed  (Minutes billed as physical performance test) 8    Please see rehab flowsheet for details.

## 2021-02-06 NOTE — PLAN OF CARE
FOCUS/GOAL  Bladder management, Pain management, Mobility, and Safety management    ASSESSMENT, INTERVENTIONS AND CONTINUING PLAN FOR GOAL:  Patient is alert and oriented x 4. Able to make her needs known. Left side is weak. Hard of hearing and wears bilateral hearing aids. Denied pain. Bruises noted on back of head, upper back and left side of face. Transfers with maximum assist of 1 using gait belt and walker. Was both continent and incontinent of bladder. Uses the bedside commode.  Last bowel movement was yesterday per patient report. Bed alarm in place.

## 2021-02-06 NOTE — PROGRESS NOTES
"  [facility]   Acute Rehabilitation Unit  Daily progress note    INTERVAL HISTORY  Jamee Douglas was seen during her PT session today. She was doing well. Denied any pain or discomfort. She was pleased with her progress and happy that we have therapies on Sunday too.     K wnl today.  BP at goal.         MEDICATIONS    aspirin  81 mg Oral Daily     atorvastatin  40 mg Oral QPM     cholecalciferol  400 Units Oral Daily     dorzolamide-timolol  1 drop Right Eye BID     multivitamin w/minerals  1 tablet Oral Daily     potassium chloride  20 mEq Oral Daily     travoprost KORINA FREE  1 drop Both Eyes At Bedtime        acetaminophen, ondansetron, polyethylene glycol, senna-docusate     PHYSICAL EXAM  /64 (BP Location: Right arm)   Pulse 99   Temp 97.6  F (36.4  C) (Oral)   Resp 16   Ht 1.6 m (5' 3\")   Wt 61.6 kg (135 lb 14.4 oz)   LMP 01/01/1970   SpO2 94%   BMI 24.07 kg/m      Gen: NAD, pleasant   Pulm: non-labored in room air   Ext: no edema in bilateral lower extremities    Neuro/MSK: left hemiparesis and incoordination       ASSESSMENT AND PLAN  Jamee Douglas is an 88-year-old R-hand dominant female who was admitted to the hospital on 1/29/2021 following a fall resulting in SAH and SDH. She has h/o multiple strokes in Dec 2020 and suffered from another stroke after this admission on 2/1. Admitted to ARU 2/4.    --Vitals stable. Labs: K wnl.   --Continue ongoing medical management. No change in meds; BP at goal.   --Continue therapies and plan of care. Participating well. Requires min to mod A for transfers and most ADLs.     Melody Haywood MD  Physical Medicine & Rehabilitation     Time Spent on this Encounter   I spent a total of 20 minutes face to face and coordinating care of Jamee Douglas.  Over 50% of my time on the unit was spent counseling the patient and /or coordinating care; see note for details.          "

## 2021-02-06 NOTE — PLAN OF CARE
FOCUS/GOAL  Bladder management    ASSESSMENT, INTERVENTIONS AND CONTINUING PLAN FOR GOAL:  Patient A&O x4. Assist of 1 w/ walker. Continent and incontinent of bladder. No complaints of pain. K+ level at 3.4, scheduled K+ still given. Pt asked to be switched over to K+ tablets instead of powder packet. Provider notified and order changed. Continue w/ plan of care.      Refill - birth control - send to her new mail order pharmacy. Britt will call back with the name of the pharmacy.

## 2021-02-06 NOTE — PLAN OF CARE
FOCUS/GOAL  Bladder management, Pain management, Mobility, Cognition/Memory/Judgment/Problem solving, and Safety management    ASSESSMENT, INTERVENTIONS AND CONTINUING PLAN FOR GOAL:  Pt is alert and oriented. Continent of bladder using toilet x 1, incontinent of bladder x 1. Denied pain or discomfort overnight. Pt appeared to be sleeping well during rounds. Uses call light appropriately, able to make needs known. Bed alarm on for safety. Will continue with POC.

## 2021-02-06 NOTE — PLAN OF CARE
Discharge Planner Post-Acute Rehab OT:      Discharge Plan: pt. reporting that she may discharge to dtr.s home initially, but would eventually like to find her own place.     Precautions: fall, seizure     Current Status:  ADLs: min A with UB /LB dressing, Anthony with transf.s/mobility using FWW and cues for safety/sequencing task.  IADLs: not yet tested  Vision/Cognition: continue to assess     Assessment: Pt. Completed full shower.  See flowsheet for details.     Other Barriers to Discharge (DME, Family Training, etc): TBD

## 2021-02-06 NOTE — PLAN OF CARE
Discharge Planner Post-Acute Rehab PT:      Discharge Plan: daughter's house versus new [facility] facility,  PT     Precautions: falls     Current Status:  Bed Mobility: Anthony for LEs sit<>supine   Transfer: CGA-Anthony sit<>stands to FWW; benefits from cues in set up for stand. Pt cued to fully align self with chair prior to sit and instructed to wait for cues prior to initiating sit.   Gait: grossly Anthony w/ FWW, difficulty problem solving turns and navigating tight spaces. Focused on making safe turns with gait today-some improvement noted. 30 ft x 3 with FWW and CGA.  Stairs: NT  Balance: SBA sitting balance, CGA-modA for dynamic balance, variable.      Assessment:  Pt presents with fair LLE strength (grossly 4/5) but impaired functional strength, balance and coordination 2/2 cognitive deficits, L sided apraxia and inattention. Pt noted to be variable with level of A, is CGA-Anthony for forward ambulation w/ FWW but needs step by step cues for turns and problem solving approach to sit, needing up to modA for LOB 2/2 sitting prior to safe positioning.    2/6/2021-TUG 43 seconds, high falls risk. Focus today was on improving safety with turns during gait as well as forced use of LLE and strengthening thereof. Pt participates well and is motivated.     Other Barriers to Discharge (DME, Family Training, etc): Has FWW, will need family training.

## 2021-02-07 ENCOUNTER — APPOINTMENT (OUTPATIENT)
Dept: PHYSICAL THERAPY | Facility: CLINIC | Age: 86
End: 2021-02-07
Payer: COMMERCIAL

## 2021-02-07 ENCOUNTER — APPOINTMENT (OUTPATIENT)
Dept: SPEECH THERAPY | Facility: CLINIC | Age: 86
End: 2021-02-07
Payer: COMMERCIAL

## 2021-02-07 ENCOUNTER — APPOINTMENT (OUTPATIENT)
Dept: OCCUPATIONAL THERAPY | Facility: CLINIC | Age: 86
End: 2021-02-07
Payer: COMMERCIAL

## 2021-02-07 PROCEDURE — 97116 GAIT TRAINING THERAPY: CPT | Mod: GP

## 2021-02-07 PROCEDURE — 128N000003 HC R&B REHAB

## 2021-02-07 PROCEDURE — 97130 THER IVNTJ EA ADDL 15 MIN: CPT | Performed by: SPEECH-LANGUAGE PATHOLOGIST

## 2021-02-07 PROCEDURE — 97530 THERAPEUTIC ACTIVITIES: CPT | Mod: GP

## 2021-02-07 PROCEDURE — 97112 NEUROMUSCULAR REEDUCATION: CPT | Mod: GP

## 2021-02-07 PROCEDURE — 97112 NEUROMUSCULAR REEDUCATION: CPT | Mod: GO

## 2021-02-07 PROCEDURE — 97750 PHYSICAL PERFORMANCE TEST: CPT | Mod: GP

## 2021-02-07 PROCEDURE — 97129 THER IVNTJ 1ST 15 MIN: CPT | Performed by: SPEECH-LANGUAGE PATHOLOGIST

## 2021-02-07 PROCEDURE — 97530 THERAPEUTIC ACTIVITIES: CPT | Mod: GO

## 2021-02-07 PROCEDURE — 99231 SBSQ HOSP IP/OBS SF/LOW 25: CPT | Performed by: PHYSICAL MEDICINE & REHABILITATION

## 2021-02-07 PROCEDURE — 250N000013 HC RX MED GY IP 250 OP 250 PS 637: Performed by: PHYSICIAN ASSISTANT

## 2021-02-07 PROCEDURE — 250N000013 HC RX MED GY IP 250 OP 250 PS 637: Performed by: PHYSICAL MEDICINE & REHABILITATION

## 2021-02-07 RX ORDER — BISACODYL 10 MG
10 SUPPOSITORY, RECTAL RECTAL DAILY
Status: DISCONTINUED | OUTPATIENT
Start: 2021-02-07 | End: 2021-02-07

## 2021-02-07 RX ORDER — BISACODYL 10 MG
10 SUPPOSITORY, RECTAL RECTAL DAILY PRN
Status: DISCONTINUED | OUTPATIENT
Start: 2021-02-07 | End: 2021-02-09

## 2021-02-07 RX ADMIN — CHOLECALCIFEROL TAB 10 MCG (400 UNIT) 400 UNITS: 10 TAB at 08:37

## 2021-02-07 RX ADMIN — TRAVOPROST 1 DROP: 0.04 SOLUTION/ DROPS OPHTHALMIC at 20:51

## 2021-02-07 RX ADMIN — ATORVASTATIN CALCIUM 40 MG: 40 TABLET, FILM COATED ORAL at 20:46

## 2021-02-07 RX ADMIN — MULTIPLE VITAMINS W/ MINERALS TAB 1 TABLET: TAB at 08:37

## 2021-02-07 RX ADMIN — DORZOLAMIDE HYDROCHLORIDE AND TIMOLOL MALEATE 1 DROP: 20; 5 SOLUTION/ DROPS OPHTHALMIC at 20:46

## 2021-02-07 RX ADMIN — POTASSIUM CHLORIDE 20 MEQ: 750 TABLET, EXTENDED RELEASE ORAL at 08:37

## 2021-02-07 RX ADMIN — DORZOLAMIDE HYDROCHLORIDE AND TIMOLOL MALEATE 1 DROP: 20; 5 SOLUTION/ DROPS OPHTHALMIC at 08:41

## 2021-02-07 RX ADMIN — ASPIRIN 81 MG: 81 TABLET, COATED ORAL at 08:37

## 2021-02-07 NOTE — PLAN OF CARE
Discharge Planner Post-Acute Rehab PT:      Discharge Plan: daughter's house versus new [facility] facility,  PT     Precautions: falls     Current Status:  Bed Mobility: Anthony for LEs sit<>supine   Transfer: CGA-Anthony sit<>stands to FWW; benefits from cues in set up for stand. Pt cued to fully align self with chair prior to sit and instructed to wait for cues prior to initiating sit.   Gait: grossly Anthony w/ FWW, difficulty problem solving turns and navigating tight spaces. Focused on improving LLE foot shuffling and heel strike. Pt demonstrates inconsistent performance, at times appearing safe, other times quite unsteady.  Stairs: NT  Balance: SBA sitting balance, CGA-modA for dynamic balance, variable.      Assessment:  Pt presents with fair LLE strength (grossly 4/5) but impaired functional strength, balance and coordination 2/2 cognitive deficits, L sided apraxia and inattention. Pt noted to be variable with level of A, is CGA-Anthony for forward ambulation w/ FWW but needs step by step cues for turns and problem solving approach to sit, needing up to modA for LOB 2/2 sitting prior to safe positioning.    2/7/2021: Cunningham 19/56, high falls risk. Initiated balance training with static standing-loses balance with minimal challenges. Worked on improving heel strike with gait as well as safer turns and reducing shuffling. Spoke with daughter, who feels that pt would be unsafe to go to an long term with minimal services at her current level of function.     Other Barriers to Discharge (DME, Family Training, etc): Has FWW, will need family training, high falls risk

## 2021-02-07 NOTE — PLAN OF CARE
FOCUS/GOAL  Bowel management, Bladder management, Pain management, Mobility, Cognition/Memory/Judgment/Problem solving, and Safety management    ASSESSMENT, INTERVENTIONS AND CONTINUING PLAN FOR GOAL:  Pt is alert and oriented. Continent of bladder, using BSC this shift. Continent and incontinent of bowel this shift. Up with assist of 1 with walker. Denied pain or discomfort overnight. Appeared to be sleeping well during rounds. Uses call light appropriately, able to make needs known. Bed alarm on for safety. Will continue with POC.

## 2021-02-07 NOTE — PLAN OF CARE
FOCUS/GOAL  Bowel management, Bladder management, Pain management, and Mobility    ASSESSMENT, INTERVENTIONS AND CONTINUING PLAN FOR GOAL:  Patient is alert and oriented x 4. Uses her call light appropriately. Denied pain. Transfers with assist of 1 using gait belt and walker. Uses the toilet in the bathroom. Was both continent and incontinent of bladder. Last bowel movement was 2/4. PRN Senna given. Still no result yet. Bed alarm in place.

## 2021-02-07 NOTE — PROGRESS NOTES
"  [facility]   Acute Rehabilitation Unit  Daily progress note    INTERVAL HISTORY  Jamee Douglas was seen in her room. She was doing well. Slept ok last night. No pain or discomfort. No new symptoms. Discussed intermittent B/B incontinence; it seems like it's mostly urge incontinence. Reviewed the plan as outlined below and she was willing to try bowel program. Will discuss with nursing.         MEDICATIONS    aspirin  81 mg Oral Daily     atorvastatin  40 mg Oral QPM     cholecalciferol  400 Units Oral Daily     dorzolamide-timolol  1 drop Right Eye BID     multivitamin w/minerals  1 tablet Oral Daily     potassium chloride  20 mEq Oral Daily     travoprost KORINA FREE  1 drop Both Eyes At Bedtime        acetaminophen, bisacodyl, ondansetron, polyethylene glycol, senna-docusate     PHYSICAL EXAM  /63 (BP Location: Left arm)   Pulse 89   Temp 96.5  F (35.8  C) (Oral)   Resp 20   Ht 1.6 m (5' 3\")   Wt 61.6 kg (135 lb 14.4 oz)   LMP 01/01/1970   SpO2 98%   BMI 24.07 kg/m      Gen: NAD, pleasant   Pulm: non-labored in room air   Ext: no edema in bilateral lower extremities    Neuro/MSK: was deferred for conversation       ASSESSMENT AND PLAN  Jamee Douglas is an 88-year-old R-hand dominant female who was admitted to the hospital on 1/29/2021 following a fall resulting in SAH and SDH. She has h/o multiple strokes in Dec 2020 and suffered from another stroke after this admission on 2/1. Admitted to ARU 2/4.    --Vitals stable - BP at goal - SBP < 160. No labs today.   --Continue ongoing medical management.    -ordered timed toileting q2-3 hours during the day to help with urge incontinent episode. Will encourage fluids during the day and minimize fluids after supper.    -also started bowel program with daily supp after dinner. May need only a few days and should improve.     --Continue therapies and plan of care. Participating well. Requires min to mod A for " transfers and most ADLs; CGA for walking. Scored 19/56 per Cunningham.     Melody Haywood MD  Physical Medicine & Rehabilitation     Time Spent on this Encounter   I spent a total of 20 minutes face to face and coordinating care of Jamee Douglas.  Over 50% of my time on the unit was spent counseling the patient and /or coordinating care; see note for details.

## 2021-02-07 NOTE — PLAN OF CARE
FOCUS/GOAL  Bladder management    ASSESSMENT, INTERVENTIONS AND CONTINUING PLAN FOR GOAL:  Patient A&O x4. Assist of 1 w/ walker. No complaints of pain. Incontinent of bladder. LBM 2/7. Continue w/ plan of care.

## 2021-02-07 NOTE — PLAN OF CARE
Discharge Planner Post-Acute Rehab OT:      Discharge Plan: pt. reporting that she may discharge to dtr.s home initially, but would eventually like to find her own place.     Precautions: fall, seizure, chair alarm     Current Status:  ADLs: min A with UB /LB dressing, Anthony with transf.s/mobility using FWW and cues for safety/sequencing task.  IADLs: not yet tested  Vision/Cognition: continue to assess; visual scanning is WFL, no significant concerns, but initially was scanning L to R, then R to L vs solely L to R in order to read.     Assessment: Recomm further vision assessment, has difficulty seeing print if not large and bold.  See flowsheet for details.     Other Barriers to Discharge (DME, Family Training, etc): TBD

## 2021-02-07 NOTE — PROGRESS NOTES
02/07/21 0900   Signing Clinician's Name / Credentials   Signing clinician's name / credentials Casimiro Box, PT   Deluna Balance Scale (DELUNA NANCY, ANGEL S, MITCHELL MARTIN, CINDY DELA CRUZ: MEASURING BALANCE IN THE ELDERLY: VALIDATION OF AN INSTRUMENT. CAN. J. PUB. HEALTH, JULY/AUGUST SUPPLEMENT 2:S7-11, 1992.)   Sit To Stand 3   Standing Unsupported 2   Sitting Unsupported 4   Stand to Sit 2   Transfers 1   Standing with Eyes Closed 2   Standing Unsupported, Feet Together 0   Reach Forward With Outstretched Arm 2   Retrieve Object From Floor 1   Turning to Look Behind 2   Turn 360 Degrees 0   Placing Alternate Foot on Stool (4-6 inches) 0   Unsupported Tandem Stand (Demonstrate to Subject) 0   One Leg Stand 0   Total Score (A score of 45 or less has been correlated with an increased risk of falls)   Total Score (out of 56) 19     Deluna performed to assess falls risk and guide plan of care regarding balance interventions.  Pt very high falls risk, required significant assist and guarding to prevent LOBs.  Pt educated on result and POC.

## 2021-02-07 NOTE — PLAN OF CARE
Discharge Planner Post-Acute Rehab SLP:      Discharge Plan: likely HH sptx     Precautions: mild to mod cognition      Current Status:  Communication: language skills intact  Cognition: mild  to moderate  impairments in ST memory and problem solving and rasoning   Swallow: regular diet and thin lqiuids     Assessment: SLP: worked on writing/choosing meal items, noting slight superimposed writing, but improved with sentence generation/larger area to write.  Noting left inattention at times with visual tasks. Mild difficulty with memory task/retention pictured items. Needed only min cues for basic written reasoning.  Other Barriers to Discharge (Family Training, etc): TBD

## 2021-02-08 ENCOUNTER — APPOINTMENT (OUTPATIENT)
Dept: OCCUPATIONAL THERAPY | Facility: CLINIC | Age: 86
End: 2021-02-08
Payer: COMMERCIAL

## 2021-02-08 ENCOUNTER — APPOINTMENT (OUTPATIENT)
Dept: SPEECH THERAPY | Facility: CLINIC | Age: 86
End: 2021-02-08
Payer: COMMERCIAL

## 2021-02-08 ENCOUNTER — APPOINTMENT (OUTPATIENT)
Dept: PHYSICAL THERAPY | Facility: CLINIC | Age: 86
End: 2021-02-08
Payer: COMMERCIAL

## 2021-02-08 LAB
ANION GAP SERPL CALCULATED.3IONS-SCNC: 2 MMOL/L (ref 3–14)
BASOPHILS # BLD AUTO: 0.1 10E9/L (ref 0–0.2)
BASOPHILS NFR BLD AUTO: 1.5 %
BUN SERPL-MCNC: 10 MG/DL (ref 7–30)
CALCIUM SERPL-MCNC: 8.8 MG/DL (ref 8.5–10.1)
CHLORIDE SERPL-SCNC: 110 MMOL/L (ref 94–109)
CO2 SERPL-SCNC: 29 MMOL/L (ref 20–32)
CREAT SERPL-MCNC: 0.62 MG/DL (ref 0.52–1.04)
DIFFERENTIAL METHOD BLD: ABNORMAL
EOSINOPHIL # BLD AUTO: 0.3 10E9/L (ref 0–0.7)
EOSINOPHIL NFR BLD AUTO: 4.4 %
ERYTHROCYTE [DISTWIDTH] IN BLOOD BY AUTOMATED COUNT: 14.3 % (ref 10–15)
GFR SERPL CREATININE-BSD FRML MDRD: 80 ML/MIN/{1.73_M2}
GLUCOSE SERPL-MCNC: 101 MG/DL (ref 70–99)
HCT VFR BLD AUTO: 35.8 % (ref 35–47)
HGB BLD-MCNC: 11.5 G/DL (ref 11.7–15.7)
IMM GRANULOCYTES # BLD: 0 10E9/L (ref 0–0.4)
IMM GRANULOCYTES NFR BLD: 0.4 %
LYMPHOCYTES # BLD AUTO: 2 10E9/L (ref 0.8–5.3)
LYMPHOCYTES NFR BLD AUTO: 27.2 %
MCH RBC QN AUTO: 29.6 PG (ref 26.5–33)
MCHC RBC AUTO-ENTMCNC: 32.1 G/DL (ref 31.5–36.5)
MCV RBC AUTO: 92 FL (ref 78–100)
MONOCYTES # BLD AUTO: 0.7 10E9/L (ref 0–1.3)
MONOCYTES NFR BLD AUTO: 9.6 %
NEUTROPHILS # BLD AUTO: 4.2 10E9/L (ref 1.6–8.3)
NEUTROPHILS NFR BLD AUTO: 56.9 %
NRBC # BLD AUTO: 0 10*3/UL
NRBC BLD AUTO-RTO: 0 /100
PLATELET # BLD AUTO: 304 10E9/L (ref 150–450)
POTASSIUM SERPL-SCNC: 3.7 MMOL/L (ref 3.4–5.3)
RBC # BLD AUTO: 3.89 10E12/L (ref 3.8–5.2)
SODIUM SERPL-SCNC: 141 MMOL/L (ref 133–144)
WBC # BLD AUTO: 7.4 10E9/L (ref 4–11)

## 2021-02-08 PROCEDURE — 99232 SBSQ HOSP IP/OBS MODERATE 35: CPT | Mod: GC | Performed by: PHYSICAL MEDICINE & REHABILITATION

## 2021-02-08 PROCEDURE — 97130 THER IVNTJ EA ADDL 15 MIN: CPT | Performed by: SPEECH-LANGUAGE PATHOLOGIST

## 2021-02-08 PROCEDURE — 80048 BASIC METABOLIC PNL TOTAL CA: CPT | Performed by: PHYSICIAN ASSISTANT

## 2021-02-08 PROCEDURE — 97116 GAIT TRAINING THERAPY: CPT | Mod: GP

## 2021-02-08 PROCEDURE — 97129 THER IVNTJ 1ST 15 MIN: CPT | Performed by: SPEECH-LANGUAGE PATHOLOGIST

## 2021-02-08 PROCEDURE — 97130 THER IVNTJ EA ADDL 15 MIN: CPT

## 2021-02-08 PROCEDURE — 97112 NEUROMUSCULAR REEDUCATION: CPT | Mod: GP

## 2021-02-08 PROCEDURE — 97110 THERAPEUTIC EXERCISES: CPT | Mod: GP

## 2021-02-08 PROCEDURE — 85025 COMPLETE CBC W/AUTO DIFF WBC: CPT | Performed by: PHYSICIAN ASSISTANT

## 2021-02-08 PROCEDURE — 128N000003 HC R&B REHAB

## 2021-02-08 PROCEDURE — 97129 THER IVNTJ 1ST 15 MIN: CPT

## 2021-02-08 PROCEDURE — 250N000013 HC RX MED GY IP 250 OP 250 PS 637: Performed by: PHYSICAL MEDICINE & REHABILITATION

## 2021-02-08 PROCEDURE — 250N000013 HC RX MED GY IP 250 OP 250 PS 637: Performed by: PHYSICIAN ASSISTANT

## 2021-02-08 PROCEDURE — 36415 COLL VENOUS BLD VENIPUNCTURE: CPT | Performed by: PHYSICIAN ASSISTANT

## 2021-02-08 PROCEDURE — 97535 SELF CARE MNGMENT TRAINING: CPT | Mod: GO | Performed by: OCCUPATIONAL THERAPIST

## 2021-02-08 RX ADMIN — POTASSIUM CHLORIDE 20 MEQ: 750 TABLET, EXTENDED RELEASE ORAL at 08:28

## 2021-02-08 RX ADMIN — DORZOLAMIDE HYDROCHLORIDE AND TIMOLOL MALEATE 1 DROP: 20; 5 SOLUTION/ DROPS OPHTHALMIC at 19:58

## 2021-02-08 RX ADMIN — ASPIRIN 81 MG: 81 TABLET, COATED ORAL at 08:27

## 2021-02-08 RX ADMIN — MULTIPLE VITAMINS W/ MINERALS TAB 1 TABLET: TAB at 08:28

## 2021-02-08 RX ADMIN — BISACODYL 10 MG: 10 SUPPOSITORY RECTAL at 18:57

## 2021-02-08 RX ADMIN — TRAVOPROST 1 DROP: 0.04 SOLUTION/ DROPS OPHTHALMIC at 19:58

## 2021-02-08 RX ADMIN — CHOLECALCIFEROL TAB 10 MCG (400 UNIT) 400 UNITS: 10 TAB at 08:28

## 2021-02-08 RX ADMIN — DORZOLAMIDE HYDROCHLORIDE AND TIMOLOL MALEATE 1 DROP: 20; 5 SOLUTION/ DROPS OPHTHALMIC at 08:28

## 2021-02-08 RX ADMIN — ATORVASTATIN CALCIUM 40 MG: 40 TABLET, FILM COATED ORAL at 19:58

## 2021-02-08 NOTE — PLAN OF CARE
Sleeping well throughout the night, up x3 to the commode. Using call light appropriately. Bed alarm on for safety. A&O x4. Denies SOB/chest pain/dizziness/headache. Skin intact except scattered bruising and scabs. Zio patch on L chest. Denies pain. Denies numbness/tingling. Transfers with 1-2A with walker and gait belt, unsteady pivot transfers to commode for bathroom. Regular diet, thin liquid, denies N/V. Calling for bathroom, can be incontinent for bladder due to urgency. Usually continent of bowel per report, LBM 2/7, denies constipation or diarrhea.

## 2021-02-08 NOTE — PLAN OF CARE
FOCUS/GOAL  Bladder management, Pain management, and Mobility    ASSESSMENT, INTERVENTIONS AND CONTINUING PLAN FOR GOAL:  Patient is alert and oriented x 4. Speech is clear but soft-spoken. Hard of hearing and has bilateral hearing aids. Able to make her needs known. Denied pain. Transfers with assist of 1 using gait belt and walker. Can be both continent and incontinent of bladder. Last bowel movement was today.

## 2021-02-08 NOTE — PLAN OF CARE
Discharge Planner Post-Acute Rehab PT:      Discharge Plan: daughter's house versus new [facility] facility,  PT     Precautions: falls     Current Status:  Bed Mobility: Anthony for LEs sit<>supine   Transfer: CGA with FWW, better turning awareness   Gait: CGA with FWW, good when attentive to task, but very distractible in enviornment  Stairs: NT - not a goal for pt.  Balance: 19/56 Cunningham 2/7/21     Assessment:  Focusing on LLE stance/strength. Gait/turns progressing well when pt attentive to task, however can lose focus easily.     Other Barriers to Discharge (DME, Family Training, etc): Has FWW, will need family training, high falls risk

## 2021-02-08 NOTE — PLAN OF CARE
Pt alert and oriented x4. VSS. [place], wears hearing aids. No complaints of shortness of breath or chest pain. Denied pain. Left side weakness. Up with one assist, gait belt and walker. Regular diet, thin liquids. Takes pills whole, a couple at a time. Incontinent at times. (dribbles) voiding spontaneously in the bathroom. LBM:  2/7. Zio patch on (L) chest. Baseline neuropathy of lower extremities (feet). Pt able to make needs known. Will continue with plan of care.

## 2021-02-08 NOTE — PROGRESS NOTES
"  [facility]   Acute Rehabilitation Unit  Daily progress note    INTERVAL HISTORY  Weekend therapy and progress notes reviewed with no acute events noted.  Bowel program initiated for constipation and timed toileting for urge incontinence.  Jamee Douglas was seen and examined today sitting in chair in her room.  She reports a good weekend overall and denies any concerns this morning.  She feels she is making good progress with therapy, and is most concerned with ongoing left leg weakness. She reports that \"sometimes that foot does what she wants, and sometimes it won't\".  She denies any pain, including in her left shoulder.  She also denies shortness of breath, fever, nausea, bowel or bladder concerns.  AM labs reviewed and demonstrating stable mild anemia and improving K on supplementation.    Functionally, she scored 19/56 on DELUNA demonstrating very high fall risk.  She requires min assist with upper and lower body dressing.  SLP noting left inattention at times with visual tasks, mild to moderate impairments in short-term memory, problem-solving, and reasoning.    MEDICATIONS    aspirin  81 mg Oral Daily     atorvastatin  40 mg Oral QPM     cholecalciferol  400 Units Oral Daily     dorzolamide-timolol  1 drop Right Eye BID     multivitamin w/minerals  1 tablet Oral Daily     potassium chloride  20 mEq Oral Daily     travoprost KORINA FREE  1 drop Both Eyes At Bedtime        acetaminophen, bisacodyl, ondansetron, polyethylene glycol, senna-docusate     PHYSICAL EXAM  /56 (BP Location: Left arm)   Pulse 99   Temp 97.5  F (36.4  C) (Oral)   Resp 16   Ht 1.6 m (5' 3\")   Wt 61.6 kg (135 lb 14.4 oz)   LMP 01/01/1970   SpO2 96%   BMI 24.07 kg/m    Gen: alert, pleasant, sitting upright in her chair, no acute distress  HEENT: R eye cloudy, NCAT, MMM  Cardio: RRR, no murmurs  Pulm: CTA bilaterally, non-labored breathing on room air  Ext: warm, well-perfused, no calf " tenderness bilaterally, trace LLE edema  Neuro/MSK: AAO x3, left hemiparesis, weaker in LLE (HF 4, KE 3, KF 3, DF 3) than LUE.    Speech clear/fluent, responds appropriately, follows commands  Skin: improving ecchymoses on L side of face over cheek and jaw    LABS  Lab Results   Component Value Date    WBC 7.4 02/08/2021     Lab Results   Component Value Date    RBC 3.89 02/08/2021     Lab Results   Component Value Date    HGB 11.5 02/08/2021     Lab Results   Component Value Date    HCT 35.8 02/08/2021     Lab Results   Component Value Date    MCV 92 02/08/2021     Lab Results   Component Value Date    MCH 29.6 02/08/2021     Lab Results   Component Value Date    MCHC 32.1 02/08/2021     Lab Results   Component Value Date    RDW 14.3 02/08/2021     Lab Results   Component Value Date     02/08/2021     Last Comprehensive Metabolic Panel:  Sodium   Date Value Ref Range Status   02/08/2021 141 133 - 144 mmol/L Final     Potassium   Date Value Ref Range Status   02/08/2021 3.7 3.4 - 5.3 mmol/L Final     Chloride   Date Value Ref Range Status   02/08/2021 110 (H) 94 - 109 mmol/L Final     Carbon Dioxide   Date Value Ref Range Status   02/08/2021 29 20 - 32 mmol/L Final     Anion Gap   Date Value Ref Range Status   02/08/2021 2 (L) 3 - 14 mmol/L Final     Glucose   Date Value Ref Range Status   02/08/2021 101 (H) 70 - 99 mg/dL Final     Urea Nitrogen   Date Value Ref Range Status   02/08/2021 10 7 - 30 mg/dL Final     Creatinine   Date Value Ref Range Status   02/08/2021 0.62 0.52 - 1.04 mg/dL Final     GFR Estimate   Date Value Ref Range Status   02/08/2021 80 >60 mL/min/[1.73_m2] Final     Comment:     Non  GFR Calc  Starting 12/18/2018, serum creatinine based estimated GFR (eGFR) will be   calculated using the Chronic Kidney Disease Epidemiology Collaboration   (CKD-EPI) equation.       Calcium   Date Value Ref Range Status   02/08/2021 8.8 8.5 - 10.1 mg/dL Final         Rehabilitation -  continue comprehensive acute inpatient rehabilitation program with multidisciplinary approach including therapies, rehab nursing, and physiatry following. See interval history for updates.      ASSESSMENT AND PLAN  Jamee Douglas is an 88-year-old R-hand dominant female with hx of multiple R ROBBY ischemic strokes in 12/2020 and fall with SAH 1/5/2021 who was admitted on 1/29/2021 after another fall in which she sustained several intracranial hemorrhages, primarily in the subarachnoid spaces with a small right falx subdural hematoma. Hospital course was complicated by HTN, and an acute increase in L-sided weakness on 2/1 with new R ROBBY subacute infarcts on imaging as well as multiple smaller infarcts.  She was admitted to acute rehab on 2/4/2021 for rehabilitation and ongoing medical management.     Acute ischemic stroke, right ROBBY, felt due to symptomatic intracranial atherosclerosis vs. possible atheroembolic event from aortic arch plaque   Hx recent acute ischemic stroke, right ROBBY in 12/2020 with residual left-sided weakness.  Previously on DAPT with ASA 81 mg and Plavix, though Plavix discontinued 1/5 given SAH.  Developed new dense hemiparesis on left with some speech difficulties on 2/1 in the AM. Stroke code called.  MRI brain showed R ROBBY infarct, with multiple areas of smaller infarcts, and unchanged bilateral subdural hematomas and areas of scattered SAH.  CTA head/neck demonstrating R ROBBY occlusion, L ICA 50-60% stenosis, R ICA with linear filling defect.  Echo EF 60%, normal LA size, bubble study negative.  EKG/tele: NSR.  LDL 53 on 12/18/20, A1c 5.7, trop 0.058.  Options limited due to recent strokes/SAH/SDH, location of lesions preclude IR, etc. therefore no TPA or endovascular treatment.  - Continue aspirin 81 mg daily, started per neurology 2/2  - 14 day Ziopatch present at admission to ARU  - SBP goal <160, though per neurology, would not treat more aggressively than this due to risk of  precipitating recurrent stroke symptoms. Per discussion with sending team on admission, no scheduled or PRN BP meds recommended at this time.  - Plan 4 week follow up with Dr. Finnegan at [facility] neurology on discharge.   - PT/OT. Discharged to ARU for ongoing therapies  - Regular diet   - Follow up with neurology, Dr. Brooke Finnegan ([facility]) in 4 weeks    Multiple acute SAH  Acute SDH  Mechanical fall  Presented to ED on 1/29/21 after [facility] at [facility].  CT head showed several acute-appearing areas of intracranial hemorrhage in primarily subarachnoid spaces with small right falx subdural hematoma, no associated mass effect.  Fall felt likely due to ongoing deficits from recent acute strokes in December. Nothing in history to suggest other cause.  PTA ASA held initially upon admission, but resumed as above after recurrent ischemic stroke.  Neurosurgery consulted but felt no operative intervention indicated.  Follow-up CT head on 1/30 with stable scattered foci of subarachnoid hemorrhage.  Per chart review, patient also presented to ED on 1/5/2021 after a fall on 1/4 while at TCU recovering from initial strokes.  CT head demonstrating small new subarachnoid hemorrhage over left frontotemporoparietal region and within the left ambient cistern.  Plavix stopped and discharged back to TCU.  - Continue PT/OT  - Follow up with neurosurgery in 1 month with non-contrast head CT day of appt     Hypertension  Started on diltiazem 120mg daily after recent strokes. Developed some orthostatic symptoms and had some recrudescence of stroke symptoms with exertion. Diltiazem stopped and symptoms resolved. BP elevated during acute hospitalization requiring hydralazine intermittently. Re-challenged with amlodipine during hospital stay, stopped after stroke on 2/1.  Per discussion with sending team on admission to ARU, no scheduled or PRN BP meds recommended at this time, and continue to monitor.  -  SBP goal <160, though per neurology, would not treat more aggressively than this due to risk of precipitating recurrent stroke symptoms.   - As of 2/8, SBP generally 110s-120s, with two readings in the past 48 hours in 150s  - Continue to monitor BP     Glaucoma  Continue prior to admission eye drops    Hypokalemia.  First noted during acute hospitalization, 3.1 on 2/1.  Given 1-time supplementation of 20 mEq and improved to 3/5 on 2/2.  Recurrent drop to 3.2 on 2/5, so started on 20 mEq daily.  - Continue potassium chloride 20 mEq daily  - Repeat BMP 2/8 with K 3.7  - Trend BMP and discontinue supplementation if indicated       1. Adjustment to disability:  Clinical psychology to eval and treat as indicated  2. FEN: Regular diet and Thin liquids  3. Bowel: continent  4. Bladder: continent  5. DVT Prophylaxis: mechanical  6. GI Prophylaxis: none  7. Code: no CPR-Do NOT Intubate  8. Disposition: assisted living  9. ELOS:  18 days  10. Rehab prognosis: good  11. Follow up Appointments on Discharge: Neurology 4 week follow up with Dr. Finnegan at [facility]. Neurosurgery f/u in 1 month with non-contrast head CT day of appt.      Seen and discussed with Dr. Casimiro Saldivar, PM&R staff physician     Rosalie Stokes PA-C  Physical Medicine & Rehabilitation

## 2021-02-08 NOTE — PROGRESS NOTES
Discharge Planner Post-Acute Rehab OT:      Discharge Plan: pt. reporting that she may discharge to dtr's home initially, but would eventually like to find her own place.     Precautions: fall, seizure, alarms, left side inattention and weakness    Current Status:  ADLs: min A with UB / mod A with LB dressing. CGA with G/H tasks standing at EOS with FWW. Min A with functional transfers with FWW  IADLs: not yet tested  Vision/Cognition: continue to assess; visual scanning is WFL, no significant concerns, but initially was scanning L to R, then R to L vs solely L to R in order to read.      Assessment: Pt motivated to participate in OT session. Pt presenting with left side weakness and inattention span. Educated pt on use of AE to increase IND with LBD tasks. Pt requires cues for sequencing and to attend to left side. Recommend OT services to maximize IND and safety with ADLs/IADLs and functional mobility.      Other Barriers to Discharge (DME, Family Training, etc): TBD

## 2021-02-08 NOTE — PROGRESS NOTES
02/08/21 1330   Signing Clinician's Name / Credentials   Signing clinician's name / credentials Jennifer Guaman   Quick Adds   Rehab Discipline SLP   Additional Documentation   SLP Plan SLP: Could work on funcitonal tasks such as meal planning, word problems, making lists, etc. Enjoyed the ipad tasks. Pt has her own ipad and would benefit downloading these activities.    Total Session Time   Total Session Time (minutes) 30 minutes  (cognitive)   SLP - Acute Rehab Center Time   Individual Time (minutes) - enter zero if not applicable - SLP 30   Group Time (minutes) - enter zero if not applicable  - SLP 0   Concurrent Time (minutes) - enter zero if not applicable  - SLP 0   Co-Treatment Time (minutes) - enter zero if not applicable  - SLP 0   ARC Total Session Time (minutes) - SLP 30   Problem Solving   Problem Solving Comment SLP: WJ-R verbl analogies pt scored 87th percentile   Memory   Memory Comment SLP: WJ-R auditory visual learning pt scored 75th precentile

## 2021-02-09 ENCOUNTER — APPOINTMENT (OUTPATIENT)
Dept: OCCUPATIONAL THERAPY | Facility: CLINIC | Age: 86
End: 2021-02-09
Payer: COMMERCIAL

## 2021-02-09 ENCOUNTER — APPOINTMENT (OUTPATIENT)
Dept: PHYSICAL THERAPY | Facility: CLINIC | Age: 86
End: 2021-02-09
Payer: COMMERCIAL

## 2021-02-09 ENCOUNTER — APPOINTMENT (OUTPATIENT)
Dept: SPEECH THERAPY | Facility: CLINIC | Age: 86
End: 2021-02-09
Payer: COMMERCIAL

## 2021-02-09 PROCEDURE — 97535 SELF CARE MNGMENT TRAINING: CPT | Mod: GO | Performed by: OCCUPATIONAL THERAPIST

## 2021-02-09 PROCEDURE — 97130 THER IVNTJ EA ADDL 15 MIN: CPT

## 2021-02-09 PROCEDURE — 128N000003 HC R&B REHAB

## 2021-02-09 PROCEDURE — 97530 THERAPEUTIC ACTIVITIES: CPT | Mod: GO | Performed by: OCCUPATIONAL THERAPIST

## 2021-02-09 PROCEDURE — 97110 THERAPEUTIC EXERCISES: CPT | Mod: GP

## 2021-02-09 PROCEDURE — 99233 SBSQ HOSP IP/OBS HIGH 50: CPT | Performed by: PHYSICAL MEDICINE & REHABILITATION

## 2021-02-09 PROCEDURE — 97112 NEUROMUSCULAR REEDUCATION: CPT | Mod: GP

## 2021-02-09 PROCEDURE — 97530 THERAPEUTIC ACTIVITIES: CPT | Mod: GP

## 2021-02-09 PROCEDURE — 97110 THERAPEUTIC EXERCISES: CPT | Mod: GO | Performed by: OCCUPATIONAL THERAPIST

## 2021-02-09 PROCEDURE — 999N000125 HC STATISTIC PATIENT MED CONFERENCE < 30 MIN

## 2021-02-09 PROCEDURE — 97129 THER IVNTJ 1ST 15 MIN: CPT

## 2021-02-09 PROCEDURE — 999N000125 HC STATISTIC PATIENT MED CONFERENCE < 30 MIN: Performed by: OCCUPATIONAL THERAPIST

## 2021-02-09 PROCEDURE — 250N000013 HC RX MED GY IP 250 OP 250 PS 637: Performed by: PHYSICIAN ASSISTANT

## 2021-02-09 PROCEDURE — 999N000150 HC STATISTIC PT MED CONFERENCE < 30 MIN

## 2021-02-09 PROCEDURE — 250N000013 HC RX MED GY IP 250 OP 250 PS 637: Performed by: PHYSICAL MEDICINE & REHABILITATION

## 2021-02-09 RX ORDER — BISACODYL 10 MG
10 SUPPOSITORY, RECTAL RECTAL DAILY
Status: DISCONTINUED | OUTPATIENT
Start: 2021-02-09 | End: 2021-02-10

## 2021-02-09 RX ADMIN — TRAVOPROST 1 DROP: 0.04 SOLUTION/ DROPS OPHTHALMIC at 20:20

## 2021-02-09 RX ADMIN — ASPIRIN 81 MG: 81 TABLET, COATED ORAL at 08:40

## 2021-02-09 RX ADMIN — DORZOLAMIDE HYDROCHLORIDE AND TIMOLOL MALEATE 1 DROP: 20; 5 SOLUTION/ DROPS OPHTHALMIC at 08:40

## 2021-02-09 RX ADMIN — POTASSIUM CHLORIDE 20 MEQ: 750 TABLET, EXTENDED RELEASE ORAL at 08:40

## 2021-02-09 RX ADMIN — DORZOLAMIDE HYDROCHLORIDE AND TIMOLOL MALEATE 1 DROP: 20; 5 SOLUTION/ DROPS OPHTHALMIC at 20:09

## 2021-02-09 RX ADMIN — ATORVASTATIN CALCIUM 40 MG: 40 TABLET, FILM COATED ORAL at 20:09

## 2021-02-09 RX ADMIN — MULTIPLE VITAMINS W/ MINERALS TAB 1 TABLET: TAB at 08:41

## 2021-02-09 RX ADMIN — CHOLECALCIFEROL TAB 10 MCG (400 UNIT) 400 UNITS: 10 TAB at 08:40

## 2021-02-09 NOTE — PLAN OF CARE
VS: Stable, afebrile   O2: Adequate room air saturations   Output: BRP, continent with some dribbling   Last BM: 2/8/21, X sm BM   Activity: OOB with 1 assist   Skin: bruises   Pain: Denies   CMS: intact   Dressing: None   Diet: Reg with thin liquids   LDA: None   Equipment: Walker, gait belt   Plan: Continue to monitor bladder, bowel, weakness   Additional Info:

## 2021-02-09 NOTE — PROGRESS NOTES
Pt dtr called SWer. Stated that she is looking into the facility listed below. Pt dtr will update SWer with more information if/when needed. Pt dtr also given information for [facility].     [facility] [place] [address]  PH: [phone]     RYAN Santos, [facility] Acute Rehab Unit   Phone: [phone]  I   Pager: [phone]

## 2021-02-09 NOTE — PROGRESS NOTES
"  [facility]   Acute Rehabilitation Unit  Daily progress note    INTERVAL HISTORY  No acute events reported overnight.  Patient seen and examined this AM for team rounds.  Denies chest pain, shortness of breath, no fever or chills.  Is making gains day by day, but still requires support.  Will continue therapy as functional goals still to be made.      Functional  OT:  ADLs: min A with UB / mod A with LB dressing. CGA with G/H tasks standing at EOS with FWW. Min A with functional transfers with FWW  IADLs: not yet tested  Vision/Cognition: continue to assess; visual scanning is WFL, no significant concerns, but initially was scanning L to R, then R to L vs solely L to R        ROS: 10 point ROS neg other than the symptoms noted above in the HPI.        MEDICATIONS    aspirin  81 mg Oral Daily     atorvastatin  40 mg Oral QPM     bisacodyl  10 mg Rectal Daily     cholecalciferol  400 Units Oral Daily     dorzolamide-timolol  1 drop Right Eye BID     multivitamin w/minerals  1 tablet Oral Daily     potassium chloride  20 mEq Oral Daily     travoprost KORINA FREE  1 drop Both Eyes At Bedtime        acetaminophen, ondansetron, polyethylene glycol, senna-docusate     PHYSICAL EXAM  /66 (BP Location: Right arm)   Pulse 79   Temp 97.1  F (36.2  C) (Oral)   Resp 18   Ht 1.6 m (5' 3\")   Wt 61.6 kg (135 lb 14.4 oz)   LMP 01/01/1970   SpO2 96%   BMI 24.07 kg/m    Gen: alert, pleasant, resting in bed, no acute distress  HEENT:  NCAT, MMM  Cardio: RRR, no murmurs  Pulm: CTA bilaterally, non-labored breathing on room air  Ext: warm, well-perfused, no calf tenderness bilaterally, trace LLE edema  Neuro/MSK: AAO x3, left hemiparesis, weaker in LLE (HF 4, KE 3, KF 3, DF 3) than LUE.    Speech clear/fluent, responds appropriately, follows commands  Skin: improving ecchymoses on L side of face over cheek and jaw    LABS  Lab Results   Component Value Date    WBC 7.4 02/08/2021     Lab " Results   Component Value Date    RBC 3.89 02/08/2021     Lab Results   Component Value Date    HGB 11.5 02/08/2021     Lab Results   Component Value Date    HCT 35.8 02/08/2021     Lab Results   Component Value Date    MCV 92 02/08/2021     Lab Results   Component Value Date    MCH 29.6 02/08/2021     Lab Results   Component Value Date    MCHC 32.1 02/08/2021     Lab Results   Component Value Date    RDW 14.3 02/08/2021     Lab Results   Component Value Date     02/08/2021     Last Comprehensive Metabolic Panel:  Sodium   Date Value Ref Range Status   02/08/2021 141 133 - 144 mmol/L Final     Potassium   Date Value Ref Range Status   02/08/2021 3.7 3.4 - 5.3 mmol/L Final     Chloride   Date Value Ref Range Status   02/08/2021 110 (H) 94 - 109 mmol/L Final     Carbon Dioxide   Date Value Ref Range Status   02/08/2021 29 20 - 32 mmol/L Final     Anion Gap   Date Value Ref Range Status   02/08/2021 2 (L) 3 - 14 mmol/L Final     Glucose   Date Value Ref Range Status   02/08/2021 101 (H) 70 - 99 mg/dL Final     Urea Nitrogen   Date Value Ref Range Status   02/08/2021 10 7 - 30 mg/dL Final     Creatinine   Date Value Ref Range Status   02/08/2021 0.62 0.52 - 1.04 mg/dL Final     GFR Estimate   Date Value Ref Range Status   02/08/2021 80 >60 mL/min/[1.73_m2] Final     Comment:     Non  GFR Calc  Starting 12/18/2018, serum creatinine based estimated GFR (eGFR) will be   calculated using the Chronic Kidney Disease Epidemiology Collaboration   (CKD-EPI) equation.       Calcium   Date Value Ref Range Status   02/08/2021 8.8 8.5 - 10.1 mg/dL Final         Rehabilitation - continue comprehensive acute inpatient rehabilitation program with multidisciplinary approach including therapies, rehab nursing, and physiatry following. See interval history for updates.      ASSESSMENT AND PLAN  Jamee Douglas is an 88-year-old R-hand dominant female with hx of multiple R ROBBY ischemic strokes in 12/2020 and fall  with SAH 1/5/2021 who was admitted on 1/29/2021 after another fall in which she sustained several intracranial hemorrhages, primarily in the subarachnoid spaces with a small right falx subdural hematoma. Hospital course was complicated by HTN, and an acute increase in L-sided weakness on 2/1 with new R ROBBY subacute infarcts on imaging as well as multiple smaller infarcts.  She was admitted to acute rehab on 2/4/2021 for rehabilitation and ongoing medical management.     Acute ischemic stroke, right ROBBY, felt due to symptomatic intracranial atherosclerosis vs. possible atheroembolic event from aortic arch plaque   Hx recent acute ischemic stroke, right ROBBY in 12/2020 with residual left-sided weakness.  Previously on DAPT with ASA 81 mg and Plavix, though Plavix discontinued 1/5 given SAH.  Developed new dense hemiparesis on left with some speech difficulties on 2/1 in the AM. Stroke code called.  MRI brain showed R ROBBY infarct, with multiple areas of smaller infarcts, and unchanged bilateral subdural hematomas and areas of scattered SAH.  CTA head/neck demonstrating R ROBBY occlusion, L ICA 50-60% stenosis, R ICA with linear filling defect.  Echo EF 60%, normal LA size, bubble study negative.  EKG/tele: NSR.  LDL 53 on 12/18/20, A1c 5.7, trop 0.058.  Options limited due to recent strokes/SAH/SDH, location of lesions preclude IR, etc. therefore no TPA or endovascular treatment.  - Continue aspirin 81 mg daily, started per neurology 2/2  - 14 day Ziopatch present at admission to ARU  - SBP goal <160, though per neurology, would not treat more aggressively than this due to risk of precipitating recurrent stroke symptoms. Per discussion with sending team on admission, no scheduled or PRN BP meds recommended at this time.  - Plan 4 week follow up with Dr. Finnegan at [facility] neurology on discharge.   - PT/OT. Discharged to ARU for ongoing therapies  - Regular diet   - Follow up with neurology, Dr. Brooke Finnegan ([facility]) in 4 weeks    Multiple acute SAH  Acute SDH  Mechanical fall  Presented to ED on 1/29/21 after fall at [facility].  CT head showed several acute-appearing areas of intracranial hemorrhage in primarily subarachnoid spaces with small right falx subdural hematoma, no associated mass effect.  Fall felt likely due to ongoing deficits from recent acute strokes in December. Nothing in history to suggest other cause.  PTA ASA held initially upon admission, but resumed as above after recurrent ischemic stroke.  Neurosurgery consulted but felt no operative intervention indicated.  Follow-up CT head on 1/30 with stable scattered foci of subarachnoid hemorrhage.  Per chart review, patient also presented to ED on 1/5/2021 after a fall on 1/4 while at TCU recovering from initial strokes.  CT head demonstrating small new subarachnoid hemorrhage over left frontotemporoparietal region and within the left ambient cistern.  Plavix stopped and discharged back to TCU.  - Continue PT/OT  - Follow up with neurosurgery in 1 month with non-contrast head CT day of appt     Hypertension  Started on diltiazem 120mg daily after recent strokes. Developed some orthostatic symptoms and had some recrudescence of stroke symptoms with exertion. Diltiazem stopped and symptoms resolved. BP elevated during acute hospitalization requiring hydralazine intermittently. Re-challenged with amlodipine during hospital stay, stopped after stroke on 2/1.  Per discussion with sending team on admission to ARU, no scheduled or PRN BP meds recommended at this time, and continue to monitor.  - SBP goal <160, though per neurology, would not treat more aggressively than this due to risk of precipitating recurrent stroke symptoms.   - As of 2/8, SBP generally 110s-120s, with two readings in the past 48 hours in 150s  - Continue to monitor BP as it is running well      Glaucoma  Continue prior to admission eye drops    Hypokalemia.  First noted  during acute hospitalization, 3.1 on 2/1.  Given 1-time supplementation of 20 mEq and improved to 3/5 on 2/2.  Recurrent drop to 3.2 on 2/5, so started on 20 mEq daily.  - Continue potassium chloride 20 mEq daily  - Repeat BMP 2/8 with K 3.7  - Trend BMP and discontinue supplementation if indicated       1. Adjustment to disability:  Clinical psychology to eval and treat as indicated  2. FEN: Regular diet and Thin liquids  3. Bowel: continent  4. Bladder: continent  5. DVT Prophylaxis: mechanical  6. GI Prophylaxis: none  7. Code: no CPR-Do NOT Intubate  8. Disposition: assisted living  9. ELOS:  18 days  10. Rehab prognosis: good  11. Follow up Appointments on Discharge: Neurology 4 week follow up with Dr. Finnegan at [facility]. Neurosurgery f/u in 1 month with non-contrast head CT day of appt.        Casimiro Saldivar DO      DOS: 2/9/21      I spent a total of 35 minutes face to face and coordinating care of Jamee Douglas. Over 50% of my time on the unit was spent counseling the patient and /or coordinating care regarding .

## 2021-02-09 NOTE — PROGRESS NOTES
CLINICAL NUTRITION SERVICES - REASSESSMENT NOTE     Nutrition Prescription    RECOMMENDATIONS FOR MDs/PROVIDERS TO ORDER:  None today - pt reviewed face to face during care team rounds     Malnutrition Status:    Patient does not meet two of the established criteria necessary for diagnosing malnutrition    Recommendations already ordered by Registered Dietitian (RD):  None today - continue diet/supplement/snacks as ordered     Future/Additional Recommendations:  Monitor meal/snack/supplement intakes  Monitor weight trends      EVALUATION OF THE PROGRESS TOWARD GOALS   Diet: Regular  Supplement: Food snacks - On Mon/Wed/Fri/Sun send cottage cheese with fruit cup at 2 PM snack time, on Tues/Thurs/Sat send cheese and crackers at 2 PM snack time  Offer Gelatein PRN   Intake: 50-75% per flow sheets       NEW FINDINGS   MAR reviewed. Labs reviewed, K+ improving. Pt was reviewed during care team rounds and visited at bedside, pt agreeing to continue snacks/PRN supplement as ordered, RD gave pt encouragement to continue to take meals/snacks well, reviewed family can bring in outside food for menu fatigue.     Wt Readings from Last 10 Encounters:   02/04/21 61.6 kg (135 lb 14.4 oz)   01/29/21 59.9 kg (132 lb)   03/27/18 64 kg (141 lb)     Weight assessment: No new weight to assess today, trends appear stable from pt reported UBW of 132 lbs.     MALNUTRITION  % Intake: No decreased intake noted  % Weight Loss: None noted  Subcutaneous Fat Loss: Facial region: mild vs age related   Muscle Loss: Facial & jaw region: mild vs age related   Fluid Accumulation/Edema: None noted  Malnutrition Diagnosis: Patient does not meet two of the established criteria necessary for diagnosing malnutrition    Previous Goals   Patient to consume % of nutritionally adequate meal trays TID, or the equivalent with supplements/snacks.  Evaluation: Likely met    Previous Nutrition Diagnosis  Predicted inadequate nutrient intake related to  decreased appetite as evidenced by pt taking 25-50% of meals thus far   Evaluation: Improving, diagnosis slightly adjusted to below     CURRENT NUTRITION DIAGNOSIS  Predicted inadequate nutrient intake related to prior decreased appetite as evidenced by pt now taking 50-75% of meals and accepting snack/supplement     INTERVENTIONS  Implementation  Medical food supplement therapy - continue as ordered   Modify composition of meals/snacks - continue as ordered     Goals  Patient to consume % of nutritionally adequate meal trays TID, or the equivalent with supplements/snacks.    Monitoring/Evaluation  Progress toward goals will be monitored and evaluated per protocol.    Luz Kang RD, CNSC, LD  ARU RD pager: [phone]

## 2021-02-09 NOTE — PROGRESS NOTES
Patient is A&Ox4, makes needs known. CGA with walker and gait belt. Voids spontaneously, suppository given after dinner for bowel program. Patient has small BM. Denies pain. Continue with POC.

## 2021-02-09 NOTE — PROGRESS NOTES
Post Rounds Family Phone Call  Length of call: 15 min   Family involved: Kyle Vleázquez  Projected discharge date: 2/19/21  Projected discharge location: Memory Care vs [facility]  Equipment needs: Pt has FWW, manual W/C

## 2021-02-09 NOTE — PLAN OF CARE
Discharge Planner Post-Acute Rehab SLP:      Discharge Plan: likely HH sptx     Precautions: mild to mod cognition      Current Status:  Communication: language skills intact  Cognition: mild  to moderate  impairments in ST memory and problem solving and rasoning   Swallow: regular diet and thin lqiuids     Assessment:  Pt engaged in ipad based activities on her own personal ipad. She enjoyed seeing herself make progress on an activity. Pt needed moderate cueing from the clinician when she initially did a task as she was learning and comprehending the directions. Once she understood the concept, she needed minmal cueing to complete activities. On one activity she needed more cueing after her the learning period. Pt frequently got distracted during activities if her phone went off. Pt had phone directly next to her and would stop what she was doing to check it. Suggested not to have phone around during cognitive therapy.     Other Barriers to Discharge (Family Training, etc): TBD

## 2021-02-09 NOTE — CARE CONFERENCE
Acute Rehab Care Conference/Team Rounds    Type: Team Rounds    Present: Jamee Douglas, Dr. Casimiro Harris, Rosalie Stokes PA,  Vignesh Yancey RN, Celine Butt PT, Rosio Kaminski OT, Julia Verdugo [facility], Bethel Reeves SLP, Dyan Carter , Luz Kang Dietician, Ro Rosario , Vignesh Yancey RN    Discharge Barriers/Treatment/Education    Rehab Diagnosis: CVA with left hemiparesis     Active Medical Co-morbidities/Prognosis:     Patient Active Problem List   Diagnosis Code     Low back pain M54.5     Hyperlipidemia with target LDL less than 130 E78.5     Osteoarthritis M19.90     Glaucoma H40.9     Advanced directives, counseling/discussion Z71.89     Benign essential hypertension I10     Subarachnoid hemorrhage (H) I60.9     Injury of head, initial encounter S09.90XA     Acute ischemic right MCA stroke (H) I63.511         Safety: WDL A&O x4. Left sided weakness    Pain: denies    Medications, Skin, Tubes/Lines: Takes pills whole, skin good, no tubes lines or drains    Swallowing/Nutrition: No dysphagia related concerns noted.     Bowel/Bladder: dribble incontinence of bladder, bowel program. Small BM on 2/8    Psychosocial: , lives alone in an care home. 4 children, 1 dtr and 3 sons (all sons [place]of[place]state but dtr local). Good support from children. Falls reported. Was recently in a TCU for about 6 weeks until insurance denied additional stay. Prior to initial stroke in October, pt was indep and living alone. Described as 'super woman' and working out daily for one hour. Denied mental health, substance abuse or financial concerns. Pleasant and motivated.     ADLs/IADLs: Pt making slow but steady progress with ADLs with FWW. Pt demonstrated limited carry-over with AE for LBD tasks. Pt limited by impaired cognition, left side weakness, left side inattention, impaired FMC skills with LUE. Pt requires SBA with UBD and min A with LBD tasks. Pt requires CGA with G/H tasks standing at EOS with  FWW. Recommending ongoing IP rehab at this time. Pt may need AM/PM cares upon discharge. HC OT services    Mobility: Bed Mobility: Anthony for LEs sit<>supine   Transfer: CGA with FWW, better turning awareness   Gait: CGA with FWW, good when attentive to task, but very distractible in enviornment  Stairs: NT - not a goal for pt.  Balance: 19/56 Cunningham 2/7/21    Cognition/Language: Difficulties with reasoning/problem solving but showing improvement. Positively motivated to try to complete tasks in a more independent setting and very comfortable with using technologies. Difficulties with carryover between sessions but within the session able to show appropriate improvement with repetition in the tasks. Ongoing therapy at facility discharge.     Community Re-Entry: to achieve a level of mod I     Transportation: Not a  - family to provide    Decision maker: self    Plan of Care and goals reviewed and updated.    Discharge Plan/Recommendations    Fall Precautions: continue    Patient/Family input to goals: yes     Estimated length of stay: 12 - 14 days     Overall plan for the patient: achieve a level of mod I with ADLs       Utilization Review and Continued Stay Justification    Medical Necessity Criteria:    For any criteria that is not met, please document reason and plan for discharge, transfer, or modification of plan of care to address.    Requires intensive rehabilitation program to treat functional deficits?: Yes    Requires 3x per week or greater involvement of rehabilitation physician to oversee rehabilitation program?: Yes    Requires rehabilitation nursing interventions?: Yes    Patient is making functional progress?: Yes    There is a potential for additional functional progress? Yes    Patient is participating in therapy 3 hours per day a minimum of 5 days per week or 15 hours per week in 7 day period?:Yes    Has discharge needs that require coordinated discharge planning  approach?:Yes      Barriers/Concerns related to meeting medical necessity criteria:  None     Team Plan to Address Concern:  As needed     Final Physician Sign off    Statement of Approval:  Casimiro Saldivar, DO      Patient Goals  Social Work Goals: Confirm discharge recommendations with therapy, coordinate safe discharge plan and remain available to support and assist as needed. Work with Dtr to help place pt into a different longterm, per pt and pt family preference.     OT Frequency: daily 60-90 min.  OT goal: hygiene/grooming: independent, modified independent, while standing  OT goal: upper body dressing: Independent, Modified independent, including set-up/clothing retrieval  OT goal: lower body dressing: Independent, Modified independent, including set-up/clothing retrieval  OT goal: upper body bathing: Independent  OT goal: lower body bathing: Independent, Modified independent  OT goal: toilet transfer/toileting: Independent, Modified independent, cleaning and garment management, toilet transfer  OT goal: meal preparation: Independent, ambulatory level, with simple meal preparation  OT goal: home management: Independent, Modified independent, ambulatory level, with light demand household tasks  OT goal: cognitive: Patient/caregiver will verbalize understanding of cognitive assessment results/recommendations as needed for safe discharge planning  OT goal 1: CGA with shower transf. utilizing A.E./DME prn.      PT Frequency: daily, 60 min  PT goal: bed mobility: Independent, Supine to/from sit, Rolling, Bridging  PT goal: transfers: Supervision/stand-by assist, Sit to/from stand, Bed to/from chair, Assistive device  PT goal: gait: Assistive device, 100 feet, Supervision/stand-by assist  PT goal: stairs: Supervision/stand-by assist, 1 stair, Assistive device(curb step)  PT goal: perform aerobic activity with stable cardiovascular response: 20 minutes, NuStep, continuous activity  PT goal 1: Pt understanding of LE  strengthening and balance HEP to progress fucntional mobility at discharge.  PT Goal 2: SBA for car transfer to ensure community access.  PT Goal 3: SBA for floor transfer with furniture assist to ensure safety in home environment.      SLP Frequency: daily  SLP goal 1: P tiwll utilize compensatory memory strategies to complete ST recall tasks with 90% accuracy  SLP goal 2: pt will complete mod to complex level reasoning and problem solving tasks with 90% accuracy     Nursing Goals  Bowel and Bladder care  Fall prevention   Medication Education  Skin Care protection

## 2021-02-09 NOTE — PLAN OF CARE
Discharge Planner Post-Acute Rehab PT:      Discharge Plan: daughter's house versus [facility] facility,  PT     Precautions: falls     Current Status:  Bed Mobility: Anthony for LEs and trunk for sit<>supine   Transfer: CGA with FWW, VCs needed for safety and mechanics   Gait: CGA with FWW, good when attentive to task, but very distractible in enviornment  Stairs: NT - not a goal for pt.  Balance: 19/56 Cunningham 2/7/21, intact sitting balance, B UE support for standing balance      Assessment: Pt very distractible this session. Near anterior LOB during ambulation 2/2 L toe catching. D/t L LE inattention and step asymmetry, trialed TENS on L anterior tib. Improvements were noted - plan to continue L LE TENS use.      Other Barriers to Discharge (DME, Family Training, etc): Has FWW, will need family training, high falls risk

## 2021-02-10 ENCOUNTER — APPOINTMENT (OUTPATIENT)
Dept: OCCUPATIONAL THERAPY | Facility: CLINIC | Age: 86
End: 2021-02-10
Payer: COMMERCIAL

## 2021-02-10 ENCOUNTER — APPOINTMENT (OUTPATIENT)
Dept: PHYSICAL THERAPY | Facility: CLINIC | Age: 86
End: 2021-02-10
Payer: COMMERCIAL

## 2021-02-10 ENCOUNTER — APPOINTMENT (OUTPATIENT)
Dept: SPEECH THERAPY | Facility: CLINIC | Age: 86
End: 2021-02-10
Payer: COMMERCIAL

## 2021-02-10 PROCEDURE — 250N000013 HC RX MED GY IP 250 OP 250 PS 637: Performed by: PHYSICIAN ASSISTANT

## 2021-02-10 PROCEDURE — 97129 THER IVNTJ 1ST 15 MIN: CPT

## 2021-02-10 PROCEDURE — 97530 THERAPEUTIC ACTIVITIES: CPT | Mod: GP

## 2021-02-10 PROCEDURE — 97535 SELF CARE MNGMENT TRAINING: CPT | Mod: GO | Performed by: OCCUPATIONAL THERAPIST

## 2021-02-10 PROCEDURE — 97130 THER IVNTJ EA ADDL 15 MIN: CPT

## 2021-02-10 PROCEDURE — 97112 NEUROMUSCULAR REEDUCATION: CPT | Mod: GP

## 2021-02-10 PROCEDURE — 97530 THERAPEUTIC ACTIVITIES: CPT | Mod: GO | Performed by: OCCUPATIONAL THERAPIST

## 2021-02-10 PROCEDURE — 128N000003 HC R&B REHAB

## 2021-02-10 PROCEDURE — 97110 THERAPEUTIC EXERCISES: CPT | Mod: GP

## 2021-02-10 PROCEDURE — 250N000013 HC RX MED GY IP 250 OP 250 PS 637: Performed by: PHYSICAL MEDICINE & REHABILITATION

## 2021-02-10 PROCEDURE — 99232 SBSQ HOSP IP/OBS MODERATE 35: CPT | Mod: GC | Performed by: PHYSICAL MEDICINE & REHABILITATION

## 2021-02-10 RX ORDER — BISACODYL 10 MG
10 SUPPOSITORY, RECTAL RECTAL DAILY PRN
Status: DISCONTINUED | OUTPATIENT
Start: 2021-02-10 | End: 2021-02-18 | Stop reason: HOSPADM

## 2021-02-10 RX ADMIN — ASPIRIN 81 MG: 81 TABLET, COATED ORAL at 08:34

## 2021-02-10 RX ADMIN — ATORVASTATIN CALCIUM 40 MG: 40 TABLET, FILM COATED ORAL at 20:04

## 2021-02-10 RX ADMIN — DORZOLAMIDE HYDROCHLORIDE AND TIMOLOL MALEATE 1 DROP: 20; 5 SOLUTION/ DROPS OPHTHALMIC at 08:33

## 2021-02-10 RX ADMIN — CHOLECALCIFEROL TAB 10 MCG (400 UNIT) 400 UNITS: 10 TAB at 08:34

## 2021-02-10 RX ADMIN — TRAVOPROST 1 DROP: 0.04 SOLUTION/ DROPS OPHTHALMIC at 20:04

## 2021-02-10 RX ADMIN — DORZOLAMIDE HYDROCHLORIDE AND TIMOLOL MALEATE 1 DROP: 20; 5 SOLUTION/ DROPS OPHTHALMIC at 19:49

## 2021-02-10 RX ADMIN — POTASSIUM CHLORIDE 20 MEQ: 750 TABLET, EXTENDED RELEASE ORAL at 08:34

## 2021-02-10 RX ADMIN — MULTIPLE VITAMINS W/ MINERALS TAB 1 TABLET: TAB at 08:34

## 2021-02-10 NOTE — PROGRESS NOTES
SPIRITUAL HEALTH SERVICES  SPIRITUAL ASSESSMENT Progress Note  [facility] ([facility]) 5R ARU     REFERRAL SOURCE: Follow-Up    In a brief visit with Jamee, she stated that she is making progress, but that it's too slow for her and that her left hand and leg are still giving her some trouble.  She stated her daughter is looking for a new place for her to live. When asked what was important in a new home she said a place where the staff care about the people they care for.    PLAN: I will continue to follow Jamee.  [facility] remains available per pt/family request.    Ro Rosario  Chaplain Resident  Pager: [phone]

## 2021-02-10 NOTE — PLAN OF CARE
"Blood pressure 110/56, pulse 97, temperature 97.2  F (36.2  C), temperature source Oral, resp. rate 18, height 1.6 m (5' 3\"), weight 61.6 kg (135 lb 14.4 oz), last menstrual period 01/01/1970, SpO2 97 %, not currently breastfeeding.  Patient is A&O but forgetful or disoriented at times. LS clear, BS active. Transfers with assist of one using a walker and gait belt. Zio patch at left upper chest. Tolerating regular diet with thin liquids. Takes med's whole. Dribbles with urine, offered toilet ing and mateus cares done. New orders of bowel program. Patient reported a BM this am, staff did not see.Continue with POC.  "

## 2021-02-10 NOTE — PLAN OF CARE
Discharge Planner Post-Acute Rehab SLP:      Discharge Plan: likely HH sptx     Precautions: mild to mod cognition      Current Status:  Communication: language skills intact  Cognition: mild  to moderate  impairments in ST memory and problem solving and reasoning   Swallow: regular diet and thin lqiuids     Assessment: Patient initially having significant difficulties being able to complete a moderate to complex level reasoning/problem solving task but once provided with multiple examples and having patient explained back the task to clinician, able to show significant improvement on completion of the task.     Other Barriers to Discharge (Family Training, etc): TBD

## 2021-02-10 NOTE — PLAN OF CARE
FOCUS/GOAL  Bladder management, Pain management, Mobility, and Safety management    ASSESSMENT, INTERVENTIONS AND CONTINUING PLAN FOR GOAL:  Patient is alert and oriented x 4. Denied pain. Uses her call light appropriately. Transfers with assist of 1 using gait belt and walker. Left side is weak. Continent of bladder this evening but had episodes of urinary incontinence during the day per report. Last bowel movement was yesterday. Refused scheduled suppository. Bed alarm in place. Will continue to monitor.

## 2021-02-10 NOTE — PROGRESS NOTES
Discharge Planner Post-Acute Rehab OT:      Discharge Plan: Daughter locating care home/Memory Care unit. Estimated discharge 2/18/21.      Precautions: fall, seizure, alarms, left side inattention and weakness     Current Status:  ADLs: min A with UB / mod A with LB dressing. CGA/min A with G/H tasks standing at EOS with FWW dt/ posterior leaning. Min A with functional transfers and mobility with FWW.   IADLs: Pt will likely need assistance with IADLs d/t impaired cognition.   Vision/Cognition: Daughter has reported previous impaired vision with right eye. Pt has left sided inattention.      Assessment: Pt motivated to participate in OT session but limited by left sided weakness, impaired attention, impaired cognition, and left sided inattention. Pt continues to demonstrate poor carry-over with compensatory strategies for ADLs and use of AE training. Pt demonstrating increased difficulty with grasp/release with LUE. Recommend OT services to maximize IND and safety with ADLs/IADLs and functional mobility.      Other Barriers to Discharge (DME, Family Training, etc): TBD

## 2021-02-10 NOTE — PLAN OF CARE
FOCUS/GOAL  Bladder management, Pain management, Mobility, Cognition/Memory/Judgment/Problem solving, and Safety management    ASSESSMENT, INTERVENTIONS AND CONTINUING PLAN FOR GOAL:  Pt is alert, disoriented to time. Incontinent and continent of bladder using toilet. Up with assist of 1 with walker. Denied pain or discomfort this shift. Appeared to be sleeping well during rounds. Uses call light appropriately, able to make needs known. Bed alarm on for safety. Will continue with POC.

## 2021-02-10 NOTE — PROGRESS NOTES
"  [facility]   Acute Rehabilitation Unit  Daily progress note    INTERVAL HISTORY  No acute events reported overnight.  Patient seen and examined this morning at bedside.  She denies any concerns and feels she is making good progress with therapy.  She does note she still feels \"wobbly\" when standing.  She declined suppository last night and had BM this morning.  Will discontinue scheduled suppository and continue with PRN bowel meds.  She denies any shortness of breath, nausea, bladder concerns.  She notes pain in left ankle area with exam, but does not recall onset or instigating factor, denies pain with weight-bearing.     Functionally, she is needing contact guard assist and FWW for transfers and gait.  She is noted to be very distractible in environment while ambulating.  Near LOB with PT d/t inattention and L toe catching.  Trialed TENS to L anterior tib.   She is needing contact guard assistance with grooming and hygiene at edge of sink, standby assist for upper body dressing with min assist for lower body (to pull up pants).     ROS: 10 point ROS neg other than the symptoms noted above in the HPI.      MEDICATIONS    aspirin  81 mg Oral Daily     atorvastatin  40 mg Oral QPM     cholecalciferol  400 Units Oral Daily     dorzolamide-timolol  1 drop Right Eye BID     multivitamin w/minerals  1 tablet Oral Daily     potassium chloride  20 mEq Oral Daily     travoprost KORINA FREE  1 drop Both Eyes At Bedtime        acetaminophen, bisacodyl, ondansetron, polyethylene glycol, senna-docusate     PHYSICAL EXAM  /56 (BP Location: Left arm)   Pulse 97   Temp 97.2  F (36.2  C) (Oral)   Resp 18   Ht 1.6 m (5' 3\")   Wt 61.6 kg (135 lb 14.4 oz)   LMP 01/01/1970   SpO2 97%   BMI 24.07 kg/m    Gen: alert, pleasant, resting in bed, no acute distress  HEENT:  NCAT, MMM  Cardio: RRR  Pulm: CTA bilaterally  Ext: warm, well-perfused, no calf tenderness bilaterally, trace LLE " edema  Neuro/MSK: AAO x3, left hemiparesis, weaker in LLE (HF 4, KE 3, KF 3, DF 3) than LUE.    Speech clear/fluent, responds appropriately, follows commands.  Tenderness at left ankle with palpation, no swelling, warmth, redness.  Skin: improving ecchymoses on L side of face over cheek and jaw    LABS  Lab Results   Component Value Date    WBC 7.4 02/08/2021     Lab Results   Component Value Date    RBC 3.89 02/08/2021     Lab Results   Component Value Date    HGB 11.5 02/08/2021     Lab Results   Component Value Date    HCT 35.8 02/08/2021     Lab Results   Component Value Date    MCV 92 02/08/2021     Lab Results   Component Value Date    MCH 29.6 02/08/2021     Lab Results   Component Value Date    MCHC 32.1 02/08/2021     Lab Results   Component Value Date    RDW 14.3 02/08/2021     Lab Results   Component Value Date     02/08/2021     Last Comprehensive Metabolic Panel:  Sodium   Date Value Ref Range Status   02/08/2021 141 133 - 144 mmol/L Final     Potassium   Date Value Ref Range Status   02/08/2021 3.7 3.4 - 5.3 mmol/L Final     Chloride   Date Value Ref Range Status   02/08/2021 110 (H) 94 - 109 mmol/L Final     Carbon Dioxide   Date Value Ref Range Status   02/08/2021 29 20 - 32 mmol/L Final     Anion Gap   Date Value Ref Range Status   02/08/2021 2 (L) 3 - 14 mmol/L Final     Glucose   Date Value Ref Range Status   02/08/2021 101 (H) 70 - 99 mg/dL Final     Urea Nitrogen   Date Value Ref Range Status   02/08/2021 10 7 - 30 mg/dL Final     Creatinine   Date Value Ref Range Status   02/08/2021 0.62 0.52 - 1.04 mg/dL Final     GFR Estimate   Date Value Ref Range Status   02/08/2021 80 >60 mL/min/[1.73_m2] Final     Comment:     Non  GFR Calc  Starting 12/18/2018, serum creatinine based estimated GFR (eGFR) will be   calculated using the Chronic Kidney Disease Epidemiology Collaboration   (CKD-EPI) equation.       Calcium   Date Value Ref Range Status   02/08/2021 8.8 8.5 - 10.1 mg/dL  Final         Rehabilitation - continue comprehensive acute inpatient rehabilitation program with multidisciplinary approach including therapies, rehab nursing, and physiatry following. See interval history for updates.      ASSESSMENT AND PLAN  Jamee Douglas is an 88-year-old R-hand dominant female with hx of multiple R ROBBY ischemic strokes in 12/2020 and fall with SAH 1/5/2021 who was admitted on 1/29/2021 after another fall in which she sustained several intracranial hemorrhages, primarily in the subarachnoid spaces with a small right falx subdural hematoma. Hospital course was complicated by HTN, and an acute increase in L-sided weakness on 2/1 with new R ROBBY subacute infarcts on imaging as well as multiple smaller infarcts.  She was admitted to acute rehab on 2/4/2021 for rehabilitation and ongoing medical management.     Acute ischemic stroke, right ROBBY, felt due to symptomatic intracranial atherosclerosis vs. possible atheroembolic event from aortic arch plaque   Hx recent acute ischemic stroke, right ROBBY in 12/2020 with residual left-sided weakness.  Previously on DAPT with ASA 81 mg and Plavix, though Plavix discontinued 1/5 given SAH.  Developed new dense hemiparesis on left with some speech difficulties on 2/1 in the AM. Stroke code called.  MRI brain showed R ROBBY infarct, with multiple areas of smaller infarcts, and unchanged bilateral subdural hematomas and areas of scattered SAH.  CTA head/neck demonstrating R ROBBY occlusion, L ICA 50-60% stenosis, R ICA with linear filling defect.  Echo EF 60%, normal LA size, bubble study negative.  EKG/tele: NSR.  LDL 53 on 12/18/20, A1c 5.7, trop 0.058.  Options limited due to recent strokes/SAH/SDH, location of lesions preclude IR, etc. therefore no TPA or endovascular treatment.  - Continue aspirin 81 mg daily, started per neurology 2/2  - 14 day Ziopatch present at admission to ARU  - SBP goal <160, though per neurology, would not treat more aggressively  than this due to risk of precipitating recurrent stroke symptoms. Per discussion with sending team on admission, no scheduled or PRN BP meds recommended at this time.  - Plan 4 week follow up with Dr. Finnegan at [facility] neurology on discharge.   - PT/OT. Discharged to ARU for ongoing therapies  - Regular diet   - Follow up with neurology, Dr. Brooke Finnegan ([facility]) in 4 weeks    Multiple acute SAH  Acute SDH  Mechanical fall  Presented to ED on 1/29/21 after fall at [facility]isted Living Facility.  CT head showed several acute-appearing areas of intracranial hemorrhage in primarily subarachnoid spaces with small right falx subdural hematoma, no associated mass effect.  Fall felt likely due to ongoing deficits from recent acute strokes in December. Nothing in history to suggest other cause.  PTA ASA held initially upon admission, but resumed as above after recurrent ischemic stroke.  Neurosurgery consulted but felt no operative intervention indicated.  Follow-up CT head on 1/30 with stable scattered foci of subarachnoid hemorrhage.  Per chart review, patient also presented to ED on 1/5/2021 after a fall on 1/4 while at TCU recovering from initial strokes.  CT head demonstrating small new subarachnoid hemorrhage over left frontotemporoparietal region and within the left ambient cistern.  Plavix stopped and discharged back to TCU.  - Continue PT/OT  - Follow up with neurosurgery in 1 month with non-contrast head CT day of appt     Hypertension  Started on diltiazem 120mg daily after recent strokes. Developed some orthostatic symptoms and had some recrudescence of stroke symptoms with exertion. Diltiazem stopped and symptoms resolved. BP elevated during acute hospitalization requiring hydralazine intermittently. Re-challenged with amlodipine during hospital stay, stopped after stroke on 2/1.  Per discussion with sending team on admission to ARU, no scheduled or PRN BP meds recommended at this time, and continue  to monitor.  - SBP goal <160, though per neurology, would not treat more aggressively than this due to risk of precipitating recurrent stroke symptoms.   - As of 2/10, SBP generally 110s-130s  - Continue to monitor BP, but currently under goal     Glaucoma  Continue prior to admission eye drops    Hypokalemia.  First noted during acute hospitalization, 3.1 on 2/1.  Given 1-time supplementation of 20 mEq and improved to 3/5 on 2/2.  Recurrent drop to 3.2 on 2/5, so started on 20 mEq daily.  - Continue potassium chloride 20 mEq daily  - BMP 2/8 with K 3.7, plan to repeat tomorrow  - Trend BMP and discontinue supplementation if indicated       1. Adjustment to disability:  Clinical psychology to eval and treat as indicated  2. FEN: Regular diet and Thin liquids  3. Bowel: continent, intermittent constipation - has Senokot, Miralax, bisacodyl suppository available PRN  4. Bladder: continent  5. DVT Prophylaxis: mechanical  6. GI Prophylaxis: none  7. Code: no CPR-Do NOT Intubate  8. Disposition: assisted living  9. ELOS:  18 days  10. Rehab prognosis: good  11. Follow up Appointments on Discharge: Neurology 4 week follow up with Dr. Finnegan at [facility]. Neurosurgery f/u in 1 month with non-contrast head CT day of appt.      Seen and discussed with Dr. Casimiro Saldivar, PM&R staff physician     Rosalie Stokes PA-C  Physical Medicine & Rehabilitation

## 2021-02-10 NOTE — PLAN OF CARE
Discharge Planner Post-Acute Rehab PT:      Discharge Plan: daughter's house versus [facility] facility,  PT     Precautions: falls     Current Status:  Bed Mobility: Anthony for LEs and trunk for sit<>supine   Transfer: CGA with FWW, VCs needed for safety and mechanics   Gait: CGA with FWW, good when attentive to task, but very distractible in environment  Stairs: NT - not a goal for pt.  Balance: 19/56 Cunningham 2/7/21, intact sitting balance, B UE support for standing balance      Assessment: Pt's symmetry with gait improved this session with L ant tib TENS and facilitation to B abdominals. Pt appears to have freezing episodes when presented with skills that require more complex motor planning - recommend simple tasks and 1-step instructions. Much more engaged during today's session.      Other Barriers to Discharge (DME, Family Training, etc): Has FWW, will need family training, high falls risk

## 2021-02-11 ENCOUNTER — APPOINTMENT (OUTPATIENT)
Dept: SPEECH THERAPY | Facility: CLINIC | Age: 86
End: 2021-02-11
Payer: COMMERCIAL

## 2021-02-11 ENCOUNTER — APPOINTMENT (OUTPATIENT)
Dept: PHYSICAL THERAPY | Facility: CLINIC | Age: 86
End: 2021-02-11
Payer: COMMERCIAL

## 2021-02-11 ENCOUNTER — APPOINTMENT (OUTPATIENT)
Dept: OCCUPATIONAL THERAPY | Facility: CLINIC | Age: 86
End: 2021-02-11
Payer: COMMERCIAL

## 2021-02-11 LAB
ANION GAP SERPL CALCULATED.3IONS-SCNC: 4 MMOL/L (ref 3–14)
BUN SERPL-MCNC: 15 MG/DL (ref 7–30)
CALCIUM SERPL-MCNC: 9.1 MG/DL (ref 8.5–10.1)
CHLORIDE SERPL-SCNC: 109 MMOL/L (ref 94–109)
CO2 SERPL-SCNC: 29 MMOL/L (ref 20–32)
CREAT SERPL-MCNC: 0.67 MG/DL (ref 0.52–1.04)
GFR SERPL CREATININE-BSD FRML MDRD: 78 ML/MIN/{1.73_M2}
GLUCOSE SERPL-MCNC: 97 MG/DL (ref 70–99)
POTASSIUM SERPL-SCNC: 4.4 MMOL/L (ref 3.4–5.3)
SODIUM SERPL-SCNC: 142 MMOL/L (ref 133–144)

## 2021-02-11 PROCEDURE — 97530 THERAPEUTIC ACTIVITIES: CPT | Mod: GP | Performed by: REHABILITATION PRACTITIONER

## 2021-02-11 PROCEDURE — 97129 THER IVNTJ 1ST 15 MIN: CPT

## 2021-02-11 PROCEDURE — 128N000003 HC R&B REHAB

## 2021-02-11 PROCEDURE — 80048 BASIC METABOLIC PNL TOTAL CA: CPT | Performed by: PHYSICIAN ASSISTANT

## 2021-02-11 PROCEDURE — 97130 THER IVNTJ EA ADDL 15 MIN: CPT

## 2021-02-11 PROCEDURE — 97110 THERAPEUTIC EXERCISES: CPT | Mod: GP | Performed by: REHABILITATION PRACTITIONER

## 2021-02-11 PROCEDURE — 97535 SELF CARE MNGMENT TRAINING: CPT | Mod: GO

## 2021-02-11 PROCEDURE — 99232 SBSQ HOSP IP/OBS MODERATE 35: CPT | Performed by: PHYSICAL MEDICINE & REHABILITATION

## 2021-02-11 PROCEDURE — 97116 GAIT TRAINING THERAPY: CPT | Mod: GP | Performed by: REHABILITATION PRACTITIONER

## 2021-02-11 PROCEDURE — 36415 COLL VENOUS BLD VENIPUNCTURE: CPT | Performed by: PHYSICIAN ASSISTANT

## 2021-02-11 PROCEDURE — 97530 THERAPEUTIC ACTIVITIES: CPT | Mod: GO

## 2021-02-11 PROCEDURE — 250N000013 HC RX MED GY IP 250 OP 250 PS 637: Performed by: PHYSICIAN ASSISTANT

## 2021-02-11 RX ADMIN — CHOLECALCIFEROL TAB 10 MCG (400 UNIT) 400 UNITS: 10 TAB at 08:31

## 2021-02-11 RX ADMIN — MULTIPLE VITAMINS W/ MINERALS TAB 1 TABLET: TAB at 08:31

## 2021-02-11 RX ADMIN — DORZOLAMIDE HYDROCHLORIDE AND TIMOLOL MALEATE 1 DROP: 20; 5 SOLUTION/ DROPS OPHTHALMIC at 21:00

## 2021-02-11 RX ADMIN — TRAVOPROST 1 DROP: 0.04 SOLUTION/ DROPS OPHTHALMIC at 20:59

## 2021-02-11 RX ADMIN — DORZOLAMIDE HYDROCHLORIDE AND TIMOLOL MALEATE 1 DROP: 20; 5 SOLUTION/ DROPS OPHTHALMIC at 08:31

## 2021-02-11 RX ADMIN — ASPIRIN 81 MG: 81 TABLET, COATED ORAL at 08:31

## 2021-02-11 RX ADMIN — ATORVASTATIN CALCIUM 40 MG: 40 TABLET, FILM COATED ORAL at 20:59

## 2021-02-11 ASSESSMENT — MIFFLIN-ST. JEOR: SCORE: 1002.87

## 2021-02-11 NOTE — PLAN OF CARE
Discharge Planner Post-Acute Rehab SLP:      Discharge Plan: likely HH sptx     Precautions: mild to mod cognition      Current Status:  Communication: language skills intact  Cognition: mild  to moderate  impairments in ST memory and problem solving and reasoning   Swallow: regular diet and thin lqiuids     Assessment: Patient able to accurately recall what task was completed yesterday and asking if the same task could be completed this date.  When engaging in a new learning task, very helpful for patient to verbally review with clinician the purpose and steps of the task and when completed a second time able to show significant improvement in ability to complete the task.     Other Barriers to Discharge (Family Training, etc): TBD

## 2021-02-11 NOTE — PROGRESS NOTES
"  [facility]   Acute Rehabilitation Unit  Daily progress note    INTERVAL HISTORY  No acute events reported overnight.  Patient seen and examined this morning.  She reports to be feeling well overall, though with ongoing left-sided weakness.  She also notes ongoing pain in left ankle/lower leg.  She denies pain with weightbearing, but states it is tender to the touch.  She denies recollection of injury to this area with previous fall.  Per chart review, she did have near anterior LOB a few days ago with L toe catching.  She has also been receiving TENS treatment to tibialis anterior.  She also notes some bilateral heel pain last night, which per nursing was alleviated by floating heels.  She reports have had a good BM this morning. Denies any shortness of breath, dizziness, nausea, bladder concerns.    Functionally, she is needing contact guard assist for ambulation, some improvement with L tib ant TENS.  PT also noted more freezing with complex motor planning skills.  For ADLs, she needs min assist with upper body dressing, mod assist with lower body dressing, contact guard to min assist with grooming/hygiene tasks.     ROS: 10 point ROS neg other than the symptoms noted above in the HPI.      MEDICATIONS    aspirin  81 mg Oral Daily     atorvastatin  40 mg Oral QPM     cholecalciferol  400 Units Oral Daily     dorzolamide-timolol  1 drop Right Eye BID     multivitamin w/minerals  1 tablet Oral Daily     travoprost KORINA FREE  1 drop Both Eyes At Bedtime        acetaminophen, bisacodyl, ondansetron, polyethylene glycol, senna-docusate     PHYSICAL EXAM  /59 (BP Location: Right arm)   Pulse 79   Temp 97  F (36.1  C) (Oral)   Resp 16   Ht 1.6 m (5' 3\")   Wt 60.4 kg (133 lb 1.6 oz)   LMP 01/01/1970   SpO2 97%   BMI 23.58 kg/m    Gen: alert, pleasant elderly female in no acute distress  HEENT:  NCAT, anicteric sclera  Cardio: RRR  Pulm: CTA bilaterally  Ext: warm, " well-perfused, no calf tenderness bilaterally, trace LLE edema  Neuro/MSK: AAO x3, left hemiparesis, weaker in LLE (HF 4, KE 3, KF 3, DF 3) than LUE.    Speech clear/fluent, responds appropriately, follows commands.  Tenderness over left medial malleolus with palpation, also mildly tender just medial to mid-tibia and areas of lateral gastrocnemius.  Skin: improving ecchymoses on L side of face over cheek and jaw; no redness, warmth, ecchymoses, abrasions at LLE    LABS  Lab Results   Component Value Date    WBC 7.4 02/08/2021     Lab Results   Component Value Date    RBC 3.89 02/08/2021     Lab Results   Component Value Date    HGB 11.5 02/08/2021     Lab Results   Component Value Date    HCT 35.8 02/08/2021     Lab Results   Component Value Date    MCV 92 02/08/2021     Lab Results   Component Value Date    MCH 29.6 02/08/2021     Lab Results   Component Value Date    MCHC 32.1 02/08/2021     Lab Results   Component Value Date    RDW 14.3 02/08/2021     Lab Results   Component Value Date     02/08/2021     Last Comprehensive Metabolic Panel:  Sodium   Date Value Ref Range Status   02/11/2021 142 133 - 144 mmol/L Final     Potassium   Date Value Ref Range Status   02/11/2021 4.4 3.4 - 5.3 mmol/L Final     Chloride   Date Value Ref Range Status   02/11/2021 109 94 - 109 mmol/L Final     Carbon Dioxide   Date Value Ref Range Status   02/11/2021 29 20 - 32 mmol/L Final     Anion Gap   Date Value Ref Range Status   02/11/2021 4 3 - 14 mmol/L Final     Glucose   Date Value Ref Range Status   02/11/2021 97 70 - 99 mg/dL Final     Urea Nitrogen   Date Value Ref Range Status   02/11/2021 15 7 - 30 mg/dL Final     Creatinine   Date Value Ref Range Status   02/11/2021 0.67 0.52 - 1.04 mg/dL Final     GFR Estimate   Date Value Ref Range Status   02/11/2021 78 >60 mL/min/[1.73_m2] Final     Comment:     Non  GFR Calc  Starting 12/18/2018, serum creatinine based estimated GFR (eGFR) will be   calculated  using the Chronic Kidney Disease Epidemiology Collaboration   (CKD-EPI) equation.       Calcium   Date Value Ref Range Status   02/11/2021 9.1 8.5 - 10.1 mg/dL Final         Rehabilitation - continue comprehensive acute inpatient rehabilitation program with multidisciplinary approach including therapies, rehab nursing, and physiatry following. See interval history for updates.      ASSESSMENT AND PLAN  Jamee Douglas is an 88-year-old R-hand dominant female with hx of multiple R ROBBY ischemic strokes in 12/2020 and fall with SAH 1/5/2021 who was admitted on 1/29/2021 after another fall in which she sustained several intracranial hemorrhages, primarily in the subarachnoid spaces with a small right falx subdural hematoma. Hospital course was complicated by HTN, and an acute increase in L-sided weakness on 2/1 with new R ROBBY subacute infarcts on imaging as well as multiple smaller infarcts.  She was admitted to acute rehab on 2/4/2021 for rehabilitation and ongoing medical management.     Acute ischemic stroke, right ROBBY, felt due to symptomatic intracranial atherosclerosis vs. possible atheroembolic event from aortic arch plaque   Hx recent acute ischemic stroke, right ROBBY in 12/2020 with residual left-sided weakness.  Previously on DAPT with ASA 81 mg and Plavix, though Plavix discontinued 1/5 given SAH.  Developed new dense hemiparesis on left with some speech difficulties on 2/1 in the AM. Stroke code called.  MRI brain showed R ROBBY infarct, with multiple areas of smaller infarcts, and unchanged bilateral subdural hematomas and areas of scattered SAH.  CTA head/neck demonstrating R ROBBY occlusion, L ICA 50-60% stenosis, R ICA with linear filling defect.  Echo EF 60%, normal LA size, bubble study negative.  EKG/tele: NSR.  LDL 53 on 12/18/20, A1c 5.7, trop 0.058.  Options limited due to recent strokes/SAH/SDH, location of lesions preclude IR, etc. therefore no TPA or endovascular treatment.  - Continue aspirin 81  mg daily, started per neurology 2/2  - 14 day Codyopaannie present at admission to ARU  - SBP goal <160, though per neurology, would not treat more aggressively than this due to risk of precipitating recurrent stroke symptoms. Per discussion with sending team on admission, no scheduled or PRN BP meds recommended at this time.  - Plan 4 week follow up with Dr. Finnegan at [facility] neurology on discharge.   - PT/OT. Discharged to ARU for ongoing therapies  - Regular diet   - Follow up with neurology, Dr. Brooke Finnegan ([facility]) in 4 weeks    Multiple acute SAH  Acute SDH  Mechanical fall  Presented to ED on 1/29/21 [facility].  CT head showed several acute-appearing areas of intracranial hemorrhage in primarily subarachnoid spaces with small right falx subdural hematoma, no associated mass effect.  Fall felt likely due to ongoing deficits from recent acute strokes in December. Nothing in history to suggest other cause.  PTA ASA held initially upon admission, but resumed as above after recurrent ischemic stroke.  Neurosurgery consulted but felt no operative intervention indicated.  Follow-up CT head on 1/30 with stable scattered foci of subarachnoid hemorrhage.  Per chart review, patient also presented to ED on 1/5/2021 after a fall on 1/4 while at TCU recovering from initial strokes.  CT head demonstrating small new subarachnoid hemorrhage over left frontotemporoparietal region and within the left ambient cistern.  Plavix stopped and discharged back to TCU.  - Continue PT/OT  - Follow up with neurosurgery in 1 month with non-contrast head CT day of appt     Hypertension  Started on diltiazem 120mg daily after recent strokes. Developed some orthostatic symptoms and had some recrudescence of stroke symptoms with exertion. Diltiazem stopped and symptoms resolved. BP elevated during acute hospitalization requiring hydralazine intermittently. Re-challenged with amlodipine during hospital  stay, stopped after stroke on 2/1.  Per discussion with sending team on admission to ARU, no scheduled or PRN BP meds recommended at this time, and continue to monitor.  - SBP goal <160, though per neurology, would not treat more aggressively than this due to risk of precipitating recurrent stroke symptoms.   - As of 2/11, SBP generally 110s-130s  - Continue to monitor BP, but currently under goal     Glaucoma  Continue prior to admission eye drops    Hypokalemia, improving.  First noted during acute hospitalization, 3.1 on 2/1.  Given 1-time supplementation of 20 mEq and improved to 3/5 on 2/2.  Recurrent drop to 3.2 on 2/5, so started on 20 mEq daily.  - Continue potassium chloride 20 mEq daily  - BMP 2/8 with K 3.7  - BMP 2/11 with K 4.4. Will discontinue supplementation and recheck on Mon.  - Trend BMP     L ankle/lower leg pain.  First reported on 2/10.  Mild tenderness over medial malleolus, as well as gastroc and tibialis anterior.  No redness, warmth, ecchymoses, abrasions.  Mild L pedal edema, but present since baseline.  Pain not aggravated by weight-bearing.  Incident with near anterior LOB with L toe catching on 2/9.  Patient also with L inattention, weakness. Suspect possible ankle strain/sprain.  Also recent initiation of TENS and increased lower extremity work with therapies, so maybe muscular.  Imaging does not seem clinically indicated at this time.  - Trial conservative measures: ice, rest, elevation, voltaren PRN, Tylenol  - Continue to monitor     1. Adjustment to disability:  Clinical psychology to eval and treat as indicated  2. FEN: Regular diet and Thin liquids  3. Bowel: continent, intermittent constipation - has Senokot, Miralax, bisacodyl suppository available PRN  4. Bladder: continent  5. DVT Prophylaxis: mechanical  6. GI Prophylaxis: none  7. Code: no CPR-Do NOT Intubate  8. Disposition: assisted living  9. ELOS:  18 days  10. Rehab prognosis: good  11. Follow up Appointments on  Discharge: Neurology 4 week follow up with Dr. Finnegan at [facility]. Neurosurgery f/u in 1 month with non-contrast head CT day of appt.      Seen and discussed with Dr. Casimiro Saldivar, PM&R staff physician     Rosalie Stokes PA-C  Physical Medicine & Rehabilitation

## 2021-02-11 NOTE — PROGRESS NOTES
Discharge Planner Post-Acute Rehab OT:      Discharge Plan: Daughter locating half-way/Memory Care unit. Estimated discharge 2/18/21.      Precautions: fall, seizure, alarms, left side inattention and weakness     Current Status:  ADLs: min A with UB / mod A with LB dressing. CGA/min A with G/H tasks standing at EOS with FWW dt/ posterior leaning. Min A with functional transfers and mobility with FWW. Toileting and clothing management with min A for standing balance.    IADLs: Pt will likely need assistance with IADLs d/t impaired cognition.   Vision/Cognition: Daughter has reported previous impaired vision with right eye. Pt has left sided inattention.      Assessment: Pt continues to be limited by strength/coordination deficits impacting use of AE.  Pt issued elastic laces for increased ease and I with managing shoes.   Recommend OT services to maximize IND and safety with ADLs/IADLs and functional mobility.      Other Barriers to Discharge (DME, Family Training, etc): TBD

## 2021-02-11 NOTE — PLAN OF CARE
Pt used call light to make needs known appropriately. Continent of urine, used toilet. LBM yesterday. C/o mild pain to left ankle but declined  Zio patch on left chest. Denies chest pain or SOB. Participating in therapies.

## 2021-02-11 NOTE — PLAN OF CARE
FOCUS/GOAL  Bladder management, Pain management, Mobility, Cognition/Memory/Judgment/Problem solving, and Safety management    ASSESSMENT, INTERVENTIONS AND CONTINUING PLAN FOR GOAL:  Pt is alert and oriented. Continent and incontinent of bladder, voiding in the toilet. Reported discomfort on bilateral heels, no redness noted. Floated heels off bed and pt reported relief. Tubigrip to LLE. Up CGA with walker to the bathroom. Pt slept well between cares. Uses call light appropriately, able to make needs known. Bed alarm on for safety. Will continue with POC.

## 2021-02-11 NOTE — PLAN OF CARE
Patient is A&Ox4, but can be occasionally forgetful.  Assist of 1 with walker & gait belt. Patient is occasionally incontinent of bladder due to dribbling. Continent of bowel, last BM today.  Regular/thin diet, takes pills whole. Denies pain during shift.  Zio patch on left chest.  Continue POC.

## 2021-02-11 NOTE — PLAN OF CARE
Discharge Planner Post-Acute Rehab PT:     Discharge Plan: daughter's house versus new [facility] facility,  PT     Precautions: falls    Current Status:  Bed Mobility: min A   Transfer: CGA   Gait: CGA   Stairs:  NT - not a goal for pt  Balance: 19/56 Cunningham 2/7/21, intact sitting balance, B UE support for standing balance     Assessment:  pt working well with PT. Pt able for follow new tech for STS having pt verbally tech before each stand with good carryover.     Other Barriers to Discharge (DME, Family Training, etc): Has FWW, will need family training, high falls risk

## 2021-02-12 ENCOUNTER — APPOINTMENT (OUTPATIENT)
Dept: GENERAL RADIOLOGY | Facility: CLINIC | Age: 86
DRG: 057 | End: 2021-02-12
Attending: PHYSICAL MEDICINE & REHABILITATION
Payer: COMMERCIAL

## 2021-02-12 ENCOUNTER — APPOINTMENT (OUTPATIENT)
Dept: OCCUPATIONAL THERAPY | Facility: CLINIC | Age: 86
End: 2021-02-12
Payer: COMMERCIAL

## 2021-02-12 ENCOUNTER — APPOINTMENT (OUTPATIENT)
Dept: SPEECH THERAPY | Facility: CLINIC | Age: 86
End: 2021-02-12
Payer: COMMERCIAL

## 2021-02-12 ENCOUNTER — APPOINTMENT (OUTPATIENT)
Dept: PHYSICAL THERAPY | Facility: CLINIC | Age: 86
End: 2021-02-12
Payer: COMMERCIAL

## 2021-02-12 PROCEDURE — 97535 SELF CARE MNGMENT TRAINING: CPT | Mod: GO | Performed by: OCCUPATIONAL THERAPIST

## 2021-02-12 PROCEDURE — 97129 THER IVNTJ 1ST 15 MIN: CPT | Performed by: SPEECH-LANGUAGE PATHOLOGIST

## 2021-02-12 PROCEDURE — 99232 SBSQ HOSP IP/OBS MODERATE 35: CPT | Performed by: PHYSICAL MEDICINE & REHABILITATION

## 2021-02-12 PROCEDURE — 128N000003 HC R&B REHAB

## 2021-02-12 PROCEDURE — 97112 NEUROMUSCULAR REEDUCATION: CPT | Mod: GP

## 2021-02-12 PROCEDURE — 97130 THER IVNTJ EA ADDL 15 MIN: CPT | Performed by: SPEECH-LANGUAGE PATHOLOGIST

## 2021-02-12 PROCEDURE — 73610 X-RAY EXAM OF ANKLE: CPT | Mod: 26 | Performed by: RADIOLOGY

## 2021-02-12 PROCEDURE — 73610 X-RAY EXAM OF ANKLE: CPT | Mod: LT

## 2021-02-12 PROCEDURE — 73630 X-RAY EXAM OF FOOT: CPT | Mod: LT

## 2021-02-12 PROCEDURE — 73630 X-RAY EXAM OF FOOT: CPT | Mod: 26 | Performed by: RADIOLOGY

## 2021-02-12 PROCEDURE — 250N000013 HC RX MED GY IP 250 OP 250 PS 637: Performed by: PHYSICIAN ASSISTANT

## 2021-02-12 RX ADMIN — CHOLECALCIFEROL TAB 10 MCG (400 UNIT) 400 UNITS: 10 TAB at 07:48

## 2021-02-12 RX ADMIN — DORZOLAMIDE HYDROCHLORIDE AND TIMOLOL MALEATE 1 DROP: 20; 5 SOLUTION/ DROPS OPHTHALMIC at 19:44

## 2021-02-12 RX ADMIN — ATORVASTATIN CALCIUM 40 MG: 40 TABLET, FILM COATED ORAL at 19:45

## 2021-02-12 RX ADMIN — ASPIRIN 81 MG: 81 TABLET, COATED ORAL at 07:48

## 2021-02-12 RX ADMIN — MULTIPLE VITAMINS W/ MINERALS TAB 1 TABLET: TAB at 07:48

## 2021-02-12 RX ADMIN — TRAVOPROST 1 DROP: 0.04 SOLUTION/ DROPS OPHTHALMIC at 19:56

## 2021-02-12 RX ADMIN — DORZOLAMIDE HYDROCHLORIDE AND TIMOLOL MALEATE 1 DROP: 20; 5 SOLUTION/ DROPS OPHTHALMIC at 07:49

## 2021-02-12 NOTE — PLAN OF CARE
Discharge Planner Post-Acute Rehab OT:      Discharge Plan: Daughter locating [facility]/Memory Care unit. Estimated discharge 2/18/21.      Precautions: fall, seizure, alarms, left side inattention and weakness     Current Status:  ADLs: min A with UB / mod A with LB dressing. CGA/min A with G/H tasks standing at EOS with FWW dt/ posterior leaning. Min A with functional transfers and mobility with FWW. Toileting and clothing management with min A for standing balance.    IADLs: Pt will likely need assistance with IADLs d/t impaired cognition.   Vision/Cognition: Daughter has reported previous impaired vision with right eye. Pt has left sided inattention.      Assessment: Pt.  Completed ordering flowers for her dtr. I/S via phone.  Pt. Min/CGA with BADLs and functional mobility. Amb. With FWW throughout room/bathroom with CGA.      Other Barriers to Discharge (DME, Family Training, etc): TBD

## 2021-02-12 NOTE — PLAN OF CARE
Discharge Planner Post-Acute Rehab PT:      Discharge Plan: daughter's house versus new [facility] facility,  PT     Precautions: falls     Current Status:  Bed Mobility: Anthony for LEs and trunk for sit<>supine   Transfer: CGA with FWW  Gait: CGA with FWW, very distractable, intermittent L toe drag   Stairs: NT - not a goal for pt.  Balance: 19/56 Cunningham 2/7/21, intact sitting balance, CGA for standing balance      Assessment: Decreased L toe drag during gait with moleskin to L forefoot. Pt heavily compensates with R LE and has difficulty achieving symmetric L LE activation during tasks. Recommend providing simple tasks with few instructions as pt struggles with motor planning during complex tasks.      Other Barriers to Discharge (DME, Family Training, etc): Has FWW, will need family training, high falls risk

## 2021-02-12 NOTE — PLAN OF CARE
FOCUS/GOAL  Medical management    ASSESSMENT, INTERVENTIONS AND CONTINUING PLAN FOR GOAL:  Patient is stable, ambulated to the bathroom with CGA using gait belt and walker. She had a medium . L ankle and foot RX done today result still pending. Denied pain. Will continue with POC.

## 2021-02-12 NOTE — PLAN OF CARE
Discharge Planner Post-Acute Rehab SLP:      Discharge Plan: likely HH sptx     Precautions: mild to mod cognition      Current Status:  Communication: language skills intact  Cognition: mild  to moderate  impairments in ST memory and problem solving and reasoning   Swallow: regular diet and thin lqiuids     Assessment: SLP: helped pt with logging into internet , searching for flower store, OT may be helping her order. Worked on numerical reasoning, word problems dealing with time , needing only min assist  Barriers to discharge:

## 2021-02-12 NOTE — PROGRESS NOTES
"  [facility]   Acute Rehabilitation Unit  Daily progress note    INTERVAL HISTORY  No acute events reported overnight.  Patient seen and examined this morning.  She reports to be feeling well overall, though with ongoing left-sided weakness and neglect, but her function is improving.  L ankle foot pain persists, will continue conservative management and order xray to r/o occult fracture.  Denies chest pain, shortness of breath, no fever or chills.       Functional:  OT:  ADLs: min A with UB / mod A with LB dressing. CGA/min A with G/H tasks standing at EOS with FWW dt/ posterior leaning. Min A with functional transfers and mobility with FWW. Toileting and clothing management with min A for standing balance.    IADLs: Pt will likely need assistance with IADLs d/t impaired cognition.   Vision/Cognition: Daughter has reported previous impaired vision with right eye. Pt has left sided inattention.        ROS: 10 point ROS neg other than the symptoms noted above in the HPI.      MEDICATIONS    aspirin  81 mg Oral Daily     atorvastatin  40 mg Oral QPM     cholecalciferol  400 Units Oral Daily     dorzolamide-timolol  1 drop Right Eye BID     multivitamin w/minerals  1 tablet Oral Daily     travoprost KORINA FREE  1 drop Both Eyes At Bedtime        acetaminophen, bisacodyl, diclofenac, ondansetron, polyethylene glycol, senna-docusate     PHYSICAL EXAM  /51 (BP Location: Right arm)   Pulse 82   Temp 96  F (35.6  C) (Oral)   Resp 16   Ht 1.6 m (5' 3\")   Wt 60.4 kg (133 lb 1.6 oz)   LMP 01/01/1970   SpO2 99%   BMI 23.58 kg/m    Gen: alert, pleasant elderly female in no acute distress  HEENT:  NCAT, anicteric sclera  Cardio: RRR  Pulm: CTA bilaterally  Ext: warm, well-perfused, no calf tenderness bilaterally, trace LLE edema  Neuro/MSK: AAO x3, left hemiparesis, weaker in LLE (HF 4, KE 3, KF 3, DF 3) than LUE.    Speech clear/fluent, responds appropriately, follows commands.  " Tenderness over left medial malleolus with palpation, also mildly tender just medial to mid-tibia and areas of lateral gastrocnemius also lateral cuneiform and 5th distal metatarsal.   Skin: improving ecchymoses on L side of face over cheek and jaw; no redness, warmth, ecchymoses, abrasions at LLE    LABS    Lab Results   Component Value Date    WBC 7.4 02/08/2021     Lab Results   Component Value Date    RBC 3.89 02/08/2021     Lab Results   Component Value Date    HGB 11.5 02/08/2021     Lab Results   Component Value Date    HCT 35.8 02/08/2021     Lab Results   Component Value Date    MCV 92 02/08/2021     Lab Results   Component Value Date    MCH 29.6 02/08/2021     Lab Results   Component Value Date    MCHC 32.1 02/08/2021     Lab Results   Component Value Date    RDW 14.3 02/08/2021     Lab Results   Component Value Date     02/08/2021         Last Comprehensive Metabolic Panel:  Sodium   Date Value Ref Range Status   02/11/2021 142 133 - 144 mmol/L Final     Potassium   Date Value Ref Range Status   02/11/2021 4.4 3.4 - 5.3 mmol/L Final     Chloride   Date Value Ref Range Status   02/11/2021 109 94 - 109 mmol/L Final     Carbon Dioxide   Date Value Ref Range Status   02/11/2021 29 20 - 32 mmol/L Final     Anion Gap   Date Value Ref Range Status   02/11/2021 4 3 - 14 mmol/L Final     Glucose   Date Value Ref Range Status   02/11/2021 97 70 - 99 mg/dL Final     Urea Nitrogen   Date Value Ref Range Status   02/11/2021 15 7 - 30 mg/dL Final     Creatinine   Date Value Ref Range Status   02/11/2021 0.67 0.52 - 1.04 mg/dL Final     GFR Estimate   Date Value Ref Range Status   02/11/2021 78 >60 mL/min/[1.73_m2] Final     Comment:     Non  GFR Calc  Starting 12/18/2018, serum creatinine based estimated GFR (eGFR) will be   calculated using the Chronic Kidney Disease Epidemiology Collaboration   (CKD-EPI) equation.       Calcium   Date Value Ref Range Status   02/11/2021 9.1 8.5 - 10.1 mg/dL  Final         Rehabilitation - continue comprehensive acute inpatient rehabilitation program with multidisciplinary approach including therapies, rehab nursing, and physiatry following. See interval history for updates.      ASSESSMENT AND PLAN  Jamee Douglas is an 88-year-old R-hand dominant female with hx of multiple R ROBBY ischemic strokes in 12/2020 and fall with SAH 1/5/2021 who was admitted on 1/29/2021 after another fall in which she sustained several intracranial hemorrhages, primarily in the subarachnoid spaces with a small right falx subdural hematoma. Hospital course was complicated by HTN, and an acute increase in L-sided weakness on 2/1 with new R ROBBY subacute infarcts on imaging as well as multiple smaller infarcts.  She was admitted to acute rehab on 2/4/2021 for rehabilitation and ongoing medical management.     Acute ischemic stroke, right ROBBY, felt due to symptomatic intracranial atherosclerosis vs. possible atheroembolic event from aortic arch plaque   Hx recent acute ischemic stroke, right ROBBY in 12/2020 with residual left-sided weakness.  Previously on DAPT with ASA 81 mg and Plavix, though Plavix discontinued 1/5 given SAH.  Developed new dense hemiparesis on left with some speech difficulties on 2/1 in the AM. Stroke code called.  MRI brain showed R ROBBY infarct, with multiple areas of smaller infarcts, and unchanged bilateral subdural hematomas and areas of scattered SAH.  CTA head/neck demonstrating R ROBBY occlusion, L ICA 50-60% stenosis, R ICA with linear filling defect.  Echo EF 60%, normal LA size, bubble study negative.  EKG/tele: NSR.  LDL 53 on 12/18/20, A1c 5.7, trop 0.058.  Options limited due to recent strokes/SAH/SDH, location of lesions preclude IR, etc. therefore no TPA or endovascular treatment.  - Continue aspirin 81 mg daily, started per neurology 2/2  - 14 day Ziopatch present at admission to ARU  - SBP goal <160, though per neurology, would not treat more aggressively  than this due to risk of precipitating recurrent stroke symptoms. Per discussion with sending team on admission, no scheduled or PRN BP meds recommended at this time.  - Plan 4 week follow up with Dr. Finnegan at [facility] neurology on discharge.   - PT/OT. Discharged to ARU for ongoing therapies  - Regular diet   - Follow up with neurology, Dr. Brooke Finnegan ([facility]) in 4 weeks    Multiple acute SAH  Acute SDH  Mechanical fall  Presented to ED on 1/29/21 after fall at [facility].  CT head showed several acute-appearing areas of intracranial hemorrhage in primarily subarachnoid spaces with small right falx subdural hematoma, no associated mass effect.  Fall felt likely due to ongoing deficits from recent acute strokes in December. Nothing in history to suggest other cause.  PTA ASA held initially upon admission, but resumed as above after recurrent ischemic stroke.  Neurosurgery consulted but felt no operative intervention indicated.  Follow-up CT head on 1/30 with stable scattered foci of subarachnoid hemorrhage.  Per chart review, patient also presented to ED on 1/5/2021 after a fall on 1/4 while at TCU recovering from initial strokes.  CT head demonstrating small new subarachnoid hemorrhage over left frontotemporoparietal region and within the left ambient cistern.  Plavix stopped and discharged back to TCU.  - Continue PT/OT  - Follow up with neurosurgery in 1 month with non-contrast head CT day of appt     Hypertension  Started on diltiazem 120mg daily after recent strokes. Developed some orthostatic symptoms and had some recrudescence of stroke symptoms with exertion. Diltiazem stopped and symptoms resolved. BP elevated during acute hospitalization requiring hydralazine intermittently. Re-challenged with amlodipine during hospital stay, stopped after stroke on 2/1.  Per discussion with sending team on admission to ARU, no scheduled or PRN BP meds recommended at this time, and continue  to monitor.  - SBP goal <160, though per neurology, would not treat more aggressively than this due to risk of precipitating recurrent stroke symptoms.   - As of 2/11, SBP generally 110s-130s  - Continue to monitor BP, but currently under goal     Glaucoma  Continue prior to admission eye drops    Hypokalemia, improving.  First noted during acute hospitalization, 3.1 on 2/1.  Given 1-time supplementation of 20 mEq and improved to 3/5 on 2/2.  Recurrent drop to 3.2 on 2/5, so started on 20 mEq daily.  - Continue potassium chloride 20 mEq daily  - BMP 2/8 with K 3.7  - BMP 2/11 with K 4.4. Will discontinue supplementation and recheck on Mon.  - Trend BMP     L ankle/lower leg pain.  First reported on 2/10.  Mild tenderness over medial malleolus, as well as gastroc and tibialis anterior.  No redness, warmth, ecchymoses, abrasions.  Mild L pedal edema, but present since baseline.  Pain not aggravated by weight-bearing.  Incident with near anterior LOB with L toe catching on 2/9.  Patient also with L inattention, weakness. Suspect possible ankle strain/sprain.  Also recent initiation of TENS and increased lower extremity work with therapies, so maybe muscular.  Imaging does not seem clinically indicated at this time.  - Trial conservative measures: ice, rest, elevation, voltaren PRN, Tylenol  - The pain persists today will order xray to r/o occult fracture.      1. Adjustment to disability:  Clinical psychology to eval and treat as indicated  2. FEN: Regular diet and Thin liquids  3. Bowel: continent, intermittent constipation - has Senokot, Miralax, bisacodyl suppository available PRN  4. Bladder: continent  5. DVT Prophylaxis: mechanical  6. GI Prophylaxis: none  7. Code: no CPR-Do NOT Intubate  8. Disposition: assisted living  9. ELOS:  18 days  10. Rehab prognosis: good  11. Follow up Appointments on Discharge: Neurology 4 week follow up with Dr. iFnnegan at [facility]. Neurosurgery f/u in 1 month with  non-contrast head CT day of appt.        Casimiro Saldivar DO      DOS: 2/12/21      I spent a total of 25 minutes face to face and coordinating care of Jamee Douglas. Over 50% of my time on the unit was spent counseling the patient and /or coordinating care regarding recent CVA.

## 2021-02-12 NOTE — PROGRESS NOTES
"Got a call from Nevin Hopper from [facility] stating that pt dtr Melania has been in contact with her, was given some options to look into and toured a facility last night and had shared being happy with that she found.     JUNE called Melania to acknowledge knowing updates. Melania stated \"I would have signed everything yesterday\". Melania is referring to [facility] and has been working with [facility]. Melania stated that she will continue to work on her end to secure what is needed and will wait till rounds on Tues to determine and finalize the date and services pt needs. Plan is for pt to move into their respite care where she can temporarily be until they determine which level of care would be best for pt. Pt will not need to move any furniture, only her person items which Melania will do. Melania stated that [facility] goes into the building to provide HC services. JUNE and Melania discussed plan to confirm things early next week and send HC referral once all is confirmed. Melania denied any needs from JUNE at this time. JUNE will continue to follow.     Julia Verdugo, LICJUNE, [facility] Acute Rehab Unit   Phone: [phone]  I   Pager: [phone]    "

## 2021-02-12 NOTE — PLAN OF CARE
FOCUS/GOAL  Medical management    ASSESSMENT, INTERVENTIONS AND CONTINUING PLAN FOR GOAL:  Patient alert and oriented, but forgetful. Patient requires cues for sequencing at times and needs cues to remain on task, as she is easily distracted. Patient ambulates with CGA and a walker. She was incontinent of a small amount of urine, but then continent on toilet. No bowel movement this shift. No skin concerns. Zio Patch in place to left chest. She denied difficulty with pain or discomfort this shift. Staff to continue with plan of care.

## 2021-02-12 NOTE — PLAN OF CARE
FOCUS/GOAL  Bladder management, Pain management, Mobility, Cognition/Memory/Judgment/Problem solving, and Safety management    ASSESSMENT, INTERVENTIONS AND CONTINUING PLAN FOR GOAL:  Pt is alert and oriented. Continent and incontinent of bladder overnight. Up CGA with walker. Denied pain or discomfort overnight. Appeared to be sleeping well during rounds. Uses call light appropriately, able to make needs known. Bed alarm on for safety. Will continue with POC.

## 2021-02-13 ENCOUNTER — APPOINTMENT (OUTPATIENT)
Dept: PHYSICAL THERAPY | Facility: CLINIC | Age: 86
End: 2021-02-13
Payer: COMMERCIAL

## 2021-02-13 ENCOUNTER — APPOINTMENT (OUTPATIENT)
Dept: SPEECH THERAPY | Facility: CLINIC | Age: 86
End: 2021-02-13
Payer: COMMERCIAL

## 2021-02-13 ENCOUNTER — APPOINTMENT (OUTPATIENT)
Dept: OCCUPATIONAL THERAPY | Facility: CLINIC | Age: 86
End: 2021-02-13
Payer: COMMERCIAL

## 2021-02-13 PROCEDURE — 97112 NEUROMUSCULAR REEDUCATION: CPT | Mod: GO

## 2021-02-13 PROCEDURE — 250N000013 HC RX MED GY IP 250 OP 250 PS 637: Performed by: PHYSICIAN ASSISTANT

## 2021-02-13 PROCEDURE — 97110 THERAPEUTIC EXERCISES: CPT | Mod: GP

## 2021-02-13 PROCEDURE — 128N000003 HC R&B REHAB

## 2021-02-13 PROCEDURE — 97112 NEUROMUSCULAR REEDUCATION: CPT | Mod: GP

## 2021-02-13 PROCEDURE — 97129 THER IVNTJ 1ST 15 MIN: CPT | Performed by: SPEECH-LANGUAGE PATHOLOGIST

## 2021-02-13 PROCEDURE — 97535 SELF CARE MNGMENT TRAINING: CPT | Mod: GO

## 2021-02-13 PROCEDURE — 97116 GAIT TRAINING THERAPY: CPT | Mod: GP

## 2021-02-13 PROCEDURE — 97130 THER IVNTJ EA ADDL 15 MIN: CPT | Performed by: SPEECH-LANGUAGE PATHOLOGIST

## 2021-02-13 PROCEDURE — 97530 THERAPEUTIC ACTIVITIES: CPT | Mod: GP

## 2021-02-13 PROCEDURE — 99232 SBSQ HOSP IP/OBS MODERATE 35: CPT | Performed by: PHYSICAL MEDICINE & REHABILITATION

## 2021-02-13 RX ADMIN — ATORVASTATIN CALCIUM 40 MG: 40 TABLET, FILM COATED ORAL at 20:53

## 2021-02-13 RX ADMIN — TRAVOPROST 1 DROP: 0.04 SOLUTION/ DROPS OPHTHALMIC at 20:53

## 2021-02-13 RX ADMIN — ASPIRIN 81 MG: 81 TABLET, COATED ORAL at 08:30

## 2021-02-13 RX ADMIN — DORZOLAMIDE HYDROCHLORIDE AND TIMOLOL MALEATE 1 DROP: 20; 5 SOLUTION/ DROPS OPHTHALMIC at 20:53

## 2021-02-13 RX ADMIN — MULTIPLE VITAMINS W/ MINERALS TAB 1 TABLET: TAB at 08:30

## 2021-02-13 RX ADMIN — DORZOLAMIDE HYDROCHLORIDE AND TIMOLOL MALEATE 1 DROP: 20; 5 SOLUTION/ DROPS OPHTHALMIC at 09:25

## 2021-02-13 RX ADMIN — CHOLECALCIFEROL TAB 10 MCG (400 UNIT) 400 UNITS: 10 TAB at 08:30

## 2021-02-13 NOTE — PLAN OF CARE
Discharge Planner Post-Acute Rehab SLP:      Discharge Plan: likely HH sptx     Precautions: mild to mod cognition      Current Status:  Communication: language skills intact  Cognition: mild  to moderate  impairments in ST memory and problem solving and reasoning   Swallow: regular diet and thin lqiuids     Assessment: Completed St recall task with using visualization and verbal rehearsal- pt at 11/12 accuracy with this task . Completed number reasoning task- word problems- pt at 1/2 accuracy and completed complex level written deductive reasoning task - needing mod assist to aid in placement of details and with divided attention demands of task       Barriers to discharge:TBD

## 2021-02-13 NOTE — PROGRESS NOTES
"  [facility]   Acute Rehabilitation Unit  Daily progress note    INTERVAL HISTORY  No acute events reported overnight.  Patient seen and examined this morning.  She reports to be feeling well overall, though with ongoing left-sided weakness and neglect, but her function is improving.   Denies chest pain, shortness of breath, no fever or chills.  Xray of left ankle and foot reviewed, no acute issues, chronic degenerative changes.  Reinforced conservative measures.  Continues to make excellent progress with therapy program.    Functional:  PT:  Bed Mobility: Anthony for LEs and trunk for sit<>supine   Transfer: CGA with FWW  Gait: CGA with FWW, very distractable, intermittent L toe drag   Stairs: NT - not a goal for pt.  Balance: 19/56 Cunningham 2/7/21, intact sitting balance, CGA for standing balance         ROS: 10 point ROS neg other than the symptoms noted above in the HPI.      MEDICATIONS    aspirin  81 mg Oral Daily     atorvastatin  40 mg Oral QPM     cholecalciferol  400 Units Oral Daily     dorzolamide-timolol  1 drop Right Eye BID     multivitamin w/minerals  1 tablet Oral Daily     travoprost KORINA FREE  1 drop Both Eyes At Bedtime        acetaminophen, bisacodyl, diclofenac, ondansetron, polyethylene glycol, senna-docusate     PHYSICAL EXAM  BP (!) (P) 112/33 (BP Location: Right arm)   Pulse (P) 88   Temp 98  F (36.7  C) (Oral)   Resp (P) 18   Ht 1.6 m (5' 3\")   Wt 60.4 kg (133 lb 1.6 oz)   LMP 01/01/1970   SpO2 (P) 98%   BMI 23.58 kg/m    Gen: alert, pleasant elderly female in no acute distress  HEENT:  NCAT, anicteric sclera  Cardio: RRR  Pulm: CTA bilaterally  Ext: warm, well-perfused, no calf tenderness bilaterally, trace LLE edema  Neuro/MSK: AAO x3, left hemiparesis, weaker in LLE (HF 4, KE 3, KF 3, DF 3) than LUE.    Speech clear/fluent, responds appropriately, follows commands.  Left neglect.   Skin: improving ecchymoses on L side of face over cheek and jaw; no " redness, warmth, ecchymoses, abrasions at LLE    LABS    Lab Results   Component Value Date    WBC 7.4 02/08/2021     Lab Results   Component Value Date    RBC 3.89 02/08/2021     Lab Results   Component Value Date    HGB 11.5 02/08/2021     Lab Results   Component Value Date    HCT 35.8 02/08/2021     Lab Results   Component Value Date    MCV 92 02/08/2021     Lab Results   Component Value Date    MCH 29.6 02/08/2021     Lab Results   Component Value Date    MCHC 32.1 02/08/2021     Lab Results   Component Value Date    RDW 14.3 02/08/2021     Lab Results   Component Value Date     02/08/2021         Last Comprehensive Metabolic Panel:  Sodium   Date Value Ref Range Status   02/11/2021 142 133 - 144 mmol/L Final     Potassium   Date Value Ref Range Status   02/11/2021 4.4 3.4 - 5.3 mmol/L Final     Chloride   Date Value Ref Range Status   02/11/2021 109 94 - 109 mmol/L Final     Carbon Dioxide   Date Value Ref Range Status   02/11/2021 29 20 - 32 mmol/L Final     Anion Gap   Date Value Ref Range Status   02/11/2021 4 3 - 14 mmol/L Final     Glucose   Date Value Ref Range Status   02/11/2021 97 70 - 99 mg/dL Final     Urea Nitrogen   Date Value Ref Range Status   02/11/2021 15 7 - 30 mg/dL Final     Creatinine   Date Value Ref Range Status   02/11/2021 0.67 0.52 - 1.04 mg/dL Final     GFR Estimate   Date Value Ref Range Status   02/11/2021 78 >60 mL/min/[1.73_m2] Final     Comment:     Non  GFR Calc  Starting 12/18/2018, serum creatinine based estimated GFR (eGFR) will be   calculated using the Chronic Kidney Disease Epidemiology Collaboration   (CKD-EPI) equation.       Calcium   Date Value Ref Range Status   02/11/2021 9.1 8.5 - 10.1 mg/dL Final         Rehabilitation - continue comprehensive acute inpatient rehabilitation program with multidisciplinary approach including therapies, rehab nursing, and physiatry following. See interval history for updates.      ASSESSMENT AND PLAN  Jamee  Stella is an 88-year-old R-hand dominant female with hx of multiple R ROBBY ischemic strokes in 12/2020 and fall with SAH 1/5/2021 who was admitted on 1/29/2021 after another fall in which she sustained several intracranial hemorrhages, primarily in the subarachnoid spaces with a small right falx subdural hematoma. Hospital course was complicated by HTN, and an acute increase in L-sided weakness on 2/1 with new R ROBBY subacute infarcts on imaging as well as multiple smaller infarcts.  She was admitted to acute rehab on 2/4/2021 for rehabilitation and ongoing medical management.     Acute ischemic stroke, right ROBBY, felt due to symptomatic intracranial atherosclerosis vs. possible atheroembolic event from aortic arch plaque   Hx recent acute ischemic stroke, right ROBBY in 12/2020 with residual left-sided weakness.  Previously on DAPT with ASA 81 mg and Plavix, though Plavix discontinued 1/5 given SAH.  Developed new dense hemiparesis on left with some speech difficulties on 2/1 in the AM. Stroke code called.  MRI brain showed R ROBBY infarct, with multiple areas of smaller infarcts, and unchanged bilateral subdural hematomas and areas of scattered SAH.  CTA head/neck demonstrating R ROBBY occlusion, L ICA 50-60% stenosis, R ICA with linear filling defect.  Echo EF 60%, normal LA size, bubble study negative.  EKG/tele: NSR.  LDL 53 on 12/18/20, A1c 5.7, trop 0.058.  Options limited due to recent strokes/SAH/SDH, location of lesions preclude IR, etc. therefore no TPA or endovascular treatment.  - Continue aspirin 81 mg daily, started per neurology 2/2  - 14 day Ziopatch present at admission to ARU  - SBP goal <160, though per neurology, would not treat more aggressively than this due to risk of precipitating recurrent stroke symptoms. Per discussion with sending team on admission, no scheduled or PRN BP meds recommended at this time.  - Plan 4 week follow up with Dr. Finnegan at [facility] on discharge.   -  PT/OT. Discharged to ARU for ongoing therapies  - Regular diet   - Follow up with neurology, Dr. Brooke Finnegan ([facility]) in 4 weeks    Multiple acute SAH  Acute SDH  Mechanical fall  Presented to ED on 1/29/21 after fall at [facility].  CT head showed several acute-appearing areas of intracranial hemorrhage in primarily subarachnoid spaces with small right falx subdural hematoma, no associated mass effect.  Fall felt likely due to ongoing deficits from recent acute strokes in December. Nothing in history to suggest other cause.  PTA ASA held initially upon admission, but resumed as above after recurrent ischemic stroke.  Neurosurgery consulted but felt no operative intervention indicated.  Follow-up CT head on 1/30 with stable scattered foci of subarachnoid hemorrhage.  Per chart review, patient also presented to ED on 1/5/2021 after a fall on 1/4 while at TCU recovering from initial strokes.  CT head demonstrating small new subarachnoid hemorrhage over left frontotemporoparietal region and within the left ambient cistern.  Plavix stopped and discharged back to TCU.  - Continue PT/OT  - Follow up with neurosurgery in 1 month with non-contrast head CT day of appt     Hypertension  Started on diltiazem 120mg daily after recent strokes. Developed some orthostatic symptoms and had some recrudescence of stroke symptoms with exertion. Diltiazem stopped and symptoms resolved. BP elevated during acute hospitalization requiring hydralazine intermittently. Re-challenged with amlodipine during hospital stay, stopped after stroke on 2/1.  Per discussion with sending team on admission to ARU, no scheduled or PRN BP meds recommended at this time, and continue to monitor.  - SBP goal <160, though per neurology, would not treat more aggressively than this due to risk of precipitating recurrent stroke symptoms.   - As of 2/11, SBP generally 110s-130s  - Continue to monitor BP, but currently under  goal     Glaucoma  Continue prior to admission eye drops    Hypokalemia, improving.  First noted during acute hospitalization, 3.1 on 2/1.  Given 1-time supplementation of 20 mEq and improved to 3/5 on 2/2.  Recurrent drop to 3.2 on 2/5, so started on 20 mEq daily.  - Continue potassium chloride 20 mEq daily  - BMP 2/8 with K 3.7  - BMP 2/11 with K 4.4. Will discontinue supplementation and recheck on Mon.  - Trend BMP     L ankle/lower leg pain.  First reported on 2/10.  Mild tenderness over medial malleolus, as well as gastroc and tibialis anterior.  No redness, warmth, ecchymoses, abrasions.  Mild L pedal edema, but present since baseline.  Pain not aggravated by weight-bearing.  Incident with near anterior LOB with L toe catching on 2/9.  Patient also with L inattention, weakness. Suspect possible ankle strain/sprain.  Also recent initiation of TENS and increased lower extremity work with therapies, so maybe muscular.  Imaging does not seem clinically indicated at this time.  - Trial conservative measures: ice, rest, elevation, voltaren PRN, Tylenol  - Xray shows no acute issues, some degenerative changes, will continue consevative measures and monitor.      1. Adjustment to disability:  Clinical psychology to eval and treat as indicated  2. FEN: Regular diet and Thin liquids  3. Bowel: continent, intermittent constipation - has Senokot, Miralax, bisacodyl suppository available PRN  4. Bladder: continent  5. DVT Prophylaxis: mechanical  6. GI Prophylaxis: none  7. Code: no CPR-Do NOT Intubate  8. Disposition: assisted living  9. ELOS:  18 days  10. Rehab prognosis: good  11. Follow up Appointments on Discharge: Neurology 4 week follow up with Dr. Finnegan at [facility]. Neurosurgery f/u in 1 month with non-contrast head CT day of appt.        Casimiro Saldivar DO      DOS: 2/13/21      I spent a total of 25 minutes face to face and coordinating care of Jamee Douglas. Over 50% of my time on the unit  was spent counseling the patient and /or coordinating care regarding recent CVA.

## 2021-02-13 NOTE — PLAN OF CARE
FOCUS/GOAL  Bowel management, Bladder management, Pain management and Cognition/Memory/Judgment/Problem solving    ASSESSMENT, INTERVENTIONS AND CONTINUING PLAN FOR GOAL:  Pt is alert and oriented x4, calling for needs, denies fever, chills, CP, SOB, N/V, abdominal pain, or new weakness/numbness/tingling, continent of bowel and bladder, transferring with assist of 1 and ww, sleeping well throughout the night, no tubes, lines or drains, vs stable, no further care concerns at this time continue with POC.

## 2021-02-13 NOTE — PLAN OF CARE
Patient is alert and oriented. Able to communicate needs effectively. VSS. Denied pain, denied sob, denied CP. A of 1 with walker and belt for transfer and ambulation. continent of bladder. LBM AM shift. No care concern this shift. Continue with POC.

## 2021-02-13 NOTE — PLAN OF CARE
FOCUS/GOAL  Pain management and Medical management    ASSESSMENT, INTERVENTIONS AND CONTINUING PLAN FOR GOAL:  Pt is stable. Reported voiding w/o difficulty. LBM 2/12. Denied pain. no acute osseous abnormality found in yesterday L foot -ankle Xray. Patient aware of the result.Will continue with POC.

## 2021-02-13 NOTE — PLAN OF CARE
Discharge Planner Post-Acute Rehab PT:      Discharge Plan: daughter's house versus new [facility] facility,  PT     Precautions: falls     Current Status:  Bed Mobility: Anthony for LEs and trunk for sit<>supine   Transfer: CGA with FWW  Gait: CGA with FWW, very distractable, intermittent L toe drag   Stairs: NT - not a goal for pt.  Balance: 19/56 Cunningham 2/7/21, intact sitting balance, CGA for standing balance      Assessment: Pt compensates with R LE and has difficulty achieving symmetric L LE activation during tasks. Recommend providing simple tasks with few instructions as pt struggles with motor planning during complex tasks. Continued to note improved motor symmetry during transfers, gait and nustep with cues, pt able to demonstrate carryover with some faded cues.      Other Barriers to Discharge (DME, Family Training, etc): Has FWW, will need family training, high falls risk

## 2021-02-13 NOTE — PLAN OF CARE
Discharge Planner Post-Acute Rehab OT:      Discharge Plan: Daughter locating longterm/Memory Care unit. Estimated discharge 2/18/21.      Precautions: fall, seizure, alarms, left side inattention and weakness     Current Status:  ADLs: SBA with UB with cues and increased time required / mod A with LB dressing uses sockaide, reacher, shoe horn and elastic shoe laces. CGA/min A with G/H tasks standing at EOS with FWW dt/ posterior leaning. Min A with functional transfers and mobility with FWW. Toileting and clothing management with min A for standing balance.    IADLs: Pt will likely need assistance with IADLs d/t impaired cognition.   Vision/Cognition: Daughter has reported previous impaired vision with right eye. Pt has left sided inattention.      Assessment: Pt needs verbal and tactile cues to sit at midline when on EOB. Pt may benefit from using soft sockaide due to pt having difficult time putting her regular socks over the plastic soackaide. Session focused on LB dressing AE, reaching with LUE and finding midline when sitting unsupported.  Pt. Min/CGA with BADLs and functional mobility. Amb. With FWW throughout room/bathroom with CGA.      Other Barriers to Discharge (DME, Family Training, etc): TBD

## 2021-02-14 ENCOUNTER — APPOINTMENT (OUTPATIENT)
Dept: PHYSICAL THERAPY | Facility: CLINIC | Age: 86
End: 2021-02-14
Payer: COMMERCIAL

## 2021-02-14 ENCOUNTER — APPOINTMENT (OUTPATIENT)
Dept: OCCUPATIONAL THERAPY | Facility: CLINIC | Age: 86
End: 2021-02-14
Payer: COMMERCIAL

## 2021-02-14 PROCEDURE — 97112 NEUROMUSCULAR REEDUCATION: CPT | Mod: GO

## 2021-02-14 PROCEDURE — 97530 THERAPEUTIC ACTIVITIES: CPT | Mod: GP

## 2021-02-14 PROCEDURE — 97110 THERAPEUTIC EXERCISES: CPT | Mod: GO

## 2021-02-14 PROCEDURE — 128N000003 HC R&B REHAB

## 2021-02-14 PROCEDURE — 250N000013 HC RX MED GY IP 250 OP 250 PS 637: Performed by: PHYSICIAN ASSISTANT

## 2021-02-14 PROCEDURE — 97110 THERAPEUTIC EXERCISES: CPT | Mod: GP

## 2021-02-14 PROCEDURE — 97116 GAIT TRAINING THERAPY: CPT | Mod: GP

## 2021-02-14 PROCEDURE — 97530 THERAPEUTIC ACTIVITIES: CPT | Mod: GO

## 2021-02-14 PROCEDURE — 99231 SBSQ HOSP IP/OBS SF/LOW 25: CPT | Performed by: PHYSICAL MEDICINE & REHABILITATION

## 2021-02-14 PROCEDURE — 97112 NEUROMUSCULAR REEDUCATION: CPT | Mod: GP

## 2021-02-14 RX ADMIN — TRAVOPROST 1 DROP: 0.04 SOLUTION/ DROPS OPHTHALMIC at 21:06

## 2021-02-14 RX ADMIN — ATORVASTATIN CALCIUM 40 MG: 40 TABLET, FILM COATED ORAL at 20:33

## 2021-02-14 RX ADMIN — CHOLECALCIFEROL TAB 10 MCG (400 UNIT) 400 UNITS: 10 TAB at 08:16

## 2021-02-14 RX ADMIN — ASPIRIN 81 MG: 81 TABLET, COATED ORAL at 08:16

## 2021-02-14 RX ADMIN — DORZOLAMIDE HYDROCHLORIDE AND TIMOLOL MALEATE 1 DROP: 20; 5 SOLUTION/ DROPS OPHTHALMIC at 08:16

## 2021-02-14 RX ADMIN — DORZOLAMIDE HYDROCHLORIDE AND TIMOLOL MALEATE 1 DROP: 20; 5 SOLUTION/ DROPS OPHTHALMIC at 20:33

## 2021-02-14 RX ADMIN — MULTIPLE VITAMINS W/ MINERALS TAB 1 TABLET: TAB at 08:16

## 2021-02-14 NOTE — PLAN OF CARE
XCite stim unit for Activity Based Therapy. Skilled set up; determined appropriate FES parameters for each muscle group based off of strong tetanic response of muscle test.  Used the following activities from the software library: overhead reach and grasp, outward reach and grasp  Intervention and patient response: utilized mirror and visual cues to motor planning, LUE using Xcite and following with RUE to improve bilateral integration. Tolerated well but needed cues for pacing throughout d/t decreased memory/attention of sequence. Improved bilateral symmetry post activity  Please see [email or web address] for further details on patient's stimulation parameters.

## 2021-02-14 NOTE — PLAN OF CARE
Pt denied pain during shift. Denied SOB, denied difficulty breathing. No neuro changes. VSS. Pt A&Ox4, using call light appropriately to express needs. AO1 with walker. Regular, thin diet. Call light within reach. Continue with POC.

## 2021-02-14 NOTE — PROGRESS NOTES
"  [facility]   Acute Rehabilitation Unit  Daily progress note    INTERVAL HISTORY  No acute events reported overnight.  Patient seen and examined this morning.  She reports to be feeling well overall.  Denies chest pain, shortness of breath, no fever or chills.  Left sided neglect is improving.      Functional:  OT:  ADLs: SBA with UB with cues and increased time required / mod A with LB dressing uses sockaide, reacher, shoe horn and elastic shoe laces. CGA/min A with G/H tasks standing at EOS with FWW dt/ posterior leaning. Min A with functional transfers and mobility with FWW. Toileting and clothing management with min A for standing balance.    IADLs: Pt will likely need assistance with IADLs d/t impaired cognition.   Vision/Cognition: Daughter has reported previous impaired vision with right eye. Pt has left sided inattention.          ROS: 10 point ROS neg other than the symptoms noted above in the HPI.      MEDICATIONS    aspirin  81 mg Oral Daily     atorvastatin  40 mg Oral QPM     cholecalciferol  400 Units Oral Daily     dorzolamide-timolol  1 drop Right Eye BID     multivitamin w/minerals  1 tablet Oral Daily     travoprost KORINA FREE  1 drop Both Eyes At Bedtime        acetaminophen, bisacodyl, diclofenac, ondansetron, polyethylene glycol, senna-docusate     PHYSICAL EXAM  /45 (BP Location: Left arm)   Pulse 98   Temp 95.1  F (35.1  C) (Oral)   Resp 18   Ht 1.6 m (5' 3\")   Wt 60.4 kg (133 lb 1.6 oz)   LMP 01/01/1970   SpO2 98%   BMI 23.58 kg/m    Gen: alert, pleasant elderly female in no acute distress, up in chair   HEENT:  NCAT, anicteric sclera  Cardio: RRR  Pulm: CTA bilaterally  Ext: warm, well-perfused, no calf tenderness bilaterally, trace LLE edema  Neuro/MSK: AAO x3, left hemiparesis, weaker in LLE (HF 4, KE 3, KF 3, DF 3) than LUE.    Speech clear/fluent, responds appropriately, follows commands.  Left neglect - improving   Skin: improving " ecchymoses on L side of face over cheek and jaw; no redness, warmth, ecchymoses, abrasions at LLE    LABS    Lab Results   Component Value Date    WBC 7.4 02/08/2021     Lab Results   Component Value Date    RBC 3.89 02/08/2021     Lab Results   Component Value Date    HGB 11.5 02/08/2021     Lab Results   Component Value Date    HCT 35.8 02/08/2021     Lab Results   Component Value Date    MCV 92 02/08/2021     Lab Results   Component Value Date    MCH 29.6 02/08/2021     Lab Results   Component Value Date    MCHC 32.1 02/08/2021     Lab Results   Component Value Date    RDW 14.3 02/08/2021     Lab Results   Component Value Date     02/08/2021         Last Comprehensive Metabolic Panel:  Sodium   Date Value Ref Range Status   02/11/2021 142 133 - 144 mmol/L Final     Potassium   Date Value Ref Range Status   02/11/2021 4.4 3.4 - 5.3 mmol/L Final     Chloride   Date Value Ref Range Status   02/11/2021 109 94 - 109 mmol/L Final     Carbon Dioxide   Date Value Ref Range Status   02/11/2021 29 20 - 32 mmol/L Final     Anion Gap   Date Value Ref Range Status   02/11/2021 4 3 - 14 mmol/L Final     Glucose   Date Value Ref Range Status   02/11/2021 97 70 - 99 mg/dL Final     Urea Nitrogen   Date Value Ref Range Status   02/11/2021 15 7 - 30 mg/dL Final     Creatinine   Date Value Ref Range Status   02/11/2021 0.67 0.52 - 1.04 mg/dL Final     GFR Estimate   Date Value Ref Range Status   02/11/2021 78 >60 mL/min/[1.73_m2] Final     Comment:     Non  GFR Calc  Starting 12/18/2018, serum creatinine based estimated GFR (eGFR) will be   calculated using the Chronic Kidney Disease Epidemiology Collaboration   (CKD-EPI) equation.       Calcium   Date Value Ref Range Status   02/11/2021 9.1 8.5 - 10.1 mg/dL Final         Rehabilitation - continue comprehensive acute inpatient rehabilitation program with multidisciplinary approach including therapies, rehab nursing, and physiatry following. See interval  history for updates.      ASSESSMENT AND PLAN  Jamee Douglas is an 88-year-old R-hand dominant female with hx of multiple R ROBBY ischemic strokes in 12/2020 and fall with SAH 1/5/2021 who was admitted on 1/29/2021 after another fall in which she sustained several intracranial hemorrhages, primarily in the subarachnoid spaces with a small right falx subdural hematoma. Hospital course was complicated by HTN, and an acute increase in L-sided weakness on 2/1 with new R ROBBY subacute infarcts on imaging as well as multiple smaller infarcts.  She was admitted to acute rehab on 2/4/2021 for rehabilitation and ongoing medical management.     Acute ischemic stroke, right ROBBY, felt due to symptomatic intracranial atherosclerosis vs. possible atheroembolic event from aortic arch plaque   Hx recent acute ischemic stroke, right ROBBY in 12/2020 with residual left-sided weakness.  Previously on DAPT with ASA 81 mg and Plavix, though Plavix discontinued 1/5 given SAH.  Developed new dense hemiparesis on left with some speech difficulties on 2/1 in the AM. Stroke code called.  MRI brain showed R ROBBY infarct, with multiple areas of smaller infarcts, and unchanged bilateral subdural hematomas and areas of scattered SAH.  CTA head/neck demonstrating R ROBBY occlusion, L ICA 50-60% stenosis, R ICA with linear filling defect.  Echo EF 60%, normal LA size, bubble study negative.  EKG/tele: NSR.  LDL 53 on 12/18/20, A1c 5.7, trop 0.058.  Options limited due to recent strokes/SAH/SDH, location of lesions preclude IR, etc. therefore no TPA or endovascular treatment.  - Continue aspirin 81 mg daily, started per neurology 2/2  - 14 day Ziopatch present at admission to ARU  - SBP goal <160, though per neurology, would not treat more aggressively than this due to risk of precipitating recurrent stroke symptoms. Per discussion with sending team on admission, no scheduled or PRN BP meds recommended at this time.  - Plan 4 week follow up with   Sivan at [facility] neurology on discharge.   - PT/OT. Discharged to ARU for ongoing therapies  - Regular diet   - Follow up with neurology, Dr. Brooke Finnegan ([facility]) in 4 weeks    Multiple acute SAH  Acute SDH  Mechanical fall  Presented to ED on 1/29/21 after fall at [facility].  CT head showed several acute-appearing areas of intracranial hemorrhage in primarily subarachnoid spaces with small right falx subdural hematoma, no associated mass effect.  Fall felt likely due to ongoing deficits from recent acute strokes in December. Nothing in history to suggest other cause.  PTA ASA held initially upon admission, but resumed as above after recurrent ischemic stroke.  Neurosurgery consulted but felt no operative intervention indicated.  Follow-up CT head on 1/30 with stable scattered foci of subarachnoid hemorrhage.  Per chart review, patient also presented to ED on 1/5/2021 after a fall on 1/4 while at TCU recovering from initial strokes.  CT head demonstrating small new subarachnoid hemorrhage over left frontotemporoparietal region and within the left ambient cistern.  Plavix stopped and discharged back to TCU.  - Continue PT/OT  - Follow up with neurosurgery in 1 month with non-contrast head CT day of appt     Hypertension  Started on diltiazem 120mg daily after recent strokes. Developed some orthostatic symptoms and had some recrudescence of stroke symptoms with exertion. Diltiazem stopped and symptoms resolved. BP elevated during acute hospitalization requiring hydralazine intermittently. Re-challenged with amlodipine during hospital stay, stopped after stroke on 2/1.  Per discussion with sending team on admission to ARU, no scheduled or PRN BP meds recommended at this time, and continue to monitor.  - SBP goal <160, though per neurology, would not treat more aggressively than this due to risk of precipitating recurrent stroke symptoms.   - As of 2/11, SBP generally 110s-130s  -  Continue to monitor BP, but currently under goal     Glaucoma  Continue prior to admission eye drops    Hypokalemia, improving.  First noted during acute hospitalization, 3.1 on 2/1.  Given 1-time supplementation of 20 mEq and improved to 3/5 on 2/2.  Recurrent drop to 3.2 on 2/5, so started on 20 mEq daily.  - Continue potassium chloride 20 mEq daily  - BMP 2/8 with K 3.7  - BMP 2/11 with K 4.4. Will discontinue supplementation and recheck on Mon.  - Trend BMP     L ankle/lower leg pain.  First reported on 2/10.  Mild tenderness over medial malleolus, as well as gastroc and tibialis anterior.  No redness, warmth, ecchymoses, abrasions.  Mild L pedal edema, but present since baseline.  Pain not aggravated by weight-bearing.  Incident with near anterior LOB with L toe catching on 2/9.  Patient also with L inattention, weakness. Suspect possible ankle strain/sprain.  Also recent initiation of TENS and increased lower extremity work with therapies, so maybe muscular.  Imaging does not seem clinically indicated at this time.  - Trial conservative measures: ice, rest, elevation, voltaren PRN, Tylenol  - Xray shows no acute issues, some degenerative changes, will continue consevative measures and monitor.      1. Adjustment to disability:  Clinical psychology to eval and treat as indicated  2. FEN: Regular diet and Thin liquids  3. Bowel: continent, intermittent constipation - has Senokot, Miralax, bisacodyl suppository available PRN  4. Bladder: continent  5. DVT Prophylaxis: mechanical  6. GI Prophylaxis: none  7. Code: no CPR-Do NOT Intubate  8. Disposition: assisted living  9. ELOS:  18 days  10. Rehab prognosis: good  11. Follow up Appointments on Discharge: Neurology 4 week follow up with Dr. Finnegan at [facility]. Neurosurgery f/u in 1 month with non-contrast head CT day of appt.        Casimiro Saldivar DO      DOS: 2/14/21      I spent a total of 15 minutes face to face and coordinating care of Jamee MARQUIS  Stella. Over 50% of my time on the unit was spent counseling the patient and /or coordinating care regarding recent CVA.

## 2021-02-14 NOTE — PLAN OF CARE
FOCUS/GOAL  Medical management    ASSESSMENT, INTERVENTIONS AND CONTINUING PLAN FOR GOAL:  Patient is stable. Good appetite. LBM 2/12. Voiding well,denied pain. Will continue with POC.

## 2021-02-14 NOTE — PLAN OF CARE
Discharge Planner Post-Acute Rehab PT:      Discharge Plan: daughter's house versus new [facility] facility,  PT     Precautions: falls     Current Status:  Bed Mobility: Anthony for LEs and trunk for sit<>supine   Transfer: CGA with FWW  Gait: CGA with FWW, very distractable, intermittent L toe drag   Stairs: NT - not a goal for pt.  Balance: 19/56 Cunningham 2/7/21, intact sitting balance, CGA for standing balance      Assessment: Pt compensates with R LE and has difficulty achieving symmetric L LE activation during tasks, improving L visual attention with faded cues today. Continued to note improved motor symmetry during transfers, gait and nustep with cues, pt able to demonstrate carryover with some faded cues.      Other Barriers to Discharge (DME, Family Training, etc): Has FWW, will need family training, high falls risk

## 2021-02-14 NOTE — PLAN OF CARE
Discharge Planner Post-Acute Rehab OT:      Discharge Plan: Daughter locating senior care/Memory Care unit. Estimated discharge 2/18/21.      Precautions: fall, seizure, alarms, left side inattention and weakness     Current Status:  ADLs: SBA with UB with cues and increased time required / mod A with LB dressing uses sockaide, reacher, shoe horn and elastic shoe laces. CGA/min A with G/H tasks standing at EOS with FWW dt/ posterior leaning. Min A with functional transfers and mobility with FWW. Toileting and clothing management with min A for standing balance.    IADLs: Pt will likely need assistance with IADLs d/t impaired cognition.   Vision/Cognition: Daughter has reported previous impaired vision with right eye. Pt has left sided inattention.      Assessment: Completed UE exercises to promote functional ROM/strength, primarily focusing on functional use of LUE. Completed FMC activity w/increased time and progressed functional  strength using blue foam block. Engaged in conversation w/therapist throughout session, good effort this date.      Other Barriers to Discharge (DME, Family Training, etc): TBD

## 2021-02-14 NOTE — PLAN OF CARE
"[id number]:     Vitals: /46 (BP Location: Left arm)   Pulse 86   Temp 96.8  F (36  C) (Oral)   Resp 18   Ht 1.6 m (5' 3\")   Wt 60.4 kg (133 lb 1.6 oz)   LMP 01/01/1970   SpO2 96%   BMI 23.58 kg/m     Denies CP, SOB.   Output: Incontinent of bladder a couple times this shift, timed toileting Q2-3H.   Last BM: 2/12   Activity: Assist of 1 w/ walker   Skin: Intact.   Pain: Denies   CMS/Neuro: Intact. A&O x 4   Dressing: NA   Diet: Regular/thin, tolerating well. Takes meds whole w/ water   LDA: NA   Plan/Add'l info: Able to clearly communicate needs. Uses call light appropriately. Continue with POC.         "

## 2021-02-15 ENCOUNTER — APPOINTMENT (OUTPATIENT)
Dept: PHYSICAL THERAPY | Facility: CLINIC | Age: 86
End: 2021-02-15
Payer: COMMERCIAL

## 2021-02-15 ENCOUNTER — APPOINTMENT (OUTPATIENT)
Dept: SPEECH THERAPY | Facility: CLINIC | Age: 86
End: 2021-02-15
Payer: COMMERCIAL

## 2021-02-15 ENCOUNTER — APPOINTMENT (OUTPATIENT)
Dept: OCCUPATIONAL THERAPY | Facility: CLINIC | Age: 86
End: 2021-02-15
Payer: COMMERCIAL

## 2021-02-15 LAB
ANION GAP SERPL CALCULATED.3IONS-SCNC: 5 MMOL/L (ref 3–14)
BUN SERPL-MCNC: 17 MG/DL (ref 7–30)
CALCIUM SERPL-MCNC: 8.5 MG/DL (ref 8.5–10.1)
CHLORIDE SERPL-SCNC: 110 MMOL/L (ref 94–109)
CO2 SERPL-SCNC: 28 MMOL/L (ref 20–32)
CREAT SERPL-MCNC: 0.62 MG/DL (ref 0.52–1.04)
ERYTHROCYTE [DISTWIDTH] IN BLOOD BY AUTOMATED COUNT: 13.9 % (ref 10–15)
GFR SERPL CREATININE-BSD FRML MDRD: 80 ML/MIN/{1.73_M2}
GLUCOSE SERPL-MCNC: 96 MG/DL (ref 70–99)
HCT VFR BLD AUTO: 36.7 % (ref 35–47)
HGB BLD-MCNC: 12 G/DL (ref 11.7–15.7)
MCH RBC QN AUTO: 30.6 PG (ref 26.5–33)
MCHC RBC AUTO-ENTMCNC: 32.7 G/DL (ref 31.5–36.5)
MCV RBC AUTO: 94 FL (ref 78–100)
PLATELET # BLD AUTO: 254 10E9/L (ref 150–450)
POTASSIUM SERPL-SCNC: 3.7 MMOL/L (ref 3.4–5.3)
RBC # BLD AUTO: 3.92 10E12/L (ref 3.8–5.2)
SODIUM SERPL-SCNC: 143 MMOL/L (ref 133–144)
WBC # BLD AUTO: 7.5 10E9/L (ref 4–11)

## 2021-02-15 PROCEDURE — 85027 COMPLETE CBC AUTOMATED: CPT | Performed by: PHYSICIAN ASSISTANT

## 2021-02-15 PROCEDURE — 97129 THER IVNTJ 1ST 15 MIN: CPT

## 2021-02-15 PROCEDURE — 97130 THER IVNTJ EA ADDL 15 MIN: CPT

## 2021-02-15 PROCEDURE — 250N000013 HC RX MED GY IP 250 OP 250 PS 637: Performed by: PHYSICIAN ASSISTANT

## 2021-02-15 PROCEDURE — 97530 THERAPEUTIC ACTIVITIES: CPT | Mod: GO | Performed by: OCCUPATIONAL THERAPIST

## 2021-02-15 PROCEDURE — 99232 SBSQ HOSP IP/OBS MODERATE 35: CPT | Performed by: PHYSICAL MEDICINE & REHABILITATION

## 2021-02-15 PROCEDURE — 36415 COLL VENOUS BLD VENIPUNCTURE: CPT | Performed by: PHYSICIAN ASSISTANT

## 2021-02-15 PROCEDURE — 97535 SELF CARE MNGMENT TRAINING: CPT | Mod: GO | Performed by: OCCUPATIONAL THERAPIST

## 2021-02-15 PROCEDURE — 97530 THERAPEUTIC ACTIVITIES: CPT | Mod: GP

## 2021-02-15 PROCEDURE — 80048 BASIC METABOLIC PNL TOTAL CA: CPT | Performed by: PHYSICIAN ASSISTANT

## 2021-02-15 PROCEDURE — 128N000003 HC R&B REHAB

## 2021-02-15 PROCEDURE — 97112 NEUROMUSCULAR REEDUCATION: CPT | Mod: GP

## 2021-02-15 RX ADMIN — DORZOLAMIDE HYDROCHLORIDE AND TIMOLOL MALEATE 1 DROP: 20; 5 SOLUTION/ DROPS OPHTHALMIC at 09:24

## 2021-02-15 RX ADMIN — SENNOSIDES AND DOCUSATE SODIUM 2 TABLET: 8.6; 5 TABLET ORAL at 20:53

## 2021-02-15 RX ADMIN — ASPIRIN 81 MG: 81 TABLET, COATED ORAL at 09:25

## 2021-02-15 RX ADMIN — CHOLECALCIFEROL TAB 10 MCG (400 UNIT) 400 UNITS: 10 TAB at 09:25

## 2021-02-15 RX ADMIN — MULTIPLE VITAMINS W/ MINERALS TAB 1 TABLET: TAB at 09:25

## 2021-02-15 RX ADMIN — ATORVASTATIN CALCIUM 40 MG: 40 TABLET, FILM COATED ORAL at 20:53

## 2021-02-15 RX ADMIN — TRAVOPROST 1 DROP: 0.04 SOLUTION/ DROPS OPHTHALMIC at 20:53

## 2021-02-15 RX ADMIN — DORZOLAMIDE HYDROCHLORIDE AND TIMOLOL MALEATE 1 DROP: 20; 5 SOLUTION/ DROPS OPHTHALMIC at 20:52

## 2021-02-15 NOTE — PROGRESS NOTES
"  [facility]   Acute Rehabilitation Unit  Daily progress note    INTERVAL HISTORY  No acute events reported overnight.  Patient seen and examined this morning. Denies chest pain, shortness of breath, no fever or chills.  Left sided neglect is improving and she is continuing to make great gains.  Will look for discharge planning this week.  Labs reviewed, no acute issues, encourage PO intake.      Functional  OT:  ADLs: Set-up and supervision with UB with cues and increased time required. Min A with LB dressing-pt able to now cross legs to thread BLE's and to don socks seated in supported chair. Close SBA/CGA with G/H tasks standing at EOS with FWW. CGA/Min A with functional transfers and mobility with FWW. Toileting and clothing management with min A for standing balance.    IADLs: Pt will likely need assistance with IADLs d/t impaired cognition.   Vision/Cognition: Daughter has reported previous impaired vision with right eye. Pt has left sided inattention.          ROS: 10 point ROS neg other than the symptoms noted above in the HPI.      MEDICATIONS    aspirin  81 mg Oral Daily     atorvastatin  40 mg Oral QPM     cholecalciferol  400 Units Oral Daily     dorzolamide-timolol  1 drop Right Eye BID     multivitamin w/minerals  1 tablet Oral Daily     travoprost KORINA FREE  1 drop Both Eyes At Bedtime        acetaminophen, bisacodyl, diclofenac, ondansetron, polyethylene glycol, senna-docusate     PHYSICAL EXAM  /52 (BP Location: Right arm)   Pulse 64   Temp 96.3  F (35.7  C) (Oral)   Resp 16   Ht 1.6 m (5' 3\")   Wt 60.4 kg (133 lb 1.6 oz)   LMP 01/01/1970   SpO2 96%   BMI 23.58 kg/m    Gen: alert, pleasant elderly female in no acute distress, up in chair   HEENT:  NCAT, anicteric sclera  Cardio: rrr  Pulm: CTA bilaterally, no distress  Ext: warm, well-perfused  Neuro/MSK: AAO x3, left hemiparesis, weaker in LLE (HF 4, KE 3, KF 3, DF 3) than LUE.    Speech " clear/fluent, responds appropriately, follows commands.  Left neglect - improving   Skin: improving ecchymoses on L side of face over cheek and jaw; no redness, warmth, ecchymoses, abrasions at LLE    LABS    Lab Results   Component Value Date    WBC 7.5 02/15/2021     Lab Results   Component Value Date    RBC 3.92 02/15/2021     Lab Results   Component Value Date    HGB 12.0 02/15/2021     Lab Results   Component Value Date    HCT 36.7 02/15/2021     Lab Results   Component Value Date    MCV 94 02/15/2021     Lab Results   Component Value Date    MCH 30.6 02/15/2021     Lab Results   Component Value Date    MCHC 32.7 02/15/2021     Lab Results   Component Value Date    RDW 13.9 02/15/2021     Lab Results   Component Value Date     02/15/2021           Last Comprehensive Metabolic Panel:  Sodium   Date Value Ref Range Status   02/15/2021 143 133 - 144 mmol/L Final     Potassium   Date Value Ref Range Status   02/15/2021 3.7 3.4 - 5.3 mmol/L Final     Chloride   Date Value Ref Range Status   02/15/2021 110 (H) 94 - 109 mmol/L Final     Carbon Dioxide   Date Value Ref Range Status   02/15/2021 28 20 - 32 mmol/L Final     Anion Gap   Date Value Ref Range Status   02/15/2021 5 3 - 14 mmol/L Final     Glucose   Date Value Ref Range Status   02/15/2021 96 70 - 99 mg/dL Final     Urea Nitrogen   Date Value Ref Range Status   02/15/2021 17 7 - 30 mg/dL Final     Creatinine   Date Value Ref Range Status   02/15/2021 0.62 0.52 - 1.04 mg/dL Final     GFR Estimate   Date Value Ref Range Status   02/15/2021 80 >60 mL/min/[1.73_m2] Final     Comment:     Non  GFR Calc  Starting 12/18/2018, serum creatinine based estimated GFR (eGFR) will be   calculated using the Chronic Kidney Disease Epidemiology Collaboration   (CKD-EPI) equation.       Calcium   Date Value Ref Range Status   02/15/2021 8.5 8.5 - 10.1 mg/dL Final         Rehabilitation - continue comprehensive acute inpatient rehabilitation program with  multidisciplinary approach including therapies, rehab nursing, and physiatry following. See interval history for updates.      ASSESSMENT AND PLAN  Jamee Douglas is an 88-year-old R-hand dominant female with hx of multiple R ROBBY ischemic strokes in 12/2020 and fall with SAH 1/5/2021 who was admitted on 1/29/2021 after another fall in which she sustained several intracranial hemorrhages, primarily in the subarachnoid spaces with a small right falx subdural hematoma. Hospital course was complicated by HTN, and an acute increase in L-sided weakness on 2/1 with new R ROBBY subacute infarcts on imaging as well as multiple smaller infarcts.  She was admitted to acute rehab on 2/4/2021 for rehabilitation and ongoing medical management.     Acute ischemic stroke, right ROBBY, felt due to symptomatic intracranial atherosclerosis vs. possible atheroembolic event from aortic arch plaque   Hx recent acute ischemic stroke, right ROBBY in 12/2020 with residual left-sided weakness.  Previously on DAPT with ASA 81 mg and Plavix, though Plavix discontinued 1/5 given SAH.  Developed new dense hemiparesis on left with some speech difficulties on 2/1 in the AM. Stroke code called.  MRI brain showed R ROBBY infarct, with multiple areas of smaller infarcts, and unchanged bilateral subdural hematomas and areas of scattered SAH.  CTA head/neck demonstrating R ROBBY occlusion, L ICA 50-60% stenosis, R ICA with linear filling defect.  Echo EF 60%, normal LA size, bubble study negative.  EKG/tele: NSR.  LDL 53 on 12/18/20, A1c 5.7, trop 0.058.  Options limited due to recent strokes/SAH/SDH, location of lesions preclude IR, etc. therefore no TPA or endovascular treatment.  - Continue aspirin 81 mg daily, started per neurology 2/2  - 14 day Ziopatch present at admission to ARU  - SBP goal <160, though per neurology, would not treat more aggressively than this due to risk of precipitating recurrent stroke symptoms. Per discussion with sending team on  admission, no scheduled or PRN BP meds recommended at this time.  - Plan 4 week follow up with Dr. Finnegan at [facility] neurology on discharge.   - PT/OT. Discharged to ARU for ongoing therapies  - Regular diet   - Follow up with neurology, Dr. Brooke Finnegan ([facility]) in 4 weeks    Multiple acute SAH  Acute SDH  Mechanical fall  Presented to ED on 1/29/21 [facility].  CT head showed several acute-appearing areas of intracranial hemorrhage in primarily subarachnoid spaces with small right falx subdural hematoma, no associated mass effect.  Fall felt likely due to ongoing deficits from recent acute strokes in December. Nothing in history to suggest other cause.  PTA ASA held initially upon admission, but resumed as above after recurrent ischemic stroke.  Neurosurgery consulted but felt no operative intervention indicated.  Follow-up CT head on 1/30 with stable scattered foci of subarachnoid hemorrhage.  Per chart review, patient also presented to ED on 1/5/2021 after a fall on 1/4 while at TCU recovering from initial strokes.  CT head demonstrating small new subarachnoid hemorrhage over left frontotemporoparietal region and within the left ambient cistern.  Plavix stopped and discharged back to TCU.  - Continue PT/OT  - Follow up with neurosurgery in 1 month with non-contrast head CT day of appt     Hypertension  Started on diltiazem 120mg daily after recent strokes. Developed some orthostatic symptoms and had some recrudescence of stroke symptoms with exertion. Diltiazem stopped and symptoms resolved. BP elevated during acute hospitalization requiring hydralazine intermittently. Re-challenged with amlodipine during hospital stay, stopped after stroke on 2/1.  Per discussion with sending team on admission to ARU, no scheduled or PRN BP meds recommended at this time, and continue to monitor.  - SBP goal <160, though per neurology, would not treat more aggressively than this due to  risk of precipitating recurrent stroke symptoms.   - As of 2/11, SBP generally 110s-130s  - Continue to monitor BP, but currently under goal     Glaucoma  Continue prior to admission eye drops    Hypokalemia, improving.  First noted during acute hospitalization, 3.1 on 2/1.  Given 1-time supplementation of 20 mEq and improved to 3/5 on 2/2.  Recurrent drop to 3.2 on 2/5, so started on 20 mEq daily.  - Continue potassium chloride 20 mEq daily  - BMP 2/8 with K 3.7  - BMP 2/11 with K 4.4. Will discontinue supplementation and recheck on Mon 2/15 it is 3.7      L ankle/lower leg pain.  First reported on 2/10.  Mild tenderness over medial malleolus, as well as gastroc and tibialis anterior.  No redness, warmth, ecchymoses, abrasions.  Mild L pedal edema, but present since baseline.  Pain not aggravated by weight-bearing.  Incident with near anterior LOB with L toe catching on 2/9.  Patient also with L inattention, weakness. Suspect possible ankle strain/sprain.  Also recent initiation of TENS and increased lower extremity work with therapies, so maybe muscular.  Imaging does not seem clinically indicated at this time.  - Trial conservative measures: ice, rest, elevation, voltaren PRN, Tylenol  - Xray shows no acute issues, some degenerative changes, will continue consevative measures and monitor.      1. Adjustment to disability:  Clinical psychology to eval and treat as indicated  2. FEN: Regular diet and Thin liquids  3. Bowel: continent, intermittent constipation - has Senokot, Miralax, bisacodyl suppository available PRN  4. Bladder: continent  5. DVT Prophylaxis: mechanical  6. GI Prophylaxis: none  7. Code: no CPR-Do NOT Intubate  8. Disposition: assisted living  9. ELOS:  18 days  10. Rehab prognosis: good  11. Follow up Appointments on Discharge: Neurology 4 week follow up with Dr. Finnegan at [facility]. Neurosurgery f/u in 1 month with non-contrast head CT day of appt.        Casimiro Saldivar,  DO      DOS: 2/15/21      I spent a total of 25 minutes face to face and coordinating care of Jamee Douglas. Over 50% of my time on the unit was spent counseling the patient and /or coordinating care regarding recent CVA.

## 2021-02-15 NOTE — PLAN OF CARE
FOCUS/GOAL  Medical management    ASSESSMENT, INTERVENTIONS AND CONTINUING PLAN FOR GOAL:  Patient is stable. Up to the bathroom with CGA using walker and gait belt, she voided. Denied pain. With continue with POC.

## 2021-02-15 NOTE — PLAN OF CARE
[id number]  VS: A&Ox4. VSS. Denied chest pain. Denied SOB. No c/o pain.    Output: Voiding spontaneously w/o difficulty. Last BM 2/13.   Activity: Ax1 via walker    Skin/Dressing: Intact   Diet: Regular   Additional Info/Plan: Pt is able to make her needs known. Call light within reach, called appropriately this shift.   Continue POC.

## 2021-02-15 NOTE — PROGRESS NOTES
"VS: /54 (BP Location: Left arm)   Pulse 90   Temp 96.2  F (35.7  C) (Oral)   Resp 16   Ht 1.6 m (5' 3\")   Wt 60.4 kg (133 lb 1.6 oz)   LMP 01/01/1970   SpO2 98%   BMI 23.58 kg/m   Alert, unable to assess orientation. Denies shortness of breath, chest pain, dizziness, lightheadedness and nausea and vomiting.   O2: Room air sat. > 90%.    Output: Voiding adequate amount without difficulty.   Last BM: 2/13   Activity: AX 1 with walker.   Skin: Intact.   Pain: Denies.   CMS: CMS and neuro intact.   Dressing: None   Diet: Regular   LDA: N/A   Equipment: Walker and personal belongings.   Plan: TBD.   Additional Info:  ZIO patch in place.     "

## 2021-02-15 NOTE — PLAN OF CARE
"Discharge Planner Post-Acute Rehab SLP:      Discharge Plan: likely HH sptx     Precautions: mild to mod cognition      Current Status:  Communication: language skills intact  Cognition: mild  to moderate  impairments in ST memory and problem solving and reasoning   Swallow: regular diet and thin lqiuids     Assessment: Pt engaged in moderate-difficult level problem solving and memory tasks. Pt benefits from moderate cueing in the form of rewording questions or statements as well as being given two options during problem solving tasks. Pt was given a worksheet to complete in her free time in which she completed about 60% until she got frustrated. Once given cueing, she is quick to understand the reasoning behind the correct answer and/or knows the answer. Pt is beginning to use cognitive strategies to help with memorizing information (writing down or rereading). engaged in a functional memory task involving specific information retention. Length of inforamation was paragraph form around 4-5 sentences. pt did well with this activity answering 5-6 questions per each paragraph (3). She needed minimal cueing (say sentence again in \"fill in the blank\" form) in 3 out of 16 total questions.     Barriers to discharge:TBD  "

## 2021-02-15 NOTE — PROGRESS NOTES
Discharge Planner Post-Acute Rehab OT:      Discharge Plan: Daughter locating residential/Memory Care unit. Estimated discharge 2/18/21.      Precautions: fall, seizure, alarms, left side inattention and weakness     Current Status:  ADLs: Set-up and supervision with UB with cues and increased time required. Min A with LB dressing-pt able to now cross legs to thread BLE's and to don socks seated in supported chair. Close SBA/CGA with G/H tasks standing at EOS with FWW. CGA/Min A with functional transfers and mobility with FWW. Toileting and clothing management with min A for standing balance.    IADLs: Pt will likely need assistance with IADLs d/t impaired cognition.   Vision/Cognition: Daughter has reported previous impaired vision with right eye. Pt has left sided inattention.      Assessment: Pt presenting with increased IND with ADLs with FWW. Pt demonstrating improved left hand coordination and sitting balance to complete LBD tasks without AE when seated in chair. Pt continues to present with posterior leaning when standing without UE support limiting IND with standing balance. Pt would benefit from continued OT services to maximize IND and safety with ADLs and functional transfers/mobility.      Other Barriers to Discharge (DME, Family Training, etc): TBD

## 2021-02-15 NOTE — PLAN OF CARE
FOCUS/GOAL  Bowel management, Bladder management, Pain management and Cognition/Memory/Judgment/Problem solving    ASSESSMENT, INTERVENTIONS AND CONTINUING PLAN FOR GOAL:  Pt is alert and oriented x4, denies fever, chills, CP, SOB, N/V, abdominal pain, or new weakness/numbness/tingling, continent of bowel and  Both continent and incontinent of bladder, transferring with assist of 1 and ww, sleeping well throughout the night, no tubes, lines or drains, vs stable, zio patch left chest, no further care concerns at this time continue with POC.

## 2021-02-15 NOTE — PLAN OF CARE
Discharge Planner Post-Acute Rehab PT:      Discharge Plan: daughter's house versus new [facility] facility,  PT     Precautions: falls     Current Status:  Bed Mobility: Anthony for LEs and trunk for sit<>supine   Transfer: CGA with FWW  Gait: CGA with FWW, very distractable, intermittent L toe drag   Stairs: NT - not a goal for pt.  Balance: 19/56 Cunningham 2/7/21, intact sitting balance, CGA/UE support for standing balance      Assessment: Improved L LE movement amplitude and symmetry noted today. Pt able to acknowledge and correct for L toe catching with several repetitions. Pt tolerating PT well. Continue working on L LE coordination and attention.     Other Barriers to Discharge (DME, Family Training, etc): Has FWW, will need family training, high falls risk

## 2021-02-16 ENCOUNTER — APPOINTMENT (OUTPATIENT)
Dept: SPEECH THERAPY | Facility: CLINIC | Age: 86
End: 2021-02-16
Payer: COMMERCIAL

## 2021-02-16 ENCOUNTER — APPOINTMENT (OUTPATIENT)
Dept: PHYSICAL THERAPY | Facility: CLINIC | Age: 86
End: 2021-02-16
Payer: COMMERCIAL

## 2021-02-16 ENCOUNTER — APPOINTMENT (OUTPATIENT)
Dept: OCCUPATIONAL THERAPY | Facility: CLINIC | Age: 86
End: 2021-02-16
Payer: COMMERCIAL

## 2021-02-16 PROCEDURE — 250N000013 HC RX MED GY IP 250 OP 250 PS 637: Performed by: PHYSICIAN ASSISTANT

## 2021-02-16 PROCEDURE — 97130 THER IVNTJ EA ADDL 15 MIN: CPT | Performed by: SPEECH-LANGUAGE PATHOLOGIST

## 2021-02-16 PROCEDURE — 128N000003 HC R&B REHAB

## 2021-02-16 PROCEDURE — 999N000150 HC STATISTIC PT MED CONFERENCE < 30 MIN

## 2021-02-16 PROCEDURE — 97112 NEUROMUSCULAR REEDUCATION: CPT | Mod: GP

## 2021-02-16 PROCEDURE — 97129 THER IVNTJ 1ST 15 MIN: CPT | Performed by: SPEECH-LANGUAGE PATHOLOGIST

## 2021-02-16 PROCEDURE — 999N000125 HC STATISTIC PATIENT MED CONFERENCE < 30 MIN: Performed by: OCCUPATIONAL THERAPIST

## 2021-02-16 PROCEDURE — 99233 SBSQ HOSP IP/OBS HIGH 50: CPT | Performed by: PHYSICAL MEDICINE & REHABILITATION

## 2021-02-16 PROCEDURE — 97535 SELF CARE MNGMENT TRAINING: CPT | Mod: GO | Performed by: OCCUPATIONAL THERAPIST

## 2021-02-16 PROCEDURE — 97110 THERAPEUTIC EXERCISES: CPT | Mod: GP | Performed by: REHABILITATION PRACTITIONER

## 2021-02-16 PROCEDURE — 999N000125 HC STATISTIC PATIENT MED CONFERENCE < 30 MIN

## 2021-02-16 PROCEDURE — 97116 GAIT TRAINING THERAPY: CPT | Mod: GP | Performed by: REHABILITATION PRACTITIONER

## 2021-02-16 RX ADMIN — ATORVASTATIN CALCIUM 40 MG: 40 TABLET, FILM COATED ORAL at 20:00

## 2021-02-16 RX ADMIN — DORZOLAMIDE HYDROCHLORIDE AND TIMOLOL MALEATE 1 DROP: 20; 5 SOLUTION/ DROPS OPHTHALMIC at 08:17

## 2021-02-16 RX ADMIN — ASPIRIN 81 MG: 81 TABLET, COATED ORAL at 08:17

## 2021-02-16 RX ADMIN — DORZOLAMIDE HYDROCHLORIDE AND TIMOLOL MALEATE 1 DROP: 20; 5 SOLUTION/ DROPS OPHTHALMIC at 19:59

## 2021-02-16 RX ADMIN — CHOLECALCIFEROL TAB 10 MCG (400 UNIT) 400 UNITS: 10 TAB at 08:17

## 2021-02-16 RX ADMIN — TRAVOPROST 1 DROP: 0.04 SOLUTION/ DROPS OPHTHALMIC at 20:09

## 2021-02-16 RX ADMIN — MULTIPLE VITAMINS W/ MINERALS TAB 1 TABLET: TAB at 08:17

## 2021-02-16 NOTE — PLAN OF CARE
Discharge Planner Post-Acute Rehab PT:     Discharge Plan: daughter's house versus new [facility] facility,  PT     Precautions: falls    Current Status:  Bed Mobility: min A for trunk control from side lying to sit.   Transfer: CGA with WW, V.c 50% of the time for STS   Gait: CGA to HHA for amb up to 200'x @  Stairs:  NT - not a goal for pt  Balance: CGA to min A for balance and stability.     Assessment:  pt cont to work hard with PT- pt cont to need V.c for forward posture on STS, longer step length on L.     Other Barriers to Discharge (DME, Family Training, etc):  Has FWW, will need family training, high falls risk

## 2021-02-16 NOTE — PLAN OF CARE
Patient is Alert and oriented x4, makes needs known. Denies pain. Voiding spontaneously, LBM this morning. Rounds completed today (see note), tentative discharge Thursday. Continue with POC.

## 2021-02-16 NOTE — PROGRESS NOTES
CLINICAL NUTRITION SERVICES - REASSESSMENT NOTE     Nutrition Prescription    RECOMMENDATIONS FOR MDs/PROVIDERS TO ORDER:  None today - pt reviewed face to face during care team rounds     Malnutrition Status:    Patient does not meet two of the established criteria necessary for diagnosing malnutrition    Recommendations already ordered by Registered Dietitian (RD):  None today - continue diet/supplement/snacks as ordered     Future/Additional Recommendations:  If pt were to remain admitted to ARU, RD would continue to monitor meal/snack intakes and weight trends     EVALUATION OF THE PROGRESS TOWARD GOALS   Diet: Regular  Supplement: Food snacks - On Mon/Wed/Fri/Sun send cottage cheese with fruit cup at 2 PM snack time, on Tues/Thurs/Sat send cheese and crackers at 2 PM snack time  Offer Gelatein PRN   Intake: % per flow sheets     NEW FINDINGS   MAR reviewed. Labs reviewed, K+ remains WNL. Pt reviewed during care team rounds, no nutritional concerns, family on the phone reviewed plan for pt to go to [place] upon discharge where meals will be provided, RD reviewed/encouraged snacks between meals as we are doing for pt here on ARU, family agreeing. Pt planned for discharge at the end of this week.     02/11/21 0540 60.4 kg (133 lb 1.6 oz)   02/04/21 1250 61.6 kg (135 lb 14.4 oz)     Weight assessment: Trends appear stable from pt reported UBW of 132 lbs.     MALNUTRITION  % Intake: No decreased intake noted  % Weight Loss: None noted  Subcutaneous Fat Loss: Facial region: mild vs age related   Muscle Loss: Facial & jaw region: mild vs age related   Fluid Accumulation/Edema: None noted  Malnutrition Diagnosis: Patient does not meet two of the established criteria necessary for diagnosing malnutrition    Previous Goals   Patient to consume % of nutritionally adequate meal trays TID, or the equivalent with supplements/snacks.  Evaluation: Likely met    Previous Nutrition Diagnosis  Predicted inadequate nutrient  intake related to prior decreased appetite as evidenced by pt now taking 50-75% of meals and accepting snack/supplement   Evaluation: Resolved    CURRENT NUTRITION DIAGNOSIS  Predicted inadequate nutrient intake related to LOS     INTERVENTIONS  Implementation  Modify composition of meals/snacks - continue snacks and supplement PRN as ordered above     Goals  Patient to consume % of nutritionally adequate meal trays TID, or the equivalent with supplements/snacks.    Monitoring/Evaluation  Progress toward goals will be monitored and evaluated per protocol.    Luz Kang RD, CNSC, LD  ARU RD pager: [phone]

## 2021-02-16 NOTE — PLAN OF CARE
FOCUS/GOAL  Bladder management, Pain management, Mobility, Cognition/Memory/Judgment/Problem solving, and Safety management    ASSESSMENT, INTERVENTIONS AND CONTINUING PLAN FOR GOAL:  Pt is alert and oriented. Incontinent of bladder this shift. Incontinent and continent of bowel this shift. Needs assistance with changing incontinence pad. Up with assist of 1 with walker. Denied pain or discomfort overnight. Appeared to be sleeping well during rounds. Uses call light appropriately, able to make needs known. Bed alarm on for safety. Will continue with POC.

## 2021-02-16 NOTE — PROGRESS NOTES
"  [facility]   Acute Rehabilitation Unit  Daily progress note    INTERVAL HISTORY  No acute events reported overnight.  Patient seen and examined this morning. Denies chest pain, shortness of breath, no fever or chills.  Discussed with patient and daughter success and still need for supervision, but overall she has made gains.  Will look to discharge to assistive living this week.   Still some neglect and upper level cognitive impairments that limit safety.       Functional  SLP:  Communication: language skills intact  Cognition: mild  to moderate  impairments in ST memory and problem solving and reasoning   Swallow: regular diet and thin lqiuids         ROS: 10 point ROS neg other than the symptoms noted above in the HPI.      MEDICATIONS    aspirin  81 mg Oral Daily     atorvastatin  40 mg Oral QPM     cholecalciferol  400 Units Oral Daily     dorzolamide-timolol  1 drop Right Eye BID     multivitamin w/minerals  1 tablet Oral Daily     travoprost KORINA FREE  1 drop Both Eyes At Bedtime        acetaminophen, bisacodyl, diclofenac, ondansetron, polyethylene glycol, senna-docusate     PHYSICAL EXAM  /58 (BP Location: Right arm)   Pulse 84   Temp 96.2  F (35.7  C) (Skin)   Resp 16   Ht 1.6 m (5' 3\")   Wt 60.4 kg (133 lb 1.6 oz)   LMP 01/01/1970   SpO2 99%   BMI 23.58 kg/m    Gen: alert, pleasant elderly female in no acute distress, up in chair   HEENT:  NCAT, anicteric sclera  Cardio: rrr  Pulm: CTA bilaterally, no distress  Ext: warm, well-perfused  Neuro/MSK: AAO x3, left hemiparesis, weaker in LLE (HF 4, KE 3, KF 3, DF 3) than LUE.    Speech clear/fluent, responds appropriately, follows commands.  Left neglect - improving   Skin: improving ecchymoses on L side of face over cheek and jaw; no redness, warmth, ecchymoses, abrasions at LLE    LABS    Lab Results   Component Value Date    WBC 7.5 02/15/2021     Lab Results   Component Value Date    RBC 3.92 02/15/2021 "     Lab Results   Component Value Date    HGB 12.0 02/15/2021     Lab Results   Component Value Date    HCT 36.7 02/15/2021     Lab Results   Component Value Date    MCV 94 02/15/2021     Lab Results   Component Value Date    MCH 30.6 02/15/2021     Lab Results   Component Value Date    MCHC 32.7 02/15/2021     Lab Results   Component Value Date    RDW 13.9 02/15/2021     Lab Results   Component Value Date     02/15/2021           Last Comprehensive Metabolic Panel:  Sodium   Date Value Ref Range Status   02/15/2021 143 133 - 144 mmol/L Final     Potassium   Date Value Ref Range Status   02/15/2021 3.7 3.4 - 5.3 mmol/L Final     Chloride   Date Value Ref Range Status   02/15/2021 110 (H) 94 - 109 mmol/L Final     Carbon Dioxide   Date Value Ref Range Status   02/15/2021 28 20 - 32 mmol/L Final     Anion Gap   Date Value Ref Range Status   02/15/2021 5 3 - 14 mmol/L Final     Glucose   Date Value Ref Range Status   02/15/2021 96 70 - 99 mg/dL Final     Urea Nitrogen   Date Value Ref Range Status   02/15/2021 17 7 - 30 mg/dL Final     Creatinine   Date Value Ref Range Status   02/15/2021 0.62 0.52 - 1.04 mg/dL Final     GFR Estimate   Date Value Ref Range Status   02/15/2021 80 >60 mL/min/[1.73_m2] Final     Comment:     Non  GFR Calc  Starting 12/18/2018, serum creatinine based estimated GFR (eGFR) will be   calculated using the Chronic Kidney Disease Epidemiology Collaboration   (CKD-EPI) equation.       Calcium   Date Value Ref Range Status   02/15/2021 8.5 8.5 - 10.1 mg/dL Final         Rehabilitation - continue comprehensive acute inpatient rehabilitation program with multidisciplinary approach including therapies, rehab nursing, and physiatry following. See interval history for updates.      ASSESSMENT AND PLAN  Jamee Douglas is an 88-year-old R-hand dominant female with hx of multiple R ROBBY ischemic strokes in 12/2020 and fall with SAH 1/5/2021 who was admitted on 1/29/2021 after  another fall in which she sustained several intracranial hemorrhages, primarily in the subarachnoid spaces with a small right falx subdural hematoma. Hospital course was complicated by HTN, and an acute increase in L-sided weakness on 2/1 with new R ROBBY subacute infarcts on imaging as well as multiple smaller infarcts.  She was admitted to acute rehab on 2/4/2021 for rehabilitation and ongoing medical management.     Acute ischemic stroke, right ROBBY, felt due to symptomatic intracranial atherosclerosis vs. possible atheroembolic event from aortic arch plaque   Hx recent acute ischemic stroke, right ROBBY in 12/2020 with residual left-sided weakness.  Previously on DAPT with ASA 81 mg and Plavix, though Plavix discontinued 1/5 given SAH.  Developed new dense hemiparesis on left with some speech difficulties on 2/1 in the AM. Stroke code called.  MRI brain showed R ROBBY infarct, with multiple areas of smaller infarcts, and unchanged bilateral subdural hematomas and areas of scattered SAH.  CTA head/neck demonstrating R ROBBY occlusion, L ICA 50-60% stenosis, R ICA with linear filling defect.  Echo EF 60%, normal LA size, bubble study negative.  EKG/tele: NSR.  LDL 53 on 12/18/20, A1c 5.7, trop 0.058.  Options limited due to recent strokes/SAH/SDH, location of lesions preclude IR, etc. therefore no TPA or endovascular treatment.  - Continue aspirin 81 mg daily, started per neurology 2/2  - 14 day Ziopatch present at admission to ARU  - SBP goal <160, though per neurology, would not treat more aggressively than this due to risk of precipitating recurrent stroke symptoms. Per discussion with sending team on admission, no scheduled or PRN BP meds recommended at this time.  - Plan 4 week follow up with Dr. Finnegan at [facility] neurology on discharge.   - PT/OT. Discharged to ARU for ongoing therapies  - Regular diet   - Follow up with neurology, Dr. Brooke Finnegan ([facility]) in 4 weeks    Multiple acute SAH  Acute  SDH  Mechanical fall  Presented to ED on 1/29/21 after fall at Assisted Living Facility.  CT head showed several acute-appearing areas of intracranial hemorrhage in primarily subarachnoid spaces with small right falx subdural hematoma, no associated mass effect.  Fall felt likely due to ongoing deficits from recent acute strokes in December. Nothing in history to suggest other cause.  PTA ASA held initially upon admission, but resumed as above after recurrent ischemic stroke.  Neurosurgery consulted but felt no operative intervention indicated.  Follow-up CT head on 1/30 with stable scattered foci of subarachnoid hemorrhage.  Per chart review, patient also presented to ED on 1/5/2021 after a fall on 1/4 while at TCU recovering from initial strokes.  CT head demonstrating small new subarachnoid hemorrhage over left frontotemporoparietal region and within the left ambient cistern.  Plavix stopped and discharged back to TCU.  - Continue PT/OT  - Follow up with neurosurgery in 1 month with non-contrast head CT day of appt     Hypertension  Started on diltiazem 120mg daily after recent strokes. Developed some orthostatic symptoms and had some recrudescence of stroke symptoms with exertion. Diltiazem stopped and symptoms resolved. BP elevated during acute hospitalization requiring hydralazine intermittently. Re-challenged with amlodipine during hospital stay, stopped after stroke on 2/1.  Per discussion with sending team on admission to ARU, no scheduled or PRN BP meds recommended at this time, and continue to monitor.  - SBP goal <160, though per neurology, would not treat more aggressively than this due to risk of precipitating recurrent stroke symptoms.   - As of 2/11, SBP generally 110s-130s  - Continue to monitor BP, but currently under goal     Glaucoma  Continue prior to admission eye drops    Hypokalemia, improving.  First noted during acute hospitalization, 3.1 on 2/1.  Given 1-time supplementation of 20 mEq and  improved to 3/5 on 2/2.  Recurrent drop to 3.2 on 2/5, so started on 20 mEq daily.  - Continue potassium chloride 20 mEq daily  - BMP 2/8 with K 3.7  - BMP 2/11 with K 4.4. Will discontinue supplementation and recheck on Mon 2/15 it is 3.7      L ankle/lower leg pain.  First reported on 2/10.  Mild tenderness over medial malleolus, as well as gastroc and tibialis anterior.  No redness, warmth, ecchymoses, abrasions.  Mild L pedal edema, but present since baseline.  Pain not aggravated by weight-bearing.  Incident with near anterior LOB with L toe catching on 2/9.  Patient also with L inattention, weakness. Suspect possible ankle strain/sprain.  Also recent initiation of TENS and increased lower extremity work with therapies, so maybe muscular.  Imaging does not seem clinically indicated at this time.  - Trial conservative measures: ice, rest, elevation, voltaren PRN, Tylenol  - Xray shows no acute issues, some degenerative changes, will continue consevative measures and monitor.      1. Adjustment to disability:  Clinical psychology to eval and treat as indicated  2. FEN: Regular diet and Thin liquids  3. Bowel: continent, intermittent constipation - has Senokot, Miralax, bisacodyl suppository available PRN  4. Bladder: continent  5. DVT Prophylaxis: mechanical  6. GI Prophylaxis: none  7. Code: no CPR-Do NOT Intubate  8. Disposition: assisted living ~ 2/18  9. ELOS:  18 days  10. Rehab prognosis: good  11. Follow up Appointments on Discharge: Neurology 4 week follow up with Dr. Finnegan at [facility]. Neurosurgery f/u in 1 month with non-contrast head CT day of appt.        Casimiro Saldivar DO      DOS: 2/16/21      I spent a total of 35 minutes face to face and coordinating care of Jamee Douglas. Over 50% of my time on the unit was spent counseling the patient and /or coordinating care regarding recent CVA.

## 2021-02-16 NOTE — PLAN OF CARE
Pt is alert and oriented x 4. Ax1 with walker. Reg diet, thin liquids. Continent of bowel, no bm this shift, can be both continent and incontinent of bladder. Pt has zio patch on her chest. Denied any pain, nausea or vomiting, SOB, numbness or tingling. Able to make needs known. No new concern at this time. Will continue to monitor.

## 2021-02-16 NOTE — CARE CONFERENCE
Acute Rehab Care Conference/Team Rounds    Type: Team Rounds    Present: JO Pappas Maura Kelly PT, Rosio Kaminski OT, Bethel Reeves SLP, Julia Verdugo SW, Luz Kang RD, Ro Rosario Resident Iraan, Dyan Carter , Jolly Knapp RN and Patient at bedside.    Discharge Barriers/Treatment/Education    Rehab Diagnosis: recent CVA with L hemiparesis and neglect     Active Medical Co-morbidities/Prognosis:   Patient Active Problem List   Diagnosis Code     Low back pain M54.5     Hyperlipidemia with target LDL less than 130 E78.5     Osteoarthritis M19.90     Glaucoma H40.9     Advanced directives, counseling/discussion Z71.89     Benign essential hypertension I10     Subarachnoid hemorrhage (H) I60.9     Injury of head, initial encounter S09.90XA     Acute ischemic right MCA stroke (H) I63.511         Safety: Alert and oriented. Uses call light appropriately, able to make needs known. Bed alarm on for safety.     Pain: Denies pain or discomfort.    Medications, Skin, Tubes/Lines: Medications taken whole with water. Skin intact with bruising. No tubes or lines.     Swallowing/Nutrition: No dysphagia related concerns.     Bowel/Bladder: Continent and incontinent of bladder. Does use the toilet, but frequently incontinent prior to getting to the bathroom. Incontinent/continent of bowel, last BM was 2/16. Senna available.    Psychosocial: , [place]. 4 children, 1 dtr and 3 sons (all sons [place]of[place]state but dtr local). Good support from children. Falls reported. Was recently in a TCU for about 6 weeks until insurance denied additional stay. Prior to initial stroke in October, pt was indep and living alone. Described as 'super woman' and working out daily for one hour. Denied mental health, substance abuse or financial concerns. Pleasant and motivated.     ADLs/IADLs: Pt making slow progress with ADLs with FWW d/t impaired balance, left neglect/inattention,  and impaired cognition. Pt requires CGA with toilet transfer and mod A with toilet hygiene. Pt requires CGA with G/H tasks standing at EOS with FWW. Pt requires SBA with UBD and min A with LBD tasks with FWW. Pt will need 24/7 physical assistance upon discharge. HC OT services upon discharge.     Mobility: Making slow gains, still anticipate need for 24-7 assist at time of discharge d/t imbalance and poor body awareness.  Bed Mobility: Anthony for LEs and trunk for sit<>supine   Transfer: CGA with FWW  Gait: CGA with FWW, very distractable, intermittent L toe drag   Stairs: NT - not a goal for pt.  Balance: 19/56 [place] 2/7/21, intact sitting balance, CGA/UE support for standing balance     Cognition/Language: Patient showing improvement in cognitive tasks though frequently requiring heavier initial instruction. When repeating the task at later time, requiring additional re-instruction. When engaged in cognitive tasks, able to make appropriate decisions with just intermittent cues and direction. Recommend initial oversight with higher level IADL tasks. Ongoing therapy at facility discharge.     Community Re-Entry: goal is to reach a level of mod  I     Transportation: Not a  - car transfer not a barrier.     Decision maker: self    Plan of Care and goals reviewed and updated.    Discharge Plan/Recommendations    Fall Precautions: continue    Patient/Family input to goals: yes    Estimated length of stay: 16 days     Overall plan for the patient: reach a level of mod  I with ADLs      Utilization Review and Continued Stay Justification    Medical Necessity Criteria:    For any criteria that is not met, please document reason and plan for discharge, transfer, or modification of plan of care to address.    Requires intensive rehabilitation program to treat functional deficits?: Yes    Requires 3x per week or greater involvement of rehabilitation physician to oversee rehabilitation program?: Yes    Requires  rehabilitation nursing interventions?: Yes    Patient is making functional progress?: Yes    There is a potential for additional functional progress? Yes    Patient is participating in therapy 3 hours per day a minimum of 5 days per week or 15 hours per week in 7 day period?:Yes    Has discharge needs that require coordinated discharge planning approach?:Yes      Barriers/Concerns related to meeting medical necessity criteria:  None     Team Plan to Address Concern:  As needed     Final Physician Sign off    Statement of Approval: Casimiro Saldivar DO      Patient Goals  Social Work Goals: See SW notes. Discharge planning ongoing.      OT Frequency: daily 60-90 min.  OT goal: hygiene/grooming: independent, modified independent, while standing  OT goal: upper body dressing: Independent, Modified independent, including set-up/clothing retrieval  OT goal: lower body dressing: Independent, Modified independent, including set-up/clothing retrieval  OT goal: upper body bathing: Independent  OT goal: lower body bathing: Independent, Modified independent  OT goal: toilet transfer/toileting: Independent, Modified independent, cleaning and garment management, toilet transfer  OT goal: meal preparation: Independent, ambulatory level, with simple meal preparation  OT goal: home management: Independent, Modified independent, ambulatory level, with light demand household tasks  OT goal: cognitive: Patient/caregiver will verbalize understanding of cognitive assessment results/recommendations as needed for safe discharge planning  OT goal 1: CGA with shower transf. utilizing A.E./DME prn.     PT Frequency: daily, 60 min  PT goal: bed mobility: Independent, Supine to/from sit, Rolling, Bridging  PT goal: transfers: Supervision/stand-by assist, Sit to/from stand, Bed to/from chair, Assistive device  PT goal: gait: Assistive device, 100 feet, Supervision/stand-by assist  PT goal: stairs: Supervision/stand-by assist, 1 stair,  Assistive device(curb step)  PT goal: perform aerobic activity with stable cardiovascular response: 20 minutes, NuStep, continuous activity  PT goal 1: Pt understanding of LE strengthening and balance HEP to progress fucntional mobility at discharge.  PT Goal 2: SBA for car transfer to ensure community access.  PT Goal 3: SBA for floor transfer with furniture assist to ensure safety in home environment.      SLP Frequency: daily  SLP goal 1: P tiwll utilize compensatory memory strategies to complete ST recall tasks with 90% accuracy  SLP goal 2: pt will complete mod to complex level reasoning and problem solving tasks with 90% accuracy     RN Patient Goal:  Pain Management: Pt will be proactive with asking for prn pain med or using non pharmacologic method for managing pain/headache during ARC stay.  RN Patient/Family Goal: Bowel: Pt will regain regular bowel patterns and continence by 2/10/21 using a bowel program if needed.  RN Patient/Family Goal: Bladder: Pt will maintain bladder continence using scheduled toileting every 3-4 hours   RN Patient/Family Goal: Medication Management: Pt will demonstrate safe medication management before discharge by completing MAP if indicated.  RN Goal: Skin Integrity: Pt will demonstrate understanding of maintaining skin integrity by turning and repositioning as needed and asking for staff assistance if needed.

## 2021-02-16 NOTE — PLAN OF CARE
Discharge Planner Post-Acute Rehab SLP:      Discharge Plan: likely HH sptx     Precautions: mild to mod cognition      Current Status:  Communication: language skills intact  Cognition: mild  to moderate  impairments in ST memory and problem solving and reasoning   Swallow: regular diet and thin lqiuids     Assessment: SLP: worked on moderate complex verbal reasoning/abstract thought, including word generation, pt needed only min assist. Pt may be nearing baseline, but was fairly independent prior.    Barriers to discharge:TBD

## 2021-02-16 NOTE — PROGRESS NOTES
Team rounds this morning. On track for discharge. Therapy called dtr, provided updates and pt dtr confirmed that plan is to discharge to [facility] into the respite care and would f/u with SWer.     SWemj received call from [facility] Ph: [phone], F; [phone] requesting facesheet, H&P, therapy notes, MAR and RN notes in order to help determine pt needs when she arrives. Shortly after, JUNE received a call from Shaina the  at [facility] asking to do an in-person assessment with pt tomorrow and possibly to have OT present as well. Discussed with DON, got approval, updated therapy schedulers and updated OT. Shaina planning to come tomorrow at 3:30pm.     JUNE met with pt, updated pt about Shaina's visit and got pt permission to fax clinicals to [facility]. Clinicals faxed. Per discussion with pt dtr Melania last week, permission to set up HC with [facility]. Referral sent today, confirmed and added to AVS. Michael updated on HC services.     JUNE called pt dtr Melania to notify of contact with [facility] and visit with Shaina tomorrow. Melania will plan to attend and appreciative of cares. Melania will f/u with [facility] RE: arrival time on day of discharge and f/u with JUEN and confirm discharge time. On track for Thursday 02/18.     Home Health Care:   [facility] PH: [phone] (previously known as: [facility])  Nurse, physical therapy, occupational therapy, speech therapy, home health aide     Julia Verdugo, [facility], [facility] Acute Rehab Unit   Phone: [phone]  I   Pager: [phone]

## 2021-02-17 ENCOUNTER — APPOINTMENT (OUTPATIENT)
Dept: PHYSICAL THERAPY | Facility: CLINIC | Age: 86
End: 2021-02-17
Payer: COMMERCIAL

## 2021-02-17 ENCOUNTER — APPOINTMENT (OUTPATIENT)
Dept: SPEECH THERAPY | Facility: CLINIC | Age: 86
End: 2021-02-17
Payer: COMMERCIAL

## 2021-02-17 ENCOUNTER — APPOINTMENT (OUTPATIENT)
Dept: OCCUPATIONAL THERAPY | Facility: CLINIC | Age: 86
End: 2021-02-17
Payer: COMMERCIAL

## 2021-02-17 PROCEDURE — 97750 PHYSICAL PERFORMANCE TEST: CPT | Mod: GP

## 2021-02-17 PROCEDURE — 128N000003 HC R&B REHAB

## 2021-02-17 PROCEDURE — 97129 THER IVNTJ 1ST 15 MIN: CPT

## 2021-02-17 PROCEDURE — 99232 SBSQ HOSP IP/OBS MODERATE 35: CPT | Performed by: PHYSICAL MEDICINE & REHABILITATION

## 2021-02-17 PROCEDURE — 97130 THER IVNTJ EA ADDL 15 MIN: CPT

## 2021-02-17 PROCEDURE — 97535 SELF CARE MNGMENT TRAINING: CPT | Mod: GO | Performed by: OCCUPATIONAL THERAPIST

## 2021-02-17 PROCEDURE — 250N000013 HC RX MED GY IP 250 OP 250 PS 637: Performed by: PHYSICIAN ASSISTANT

## 2021-02-17 PROCEDURE — 97530 THERAPEUTIC ACTIVITIES: CPT | Mod: GP

## 2021-02-17 RX ORDER — ATORVASTATIN CALCIUM 40 MG/1
40 TABLET, FILM COATED ORAL EVERY EVENING
Qty: 30 TABLET | Refills: 0 | Status: SHIPPED | OUTPATIENT
Start: 2021-02-17

## 2021-02-17 RX ORDER — ACETAMINOPHEN 325 MG/1
650 TABLET ORAL EVERY 4 HOURS PRN
COMMUNITY
Start: 2021-02-17 | End: 2024-03-25

## 2021-02-17 RX ORDER — POLYETHYLENE GLYCOL 3350 17 G/17G
17 POWDER, FOR SOLUTION ORAL DAILY
Qty: 510 G | Refills: 0 | Status: SHIPPED | OUTPATIENT
Start: 2021-02-17 | End: 2021-02-20

## 2021-02-17 RX ORDER — TRAVOPROST OPHTHALMIC SOLUTION 0.04 MG/ML
1 SOLUTION OPHTHALMIC AT BEDTIME
Qty: 5 ML | Refills: 0 | Status: SHIPPED | OUTPATIENT
Start: 2021-02-17 | End: 2023-09-22

## 2021-02-17 RX ORDER — AMOXICILLIN 250 MG
1-2 CAPSULE ORAL 2 TIMES DAILY PRN
Qty: 30 TABLET | Refills: 0 | Status: SHIPPED | OUTPATIENT
Start: 2021-02-17 | End: 2024-03-21

## 2021-02-17 RX ORDER — DORZOLAMIDE HYDROCHLORIDE AND TIMOLOL MALEATE 20; 5 MG/ML; MG/ML
1 SOLUTION/ DROPS OPHTHALMIC 2 TIMES DAILY
Qty: 10 ML | Refills: 0 | Status: SHIPPED | OUTPATIENT
Start: 2021-02-17 | End: 2023-09-22

## 2021-02-17 RX ORDER — MULTIPLE VITAMINS W/ MINERALS TAB 9MG-400MCG
1 TAB ORAL DAILY
Qty: 30 TABLET | Refills: 0 | Status: SHIPPED | OUTPATIENT
Start: 2021-02-18 | End: 2023-09-22

## 2021-02-17 RX ADMIN — ATORVASTATIN CALCIUM 40 MG: 40 TABLET, FILM COATED ORAL at 20:11

## 2021-02-17 RX ADMIN — DORZOLAMIDE HYDROCHLORIDE AND TIMOLOL MALEATE 1 DROP: 20; 5 SOLUTION/ DROPS OPHTHALMIC at 20:12

## 2021-02-17 RX ADMIN — CHOLECALCIFEROL TAB 10 MCG (400 UNIT) 400 UNITS: 10 TAB at 08:12

## 2021-02-17 RX ADMIN — MULTIPLE VITAMINS W/ MINERALS TAB 1 TABLET: TAB at 08:12

## 2021-02-17 RX ADMIN — DORZOLAMIDE HYDROCHLORIDE AND TIMOLOL MALEATE 1 DROP: 20; 5 SOLUTION/ DROPS OPHTHALMIC at 08:14

## 2021-02-17 RX ADMIN — ASPIRIN 81 MG: 81 TABLET, COATED ORAL at 08:12

## 2021-02-17 RX ADMIN — TRAVOPROST 1 DROP: 0.04 SOLUTION/ DROPS OPHTHALMIC at 20:13

## 2021-02-17 NOTE — PROGRESS NOTES
Occupational Therapy Discharge Summary    Reason for therapy discharge:    Discharged to Assisted Living Facility    Progress towards therapy goal(s). See goals on Care Plan in Psychiatric electronic health record for goal details.  Goals partially met.  Barriers to achieving goals:   discharge from facility.    Therapy recommendation(s):    Continued therapy is recommended.  Rationale/Recommendations:  Recommend HC OT services to increase IND and safety with ADLs/IADLs and functional mobility..     Pt requires set-up and SBA with UBD tasks seated and CGA/min A with LBD tasks with FWW. Pt requires CGA with G/H tasks standing at EOS with FWW. Pt requires CGA with toilet transfer and min A with toilet hygiene with FWW. Pt requires CGA with shower transfers and on/off of extended tub bench with FWW. Pt requires assistance to wash/rinse/dry 4/10 body parts while seated on extended tub bench and standing with grab bars. Recommending 24/7 physical assistance and total A with IADLs.

## 2021-02-17 NOTE — PLAN OF CARE
Physical Therapy Discharge Summary    Reason for therapy discharge:    Discharged to assisted living facility with 24/7 assist ([facility]). Pt is able to ambulate with FWW up to ~250ft, however, steadying assist is needed for most functional mobility tasks d/t frequent loss of balance and poor body awareness.     Progress towards therapy goal(s). See goals on Care Plan in [facility] electronic health record for goal details.  Goals partially met.  Barriers to achieving goals:   discharge from facility.    Therapy recommendation(s):    Continued therapy is recommended.  Rationale/Recommendations:  Home care PT is recommended in order to improve strength, balance, LE coordination and overall independence with functional mobility.    2/17 Cunningham score: 21/56

## 2021-02-17 NOTE — PLAN OF CARE
FOCUS/GOAL  Bladder management, Pain management, Mobility, Cognition/Memory/Judgment/Problem solving, and Safety management    ASSESSMENT, INTERVENTIONS AND CONTINUING PLAN FOR GOAL:  Pt is alert and oriented. Continent and incontinent of bladder using toilet. Up with assist of 1 with walker to the bathroom. Pt unable to get self to sitting on edge of bed. Pt needs full assist to get legs up into bed. Denied pain or discomfort overnight. Appears to have slept well between cares. Uses call light appropriately, able to make needs known. Bed alarm on for safety. Will continue with POC.

## 2021-02-17 NOTE — PROGRESS NOTES
"  [facility]   Acute Rehabilitation Unit  Daily progress note    INTERVAL HISTORY  No acute events reported overnight.  Patient seen and examined this morning.  She denies any new concerns and is looking forward to anticipated discharge tomorrow.  She will have meeting with  this afternoon.  She notes ongoing left-sided weakness, but states she is trying to attend to that side more.  She reports episode of pain in left great toe last night, that resolved after a short period with no recurrence.  She states having had bowel movement yesterday.  Denies abdominal pain, nausea, shortness of breath, dizziness, bladder concerns.    Functionally, she is ambulating up to 200' with contact guard to hand-hold assistance with walker.  She is is transferring with WW, contact guard assist, verbal cues for forward posture on sit to stand.  She continues to need contact guard to min assist for balance and stability.  With SLP, noted to need min assist with moderately complex verbal reasoning/abstract thought task.  With OT, she is needing close standby to contact guard assist with oral cares and grooming and hygiene tasks standing at edge of sink with wheeled walker, standby assist for upper body dressing, min assist for lower body dressing.     ROS: 10 point ROS neg other than the symptoms noted above in the HPI.      MEDICATIONS    aspirin  81 mg Oral Daily     atorvastatin  40 mg Oral QPM     cholecalciferol  400 Units Oral Daily     dorzolamide-timolol  1 drop Right Eye BID     multivitamin w/minerals  1 tablet Oral Daily     travoprost KORINA FREE  1 drop Both Eyes At Bedtime        acetaminophen, bisacodyl, diclofenac, ondansetron, polyethylene glycol, senna-docusate     PHYSICAL EXAM  /45 (BP Location: Left arm)   Pulse 90   Temp 97  F (36.1  C) (Oral)   Resp 16   Ht 1.6 m (5' 3\")   Wt 60.4 kg (133 lb 1.6 oz)   LMP 01/01/1970   SpO2 96%   BMI 23.58 kg/m  "   Gen: alert, pleasant elderly female in no acute distress, up in chair   HEENT:  NCAT, anicteric sclera  Cardio: RRR  Pulm: non-labored on room air  Ext: warm, well-perfused  Neuro/MSK: AAO x3, left hemiparesis, weaker in LLE (HF 4, KE 3, KF 3, DF 3) than LUE.    Speech clear/fluent, responds appropriately, follows commands.  Left neglect - improving   Skin: nearly resolved ecchymoses on L side of face over cheek and jaw; L great toe without redness, warmth, swelling, ecchymoses, abrasion.  Mild edema in LLE.   LABS  CBC RESULTS:   Recent Labs   Lab Test 02/15/21  0525 02/08/21  0618 02/01/21  1006 01/30/21  1015 01/29/21 2010   WBC 7.5 7.4 9.2 6.8 8.3   RBC 3.92 3.89 3.93 3.88 3.99   HGB 12.0 11.5* 11.7 11.4* 11.7   HCT 36.7 35.8 35.9 35.8 36.8   MCV 94 92 91 92 92   MCH 30.6 29.6 29.8 29.4 29.3   MCHC 32.7 32.1 32.6 31.8 31.8   RDW 13.9 14.3 14.1 13.9 13.8    304 257 215 227     Last Basic Metabolic Panel:  Recent Labs   Lab Test 02/15/21  0525 02/11/21  0537 02/08/21  0618    142 141   POTASSIUM 3.7 4.4 3.7   CHLORIDE 110* 109 110*   CO2 28 29 29   ANIONGAP 5 4 2*   GLC 96 97 101*   BUN 17 15 10   CR 0.62 0.67 0.62   GFRESTIMATED 80 78 80   POORNIMA 8.5 9.1 8.8       Rehabilitation - continue comprehensive acute inpatient rehabilitation program with multidisciplinary approach including therapies, rehab nursing, and physiatry following. See interval history for updates.      ASSESSMENT AND PLAN  Jamee Douglas is an 88-year-old R-hand dominant female with hx of multiple R ROBBY ischemic strokes in 12/2020 and fall with SAH 1/5/2021 who was admitted on 1/29/2021 after another fall in which she sustained several intracranial hemorrhages, primarily in the subarachnoid spaces with a small right falx subdural hematoma. Hospital course was complicated by HTN, and an acute increase in L-sided weakness on 2/1 with new R ROBBY subacute infarcts on imaging as well as multiple smaller infarcts.  She was admitted to  acute rehab on 2/4/2021 for rehabilitation and ongoing medical management.     Acute ischemic stroke, right ROBBY, felt due to symptomatic intracranial atherosclerosis vs. possible atheroembolic event from aortic arch plaque   Hx recent acute ischemic stroke, right ROBBY in 12/2020 with residual left-sided weakness.  Previously on DAPT with ASA 81 mg and Plavix, though Plavix discontinued 1/5 given SAH.  Developed new dense hemiparesis on left with some speech difficulties on 2/1 in the AM. Stroke code called.  MRI brain showed R ROBBY infarct, with multiple areas of smaller infarcts, and unchanged bilateral subdural hematomas and areas of scattered SAH.  CTA head/neck demonstrating R ROBBY occlusion, L ICA 50-60% stenosis, R ICA with linear filling defect.  Echo EF 60%, normal LA size, bubble study negative.  EKG/tele: NSR.  LDL 53 on 12/18/20, A1c 5.7, trop 0.058.  Options limited due to recent strokes/SAH/SDH, location of lesions preclude IR, etc. therefore no TPA or endovascular treatment.  - Continue aspirin 81 mg daily, started per neurology 2/2  - 14 day Ziopatch present at admission to ARU  - SBP goal <160, though per neurology, would not treat more aggressively than this due to risk of precipitating recurrent stroke symptoms. Per discussion with sending team on admission, no scheduled or PRN BP meds recommended at this time.  - Continue PT/OT/SLP  - Regular diet   - Follow up with neurology, Dr. Brooke Finnegan ([facility]) in 4 weeks    Multiple acute SAH  Acute SDH  Mechanical fall  Presented to ED on 1/29/21 [facility].  CT head showed several acute-appearing areas of intracranial hemorrhage in primarily subarachnoid spaces with small right falx subdural hematoma, no associated mass effect.  Fall felt likely due to ongoing deficits from recent acute strokes in December. Nothing in history to suggest other cause.  PTA ASA held initially upon admission, but resumed as above after  recurrent ischemic stroke.  Neurosurgery consulted but felt no operative intervention indicated.  Follow-up CT head on 1/30 with stable scattered foci of subarachnoid hemorrhage.  Per chart review, patient also presented to ED on 1/5/2021 after a fall on 1/4 while at TCU recovering from initial strokes.  CT head demonstrating small new subarachnoid hemorrhage over left frontotemporoparietal region and within the left ambient cistern.  Plavix stopped and discharged back to TCU.  - Continue PT/OT/SLP  - Follow up with neurosurgery in 1 month with non-contrast head CT day of appt     Hypertension  Started on diltiazem 120mg daily after recent strokes. Developed some orthostatic symptoms and had some recrudescence of stroke symptoms with exertion. Diltiazem stopped and symptoms resolved. BP elevated during acute hospitalization requiring hydralazine intermittently. Re-challenged with amlodipine during hospital stay, stopped after stroke on 2/1.  Per discussion with sending team on admission to ARU, no scheduled or PRN BP meds recommended at this time, and continue to monitor.  - SBP goal <160, though per neurology, would not treat more aggressively than this due to risk of precipitating recurrent stroke symptoms.   - As of 2/17, SBP generally 100s-120s, intermittently with SBP to 140s   - Continue to monitor BP, but currently under goal     Glaucoma  Continue prior to admission eye drops    Hypokalemia, resolved.  First noted during acute hospitalization, 3.1 on 2/1.  Given 1-time supplementation of 20 mEq and improved to 3/5 on 2/2.  Recurrent drop to 3.2 on 2/5, so started on 20 mEq daily.  Recheck on 2/8 at 3.7, subsequent improvement to 4.4 on 2/11, so supplementation discontinued  - Trend BMP  - Recheck on 2/15 at 3.7, no need to resume supplement    L ankle/lower leg pain, improved.  First reported on 2/10.  Mild tenderness over medial malleolus, as well as gastroc and tibialis anterior.  No redness, warmth,  ecchymoses, abrasions.  Mild L pedal edema, but present since baseline.  Pain not aggravated by weight-bearing.  Incident with near anterior LOB with L toe catching on 2/9.  Patient also with L inattention, weakness. Suspect possible ankle strain/sprain.  Also recent initiation of TENS and increased lower extremity work with therapies, so maybe muscular.  Imaging does not seem clinically indicated at this time.  - Xray shows no acute issues, some degenerative changes  - Continue conservative measures PRN: ice, rest, elevation, voltaren, Tylenol  - As of 2/17, no ongoing complaints of pain     1. Adjustment to disability:  Clinical psychology to eval and treat as indicated  2. FEN: Regular diet and Thin liquids  3. Bowel: continent, intermittent constipation - has Senokot, Miralax, bisacodyl suppository available PRN  4. Bladder: continent  5. DVT Prophylaxis: mechanical  6. GI Prophylaxis: none  7. Code: no CPR-Do NOT Intubate  8. Disposition: assisted living ~ 2/18  9. ELOS:  18 days  10. Rehab prognosis: good  11. Follow up Appointments on Discharge: Neurology 4 week follow up with Dr. Finnegan at [facility]. Neurosurgery f/u in 1 month with non-contrast head CT day of appt.      Seen and discussed with Dr. Casimiro Saldivar, PM&R staff physician     Rosalie Stokes PA-C  Physical Medicine & Rehabilitation

## 2021-02-17 NOTE — PROGRESS NOTES
02/17/21 1500   Signing Clinician's Name / Credentials   Signing clinician's name / credentials CURTIS Dent   Deluna Balance Scale (DELUNA K, ANGEL S, MITCHELL MARTIN, CINDY DELA CRUZ: MEASURING BALANCE IN THE ELDERLY: VALIDATION OF AN INSTRUMENT. CAN. J. PUB. HEALTH, JULY/AUGUST SUPPLEMENT 2:S7-11, 1992.)   Sit To Stand 3   Standing Unsupported 3   Sitting Unsupported 4   Stand to Sit 3   Transfers 1   Standing with Eyes Closed 3   Standing Unsupported, Feet Together 1   Reach Forward With Outstretched Arm 1   Retrieve Object From Floor 1   Turning to Look Behind 1   Turn 360 Degrees 0   Placing Alternate Foot on Stool (4-6 inches) 0   Unsupported Tandem Stand (Demonstrate to Subject) 0   One Leg Stand 0   Total Score (A score of 45 or less has been correlated with an increased risk of falls)   Total Score (out of 56) 21    Deluna Balance Scale (BBS) Cutoff Scores for CVA Population:    Patient Score: 21/56    The BBS is a measure of static and dynamic standing balance that has been validated in community dwelling elderly individuals and individuals who have Parkinson's Disease, MS, and those who are s/p CVA and TBI. The test is administered without an assistive device. Scores from the Deluna are used to determine the probability of falling based on the patient's previous history of falls and their test performance.     0-20 High risk for falling- Corresponded with w/c bound status  21-40 Medium risk for falling- Able to walk with assistance  41-56 Low risk for falling- Able to walk independently  According to [facility].  Available at http://www.strokecenter.org/.  Accessibility verified April 10, 2013.  Minimal Detectable Change = 6.5 according to Babar & Seney 2008    Assessment (rationale for performing, application to patient s function & care plan): Test completed to assess dynamic balance, falls risk and to objectively measure progress. Pt's score changed from 19/56 to 21/56 which is below the  MDC. Pt is at medium risk for falling and an assistive device is recommended for safety during ambulation.     Minutes billed as physical performance test: 30

## 2021-02-17 NOTE — PLAN OF CARE
Problem: Goal/Outcome  Goal: Goal Outcome Summary  Outcome: Improving   FOCUS/GOAL  Bladder management, Medical management, and Mobility    ASSESSMENT, INTERVENTIONS AND CONTINUING PLAN FOR GOAL:  Pt's vitals stable. Denied pain. Pt had not used call light this shift. Pt needed reminders to order meals. Scheduled toileting done and pt had been continent of bladder. No bm this shift. Pt had a shower with OT this morning. Pt very excited to be going home tomorrow.

## 2021-02-17 NOTE — DISCHARGE SUMMARY
[facility]   Acute Rehabilitation Unit  Discharge summary     Date of Admission: 2/4/2021  Date of Discharge: 2/18/2021   Disposition: [place] - respite care initially then to determine Assisted Living vs. Memory Care  Primary Care Physician: Sang Antonio  Attending physician: Casimiro Saldivar,       DISCHARGE DIAGNOSIS      Mechanical fall with intracranial hemorrhage (acute SAH, SDH)    Acute ischemic stroke, R ROBBY (hx prior R ROBBY strokes 12/2020)     Impairments: left hemiparesis, impaired balance, impaired left coordination, left inattention, mild cognitive deficits    Hypertension    Glaucoma       BRIEF SUMMARY  Jamee Douglas is a 88 year old female with hx of multiple R ROBBY ischemic strokes in 12/2020 and fall with SAH 1/5/2021 who was admitted on 1/29/2021 after another fall in which she sustained several intracranial hemorrhages, primarily in the subarachnoid spaces with a small right falx subdural hematoma. Hospital course was complicated by HTN, and an acute increase in L-sided weakness on 2/1 with new R ROBBY subacute infarcts on imaging as well as multiple smaller infarcts.  She was admitted to acute rehab on 2/4/2021 for rehabilitation and ongoing medical management.    REHABILITATION COURSE  PT: Pt is able to ambulate with FWW up to ~250ft, however, steadying assist is needed for most functional mobility tasks d/t frequent loss of balance and poor body awareness. Goals partially met. Barriers to achieving goals: discharge from facility. Continued therapy is recommended. Rationale/Recommendations:  Home care PT is recommended in order to improve strength, balance, LE coordination and overall independence with functional mobility.  2/17 Cunningham score: 21/56    OT: Goals partially met.  Barriers to achieving goals: discharge from facility.  Continued therapy is recommended.  Rationale/Recommendations:  Recommend HC OT services to increase  "IND and safety with ADLs/IADLs and functional mobility.  Pt requires set-up and SBA with UBD tasks seated and CGA/min A with LBD tasks with FWW. Pt requires CGA with G/H tasks standing at EOS with FWW. Pt requires CGA with toilet transfer and min A with toilet hygiene with FWW. Pt requires CGA with shower transfers and on/off of extended tub bench with FWW. Pt requires assistance to wash/rinse/dry 4/10 body parts while seated on extended tub bench and standing with grab bars. Recommending 24/7 physical assistance and total A with IADLs.     SLP:  Goals partially met.  Barriers to achieving goals: discharge from facility.  Continued therapy is recommended.  Rationale/Recommendations:  Cognition.  Pt has mild cognitive deficits. She struggles most with moderate-complex level cognitive tasks. She has been learning strategies to completing tasks efficiently and with more ease such as writing down information on paper, repeating things out loud, and doing tasks multiple times to see improvement over a short amount of time. Carryover between sessions limited at times. Pt has \"Lumosity\" application on her personal ipad that she enjoys. Pt demonstrates strengths in math, memory, and writing.      MEDICAL COURSE  Acute ischemic stroke, right ROBBY, felt due to symptomatic intracranial atherosclerosis vs. possible atheroembolic event from aortic arch plaque   Hx recent acute ischemic stroke, right ROBBY in 12/2020 with residual left-sided weakness. Previously on DAPT with ASA 81 mg and Plavix, though Plavix discontinued 1/5 given SAH.  Developed new dense hemiparesis on left with some speech difficulties on 2/1 in the AM. Stroke code called.  MRI brain showed R ROBBY infarct, with multiple areas of smaller infarcts, and unchanged bilateral subdural hematomas and areas of scattered SAH.  CTA head/neck demonstrating R ROBBY occlusion, L ICA 50-60% stenosis, R ICA with linear filling defect.  Echo EF 60%, normal LA size, bubble study " negative.  EKG/tele: NSR.  LDL 53 on 12/18/20, A1c 5.7, trop 0.058.  Options limited due to recent strokes/SAH/SDH, location of lesions preclude IR, etc. therefore no TPA or endovascular treatment.  - Continue aspirin 81 mg daily, started per neurology 2/2  - 14 day Ziopatch present at admission to ARU.  Follow up as recommended.  - BP management as below  - Continue home PT/OT/SLP  - Continue regular diet with thin liquids  - Follow up with neurology, Dr. Brooke Finnegan ([facility]) in 4 weeks     Multiple acute SAH  Acute SDH  Mechanical fall  Presented to ED on 1/29/21 after fall at Assisted Living Facility.  CT head showed several acute-appearing areas of intracranial hemorrhage in primarily subarachnoid spaces with small right falx subdural hematoma, no associated mass effect.  Fall felt likely due to ongoing deficits from recent acute strokes in December. Nothing in history to suggest other cause.  PTA ASA held initially upon admission, but resumed as above after recurrent ischemic stroke.  Neurosurgery consulted but felt no operative intervention indicated.  Follow-up CT head on 1/30 with stable scattered foci of subarachnoid hemorrhage.  Per chart review, patient also presented to ED on 1/5/2021 after a fall on 1/4 while at TCU recovering from initial strokes.  CT head demonstrating small new subarachnoid hemorrhage over left frontotemporoparietal region and within the left ambient cistern.  Plavix stopped and discharged back to TCU.  - Continue PT/OT/SLP  - Follow up with neurosurgery within 1 month with non-contrast head CT day of appt     Hypertension  Started on diltiazem 120mg daily after recent strokes. Developed some orthostatic symptoms and had some recrudescence of stroke symptoms with exertion. Diltiazem stopped and symptoms resolved. BP elevated during acute hospitalization requiring hydralazine intermittently. Re-challenged with amlodipine during hospital stay, stopped after stroke on 2/1.  Per  discussion with sending team on admission to ARU, no scheduled or PRN BP meds recommended at this time, and continue to monitor.  - SBP goal <160, though per neurology, would not treat more aggressively than this due to risk of precipitating recurrent stroke symptoms.   - As of 2/17, SBP generally 100s-120s, intermittently with SBP to 140s, isolated episode of 172/78 yesterday.  Not currently on any anti-hypertensive meds as generally under goal  - Continue to monitor BP and follow up with PCP     Glaucoma  - Continue prior to admission eye drops    Hypokalemia, resolved.  First noted during acute hospitalization, 3.1 on 2/1.  Given 1-time supplementation of 20 mEq and improved to 3/5 on 2/2.  Recurrent drop to 3.2 on 2/5, so started on 20 mEq daily.  Recheck on 2/8 at 3.7, subsequent improvement to 4.4 on 2/11, so supplementation discontinued.  Subsequent rechecks WNL, most recent on 2/18 at 4.1, no need to resume supplement  - Follow up with PCP and repeat BMP as needed     L ankle/lower leg pain, resolved.  First reported on 2/10.  Mild tenderness over medial malleolus, as well as gastroc and tibialis anterior.  No redness, warmth, ecchymoses, abrasions.  Mild L pedal edema, but present since baseline.  Pain not aggravated by weight-bearing.  Incident with near anterior LOB with L toe catching on 2/9.  Patient also with L inattention, weakness. Suspect possible ankle strain/sprain.  Also recent initiation of TENS and increased lower extremity work with therapies, so maybe muscular.  Imaging does not seem clinically indicated at this time.  - Xray shows no acute issues, some degenerative changes  - Improvement with conservative measures PRN: ice, rest, elevation, voltaren, Tylenol  - As of 2/18, no ongoing complaints of pain    Constipation.  Noted intermittently throughout stay.  Last BM 2/16.  Has PRN bowel meds available and advised to utilize if no BM at least every other day.    DISCHARGE MEDICATIONS  Current  Discharge Medication List      START taking these medications    Details   acetaminophen (TYLENOL) 325 MG tablet Take 2 tablets (650 mg) by mouth every 4 hours as needed for mild pain or fever (> 101 F)  Qty:      Associated Diagnoses: Acute ischemic right MCA stroke (H)      aspirin (ASA) 81 MG EC tablet Take 1 tablet (81 mg) by mouth daily  Qty: 30 tablet, Refills: 0    Associated Diagnoses: Acute ischemic right MCA stroke (H)      cholecalciferol (VITAMIN D3) 10 mcg (400 units) TABS tablet Take 1 tablet (10 mcg) by mouth daily  Qty: 30 tablet, Refills: 0    Associated Diagnoses: Preventative health care      multivitamin w/minerals (MULTI-VITAMIN) tablet Take 1 tablet by mouth daily  Qty: 30 tablet, Refills: 0    Associated Diagnoses: Preventative health care      senna-docusate (SENOKOT-S/PERICOLACE) 8.6-50 MG tablet Take 1-2 tablets by mouth 2 times daily as needed for constipation  Qty: 30 tablet, Refills: 0    Associated Diagnoses: Constipation, unspecified constipation type         CONTINUE these medications which have CHANGED    Details   atorvastatin (LIPITOR) 40 MG tablet Take 1 tablet (40 mg) by mouth every evening  Qty: 30 tablet, Refills: 0    Associated Diagnoses: Acute ischemic stroke (H)      dorzolamide-timolol (COSOPT) 2-0.5 % ophthalmic solution Place 1 drop into the right eye 2 times daily  Qty: 10 mL, Refills: 0    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      polyethylene glycol (MIRALAX) 17 GM/Dose powder Take 17 g by mouth daily  Qty: 510 g, Refills: 0    Associated Diagnoses: Constipation, unspecified constipation type      travoprost BAK FREE (TRAVATAN Z) 0.004 % ophthalmic solution Place 1 drop into both eyes At Bedtime  Qty: 5 mL, Refills: 0    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         STOP taking these medications       aspirin 81 MG tablet Comments:   Reason for Stopping:         Cholecalciferol (VITAMIN D3) 10 MCG (400 UNIT) CAPS  Comments:   Reason for Stopping:         hydrALAZINE (APRESOLINE) 20 mg/mL Comments:   Reason for Stopping:         Multiple Vitamin (MULTI-VITAMIN) per tablet Comments:   Reason for Stopping:         ondansetron (ZOFRAN-ODT) 4 MG ODT tab Comments:   Reason for Stopping:                 DISCHARGE INSTRUCTIONS AND FOLLOW UP  Discharge Procedure Orders   Home care nursing referral   Referral Priority: Routine Referral Type: Home Health Therapies & Aides   Number of Visits Requested: 1     Home Care PT Referral for Hospital Discharge   Referral Priority: Routine Referral Type: Home Health Therapies & Aides   Number of Visits Requested: 1     Home Care OT Referral for Hospital Discharge   Referral Priority: Routine   Number of Visits Requested: 1     Home Care SLP Referral for Hospital Discharge   Referral Priority: Routine   Number of Visits Requested: 1     Reason for your hospital stay   Order Comments: You were admitted for rehabilitation following your fall and intracranial hemorrhage and subsequent stroke.     Adult [facility] Follow-up and recommended labs and tests   Order Comments: Follow up with primary care provider, ERINN BRYAN, within 7 days for hospital follow- up.  The following labs/tests are recommended: BMP, CBC.      Follow up with neurology (Dr. Finnegan at [facility]) within 4 weeks.     Follow up with neurosurgery within 1 month with non-contrast head CT day of appt.    Appointments on [place] and/or [facility] (with [facility] or [facility] provider or service). Call [phone] if you haven't heard regarding these appointments within 7 days of discharge.     Activity   Order Comments: Your activity upon discharge: activity as tolerated and as directed by therapies.  We recommend use of front-wheeled walker and 24/7 physical assistance initially due to high risk of falls.  We also recommend the total assistance of a family member or caregiver for all IADLs (finances, transportation, meal  preparation, medication set-up and administration, complex decision-making, etc.).     Order Specific Question Answer Comments   Is discharge order? Yes      Monitor and record   Order Comments: blood pressure daily     MD face to face encounter   Order Comments: Documentation of Face to Face and Certification for Home Health Services    I certify that patient: Jamee Douglas is under my care and that I, or a nurse practitioner or physician's assistant working with me, had a face-to-face encounter that meets the physician face-to-face encounter requirements with this patient on: 2/17/2021.    This encounter with the patient was in whole, or in part, for the following medical condition, which is the primary reason for home health care: fall with SAH/SDH, recent ischemic stroke with left-sided hemiparesis, neglect.    I certify that, based on my findings, the following services are medically necessary home health services: Nursing, Occupational Therapy, Physical Therapy and Speech Language Therapy.    My clinical findings support the need for the above services because: Nurse is needed: to assess home safety, monitor vital signs and response to medications., Occupational Therapy Services are needed to assess and treat cognitive ability and address ADL safety due to impairment in balance, strength, activity tolerance, cognition., Physical Therapy Services are needed to assess and treat the following functional impairments: balance, strength, activity tolerance, cognition. and Speech Therapy Services are needed to assess and treat impairments in language and/or swallow functions due to impaired cognition following stroke and TBI.    Further, I certify that my clinical findings support that this patient is homebound (i.e. absences from home require considerable and taxing effort and are for medical reasons or [employer] services or infrequently or of short duration when for other reasons) because: Requires  assistance of another person or specialized equipment to access medical services because patient: Is unable to exit home safely on own due to: high falls risk., Is unable to operate assistive equipment on their own. and Requires supervision of another for safe transfer...    Based on the above findings. I certify that this patient is confined to the home and needs intermittent skilled nursing care, physical therapy and/or speech therapy.  The patient is under my care, and I have initiated the establishment of the plan of care.  This patient will be followed by a physician who will periodically review the plan of care.  Physician/Provider to provide follow up care: Sang Antonio    Attending hospital physician (the Medicare certified [facility] provider): Casimiro Saldivar DO  Physician Signature: See electronic signature associated with these discharge orders.  Date: 2/17/2021     No CPR- Do NOT Intubate     Order Specific Question Answer Comments   Code status determined by: Discussion with patient/ legal decision maker      Diet   Order Comments: Follow this diet upon discharge:       Snacks/Supplements Adult: Gelatein Plus; With Meals      Snacks/Supplements Adult: Other; Food snacks - see comments; Between Meals      Combination Diet Regular Diet Adult; Thin Liquids (water, ice chips, juice, milk, gelatin, ice cream, etc)     Order Specific Question Answer Comments   Is discharge order? Yes           PHYSICAL EXAMINATION    Most recent Vital Signs:   Vitals:    02/17/21 0814 02/17/21 1658 02/17/21 2203 02/18/21 0700   BP: 117/45 (!) 172/78 134/67 138/62   BP Location: Left arm Right arm Right arm Right arm   Pulse: 90 77 76 83   Resp:  16  16   Temp:  97  F (36.1  C)  96.9  F (36.1  C)   TempSrc:  Oral  Oral   SpO2:  99%  100%   Weight:    61 kg (134 lb 8 oz)   Height:         Gen: awake, elderly, pleasant and cooperative, in NAD  HEENT: normocephalic, atraumatic, ecchymoses over left cheek/jaw nearly  resolved, anicteric sclera  Cardio: RRR, no murmurs, Ziopatch in place  Pulm: non-labored on room air, lungs CTA bilaterally  Abd: soft, non-tender, non-distended, bowel sounds present  Extr: trace edema in LLE, no warmth, redness, no calf tenderness bilaterally  Neuro/MSK: AAOx3, follows commands, responds appropriately, speech clear/fluent, improving left-sided neglect, LUE 4/5 SF/EF/EE/, LLE improving strength but still 4-/5 HF/KE/KF, 3/5 PF/DF, sensation intact to light touch all 4 extremities    50 minutes spent in discharge, including >50% in counseling and coordination of care, medication review and plan of care recommended on follow up.     Patient was evaluated on day of discharge by attending physician, Casimiro Saldivar DO, who agrees with plan of care.    Discharge summary was forwarded to Sang Antonio (PCP) at the time of discharge, so as to bridge from hospital to outpatient care.     It was our pleasure to care for Jamee Douglas during this hospitalization. Please do not hesitate to contact me should there be questions regarding the hospital course or discharge plan.          Rosalie Stokes PA-C  Physical Medicine and Rehabilitation

## 2021-02-17 NOTE — PROGRESS NOTES
"SPIRITUAL HEALTH SERVICES  SPIRITUAL ASSESSMENT Progress Note  [facility] ([facility]) 5R ARU     REFERRAL SOURCE: Follow Up    Visited with pt Jamee.  She's preparing to discharge tomorrow.  We discussed her leaving and that she's going to [facility] in [place].  Though she knows this is the right place for her, she also said, \"I don't have a home.\"  She is feeling the emotions of the changes in her life.  I normalized this for her stating she can trust God and be in hard situations at the same time. We prayed together.    PLAN: I will continue to follow this patient as long as she's admitted.    Ro Rosario  Chaplain Resident  Pager: [phone]    "

## 2021-02-17 NOTE — PLAN OF CARE
"Speech Language Therapy Discharge Summary    Reason for therapy discharge:    Discharged to long term care facility.    Progress towards therapy goal(s). See goals on Care Plan in Epic electronic health record for goal details.  Goals partially met.  Barriers to achieving goals:   discharge from facility.    Therapy recommendation(s):    Continued therapy is recommended.  Rationale/Recommendations:  Cognition.    Pt has mild cognitive deficits. She struggles most with moderate-complex level cognitive tasks. She has been learning strategies to completing tasks efficiently and with more ease such as writing down information on paper, repeating things out loud, and doing tasks multiple times to see improvement over a short amount of time. Carryover between sessions limited at times. Pt has \"Lumosity\" application on her personal ipad that she enjoys. Pt demonstrates strengths in math, memory, and writing.    "

## 2021-02-17 NOTE — DISCHARGE INSTRUCTIONS
Follow up Appointments    - Follow up with PCP, Dr. Sang Antonio  You are scheduled to see Dr. Antonio on Tuesday, February 23rd at 10:30 AM.    Address  [facility] [place]                          [address] [place] [address]  Phone   [phone]  Fax                  [phone]     - Follow up with neurosurgery in 1 month with non-contrast head CT day of appt.  You are scheduled to see Robles Houston PA-C on Wednesday, March 3rd at 3:30 PM with your CT prior at 2:30 PM.    Address  [facility] [place]                          [address], [address]                          [place] [address]  Phone   [phone]    - Follow up with neurology in 4 weeks, Dr. Finnegan at [facility].  You are scheduled to see Dr. Finnegan on Wednesday, March 17th at 3:00 PM.    Address  [facility]                          [address], [address]                          [place] [address]  Phone   [phone]    -------------------------------------------------------------------------------------------------------------------------------    Home Health Care:   [facility] PH: [phone] (previously known as: [facility])  Nurse, physical therapy, occupational therapy, speech therapy, home health aide  -You will get a call after you have discharged from [facility] to schedule the first home care visit. The home health nurse should come out within the first 24-48 hours. If you don't get a call from them within the first 48 hours, please call to follow up (number above).     [place] [facility] [place] [facility] [employer]  Additional resources and contact information online   [email or web address]  [email or web address]  PH: [phone] OR  [phone]  -------------------------------------------------------------------------------------------------------------------------------    To reduce the risk of subsequent stroke there are several important factors including optimal management of anticoagulants, blood pressure, cholesterol, diabetes and smoking abstinence.    Anticoagulation:  You are on Aspirin    Blood Pressure:  Keeping your blood pressures less than 130/80 has been shown to reduce risk of recurrent stroke. Recording your blood pressure and heart rate once daily in a log book can help you and your providers make decisions on optimal management. You are encouraged to bring your log book with you to your primary physician and/or cardiology doctor visits.    You are currently not on any medications to help control your blood pressure. Several lifestyle modifications have been associated with blood pressure reduction and are an important part of a comprehensive plan. These include: weight loss (if over-weight); a diet low in salt and cholesterol and rich in fruits and vegetables; regular aerobic physical activity and limited alcohol consumption.    Diabetes:  You do not have diabetes though it is important to continue monitoring for this in the future with your primary provider.    Diet:  Currently you're on regular diet.  Diet can significantly affect your levels and should be part of your plan. Please see additional information from dietitian about this.    Cholesterol:  Traditional target levels for LDL cholesterol or \"bad cholesterol\" is less than 130 however once you have had a stroke, your target LDL level is now less than 70. Additional recommendations such as increasing your HDL or \"good\" cholesterol and lowering your triglyceride level can also be important.    Your most recent lipid panel was   Lab Results   Component Value Date    CHOL 238 03/27/2018     Lab Results   Component Value Date    HDL 53 03/27/2018     Lab Results "   Component Value Date     03/27/2018     Lab Results   Component Value Date    TRIG 141 03/27/2018     No results found for: CHOLHDLRATIO    This should be followed up in 2-3 months with your primary provider.    Smoking:  Finally one of the most important modifiable risk factors is to not smoke. This includes cigarettes, pipes, cigars, chewing tobacco and second hand smoke. Support through counseling, nicotine replacement, and oral smoking-cessation medications may all be helpful. Often people have been able to quit during their hospitalization but once returning to their familiar environment, the urges can be stronger. If this is the case, we encourage you to get support. There are numerous options, start by talking with your doctor.

## 2021-02-17 NOTE — PROGRESS NOTES
Met with pt this morning, reminded her of the visit today at 3:30 with Shaina from [facility]. Pt expressed understanding and denied concerns RE: discharge tomorrow. Pt signed IMM.     HC confirmed and added to pt AVS for reference. No further SW needs identified. Spoke with pt lew Velázquez and confirmed plans. Melania following up RE: transport time for tomorrow discharge.    ADDENDUM: SW asked to send MD orders to [facility] tomorrow morning. SW will do so with pt/pt family permission. Pt dtr Melania plans to connect with [facility] this evening/tomorrow morning to confirm discharge time and will update SW in the morning. Melania hopeful that she can pick pt up between 10:30am-11am. Pt will need to go with 30 days of her medications. Charge RN notified of this.      Julia Verdugo, RYAN, [facility]   Phone: [phone]  I   Pager: [phone]

## 2021-02-17 NOTE — PLAN OF CARE
FOCUS/GOAL  Bowel management, Bladder management, and Medical management    ASSESSMENT, INTERVENTIONS AND CONTINUING PLAN FOR GOAL:  Patient A&O x4. Assist of 1 w/ walker. No complaints of pain. Continent and incontinent of bladder. LBM 2/16. Plan to discharge 2/18. Continue w/ plan of care.

## 2021-02-18 ENCOUNTER — PATIENT OUTREACH (OUTPATIENT)
Dept: CARE COORDINATION | Facility: CLINIC | Age: 86
End: 2021-02-18

## 2021-02-18 VITALS
OXYGEN SATURATION: 100 % | HEART RATE: 83 BPM | RESPIRATION RATE: 16 BRPM | DIASTOLIC BLOOD PRESSURE: 62 MMHG | BODY MASS INDEX: 23.83 KG/M2 | TEMPERATURE: 96.9 F | HEIGHT: 63 IN | SYSTOLIC BLOOD PRESSURE: 138 MMHG | WEIGHT: 134.5 LBS

## 2021-02-18 LAB
ANION GAP SERPL CALCULATED.3IONS-SCNC: 3 MMOL/L (ref 3–14)
BUN SERPL-MCNC: 15 MG/DL (ref 7–30)
CALCIUM SERPL-MCNC: 8.8 MG/DL (ref 8.5–10.1)
CHLORIDE SERPL-SCNC: 112 MMOL/L (ref 94–109)
CO2 SERPL-SCNC: 29 MMOL/L (ref 20–32)
CREAT SERPL-MCNC: 0.64 MG/DL (ref 0.52–1.04)
GFR SERPL CREATININE-BSD FRML MDRD: 79 ML/MIN/{1.73_M2}
GLUCOSE SERPL-MCNC: 100 MG/DL (ref 70–99)
POTASSIUM SERPL-SCNC: 4.1 MMOL/L (ref 3.4–5.3)
SODIUM SERPL-SCNC: 144 MMOL/L (ref 133–144)

## 2021-02-18 PROCEDURE — 99239 HOSP IP/OBS DSCHRG MGMT >30: CPT | Performed by: PHYSICAL MEDICINE & REHABILITATION

## 2021-02-18 PROCEDURE — 36415 COLL VENOUS BLD VENIPUNCTURE: CPT | Performed by: PHYSICIAN ASSISTANT

## 2021-02-18 PROCEDURE — 80048 BASIC METABOLIC PNL TOTAL CA: CPT | Performed by: PHYSICIAN ASSISTANT

## 2021-02-18 PROCEDURE — 250N000013 HC RX MED GY IP 250 OP 250 PS 637: Performed by: PHYSICIAN ASSISTANT

## 2021-02-18 RX ADMIN — MULTIPLE VITAMINS W/ MINERALS TAB 1 TABLET: TAB at 08:37

## 2021-02-18 RX ADMIN — CHOLECALCIFEROL TAB 10 MCG (400 UNIT) 400 UNITS: 10 TAB at 08:37

## 2021-02-18 RX ADMIN — DORZOLAMIDE HYDROCHLORIDE AND TIMOLOL MALEATE 1 DROP: 20; 5 SOLUTION/ DROPS OPHTHALMIC at 08:37

## 2021-02-18 RX ADMIN — ASPIRIN 81 MG: 81 TABLET, COATED ORAL at 08:37

## 2021-02-18 ASSESSMENT — MIFFLIN-ST. JEOR: SCORE: 1009.22

## 2021-02-18 NOTE — PROGRESS NOTES
Patient is A&O x4, VSS, denied CP, pain, numbness, tingling and SOB. AS1 with walker and gait belt. Scheduled toileting during the shift, has been continent of bladder using toilet. No BM during the shift, LBM 2/16. Pt is on regular diet, denied N/V, and take pills whole. Heels elevated off bed, bed alarm ON for safety. Probable discharge tomorrow to assisted living per report. Continue to monitor patient as POC.

## 2021-02-18 NOTE — PROGRESS NOTES
Discharge today. Per request and with pt/pt family permission, MD orders faxed to [facility] PH: [phone], F: [phone]. No additional SW needs.     Julia Verdugo, RYAN, [facility] Rehab [facility]   Phone: [phone]  I   Pager: [phone]

## 2021-02-18 NOTE — PLAN OF CARE
"  VS: /62 (BP Location: Right arm)   Pulse 83   Temp 96.9  F (36.1  C) (Oral)   Resp 16   Ht 1.6 m (5' 3\")   Wt 61 kg (134 lb 8 oz)   LMP 01/01/1970   SpO2 100%   BMI 23.83 kg/m       O2: O2 sat 100% on RA, denies SOB or respiratory distress    Output: Continent of bladder with use of toilet, but has periods of incontinence    Last BM: 2/16   Activity: Up with assist of one with walker, steady gait    Up for meals? Up in chair for breakfast    Skin: Trace edema to ankles    Pain: Denies pain or discomfort    CMS: Alert & oriented, able to make needs known    Dressing: None    Diet: Regular with thin    LDA: None    Equipment: Walker and personal belongings    Plan: Will discharge home this AM    Additional Info:        "

## 2021-02-18 NOTE — PROGRESS NOTES
[facility]  Spoke with patient daughter Melania  to discuss plans for home care services.  Patient to be discharged home 02 18 21 and has agreed to have [facility] provide RN, PT, OT, SLP and HHA services. Patient care support center processing referral.  Patient daughter verbalized understanding that initial visit is scheduled for 02 19 21 or 02 20 21. Patient daughter has 24 hour phone number for [facility] for any questions or concerns.

## 2021-02-18 NOTE — PLAN OF CARE
FOCUS/GOAL  Bowel management, Bladder management, Pain management and Cognition/Memory/Judgment/Problem solving    ASSESSMENT, INTERVENTIONS AND CONTINUING PLAN FOR GOAL:  Pt is alert and oriented x4, calling for needs appropriately, assist of 1 ww to bathroom, can be incontinent overnight but is generally continent with timed toileting, denied chest pain, sob, fever, chill, n/v, or new numbness/tingling, continue with discharge plan.

## 2021-02-18 NOTE — PROGRESS NOTES
Pt was discharged to [facility] accompanied by daughter. Discharge instructions reviewed with pt and daughter with all questions answered. All belongings sent along including prescribed medications

## 2021-02-20 ENCOUNTER — APPOINTMENT (OUTPATIENT)
Dept: CT IMAGING | Facility: CLINIC | Age: 86
DRG: 087 | End: 2021-02-20
Attending: EMERGENCY MEDICINE
Payer: COMMERCIAL

## 2021-02-20 ENCOUNTER — APPOINTMENT (OUTPATIENT)
Dept: CT IMAGING | Facility: CLINIC | Age: 86
DRG: 087 | End: 2021-02-20
Attending: PHYSICIAN ASSISTANT
Payer: COMMERCIAL

## 2021-02-20 ENCOUNTER — HOSPITAL ENCOUNTER (OUTPATIENT)
Facility: CLINIC | Age: 86
Setting detail: OBSERVATION
Discharge: NURSING, REHAB OR LONG-TERM CARE | DRG: 087 | End: 2021-02-24
Attending: EMERGENCY MEDICINE | Admitting: HOSPITALIST
Payer: COMMERCIAL

## 2021-02-20 DIAGNOSIS — S06.5XAA SUBDURAL HEMATOMA (H): ICD-10-CM

## 2021-02-20 LAB
ALBUMIN UR-MCNC: NEGATIVE MG/DL
ANION GAP SERPL CALCULATED.3IONS-SCNC: 4 MMOL/L (ref 3–14)
APPEARANCE UR: CLEAR
BACTERIA #/AREA URNS HPF: ABNORMAL /HPF
BASOPHILS # BLD AUTO: 0.1 10E9/L (ref 0–0.2)
BASOPHILS NFR BLD AUTO: 0.9 %
BILIRUB UR QL STRIP: NEGATIVE
BUN SERPL-MCNC: 13 MG/DL (ref 7–30)
CALCIUM SERPL-MCNC: 9.3 MG/DL (ref 8.5–10.1)
CHLORIDE SERPL-SCNC: 108 MMOL/L (ref 94–109)
CO2 SERPL-SCNC: 30 MMOL/L (ref 20–32)
COLOR UR AUTO: YELLOW
CREAT SERPL-MCNC: 0.89 MG/DL (ref 0.52–1.04)
DIFFERENTIAL METHOD BLD: NORMAL
EOSINOPHIL # BLD AUTO: 0.3 10E9/L (ref 0–0.7)
EOSINOPHIL NFR BLD AUTO: 3 %
ERYTHROCYTE [DISTWIDTH] IN BLOOD BY AUTOMATED COUNT: 14.4 % (ref 10–15)
GFR SERPL CREATININE-BSD FRML MDRD: 58 ML/MIN/{1.73_M2}
GLUCOSE BLDC GLUCOMTR-MCNC: 98 MG/DL (ref 70–99)
GLUCOSE SERPL-MCNC: 86 MG/DL (ref 70–99)
GLUCOSE UR STRIP-MCNC: NEGATIVE MG/DL
HCT VFR BLD AUTO: 39.9 % (ref 35–47)
HGB BLD-MCNC: 12.7 G/DL (ref 11.7–15.7)
HGB UR QL STRIP: NEGATIVE
IMM GRANULOCYTES # BLD: 0 10E9/L (ref 0–0.4)
IMM GRANULOCYTES NFR BLD: 0.3 %
KETONES UR STRIP-MCNC: NEGATIVE MG/DL
LABORATORY COMMENT REPORT: NORMAL
LEUKOCYTE ESTERASE UR QL STRIP: NEGATIVE
LYMPHOCYTES # BLD AUTO: 2.1 10E9/L (ref 0.8–5.3)
LYMPHOCYTES NFR BLD AUTO: 22.5 %
MCH RBC QN AUTO: 30 PG (ref 26.5–33)
MCHC RBC AUTO-ENTMCNC: 31.8 G/DL (ref 31.5–36.5)
MCV RBC AUTO: 94 FL (ref 78–100)
MONOCYTES # BLD AUTO: 0.8 10E9/L (ref 0–1.3)
MONOCYTES NFR BLD AUTO: 8.4 %
MUCOUS THREADS #/AREA URNS LPF: PRESENT /LPF
NEUTROPHILS # BLD AUTO: 5.9 10E9/L (ref 1.6–8.3)
NEUTROPHILS NFR BLD AUTO: 64.9 %
NITRATE UR QL: NEGATIVE
NRBC # BLD AUTO: 0 10*3/UL
NRBC BLD AUTO-RTO: 0 /100
PH UR STRIP: 7.5 PH (ref 5–7)
PLATELET # BLD AUTO: 280 10E9/L (ref 150–450)
POTASSIUM SERPL-SCNC: 4.2 MMOL/L (ref 3.4–5.3)
RADIOLOGIST FLAGS: ABNORMAL
RBC # BLD AUTO: 4.24 10E12/L (ref 3.8–5.2)
RBC #/AREA URNS AUTO: 0 /HPF (ref 0–2)
SARS-COV-2 RNA RESP QL NAA+PROBE: NEGATIVE
SODIUM SERPL-SCNC: 142 MMOL/L (ref 133–144)
SOURCE: ABNORMAL
SP GR UR STRIP: 1.01 (ref 1–1.03)
SPECIMEN SOURCE: NORMAL
SQUAMOUS #/AREA URNS AUTO: 2 /HPF (ref 0–1)
TROPONIN I SERPL-MCNC: <0.015 UG/L (ref 0–0.04)
UROBILINOGEN UR STRIP-MCNC: 0 MG/DL (ref 0–2)
WBC # BLD AUTO: 9.1 10E9/L (ref 4–11)
WBC #/AREA URNS AUTO: 2 /HPF (ref 0–5)

## 2021-02-20 PROCEDURE — 85025 COMPLETE CBC W/AUTO DIFF WBC: CPT | Performed by: EMERGENCY MEDICINE

## 2021-02-20 PROCEDURE — 99223 1ST HOSP IP/OBS HIGH 75: CPT | Mod: AI | Performed by: HOSPITALIST

## 2021-02-20 PROCEDURE — 200N000001 HC R&B ICU

## 2021-02-20 PROCEDURE — 70450 CT HEAD/BRAIN W/O DYE: CPT

## 2021-02-20 PROCEDURE — 87635 SARS-COV-2 COVID-19 AMP PRB: CPT | Performed by: EMERGENCY MEDICINE

## 2021-02-20 PROCEDURE — 250N000011 HC RX IP 250 OP 636: Performed by: HOSPITALIST

## 2021-02-20 PROCEDURE — 250N000011 HC RX IP 250 OP 636: Performed by: INTERNAL MEDICINE

## 2021-02-20 PROCEDURE — 80048 BASIC METABOLIC PNL TOTAL CA: CPT | Performed by: EMERGENCY MEDICINE

## 2021-02-20 PROCEDURE — 999N001017 HC STATISTIC GLUCOSE BY METER IP

## 2021-02-20 PROCEDURE — G0378 HOSPITAL OBSERVATION PER HR: HCPCS

## 2021-02-20 PROCEDURE — 70450 CT HEAD/BRAIN W/O DYE: CPT | Mod: 77

## 2021-02-20 PROCEDURE — 250N000013 HC RX MED GY IP 250 OP 250 PS 637: Performed by: EMERGENCY MEDICINE

## 2021-02-20 PROCEDURE — 99291 CRITICAL CARE FIRST HOUR: CPT | Mod: 25

## 2021-02-20 PROCEDURE — 96374 THER/PROPH/DIAG INJ IV PUSH: CPT

## 2021-02-20 PROCEDURE — 84484 ASSAY OF TROPONIN QUANT: CPT | Performed by: EMERGENCY MEDICINE

## 2021-02-20 PROCEDURE — C9803 HOPD COVID-19 SPEC COLLECT: HCPCS

## 2021-02-20 PROCEDURE — 81001 URINALYSIS AUTO W/SCOPE: CPT | Performed by: EMERGENCY MEDICINE

## 2021-02-20 PROCEDURE — 250N000013 HC RX MED GY IP 250 OP 250 PS 637: Performed by: INTERNAL MEDICINE

## 2021-02-20 PROCEDURE — 96375 TX/PRO/DX INJ NEW DRUG ADDON: CPT

## 2021-02-20 PROCEDURE — 99204 OFFICE O/P NEW MOD 45 MIN: CPT | Performed by: PHYSICIAN ASSISTANT

## 2021-02-20 PROCEDURE — 250N000013 HC RX MED GY IP 250 OP 250 PS 637: Performed by: HOSPITALIST

## 2021-02-20 RX ORDER — ACETAMINOPHEN 325 MG/1
650 TABLET ORAL EVERY 4 HOURS PRN
Status: DISCONTINUED | OUTPATIENT
Start: 2021-02-20 | End: 2021-02-24 | Stop reason: HOSPADM

## 2021-02-20 RX ORDER — DEXTROSE MONOHYDRATE 25 G/50ML
25-50 INJECTION, SOLUTION INTRAVENOUS
Status: DISCONTINUED | OUTPATIENT
Start: 2021-02-20 | End: 2021-02-24 | Stop reason: HOSPADM

## 2021-02-20 RX ORDER — POLYETHYLENE GLYCOL 3350 17 G/17G
1 POWDER, FOR SOLUTION ORAL DAILY PRN
COMMUNITY
End: 2023-09-22

## 2021-02-20 RX ORDER — ONDANSETRON 2 MG/ML
4 INJECTION INTRAMUSCULAR; INTRAVENOUS EVERY 6 HOURS PRN
Status: DISCONTINUED | OUTPATIENT
Start: 2021-02-20 | End: 2021-02-24 | Stop reason: HOSPADM

## 2021-02-20 RX ORDER — AMOXICILLIN 250 MG
1-2 CAPSULE ORAL 2 TIMES DAILY PRN
Status: DISCONTINUED | OUTPATIENT
Start: 2021-02-20 | End: 2021-02-24 | Stop reason: HOSPADM

## 2021-02-20 RX ORDER — ATORVASTATIN CALCIUM 40 MG/1
40 TABLET, FILM COATED ORAL EVERY EVENING
Status: DISCONTINUED | OUTPATIENT
Start: 2021-02-20 | End: 2021-02-24 | Stop reason: HOSPADM

## 2021-02-20 RX ORDER — POLYETHYLENE GLYCOL 3350 17 G/17G
17 POWDER, FOR SOLUTION ORAL DAILY PRN
Status: DISCONTINUED | OUTPATIENT
Start: 2021-02-20 | End: 2021-02-24 | Stop reason: HOSPADM

## 2021-02-20 RX ORDER — LABETALOL HYDROCHLORIDE 5 MG/ML
10-20 INJECTION, SOLUTION INTRAVENOUS EVERY 10 MIN PRN
Status: DISCONTINUED | OUTPATIENT
Start: 2021-02-20 | End: 2021-02-24 | Stop reason: HOSPADM

## 2021-02-20 RX ORDER — HYDRALAZINE HYDROCHLORIDE 20 MG/ML
10-20 INJECTION INTRAMUSCULAR; INTRAVENOUS
Status: DISCONTINUED | OUTPATIENT
Start: 2021-02-20 | End: 2021-02-24 | Stop reason: HOSPADM

## 2021-02-20 RX ORDER — TRAVOPROST OPHTHALMIC SOLUTION 0.04 MG/ML
1 SOLUTION OPHTHALMIC AT BEDTIME
Status: DISCONTINUED | OUTPATIENT
Start: 2021-02-20 | End: 2021-02-24 | Stop reason: HOSPADM

## 2021-02-20 RX ORDER — ACETAMINOPHEN 325 MG/1
650 TABLET ORAL ONCE
Status: COMPLETED | OUTPATIENT
Start: 2021-02-20 | End: 2021-02-20

## 2021-02-20 RX ORDER — NICOTINE POLACRILEX 4 MG
15-30 LOZENGE BUCCAL
Status: DISCONTINUED | OUTPATIENT
Start: 2021-02-20 | End: 2021-02-24 | Stop reason: HOSPADM

## 2021-02-20 RX ORDER — ONDANSETRON 4 MG/1
4 TABLET, ORALLY DISINTEGRATING ORAL EVERY 6 HOURS PRN
Status: DISCONTINUED | OUTPATIENT
Start: 2021-02-20 | End: 2021-02-24 | Stop reason: HOSPADM

## 2021-02-20 RX ORDER — DORZOLAMIDE HYDROCHLORIDE AND TIMOLOL MALEATE 20; 5 MG/ML; MG/ML
1 SOLUTION/ DROPS OPHTHALMIC 2 TIMES DAILY
Status: DISCONTINUED | OUTPATIENT
Start: 2021-02-20 | End: 2021-02-24 | Stop reason: HOSPADM

## 2021-02-20 RX ORDER — LEVETIRACETAM 5 MG/ML
500 INJECTION INTRAVASCULAR EVERY 12 HOURS
Status: DISCONTINUED | OUTPATIENT
Start: 2021-02-20 | End: 2021-02-22

## 2021-02-20 RX ORDER — NICARDIPINE HYDROCHLORIDE 0.2 MG/ML
2.5-15 INJECTION INTRAVENOUS CONTINUOUS
Status: DISCONTINUED | OUTPATIENT
Start: 2021-02-20 | End: 2021-02-21

## 2021-02-20 RX ADMIN — LEVETIRACETAM INJECTION 500 MG: 5 INJECTION INTRAVENOUS at 17:11

## 2021-02-20 RX ADMIN — LABETALOL HYDROCHLORIDE 10 MG: 5 INJECTION, SOLUTION INTRAVENOUS at 15:49

## 2021-02-20 RX ADMIN — ATORVASTATIN CALCIUM 40 MG: 40 TABLET, FILM COATED ORAL at 20:59

## 2021-02-20 RX ADMIN — TRAVOPROST 1 DROP: 0.04 SOLUTION/ DROPS OPHTHALMIC at 21:04

## 2021-02-20 RX ADMIN — ACETAMINOPHEN 650 MG: 325 TABLET, FILM COATED ORAL at 13:49

## 2021-02-20 RX ADMIN — ACETAMINOPHEN 650 MG: 325 TABLET, FILM COATED ORAL at 21:02

## 2021-02-20 RX ADMIN — DORZOLAMIDE HYDROCHLORIDE AND TIMOLOL MALEATE 1 DROP: 20; 5 SOLUTION/ DROPS OPHTHALMIC at 20:57

## 2021-02-20 ASSESSMENT — ACTIVITIES OF DAILY LIVING (ADL)
NUMBER_OF_TIMES_PATIENT_HAS_FALLEN_WITHIN_LAST_SIX_MONTHS: 3
ADLS_ACUITY_SCORE: 21
DIFFICULTY_COMMUNICATING: NO
WHICH_OF_THE_ABOVE_FUNCTIONAL_RISKS_HAD_A_RECENT_ONSET_OR_CHANGE?: FALL HISTORY
WALKING_OR_CLIMBING_STAIRS: AMBULATION DIFFICULTY, REQUIRES EQUIPMENT
DRESSING/BATHING_DIFFICULTY: YES
DOING_ERRANDS_INDEPENDENTLY_DIFFICULTY: YES
DIFFICULTY_EATING/SWALLOWING: NO
FALL_HISTORY_WITHIN_LAST_SIX_MONTHS: YES
TOILETING_ISSUES: NO
WEAR_GLASSES_OR_BLIND: YES
CONCENTRATING,_REMEMBERING_OR_MAKING_DECISIONS_DIFFICULTY: YES
EQUIPMENT_CURRENTLY_USED_AT_HOME: WALKER, HEMI
ADLS_ACUITY_SCORE: 21
WALKING_OR_CLIMBING_STAIRS_DIFFICULTY: YES
DRESSING/BATHING: BATHING DIFFICULTY, ASSISTANCE 1 PERSON
VISION_MANAGEMENT: GLASSES

## 2021-02-20 ASSESSMENT — ENCOUNTER SYMPTOMS
WEAKNESS: 1
SHORTNESS OF BREATH: 0
HEADACHES: 1
CONFUSION: 1
NECK PAIN: 0

## 2021-02-20 ASSESSMENT — MIFFLIN-ST. JEOR: SCORE: 1002.41

## 2021-02-20 NOTE — CONSULTS
Neurosurgery Consult    HPI    Ms. Douglas is a 88-year-old female who has been recently admitted to [facility] for small subdural hematomas and subarachnoid hemorrhage after falls at the end of January.  She was stabilized in the hospital her imaging was stable and she was recommended to follow-up with neurosurgery clinic in a month.  She ended up discharging to a rehab facility and then 2 days ago discharged home yesterday fell several times and presented to the emergency department today where repeat imaging demonstrates slightly increased bilateral subdural hematomas.    The patient states she feels relatively well other than a headache on the left side of her head where she has a large hematoma.  She also complains of left shoulder pain.  She denies weakness in her upper or lower extremities denies any other complaints.  She is here with her daughter.    She is taking a daily aspirin 81 mg.    Medical history  Hyperlipidemia  Acute ischemic right MCA stroke  Hypertension      Social history  Presents with her daughter today        B/P: 183/89, T: 98.7, P: 83, R: 18       Exam    Alert and oriented no acute distress lying in ER bed  Bilateral upper extremities with appropriate strength  Negative pronator drift bilaterally in the upper or lower extremities  Finger-nose slow and accurate bilaterally      Imaging    1. Bilateral subdural hematomas with slight increase in size.  2. Left frontal scalp hematoma. No evidence for any underlying  fracture.  3. Cerebral atrophy with chronic white matter changes and multiple old  deep lacunar infarcts.     Assessment    Bilateral subdural hematomas    Plan:      Case and imaging was discussed with Dr. Michelle.  We would recommend admission to the intensive care unit for monitoring with 1 hour neurochecks, blood pressure less than 140.  Would recommend Keppra 500 mg twice daily for 7 days.  We will repeat the CT scan at 6 PM today to ensure no worsening of the  hemorrhage.  That scan is stable likely follow-up scan 2 weeks afterwards.    Total time of 45 minutes spent with the patient today in counseling and coordination of care.

## 2021-02-20 NOTE — PHARMACY-ADMISSION MEDICATION HISTORY
Pharmacy Medication History  Admission medication history interview status for the 2/20/2021  admission is complete. See EPIC admission navigator for prior to admission medications     Location of Interview: Phone  Medication history sources: Patient's family/friend (gloria)    Significant changes made to the medication list:  none    In the past week, patient estimated taking medication this percent of the time: greater than 90%    Additional medication history information:   none    Medication reconciliation completed by provider prior to medication history? No    Time spent in this activity: 10min    Prior to Admission medications    Medication Sig Last Dose Taking? Auth Provider   acetaminophen (TYLENOL) 325 MG tablet Take 2 tablets (650 mg) by mouth every 4 hours as needed for mild pain or fever (> 101 F) prn Yes Rosalie Stokes PA-C   aspirin (ASA) 81 MG EC tablet Take 1 tablet (81 mg) by mouth daily 2/20/2021 at Unknown time Yes Rosalie Stokes PA-C   atorvastatin (LIPITOR) 40 MG tablet Take 1 tablet (40 mg) by mouth every evening 2/19/2021 at Unknown time Yes Rosalie Stokes PA-C   cholecalciferol (VITAMIN D3) 10 mcg (400 units) TABS tablet Take 1 tablet (10 mcg) by mouth daily 2/20/2021 at Unknown time Yes Rosalie Stokes PA-C   dorzolamide-timolol (COSOPT) 2-0.5 % ophthalmic solution Place 1 drop into the right eye 2 times daily 2/20/2021 at Unknown time Yes Rosalie Stokes PA-C   multivitamin w/minerals (MULTI-VITAMIN) tablet Take 1 tablet by mouth daily 2/20/2021 at Unknown time Yes Rosalie Stokes PA-C   polyethylene glycol (MIRALAX) 17 g packet Take 1 packet by mouth daily as needed for constipation prn Yes Unknown, Entered By History   senna-docusate (SENOKOT-S/PERICOLACE) 8.6-50 MG tablet Take 1-2 tablets by mouth 2 times daily as needed for constipation prn Yes Rosalie Stokes PA-C   travoprost BAK FREE (TRAVATAN Z) 0.004 % ophthalmic solution Place 1 drop into  both eyes At Bedtime 2/19/2021 at Unknown time Yes Rosalie Stokes PA-C       The information provided in this note is only as accurate as the sources available at the time of update(s)

## 2021-02-20 NOTE — ED NOTES
[facility]  ED Nurse Handoff Report    ED Chief complaint: Head Injury      ED Diagnosis:   Final diagnoses:   None       Code Status: Full Code    Allergies:   Allergies   Allergen Reactions     Penicillins Swelling       Patient Story: Pt presents to the ER via EMS after falling at the [facility] care [place].     Focused Assessment:  Pt recently moved into a [facility] care center and today was found on the floor after breakfast. Pt did hit her head but denies LOC. Pt has a left frontal lobe hematoma. Pt is not on blood thinners. Pt A&Ox3 but forgetful. Per daughter at bedside pt has had 3 falls in the past couple weeks.     Results for orders placed or performed during the hospital encounter of 02/20/21   CT Head w/o Contrast     Status: Abnormal   Result Value Ref Range    Radiologist flags Intracranial extra-axial hemorrhages with slight (AA)     Narrative    CT SCAN OF THE HEAD WITHOUT CONTRAST   2/20/2021 12:14 PM     HISTORY: Head trauma, moderate-severe. Fall, large hematoma, recent  stroke.    TECHNIQUE: Axial images of the head and coronal reformations without  IV contrast material.  Radiation dose for this scan was reduced using  automated exposure control, adjustment of the mA and/or kV according  to patient size, or iterative reconstruction technique.    COMPARISON: 02/01/2021.    FINDINGS: There is a large left frontal scalp hematoma. There is no  evidence for any underlying fracture. There are bilateral subdural  hematomas which have increased in size since the prior exam, currently  measuring up to 0.8 cm on the right and 0.7 cm on the left as compared  with 0.6 and 0.5 cm on the prior exam. These are mixed density  subdural hematomas. Cerebral atrophy and scattered white matter  changes are also noted. There is also a chronic calcification noted in  the right greater than left middle cerebral arteries. Old deep gray  matter lacunar infarcts noted. There is no evidence for any  acute  infarct.      Impression    IMPRESSION:  1. Bilateral subdural hematomas with slight increase in size.  2. Left frontal scalp hematoma. No evidence for any underlying  fracture.  3. Cerebral atrophy with chronic white matter changes and multiple old  deep lacunar infarcts.     [Critical Result: Intracranial extra-axial hemorrhages with slight  increase in size.    Finding was identified on 2/20/2021 12:18 PM.     Dr. Bedoya was contacted by me on 2/20/2021 12:23 PM and verbalized  understanding of the critical result.     ROBERT HEREDIA MD   CBC with platelets differential     Status: None   Result Value Ref Range    WBC 9.1 4.0 - 11.0 10e9/L    RBC Count 4.24 3.8 - 5.2 10e12/L    Hemoglobin 12.7 11.7 - 15.7 g/dL    Hematocrit 39.9 35.0 - 47.0 %    MCV 94 78 - 100 fl    MCH 30.0 26.5 - 33.0 pg    MCHC 31.8 31.5 - 36.5 g/dL    RDW 14.4 10.0 - 15.0 %    Platelet Count 280 150 - 450 10e9/L    Diff Method Automated Method     % Neutrophils 64.9 %    % Lymphocytes 22.5 %    % Monocytes 8.4 %    % Eosinophils 3.0 %    % Basophils 0.9 %    % Immature Granulocytes 0.3 %    Nucleated RBCs 0 0 /100    Absolute Neutrophil 5.9 1.6 - 8.3 10e9/L    Absolute Lymphocytes 2.1 0.8 - 5.3 10e9/L    Absolute Monocytes 0.8 0.0 - 1.3 10e9/L    Absolute Eosinophils 0.3 0.0 - 0.7 10e9/L    Absolute Basophils 0.1 0.0 - 0.2 10e9/L    Abs Immature Granulocytes 0.0 0 - 0.4 10e9/L    Absolute Nucleated RBC 0.0    Basic metabolic panel     Status: Abnormal   Result Value Ref Range    Sodium 142 133 - 144 mmol/L    Potassium 4.2 3.4 - 5.3 mmol/L    Chloride 108 94 - 109 mmol/L    Carbon Dioxide 30 20 - 32 mmol/L    Anion Gap 4 3 - 14 mmol/L    Glucose 86 70 - 99 mg/dL    Urea Nitrogen 13 7 - 30 mg/dL    Creatinine 0.89 0.52 - 1.04 mg/dL    GFR Estimate 58 (L) >60 mL/min/[1.73_m2]    GFR Estimate If Black 67 >60 mL/min/[1.73_m2]    Calcium 9.3 8.5 - 10.1 mg/dL   Troponin I     Status: None   Result Value Ref Range    Troponin I ES <0.015  "0.000 - 0.045 ug/L   UA with Microscopic     Status: Abnormal   Result Value Ref Range    Color Urine Yellow     Appearance Urine Clear     Glucose Urine Negative NEG^Negative mg/dL    Bilirubin Urine Negative NEG^Negative    Ketones Urine Negative NEG^Negative mg/dL    Specific Gravity Urine 1.013 1.003 - 1.035    Blood Urine Negative NEG^Negative    pH Urine 7.5 (H) 5.0 - 7.0 pH    Protein Albumin Urine Negative NEG^Negative mg/dL    Urobilinogen mg/dL 0.0 0.0 - 2.0 mg/dL    Nitrite Urine Negative NEG^Negative    Leukocyte Esterase Urine Negative NEG^Negative    Source Midstream Urine     WBC Urine 2 0 - 5 /HPF    RBC Urine 0 0 - 2 /HPF    Bacteria Urine Few (A) NEG^Negative /HPF    Squamous Epithelial /HPF Urine 2 (H) 0 - 1 /HPF    Mucous Urine Present (A) NEG^Negative /LPF          Treatments and/or interventions provided:Monitor  Patient's response to treatments and/or interventions: tolerating    To be done/followed up on inpatient unit:  Monitor Repeat CT at 6pm per Neuro    Does this patient have any cognitive concerns?: Forgetful    Activity level - Baseline/Home:  Stand with Assist  Activity Level - Current:   Stand with Assist    Patient's Preferred language: English   Needed?: No    Isolation: None  Infection: Not Applicable  Patient tested for COVID 19 prior to admission: YES  Bariatric?: No    Vital Signs:   Vitals:    02/20/21 1138   BP: (!) 183/89   Pulse: 83   Resp: 18   Temp: 98.7  F (37.1  C)   TempSrc: Oral   SpO2: 98%   Weight: 60.3 kg (133 lb)   Height: 1.6 m (5' 3\")       Cardiac Rhythm:     Was the PSS-3 completed:   Yes  What interventions are required if any?               Family Comments: daughter at bedside.   OBS brochure/video discussed/provided to patient/family: N/A              Name of person given brochure if not patient:               Relationship to patient:     For the majority of the shift this patient's behavior was Green.   Behavioral interventions performed were " .    ED NURSE PHONE NUMBER: *[phone]

## 2021-02-20 NOTE — ED NOTES
Bed: ED26  Expected date:   Expected time:   Means of arrival:   Comments:  Aysha 89 fall head injury no thinners 89 f

## 2021-02-20 NOTE — H&P
[facility]    History and Physical - Hospitalist Service       Date of Admission:  2/20/2021    Assessment & Plan   Jamee Douglas is a 88 year old female admitted on 2/20/2021.     Bilateral subdural, increasing in size  History of multiple subarachnoid hemorrhage and SDH  Multiple falls, suspect secondary to stroke deficits  No reversible causes of falling has been identified.  Plan  -Admit to ICU for every hour neuro's and repeat head CT at 6 PM  -Neurosurgery consulted and following  - keppra 500 mg bid  - orthostatics in the AM, follow-up on ziopatch results  - BP goal < 140    History of acute ischemic stroke of the right ROBBY thought to be secondary from intracranial atherosclerosis versus atheroembolic event from aortic arch plaque  It appears a Zio patch was ordered however results are not available, just mailed in 2/18  Plan  -Hold home aspirin   -Continue home atorvastatin    Hypertension  History of reported with orthostasis  Plan  - currently not on any medications  - current BP parameters < 140, prn labetalol, hydralazine and nicardipine gtt prn    Glaucoma  -Resume home eyedrops once verified       Diet:   npo  DVT Prophylaxis: Pneumatic Compression Devices  Pierce Catheter: not present  Code Status:   DNR/DNI    Disposition Plan   Expected discharge: 2 - 3 days, recommended to prior living arrangement once Work-up complete.  Entered: Bethel Palomares DO 02/20/2021, 2:15 PM     The patient's care was discussed with the Patient and Patient's Family. > 70 minutes spent on chart review, review of records, consultants, and admitting this patient to the ICU.    DO KANDIS [facility]  Contact information available via [facility] Paging/Directory      ______________________________________________________________________    Chief Complaint   Fall    History is obtained from the patient    History of Present Illness   Jamee Douglas is  a 88 year old female with a recent stroke, subdural, subarachnoid, hypertension, and glaucoma who presents to the emergency department with head injury after a fall.  The patient was diagnosed with a stroke, subdural, and subarachnoid during previous hospitalization in January 2021.  She was discharged to rehab on 2/4/2021.  She recovered quite nicely had some residual left-sided deficits and was discharged from acute rehab in 2/18/2021 to a memory care facility.    Apparently the patient was found on the floor this morning after an unwitnessed fall about an hour after eating breakfast.  The patient does not remember at all what happened.  Daughter describes her being in a memory facility and with significant fall risk and is concerned about her supervision level.  The patient mentions the left side of her head hurts and she is a hematoma in the area.  She denies any other symptoms and reports that she has been doing well since being discharged from acute rehab.    In the emergency department she was found to have slightly larger subdural hematomas bilaterally.  Neurosurgery was consulted and recommended ICU admission with serial neuro checks and a repeat head CT at 6 PM this evening in addition to Keppra.    Review of Systems    The 10 point Review of Systems is negative other than noted in the HPI or here.     Past Medical History    I have reviewed this patient's medical history and updated it with pertinent information if needed.   Past Medical History:   Diagnosis Date     Glaucoma      Hyperlipidemia LDL goal < 130      LBP (low back pain)      Osteoarthritis        Past Surgical History   I have reviewed this patient's surgical history and updated it with pertinent information if needed.  Past Surgical History:   Procedure Laterality Date     ARTHROPLASTY KNEE       HYSTERECTOMY       SURGICAL HISTORY OF -       rights shoulder     SURGICAL HISTORY OF -       glaucoma surgery     SURGICAL HISTORY OF -        back surgery       Social History   I have reviewed this patient's social history and updated it with pertinent information if needed.  Social History     Tobacco Use     Smoking status: Never Smoker     Smokeless tobacco: Never Used   Substance Use Topics     Alcohol use: Yes     Comment: Rare     Drug use: No       Family History   I have reviewed this patient's family history and updated it with pertinent information if needed.  Family History   Problem Relation Age of Onset     Other - See Comments Mother         POLYCYTHEMIA VERA     Prostate Cancer Father      Colon Cancer Brother      Lung Cancer Sister        Prior to Admission Medications   Prior to Admission Medications   Prescriptions Last Dose Informant Patient Reported? Taking?   acetaminophen (TYLENOL) 325 MG tablet prn  No Yes   Sig: Take 2 tablets (650 mg) by mouth every 4 hours as needed for mild pain or fever (> 101 F)   aspirin (ASA) 81 MG EC tablet 2/20/2021 at Unknown time  No Yes   Sig: Take 1 tablet (81 mg) by mouth daily   atorvastatin (LIPITOR) 40 MG tablet 2/19/2021 at Unknown time  No Yes   Sig: Take 1 tablet (40 mg) by mouth every evening   cholecalciferol (VITAMIN D3) 10 mcg (400 units) TABS tablet 2/20/2021 at Unknown time  No Yes   Sig: Take 1 tablet (10 mcg) by mouth daily   dorzolamide-timolol (COSOPT) 2-0.5 % ophthalmic solution 2/20/2021 at Unknown time  No Yes   Sig: Place 1 drop into the right eye 2 times daily   multivitamin w/minerals (MULTI-VITAMIN) tablet 2/20/2021 at Unknown time  No Yes   Sig: Take 1 tablet by mouth daily   polyethylene glycol (MIRALAX) 17 g packet prn  Yes Yes   Sig: Take 1 packet by mouth daily as needed for constipation   senna-docusate (SENOKOT-S/PERICOLACE) 8.6-50 MG tablet prn  No Yes   Sig: Take 1-2 tablets by mouth 2 times daily as needed for constipation   travoprost BAK FREE (TRAVATAN Z) 0.004 % ophthalmic solution 2/19/2021 at Unknown time  No Yes   Sig: Place 1 drop into both eyes At Bedtime       Facility-Administered Medications: None     Allergies   Allergies   Allergen Reactions     Penicillins Swelling       Physical Exam   Vital Signs: Temp: 98.7  F (37.1  C) Temp src: Oral BP: (!) 183/89 Pulse: 83   Resp: 18 SpO2: 98 % O2 Device: None (Room air)    Weight: 133 lbs 0 oz  General:          Resting on the gurney, appears comfortable  Head:              The scalp, face, and head appear normal  Neck:              No midline tenderness to palpation.   Mouth/Throat:  Mucus membranes are moist  CV:                  Regular rate                          Normal S1 and S2  No pathological murmur   Resp:              Breath sounds clear and equal bilaterally                          Non-labored, no retractions or accessory muscle use                          No coarseness                          No wheezing   GI:                   Abdomen is soft, no rigidity                          No tenderness to palpation  MS:                  Normal motor assessment of all extremities.                          Good capillary refill noted.  Skin:               No rash or lesions noted.  Neuro: Large hematoma to the left temporal region. Despite prior stroke with left sided deficit, patient has good strength and sensation in all extremities.   Psych:                        Awake. Alert.  Normal affect.                            Appropriate interactions.      Data   Data reviewed today: I reviewed all medications, new labs and imaging results over the last 24 hours. I personally reviewed the head CT image(s) showing slightly increased bilateral SDH.    Recent Labs   Lab 02/20/21  1153 02/18/21  0539 02/15/21  0525   WBC 9.1  --  7.5   HGB 12.7  --  12.0   MCV 94  --  94     --  254    144 143   POTASSIUM 4.2 4.1 3.7   CHLORIDE 108 112* 110*   CO2 30 29 28   BUN 13 15 17   CR 0.89 0.64 0.62   ANIONGAP 4 3 5   POORNIMA 9.3 8.8 8.5   GLC 86 100* 96   TROPI <0.015  --   --      Most Recent 3 CBC's:  Recent Labs   Lab  Test 02/20/21  1153 02/15/21  0525 02/08/21  0618   WBC 9.1 7.5 7.4   HGB 12.7 12.0 11.5*   MCV 94 94 92    254 304     Most Recent 3 BMP's:  Recent Labs   Lab Test 02/20/21  1153 02/18/21  0539 02/15/21  0525    144 143   POTASSIUM 4.2 4.1 3.7   CHLORIDE 108 112* 110*   CO2 30 29 28   BUN 13 15 17   CR 0.89 0.64 0.62   ANIONGAP 4 3 5   POORNIMA 9.3 8.8 8.5   GLC 86 100* 96     Most Recent 2 LFT's:  Recent Labs   Lab Test 03/27/18  0909 03/16/17   AST 18 16   ALT 15 17   ALKPHOS 51  --    BILITOTAL 0.4  --      Most Recent 3 INR's:  Recent Labs   Lab Test 02/01/21  1006 01/29/21 2010   INR 1.06 1.00     Recent Results (from the past 24 hour(s))   CT Head w/o Contrast   Result Value    Radiologist flags Intracranial extra-axial hemorrhages with slight (AA)    Narrative    CT SCAN OF THE HEAD WITHOUT CONTRAST   2/20/2021 12:14 PM     HISTORY: Head trauma, moderate-severe. Fall, large hematoma, recent  stroke.    TECHNIQUE: Axial images of the head and coronal reformations without  IV contrast material.  Radiation dose for this scan was reduced using  automated exposure control, adjustment of the mA and/or kV according  to patient size, or iterative reconstruction technique.    COMPARISON: 02/01/2021.    FINDINGS: There is a large left frontal scalp hematoma. There is no  evidence for any underlying fracture. There are bilateral subdural  hematomas which have increased in size since the prior exam, currently  measuring up to 0.8 cm on the right and 0.7 cm on the left as compared  with 0.6 and 0.5 cm on the prior exam. These are mixed density  subdural hematomas. Cerebral atrophy and scattered white matter  changes are also noted. There is also a chronic calcification noted in  the right greater than left middle cerebral arteries. Old deep gray  matter lacunar infarcts noted. There is no evidence for any acute  infarct.      Impression    IMPRESSION:  1. Bilateral subdural hematomas with slight increase in  size.  2. Left frontal scalp hematoma. No evidence for any underlying  fracture.  3. Cerebral atrophy with chronic white matter changes and multiple old  deep lacunar infarcts.     [Critical Result: Intracranial extra-axial hemorrhages with slight  increase in size.    Finding was identified on 2/20/2021 12:18 PM.     Dr. Bedoya was contacted by me on 2/20/2021 12:23 PM and verbalized  understanding of the critical result.     ROBERT HEREDIA MD

## 2021-02-21 ENCOUNTER — APPOINTMENT (OUTPATIENT)
Dept: OCCUPATIONAL THERAPY | Facility: CLINIC | Age: 86
DRG: 087 | End: 2021-02-21
Attending: HOSPITALIST
Payer: COMMERCIAL

## 2021-02-21 ENCOUNTER — APPOINTMENT (OUTPATIENT)
Dept: SPEECH THERAPY | Facility: CLINIC | Age: 86
DRG: 087 | End: 2021-02-21
Attending: INTERNAL MEDICINE
Payer: COMMERCIAL

## 2021-02-21 ENCOUNTER — APPOINTMENT (OUTPATIENT)
Dept: PHYSICAL THERAPY | Facility: CLINIC | Age: 86
DRG: 087 | End: 2021-02-21
Attending: HOSPITALIST
Payer: COMMERCIAL

## 2021-02-21 LAB
GLUCOSE BLDC GLUCOMTR-MCNC: 79 MG/DL (ref 70–99)
GLUCOSE BLDC GLUCOMTR-MCNC: 97 MG/DL (ref 70–99)

## 2021-02-21 PROCEDURE — 250N000011 HC RX IP 250 OP 636: Performed by: HOSPITALIST

## 2021-02-21 PROCEDURE — 96376 TX/PRO/DX INJ SAME DRUG ADON: CPT

## 2021-02-21 PROCEDURE — 97116 GAIT TRAINING THERAPY: CPT | Mod: GP

## 2021-02-21 PROCEDURE — 97165 OT EVAL LOW COMPLEX 30 MIN: CPT | Mod: GO | Performed by: REHABILITATION PRACTITIONER

## 2021-02-21 PROCEDURE — 99226 PR SUBSEQUENT OBSERVATION CARE,LEVEL III: CPT | Performed by: INTERNAL MEDICINE

## 2021-02-21 PROCEDURE — 250N000013 HC RX MED GY IP 250 OP 250 PS 637: Performed by: HOSPITALIST

## 2021-02-21 PROCEDURE — G0378 HOSPITAL OBSERVATION PER HR: HCPCS

## 2021-02-21 PROCEDURE — 999N001017 HC STATISTIC GLUCOSE BY METER IP

## 2021-02-21 PROCEDURE — 97530 THERAPEUTIC ACTIVITIES: CPT | Mod: GO | Performed by: REHABILITATION PRACTITIONER

## 2021-02-21 PROCEDURE — 97161 PT EVAL LOW COMPLEX 20 MIN: CPT | Mod: GP

## 2021-02-21 PROCEDURE — 92526 ORAL FUNCTION THERAPY: CPT | Mod: GN | Performed by: SPEECH-LANGUAGE PATHOLOGIST

## 2021-02-21 PROCEDURE — 250N000011 HC RX IP 250 OP 636: Performed by: INTERNAL MEDICINE

## 2021-02-21 PROCEDURE — 99207 PR CDG-CODE CATEGORY CHANGED: CPT | Performed by: INTERNAL MEDICINE

## 2021-02-21 PROCEDURE — 120N000001 HC R&B MED SURG/OB

## 2021-02-21 PROCEDURE — 92610 EVALUATE SWALLOWING FUNCTION: CPT | Mod: GN | Performed by: SPEECH-LANGUAGE PATHOLOGIST

## 2021-02-21 PROCEDURE — 250N000013 HC RX MED GY IP 250 OP 250 PS 637: Performed by: INTERNAL MEDICINE

## 2021-02-21 RX ADMIN — ACETAMINOPHEN 650 MG: 325 TABLET, FILM COATED ORAL at 21:01

## 2021-02-21 RX ADMIN — ACETAMINOPHEN 650 MG: 325 TABLET, FILM COATED ORAL at 10:08

## 2021-02-21 RX ADMIN — ATORVASTATIN CALCIUM 40 MG: 40 TABLET, FILM COATED ORAL at 20:47

## 2021-02-21 RX ADMIN — LEVETIRACETAM INJECTION 500 MG: 5 INJECTION INTRAVENOUS at 18:29

## 2021-02-21 RX ADMIN — LEVETIRACETAM INJECTION 500 MG: 5 INJECTION INTRAVENOUS at 05:25

## 2021-02-21 RX ADMIN — DORZOLAMIDE HYDROCHLORIDE AND TIMOLOL MALEATE 1 DROP: 20; 5 SOLUTION/ DROPS OPHTHALMIC at 21:01

## 2021-02-21 RX ADMIN — DORZOLAMIDE HYDROCHLORIDE AND TIMOLOL MALEATE 1 DROP: 20; 5 SOLUTION/ DROPS OPHTHALMIC at 07:42

## 2021-02-21 RX ADMIN — TRAVOPROST 1 DROP: 0.04 SOLUTION/ DROPS OPHTHALMIC at 21:01

## 2021-02-21 RX ADMIN — CHOLECALCIFEROL TAB 10 MCG (400 UNIT) 10 MCG: 10 TAB at 07:44

## 2021-02-21 ASSESSMENT — ACTIVITIES OF DAILY LIVING (ADL)
ADLS_ACUITY_SCORE: 23
ADLS_ACUITY_SCORE: 20
ADLS_ACUITY_SCORE: 23
ADLS_ACUITY_SCORE: 20
ADLS_ACUITY_SCORE: 23
ADLS_ACUITY_SCORE: 20

## 2021-02-21 ASSESSMENT — MIFFLIN-ST. JEOR: SCORE: 1003.13

## 2021-02-21 NOTE — PROGRESS NOTES
02/21/21 1500   Quick Adds   Type of Visit Initial PT Evaluation   Living Environment   People in home alone   Current Living Arrangements residential facility   Home Accessibility no concerns   Living Environment Comments Patient recently moved into a memory care facility. She is not pleased with the level of care she was receiving there and plans to move.    Self-Care   Usual Activity Tolerance good   Current Activity Tolerance fair   Equipment Currently Used at Home walker, rolling   Activity/Exercise/Self-Care Comment Patient states that she was walking with CGA/SBA with a walker when she left TCU. She was getting assist for dressing at her apartment.    Disability/Function   Hearing Difficulty or Deaf no   Wear Glasses or Blind yes   Vision Management glasses   Difficulty Communicating no   Difficulty Eating/Swallowing no   General Information   Onset of Illness/Injury or Date of Surgery 02/20/21   Referring Physician Bethel Palomares, DO   Patient/Family Therapy Goals Statement (PT) to walk safely, no more falls   Pertinent History of Current Problem (include personal factors and/or comorbidities that impact the POC) Patient is 87 YO F admitted after a fall at her facility, now with worsened B SDH. She was recently admitted to the hospital for fall and SDH in Jan 2021, discharged to rehab and transitioned to a memory care facility. PMH: CVA, HTN, glaucoma   Existing Precautions/Restrictions fall   Weight-Bearing Status - LUE full weight-bearing   Weight-Bearing Status - RUE full weight-bearing   Weight-Bearing Status - LLE full weight-bearing   Weight-Bearing Status - RLE full weight-bearing   General Observations No activity orders   Cognition   Orientation Status (Cognition) oriented x 3   Affect/Mental Status (Cognition) WNL   Follows Commands (Cognition) follows one-step commands;75-90% accuracy   Pain Assessment   Patient Currently in Pain No   Posture    Posture Forward head position   Range  of Motion (ROM)   ROM Quick Adds ROM WFL   Strength   Manual Muscle Testing Quick Adds Deficits observed during functional mobility   Strength Comments L LE weakness with <3/5 ankle PF and L hip flexion 3+/5   Bed Mobility   Bed Mobility supine-sit;sit-supine   Supine-Sit [place] (Bed Mobility) moderate assist (50% patient effort)   Sit-Supine [place] (Bed Mobility) minimum assist (75% patient effort)   Bed Mobility Limitations cognitive deficits;decreased ability to use legs for bridging/pushing;decreased ability to use arms for pushing/pulling   Assistive Device (Bed Mobility) bed rails   Transfers   Transfers sit-stand transfer;toilet transfer   Sit-Stand Transfer   Sit-Stand [place] (Transfers) minimum assist (75% patient effort);verbal cues   Assistive Device (Sit-Stand Transfers) walker, front-wheeled   Toilet Transfer   Type (Toilet Transfer) sit-stand;stand-sit   [place] Level (Toilet Transfer) moderate assist (50% patient effort)   Assistive Device (Toilet Transfer) grab bars/safety frame   Gait/Stairs (Locomotion)   [place] Level (Gait) minimum assist (75% patient effort)   Assistive Device (Gait) walker, front-wheeled   Distance in Feet (Required for LE Total Joints) 10' during eval + approximately 110' during treatment   Pattern (Gait) step-through   Deviations/Abnormal Patterns (Gait) left sided deviations   Left Sided Gait Deviations heel strike decreased  (mild intermittent buckling)   Balance   Balance Comments Min assist for standing balance when pulling up underwear; SBA for sitting balance on toilet during hygiene   Sensory Examination   Sensory Perception patient reports no sensory changes   Clinical Impression   Criteria for Skilled Therapeutic Intervention yes, treatment indicated   PT Diagnosis (PT) impaired mobility   Influenced by the following impairments impaired cognition, weakness, decreased balance, decresaed activity tolerance   Functional limitations due to  impairments increased difficulty with bed mobility, transfers and ambulation   Clinical Presentation Stable/Uncomplicated   Clinical Presentation Rationale medical status, transferred to lower level of care   Clinical Decision Making (Complexity) low complexity   Therapy Frequency (PT) 5x/week   Predicted Duration of Therapy Intervention (days/wks) 1 week   Planned Therapy Interventions (PT) balance training;bed mobility training;gait training;home exercise program;neuromuscular re-education;patient/family education;ROM (range of motion);strengthening;stretching;transfer training   Risk & Benefits of therapy have been explained evaluation/treatment results reviewed;care plan/treatment goals reviewed;risks/benefits reviewed;current/potential barriers reviewed;participants voiced agreement with care plan;participants included;patient   PT Discharge Planning    PT Discharge Recommendation (DC Rec) Transitional Care Facility;home with assist;home with home care physical therapy   PT Rationale for DC Rec Patient requiring assist for all mobility - if her facility is able to provide 24/7 supervision then she may be able to return there at discharge. Otherwise patient would need skilled PT intervention at TCU to maximize safety and independence with mobility.    PT Brief overview of current status  Mod assist supine to sit, Min assist sit <> stand, Min assist for ambulation with intermittent mild L LE buckling   Total Evaluation Time   Total Evaluation Time (Minutes) 15

## 2021-02-21 NOTE — PROGRESS NOTES
Neurosurgery following for bilateral SDH.  Denies complaints this morning.  Sitting up in a chair, moving all four extremities well.  Negative pronator drift.    Head CT 2/20/21  IMPRESSION: Chronic bilateral subdural collections again noted without  change. Diffuse cerebral volume loss and cerebral white matter changes  consistent with chronic small vessel ischemic disease. Left frontal  scalp hematoma again noted. No evidence for acute intracranial pathology.    PLAN:  Okay to transfer out of ICU  Every 4 hour neuro checks.  Okay to discharge from NS standpoint.  Will see as outpatient at scheduled appt 3/3/21.    EVAN Barron  [facility]  [address] [address]    Tel [phone]  Pager [phone]

## 2021-02-21 NOTE — PLAN OF CARE
Pt admitted from ER for head bleed after an mechanical fall. Pt neuros monitored hourly. SBP goal 100-140. Labetalol given x 1. Repeat CT at 1800 shows no changes. Will continue to bleed.

## 2021-02-21 NOTE — PROGRESS NOTES
02/21/21 1215   General Information   Onset of Illness/Injury or Date of Surgery 02/20/21   Referring Physician Dr. Truong   Patient/Family Therapy Goal Statement (SLP) Pt did not state.  Agreed to POC goal.    Pertinent History of Current Problem Per notes: Jamee Douglas is a 88 year old female with PMH recent admission 1/2021 for bilateral subdural hematomas, HTN, hx CVA, glaucoma, who was admitted on 2/20/2021 with increasing bilateral subdural hematomas after a fall.      2/1/21 SLP Eval during previous admit - Swallow was WFL, mild bolus holding and min labial deficits on left reported   General Observations Pt was awake, but stated she was tired.  Pt was a poor historian for previous oral-pharyngeal function, but stated she tolerates a regular diet and thin liquids at baseline.     Oral Motor   Oral Musculature   (Decreased labial ROM on L, min pull of tongue to R at times)   Dentition (Oral Motor)   Dentition (Oral Motor) adequate dentition   Vocal Quality/Secretion Management (Oral Motor)   Comment, Vocal Quality/Secretion Management (Oral Motor) Mild decreased intensity/hoarse quality   General Swallowing Observations   Respiratory Support (General Swallowing Observations) none   Clinical Swallow Eval: Thin Liquid Texture Trial   Mode of Presentation, Thin Liquids straw;cup   Volume of Liquid or Food Presented sips by cup x 5, sips by straw x 2   Oral Phase of Swallow   (holding)   Pharyngeal Phase of Swallow   (delay, increased effort to swallow)   Diagnostic Statement no overt aspiration signs   Clinical Swallow Evaluation: Solid Food Texture Trial   Mode of Presentation, Solid spoon;self-fed   Volume of Solid Food Presented bites x 5   Oral Phase, Solid Residue in oral cavity  (mild decreased ROM on left)   Pharyngeal Phase, Solid   (delay, effort to initiate)   Diagnostic Statement no overt aspiration signs; alternating to thin cued to clear residue   Esophageal Phase of Swallow   Patient  reports or presents with symptoms of esophageal dysphagia No   Swallowing Recommendations   Diet Consistency Recommendations regular diet;thin liquids   Supervision Level for Intake distant supervision needed   Mode of Delivery Recommendations no straws;bolus size, small;slow rate of intake   Swallowing Maneuver Recommendations alternate food and liquid intake   Medication Administration Recommendations, Swallowing (SLP) Single pills with thin liquids   Monitoring/Assistance Required (Eating/Swallowing) check mouth frequently for oral residue/pocketing;monitor for cough or change in vocal quality with intake   SLP Therapy Assessment/Plan   Criteria for Skilled Therapeutic Interventions Met (SLP Eval) yes   SLP Diagnosis Mild oral-pharyngeal dysphagia   Rehab Potential (SLP Eval) good, to achieve stated therapy goals   Therapy Frequency (SLP Eval)   (2x/5 days)   Predicted Duration of Therapy Intervention (SLP Eval) 5 days   Comment, Therapy Assessment/Plan (SLP) Mild oral-pharyngeal dysphagia was observed at bedside. Pt may be slightly below her baseline swallow function.  Deficits/findings include decreased labial ROM on left, slow mastication, mild oral residue, bolus holding, and delayed swallows.  Recommend a continued regular diet and thin liquids, no straw, sit up at 90 degrees, alternate textures, check for oral residue.  Plan to provide an additional swallow Tx session to assess diet tolerance and train strategies.    Therapy Plan Review/Discharge Plan (SLP)   Therapy Plan Review (SLP) evaluation/treatment results reviewed;care plan/treatment goals reviewed;risks/benefits reviewed;current/potential barriers reviewed;participants voiced agreement with care plan;participants included;patient   SLP Discharge Planning    SLP Discharge Recommendation (DC Rec) Transitional Care Facility   SLP Rationale for DC Rec Anticipate swallow Tx goal will be met as an IP; cognitive-linguistic eval and Tx at TCU if status  below baseline   SLP Brief overview of current status  Mild oral-pharyngeal dysphagia at bedside.  Pt may be slightly below baseline.  Rec: a regular diet and thin liquids, no straw, sit at 90 degrees, check for oral residue, alternate textures.      Total Evaluation Time   Total Evaluation Time (Minutes) 15

## 2021-02-21 NOTE — CONSULTS
Care Management Initial Consult    General Information  Assessment completed with: Jensen, Melania  Type of CM/SW Visit: Initial Assessment    Primary Care Provider verified and updated as needed:     Readmission within the last 30 days:           Advance Care Planning: Advance Care Planning Reviewed: no concerns identified          Communication Assessment  Patient's communication style: spoken language (English or Bilingual)    Hearing Difficulty or Deaf: no   Wear Glasses or Blind: yes    Cognitive  Cognitive/Neuro/Behavioral: WDL  Level of Consciousness: alert  Arousal Level: opens eyes spontaneously  Orientation: disoriented to, time(month, stated OCT)  Mood/Behavior: cooperative, calm  Best Language: 0 - No aphasia  Speech: clear, spontaneous    Living Environment:   People in home: alone     Current living Arrangements: residential facility      Able to return to prior arrangements: no       Family/Social Support:  Care provided by: other (see comments)(LTC staff)  Provides care for: no one, unable/limited ability to care for self  Marital Status:   Children          Description of Support System: Supportive, Involved         Current Resources:   Patient receiving home care services:       Community Resources:    Equipment currently used at home: walker, rolling  Supplies currently used at home:      Employment/Financial:  Employment Status:          Financial Concerns: No concerns identified           Lifestyle & Psychosocial Needs:        Socioeconomic History     Marital status:      Spouse name: Not on file     Number of children: Not on file     Years of education: Not on file     Highest education level: Not on file     Tobacco Use     Smoking status: Never Smoker     Smokeless tobacco: Never Used   Substance and Sexual Activity     Alcohol use: Yes     Comment: Rare     Drug use: No     Sexual activity: Never       Functional Status:  Prior to admission patient needed assistance:               Mental Health Status:          Chemical Dependency Status:                Values/Beliefs:  Spiritual, Cultural Beliefs, Taoism Practices, Values that affect care:                 Additional Information:  JUNE received consult order for discharge planning and family support, reviewed notes. JUNE spoke to patient's daughter Melania for discharge planning. Patient was admitted on 2/20/2021 due to a fall. Patient was at [facility] in [place] where she had her fall; per Melania, staff did not assist or provide adequate care for patient. Melania reports that she is looking for patient to transition to TCU and then LTC within the same facility but wants for patient to receive good care. Patient was not supervised which caused the fall. Melania reported that patient needs LTC due to her inability to care for herself and the level of supervision she needs. JUNE informed Melania of filing a grievance with [facility] if she feels that cares were not adequate at [facility]. JUNE also directed Melania to Medicare.gov to review Medicare ratings for TCUs and careoptionsnetwork.org to look for vacancies for [facility] and LTC. JUNE informed Melania that due to patient's insurance, there is a limited number of TCUs that take [facility]. Melania requested for referrals to be sent to [facility]. Referrals sent via [facility]. Melania will assist with transportation.       KARLO Raines

## 2021-02-21 NOTE — PROGRESS NOTES
[facility]    Hospitalist Progress Note    Interval History   - Reports headache improved. A&Ox3. She remembers falling and hitting her head but doesn't know how she fell.    Assessment & Plan   Summary: Jamee Douglas is a 88 year old female with PMH recent admission 1/2021 for bilateral subdural hematomas, HTN, hx CVA, glaucoma, who was admitted on 2/20/2021 with increasing bilateral subdural hematomas after a fall.    Bilateral subdural, increasing in size  Recurrent falls  History of multiple subarachnoid hemorrhage and SDH and Multiple falls, suspect secondary to stroke deficits. No reversible causes of falling has been identified. Patient was found down at her memory care facility, has no memory of how she fell. Initial CT head 2/20 @ 1214 noted increasing bilateral subdural hematomas and a left frontal scalp hematoma. Repeat head CT 2/20 @ 1804 notes stable hematomas.  - Appreciate Neurosurg monitoring   - Q1h neuro checks   - BP goal < 140  - Has not needed nicardipine, discontinue  - Continue keppra 500 mg bid  - orthostatics in the AM, follow-up on ziopatch results likely on weekday  - Possible transfer out of the ICU later today, defer to NSG team  - PT/OT/SLP consulted    History of acute ischemic stroke of the right ROBBY thought to be secondary from intracranial atherosclerosis versus atheroembolic event from aortic arch plaque  It appears a Zio patch was ordered however results are not available, just mailed in 2/18  - Hold PTA ASA  - Continue home atorvastatin    Hypertension  History of reported with orthostasis  - current BP parameters < 140, prn labetalol, hydralazine and nicardipine gtt prn    Glaucoma: PTA eyedrops    DVT Prophylaxis: Pneumatic Compression Devices  Code Status: No CPR- Do NOT Intubate  PT/OT: ordered  Diet: Regular Diet Adult      Disposition: Expected discharge 2-3 days. Likely transfer out of ICU later today    Care discussed with nurse, patient,  daughter    Lai Tipton-Luis Truong MD  Text Page  (7am to 6pm)  -Data reviewed today: I reviewed all new labs and imaging results over the last 24 hours.    Physical Exam   Temp: 98.3  F (36.8  C) Temp src: Oral BP: (!) 143/73 Pulse: 66   Resp: (!) 53 SpO2: 100 % O2 Device: High Flow Nasal Cannula (HFNC)    Vitals:    02/20/21 1138 02/21/21 0400   Weight: 60.3 kg (133 lb) 60.4 kg (133 lb 2.5 oz)     Vital Signs with Ranges  Temp:  [97.8  F (36.6  C)-98.7  F (37.1  C)] 98.3  F (36.8  C)  Pulse:  [61-88] 66  Resp:  [9-53] 53  BP: (105-187)/(47-93) 143/73  SpO2:  [95 %-100 %] 100 %  I/O last 3 completed shifts:  In: 100 [I.V.:100]  Out: -   O2 requirements: none    Constitutional: Elderly female in NAD  HEENT: Eyes nonicteric, oral mucosa moist, left temporal bruise  Cardiovascular: RRR, normal S1/2, no m/r/g  Respiratory: CTAB, no wheezing or crackles  Vascular: No LE pitting edema  GI: Normoactive bowel sounds, nontender, nondistended  Skin/Integumen: No rashes  Neuro/Psych: Appropriate affect and mood. A&Ox3, moves all extremities    Medications       atorvastatin  40 mg Oral QPM     cholecalciferol  10 mcg Oral Daily     dorzolamide-timolol  1 drop Right Eye BID     levETIRAcetam  500 mg Intravenous Q12H     travoprost KORINA FREE  1 drop Both Eyes At Bedtime       Data   Recent Labs   Lab 02/20/21  1153 02/18/21  0539 02/15/21  0525   WBC 9.1  --  7.5   HGB 12.7  --  12.0   MCV 94  --  94     --  254    144 143   POTASSIUM 4.2 4.1 3.7   CHLORIDE 108 112* 110*   CO2 30 29 28   BUN 13 15 17   CR 0.89 0.64 0.62   ANIONGAP 4 3 5   POORNIMA 9.3 8.8 8.5   GLC 86 100* 96   TROPI <0.015  --   --        Imaging:   Recent Results (from the past 24 hour(s))   CT Head w/o Contrast   Result Value    Radiologist flags Intracranial extra-axial hemorrhages with slight (AA)    Narrative    CT SCAN OF THE HEAD WITHOUT CONTRAST   2/20/2021 12:14 PM     HISTORY: Head trauma, moderate-severe. Fall, large hematoma,  recent  stroke.    TECHNIQUE: Axial images of the head and coronal reformations without  IV contrast material.  Radiation dose for this scan was reduced using  automated exposure control, adjustment of the mA and/or kV according  to patient size, or iterative reconstruction technique.    COMPARISON: 02/01/2021.    FINDINGS: There is a large left frontal scalp hematoma. There is no  evidence for any underlying fracture. There are bilateral subdural  hematomas which have increased in size since the prior exam, currently  measuring up to 0.8 cm on the right and 0.7 cm on the left as compared  with 0.6 and 0.5 cm on the prior exam. These are mixed density  subdural hematomas. Cerebral atrophy and scattered white matter  changes are also noted. There is also a chronic calcification noted in  the right greater than left middle cerebral arteries. Old deep gray  matter lacunar infarcts noted. There is no evidence for any acute  infarct.      Impression    IMPRESSION:  1. Bilateral subdural hematomas with slight increase in size.  2. Left frontal scalp hematoma. No evidence for any underlying  fracture.  3. Cerebral atrophy with chronic white matter changes and multiple old  deep lacunar infarcts.     [Critical Result: Intracranial extra-axial hemorrhages with slight  increase in size.    Finding was identified on 2/20/2021 12:18 PM.     Dr. Bedoya was contacted by me on 2/20/2021 12:23 PM and verbalized  understanding of the critical result.     ROBERT HEREDIA MD   CT Head w/o Contrast    Narrative    CT OF THE HEAD WITHOUT CONTRAST 2/20/2021 6:04 PM     COMPARISON: Head CT 2/20/2021.    HISTORY: Cerebral hemorrhage suspected.    TECHNIQUE: 5 mm thick axial CT images of the head were acquired  without IV contrast material.    FINDINGS: Mixed density subdural collections along the parietal  convexities bilaterally are again noted consistent with chronic  subdural hematomas. There is mild diffuse cerebral volume loss.  There  are subtle patchy areas of decreased density in the cerebral white  matter bilaterally that are consistent with sequela of chronic small  vessel ischemic disease.    The ventricles and basal cisterns are within normal limits in  configuration given the degree of cerebral volume loss.  There is no  midline shift. There are no extra-axial fluid collections.    No intracranial hemorrhage, mass or recent infarct.    The visualized paranasal sinuses are well-aerated. There is no  mastoiditis. There are no fractures of the visualized bones. Left  frontal scalp hematoma again noted.      Impression    IMPRESSION: Chronic bilateral subdural collections again noted without  change. Diffuse cerebral volume loss and cerebral white matter changes  consistent with chronic small vessel ischemic disease. Left frontal  scalp hematoma again noted. No evidence for acute intracranial  pathology.        Radiation dose for this scan was reduced using automated exposure  control, adjustment of the mA and/or kV according to patient size, or  iterative reconstruction technique    MIR URBINA MD

## 2021-02-21 NOTE — PLAN OF CARE
Neuro largely unchanged, pt slept between cares. C/o Left shoulder pain, improved with prn tylenol and repositioning. BP remains within parameters without intervention. VSS on RA. Continue to monitor.

## 2021-02-21 NOTE — PLAN OF CARE
ICU transfer. Pt here with fall and SDH, previous admission in January. A&0x3-4, disoriented to month but easily redirected. Neuros intact except baseline left facial droop, Left sided hemiplegia and R pupil irregular. VSS on RA and below given parameters of 140. Tele. Tolerating regular diet. Up A1-2/gb/walker. Voiding in bedside commode. Mepilex to coccyx, skin intact and blanchable. Pt scoring green on the Aggression Stop Light Tool.  TCU at discharge.     Addendum 3-7: Neuros remain unchanged. Up in chair for dinner, good appetite. Voided in BSC with A1/gb/walker. Daughter william to work with social work regarding TCU referrals.

## 2021-02-21 NOTE — PROGRESS NOTES
Patient transferred from ICU with belongings of clothing, shoes and glasses. Daughter Melania updated.    Kyle Velázquez brought patients cell phone and bilateral hearing aids along with a  for both.

## 2021-02-21 NOTE — PLAN OF CARE
Neuro: Left side weakness. Right pupil irregular. Left shoulder not shrugging. Left facial droop.   Cardio: BP WDL. Tele NSR  Resp: Room air. LS clear.  GI: BS active. Reports not having a BM in a few days. Tolerating diet  : Continent  Pain: C/O left side pain. PRN tylenol  Up with assist of 1 GB and walker.   To transfer to station 73 today.

## 2021-02-21 NOTE — PROGRESS NOTES
02/21/21 1000   Quick Adds   Type of Visit Initial Occupational Therapy Evaluation   Living Environment   People in home alone   Current Living Arrangements residential facility   Home Accessibility no concerns   Living Environment Comments Pt had recently moved to memory care prior to fall. Pt and family unhappy with level of supervision there, will be looking for other arrangements   Self-Care   Usual Activity Tolerance good   Current Activity Tolerance fair   Equipment Currently Used at Home walker, pollo   Disability/Function   Patient's preferred means of communication English speaker with hearing loss, no speech problems.   Describe hearing loss bilateral hearing loss   Use of hearing assistive devices bilateral hearing aids   General Information   Onset of Illness/Injury or Date of Surgery 02/20/21   Referring Physician Bethel Palomares, DO   Patient/Family Therapy Goal Statement (OT) Pt would like to discharge to a more supportive location and stop falling   Existing Precautions/Restrictions fall;seizures   Limitations/Impairments hearing   General Observations and Info Pt has pain in L shoulder, hip, and leg from fall. Pt has baseline L sided weakness.   Cognitive Status Examination   Orientation Status orientation to person, place and time   Affect/Mental Status (Cognitive) WFL   Follows Commands WFL   Cognitive Status Comments Some confusion noted at baseline   Visual Perception   Visual Impairment/Limitations corrective lenses full-time   Pain Assessment   Patient Currently in Pain Yes, see Vital Sign flowsheet  (pt does not give pain a number)   Range of Motion Comprehensive   Comment, General Range of Motion BUE is WFL   Strength Comprehensive (MMT)   Comment, General Manual Muscle Testing (MMT) Assessment RUE is WFL, LUE is 3-3+/5 grossly   Bed Mobility   Comment (Bed Mobility) Pt is Mod A with bed mobility, she would be more I in a lower bed.   Transfers   Transfer Comments Sit to stand  with Mod A, stand to sit with CGA   Balance   Balance Comments Pt with posterior tilt when attempting sit to stand. No further LOB once up with walker   Activities of Daily Living   BADL Assessment/Intervention bathing;upper body dressing;lower body dressing;grooming;feeding;toileting   Bathing Assessment/Intervention   Comment (Bathing) Staff will A her with bathing in care/home placement   Upper Body Dressing Assessment/Training   Comment (Upper Body Dressing) Pt gets set up and Min A as needed from staff   Lower Body Dressing Assessment/Training   Comment (Lower Body Dressing) Pt gets set up and Min A as needed from staff   Grooming Assessment/Training   Comment (Grooming) Staff set her up for grooming/hygeine in standing and pt performs I'ly. Today pt needs Min A.   Eating/Self Feeding   Comment (Feeding) Is I at baseline, anticipate pt is currently I in this task.   Toileting   Comment (Toileting) Pt reports she has had to attempt toileting I'ly in her current situation. Today she is Mod A .   Instrumental Activities of Daily Living (IADL)   IADL Comments Pt does not perform currently. IADLs performed by staff.   Clinical Impression   Criteria for Skilled Therapeutic Interventions Met (OT) yes   OT Diagnosis Decreased safety and I with ADL tasks   OT Problem List-Impairments impacting ADL activity tolerance impaired;balance;cognition;strength;pain   Assessment of Occupational Performance 1-3 Performance Deficits   Identified Performance Deficits Pt is functioning below her baseline in toilet transfer, grooming/hygeine tasks, and functional mobility   Planned Therapy Interventions (OT) ADL retraining;balance training;cognition;strengthening;transfer training;home program guidelines;progressive activity/exercise;motor coordination training   Clinical Decision Making Complexity (OT) low complexity   Therapy Frequency (OT) Daily   Predicted Duration of Therapy 7 days   Risk & Benefits of therapy have been explained  evaluation/treatment results reviewed;care plan/treatment goals reviewed;risks/benefits reviewed;participants voiced agreement with care plan;participants included;patient   Comment-Clinical Impression Skilled OT intervention is warranted to address presenting deficits   OT Discharge Planning    OT Discharge Recommendation (DC Rec) Transitional Care Facility   OT Rationale for DC Rec Pt is functioning below her baseline and could benefit from ongoing occupational therapy intervention in a TCU setting   Total Evaluation Time (Minutes)   Total Evaluation Time (Minutes) 10

## 2021-02-22 LAB
ANION GAP SERPL CALCULATED.3IONS-SCNC: 6 MMOL/L (ref 3–14)
BUN SERPL-MCNC: 15 MG/DL (ref 7–30)
CALCIUM SERPL-MCNC: 8.9 MG/DL (ref 8.5–10.1)
CHLORIDE SERPL-SCNC: 108 MMOL/L (ref 94–109)
CO2 SERPL-SCNC: 27 MMOL/L (ref 20–32)
CREAT SERPL-MCNC: 0.74 MG/DL (ref 0.52–1.04)
ERYTHROCYTE [DISTWIDTH] IN BLOOD BY AUTOMATED COUNT: 14.3 % (ref 10–15)
GFR SERPL CREATININE-BSD FRML MDRD: 72 ML/MIN/{1.73_M2}
GLUCOSE SERPL-MCNC: 114 MG/DL (ref 70–99)
HCT VFR BLD AUTO: 41 % (ref 35–47)
HGB BLD-MCNC: 12.9 G/DL (ref 11.7–15.7)
MCH RBC QN AUTO: 29.9 PG (ref 26.5–33)
MCHC RBC AUTO-ENTMCNC: 31.5 G/DL (ref 31.5–36.5)
MCV RBC AUTO: 95 FL (ref 78–100)
PLATELET # BLD AUTO: 269 10E9/L (ref 150–450)
POTASSIUM SERPL-SCNC: 4 MMOL/L (ref 3.4–5.3)
RBC # BLD AUTO: 4.31 10E12/L (ref 3.8–5.2)
SODIUM SERPL-SCNC: 141 MMOL/L (ref 133–144)
WBC # BLD AUTO: 8.5 10E9/L (ref 4–11)

## 2021-02-22 PROCEDURE — 80048 BASIC METABOLIC PNL TOTAL CA: CPT | Performed by: INTERNAL MEDICINE

## 2021-02-22 PROCEDURE — 99233 SBSQ HOSP IP/OBS HIGH 50: CPT | Performed by: STUDENT IN AN ORGANIZED HEALTH CARE EDUCATION/TRAINING PROGRAM

## 2021-02-22 PROCEDURE — 250N000013 HC RX MED GY IP 250 OP 250 PS 637: Performed by: INTERNAL MEDICINE

## 2021-02-22 PROCEDURE — 85027 COMPLETE CBC AUTOMATED: CPT | Performed by: INTERNAL MEDICINE

## 2021-02-22 PROCEDURE — G0378 HOSPITAL OBSERVATION PER HR: HCPCS

## 2021-02-22 PROCEDURE — 120N000001 HC R&B MED SURG/OB

## 2021-02-22 PROCEDURE — 250N000013 HC RX MED GY IP 250 OP 250 PS 637: Performed by: HOSPITALIST

## 2021-02-22 PROCEDURE — 36415 COLL VENOUS BLD VENIPUNCTURE: CPT | Performed by: INTERNAL MEDICINE

## 2021-02-22 RX ORDER — LEVETIRACETAM 500 MG/1
500 TABLET ORAL EVERY 12 HOURS
Status: DISCONTINUED | OUTPATIENT
Start: 2021-02-22 | End: 2021-02-24 | Stop reason: HOSPADM

## 2021-02-22 RX ADMIN — LEVETIRACETAM 500 MG: 500 TABLET, FILM COATED ORAL at 16:17

## 2021-02-22 RX ADMIN — ATORVASTATIN CALCIUM 40 MG: 40 TABLET, FILM COATED ORAL at 20:16

## 2021-02-22 RX ADMIN — ACETAMINOPHEN 650 MG: 325 TABLET, FILM COATED ORAL at 14:54

## 2021-02-22 RX ADMIN — DORZOLAMIDE HYDROCHLORIDE AND TIMOLOL MALEATE 1 DROP: 20; 5 SOLUTION/ DROPS OPHTHALMIC at 08:49

## 2021-02-22 RX ADMIN — TRAVOPROST 1 DROP: 0.04 SOLUTION/ DROPS OPHTHALMIC at 20:16

## 2021-02-22 RX ADMIN — LEVETIRACETAM 500 MG: 500 TABLET, FILM COATED ORAL at 05:19

## 2021-02-22 RX ADMIN — DORZOLAMIDE HYDROCHLORIDE AND TIMOLOL MALEATE 1 DROP: 20; 5 SOLUTION/ DROPS OPHTHALMIC at 20:16

## 2021-02-22 RX ADMIN — CHOLECALCIFEROL TAB 10 MCG (400 UNIT) 10 MCG: 10 TAB at 08:48

## 2021-02-22 ASSESSMENT — ACTIVITIES OF DAILY LIVING (ADL)
ADLS_ACUITY_SCORE: 20

## 2021-02-22 NOTE — PROGRESS NOTES
Care Management Follow Up    Length of Stay (days): 2    Expected Discharge Date: 02/23/21     Concerns to be Addressed:       Patient plan of care discussed at interdisciplinary rounds: Yes    Anticipated Discharge Disposition:       Anticipated Discharge Services:    Anticipated Discharge DME:      Patient/family educated on Medicare website which has current facility and service quality ratings:    Education Provided on the Discharge Plan:    Patient/Family in Agreement with the Plan:      Referrals Placed by CM/SW:    Private pay costs discussed: transportation costs, if applicable    Additional Information:    JUNE updated daughterMelania that [facility] does not have a contract with [facility]. We reviewed the list of TCU's that reportedly have a contract as well as Medicare ratings of these facilities; Dtr no longer requesting [facility]. Dtr asked JUNE to send additional referrals to: [facility]. [facility] and [facility], which was done thru D[facility].       Nurys Mccall [facility]  [phone]    UPDATE@1104: [facility] has no beds thru this week. [facility] has declined pt stating they are not set up to handle pt's memory issues. JUNE left VM for both [facility] and [facility].    UPDATE@4392:  [facility] no longer has a contract with [facility].    UPDATE@6454: JUNE sent additional referrals thru [facility] to: [facility] and [facility], as well as [facility].

## 2021-02-22 NOTE — PROGRESS NOTES
[facility]    Hospitalist Progress Note    Interval History   - Reports headache resolved. States she feels pretty good and is eager to work with therapies.   ROS otherwise neg x8    Assessment & Plan   Summary: Jamee Douglas is a 88 year old female with PMH recent admission 1/2021 for bilateral subdural hematomas, HTN, hx CVA, glaucoma, who was admitted on 2/20/2021 with increasing bilateral subdural hematomas after a fall.    Bilateral subdural, increasing in size  Recurrent falls  History of multiple subarachnoid hemorrhage and SDH and Multiple falls, suspect secondary to stroke deficits. No reversible causes of falling has been identified. Patient was found down at her memory care facility, has no memory of how she fell. Initial CT head 2/20 @ 1214 noted increasing bilateral subdural hematomas and a left frontal scalp hematoma. Repeat head CT 2/20 @ 1804 notes stable hematomas.  - Appreciate Neurosurg monitoring   - Q4h neuro checks per neurosurgery   - BP goal < 140  - Continue keppra 500 mg bid  - orthostatics in the AM, follow-up on ziopatch results likely on weekday  - PT/OT/SLP consulted  - ready for discharge, pending placement    History of acute ischemic stroke of the right ROBBY thought to be secondary from intracranial atherosclerosis versus atheroembolic event from aortic arch plaque  It appears a Zio patch was ordered however results are not available, just mailed in 2/18  - Hold PTA ASA  - Continue home atorvastatin    Hypertension  History of reported with orthostasis  - current BP parameters < 140, prn labetalol, hydralazine prn    Glaucoma: PTA eyedrops    DVT Prophylaxis: Pneumatic Compression Devices  Code Status: No CPR- Do NOT Intubate  PT/OT: ordered  Diet: Regular Diet Adult No Straws      Disposition: Expected discharge 2-3 days pending TCU placement.     Care discussed with nurse, patient, SW  35min spent in care with >50% in counseling and coordination of  care    Jamari Ribeiro MD  Text Page  (7am to 6pm)  -Data reviewed today: I reviewed all new labs and imaging results over the last 24 hours.    Physical Exam   Temp: 97.6  F (36.4  C) Temp src: Oral BP: 126/52 Pulse: 84   Resp: 16 SpO2: 96 % O2 Device: None (Room air)    Vitals:    02/20/21 1138 02/21/21 0400   Weight: 60.3 kg (133 lb) 60.4 kg (133 lb 2.5 oz)     Vital Signs with Ranges  Temp:  [96.9  F (36.1  C)-98.3  F (36.8  C)] 97.6  F (36.4  C)  Pulse:  [66-84] 84  Resp:  [15-53] 16  BP: (107-145)/(49-73) 126/52  SpO2:  [92 %-100 %] 96 %  I/O last 3 completed shifts:  In: 480 [P.O.:480]  Out: 300 [Urine:300]  O2 requirements: none    Constitutional: Elderly female in NAD  HEENT: Eyes nonicteric, oral mucosa moist, left temporal bruise  Cardiovascular: RRR, normal S1/2, no m/r/g  Respiratory: CTAB, no wheezing or crackles  Vascular: No LE pitting edema  GI: Normoactive bowel sounds, nontender, nondistended  Skin/Integumen: No rashes  Neuro/Psych: Appropriate affect and mood. A&Ox3, moves all extremities    Medications       atorvastatin  40 mg Oral QPM     cholecalciferol  10 mcg Oral Daily     dorzolamide-timolol  1 drop Right Eye BID     levETIRAcetam  500 mg Oral Q12H     travoprost KORINA FREE  1 drop Both Eyes At Bedtime       Data   Recent Labs   Lab 02/20/21  1153 02/18/21  0539   WBC 9.1  --    HGB 12.7  --    MCV 94  --      --     144   POTASSIUM 4.2 4.1   CHLORIDE 108 112*   CO2 30 29   BUN 13 15   CR 0.89 0.64   ANIONGAP 4 3   POORNIMA 9.3 8.8   GLC 86 100*   TROPI <0.015  --        Imaging:   No results found for this or any previous visit (from the past 24 hour(s)).

## 2021-02-22 NOTE — PROGRESS NOTES
[facility]  Patient is currently open to home care services with [facility]. The patient is currently receiving RN services, was seen 1x prior to re admission.  and home health team have been notified of patient admission. [facility] liaison will continue to follow patient during stay. If appropriate provide orders to resume home care at time of discharge.    Kaity Webster RN   [facility] Liaison   [phone]

## 2021-02-22 NOTE — PLAN OF CARE
A&Ox4. VSS on RA. Tele NSR. Pain intermittent in L shoulder, tylenol with relief. Up A1 GB/W to BSC, A2 for longer distances. Continent. Mepilex on coccyx for esme redness. L elbow scabbed and bruises on L side, L eye. CMS intact. Neuros intact to pt baseline with L droop, L hemiparesis, 4/5 strength LUE/LLE, and R pupil ovular. IV SL. Continue to monitor

## 2021-02-22 NOTE — PLAN OF CARE
PT: First attempt, pt was eating dinner. Second attempt, pt had returned to bed and declined any activity.

## 2021-02-23 ENCOUNTER — APPOINTMENT (OUTPATIENT)
Dept: PHYSICAL THERAPY | Facility: CLINIC | Age: 86
DRG: 087 | End: 2021-02-23
Payer: COMMERCIAL

## 2021-02-23 ENCOUNTER — APPOINTMENT (OUTPATIENT)
Dept: SPEECH THERAPY | Facility: CLINIC | Age: 86
DRG: 087 | End: 2021-02-23
Payer: COMMERCIAL

## 2021-02-23 PROCEDURE — 92526 ORAL FUNCTION THERAPY: CPT | Mod: GN

## 2021-02-23 PROCEDURE — 97530 THERAPEUTIC ACTIVITIES: CPT | Mod: GP

## 2021-02-23 PROCEDURE — 99225 PR SUBSEQUENT OBSERVATION CARE,LEVEL II: CPT | Performed by: HOSPITALIST

## 2021-02-23 PROCEDURE — 250N000013 HC RX MED GY IP 250 OP 250 PS 637: Performed by: HOSPITALIST

## 2021-02-23 PROCEDURE — G0378 HOSPITAL OBSERVATION PER HR: HCPCS

## 2021-02-23 PROCEDURE — 97116 GAIT TRAINING THERAPY: CPT | Mod: GP

## 2021-02-23 PROCEDURE — 120N000001 HC R&B MED SURG/OB

## 2021-02-23 PROCEDURE — 99207 PR CDG-CODE CATEGORY CHANGED: CPT | Performed by: HOSPITALIST

## 2021-02-23 PROCEDURE — 250N000013 HC RX MED GY IP 250 OP 250 PS 637: Performed by: INTERNAL MEDICINE

## 2021-02-23 RX ADMIN — TRAVOPROST 1 DROP: 0.04 SOLUTION/ DROPS OPHTHALMIC at 20:09

## 2021-02-23 RX ADMIN — DORZOLAMIDE HYDROCHLORIDE AND TIMOLOL MALEATE 1 DROP: 20; 5 SOLUTION/ DROPS OPHTHALMIC at 20:09

## 2021-02-23 RX ADMIN — DORZOLAMIDE HYDROCHLORIDE AND TIMOLOL MALEATE 1 DROP: 20; 5 SOLUTION/ DROPS OPHTHALMIC at 08:05

## 2021-02-23 RX ADMIN — LEVETIRACETAM 500 MG: 500 TABLET, FILM COATED ORAL at 04:59

## 2021-02-23 RX ADMIN — LEVETIRACETAM 500 MG: 500 TABLET, FILM COATED ORAL at 16:34

## 2021-02-23 RX ADMIN — CHOLECALCIFEROL TAB 10 MCG (400 UNIT) 10 MCG: 10 TAB at 08:03

## 2021-02-23 RX ADMIN — ATORVASTATIN CALCIUM 40 MG: 40 TABLET, FILM COATED ORAL at 20:08

## 2021-02-23 ASSESSMENT — ACTIVITIES OF DAILY LIVING (ADL)
ADLS_ACUITY_SCORE: 20

## 2021-02-23 NOTE — PROGRESS NOTES
[facility]    Hospitalist Progress Note        Assessment & Plan   Summary: Jamee Douglas is a 88 year old female with PMH recent admission 1/2021 for bilateral subdural hematomas, HTN, hx CVA, glaucoma, who was admitted on 2/20/2021 with increasing bilateral subdural hematomas after a fall.    Bilateral subdural, increasing in size  Recurrent falls  History of multiple subarachnoid hemorrhage and SDH and Multiple falls, suspect secondary to stroke deficits. No reversible causes of falling has been identified. Patient was found down at her memory care facility, has no memory of how she fell. Initial CT head 2/20 @ 1214 noted increasing bilateral subdural hematomas and a left frontal scalp hematoma. Repeat head CT 2/20 @ 1804 notes stable hematomas.  -Neurosurgery consult.   - Q4h neuro checks per neurosurgery   - BP goal < 140  - Continue keppra 500 mg bid.  Check level in TCU.  Results to Neurosurgery.  - Follow-up on ziopatch especially in view of recurrent falls and 2 subsequent hospitalization.  Zio patch placed from last hospitalization submitted 2/18/2021.  -Discussed with daughter it appears that patient's impulsiveness has contributed to her recurrent falls.  Patient generally does not wait for assistance upon arising and in [place] the first time and [place] the second time got up on her own and fell.  Discussed with daughter that after TCU needs to find living situation with higher level of care and staffing that can monitor patient's impulsivity that includes bed and seat alarms to decrease potential falls on patient's impulsiveness.    History of acute ischemic stroke of the right ROBBY thought to be secondary from intracranial atherosclerosis versus atheroembolic event from aortic arch plaque  Follow-up Zio patch submitted 2/18/2021, recurrent falls/admission for SDH  - Hold PTA ASA  - Continue home atorvastatin    Hypertension  - current BP parameters < 140, prn labetalol,  hydralazine prn    Glaucoma: PTA eyedrops    DVT Prophylaxis: Pneumatic Compression Devices  Code Status: No CPR- Do NOT Intubate  PT/OT: ordered  Diet: Regular Diet Adult No Straws      Disposition: Expected discharge tomorrow to [facility] in [place]      Seymour Grey MD  Text Page  (7am to 6pm)  -Data reviewed today: I reviewed all new labs and imaging results over the last 24 hours.      Interval History   Complains that her head is tender to touch however after reassurance and redirection she noted no further head pain or headache.  Nursing notes no neurologic focality.  Initially there was some concern regarding excess sedation however on exam patient was alert, oriented.  Daughter states that she prefers patient in current condition as the patient's impulsiveness had been contributory to 2 recurrent falls and 2 subsequent hospitalization for SDH.    Physical Exam   Temp: 98  F (36.7  C) Temp src: Oral BP: 132/65 Pulse: 74   Resp: 16 SpO2: 99 % O2 Device: None (Room air)    Vitals:    02/20/21 1138 02/21/21 0400   Weight: 60.3 kg (133 lb) 60.4 kg (133 lb 2.5 oz)     Vital Signs with Ranges  Temp:  [97.4  F (36.3  C)-98  F (36.7  C)] 98  F (36.7  C)  Pulse:  [74-77] 74  Resp:  [16] 16  BP: (100-132)/(44-70) 132/65  SpO2:  [94 %-100 %] 99 %  No intake/output data recorded.  O2 requirements: none    Constitutional:   NAD, alert, calm, cooperative    HEENT: Eyes nonicteric, oral mucosa moist, left temporal bruise  Cardiovascular: RRR, normal S1/2, no m/r/g  Respiratory: CTAB, no wheezing or crackles  Vascular: No LE pitting edema  GI: Normoactive bowel sounds, nontender, nondistended  Skin/Integumen: No rashes  Neuro. oriented x3.  Gross motor tested, nonfocal, sensory intact  Psych oriented, affect calm      Medications       atorvastatin  40 mg Oral QPM     cholecalciferol  10 mcg Oral Daily     dorzolamide-timolol  1 drop Right Eye BID     levETIRAcetam  500 mg Oral Q12H     travoprost KORINA FREE  1  drop Both Eyes At Bedtime     Total unit/floor time 35 minutes:  time consisted of the following, examination of patient, review of records including labs, imaging results, medications, interdisciplinary notes and completing documentation; > 50%Coordination of Care time with Nursing and SW regarding discharge planning, medications and follow-up ZioPatch results.     Additional Prolonged Time:  >30mins[non face to face] for discussion with Daughter Melania by phone to discuss Patient's medical condition including recurrent falls, SDH, patient's impulsivity and behaviors, medications including Keppra and monitoring levels;and discharge planning.       Data   Recent Labs   Lab 02/22/21  0851 02/20/21  1153 02/18/21  0539   WBC 8.5 9.1  --    HGB 12.9 12.7  --    MCV 95 94  --     280  --     142 144   POTASSIUM 4.0 4.2 4.1   CHLORIDE 108 108 112*   CO2 27 30 29   BUN 15 13 15   CR 0.74 0.89 0.64   ANIONGAP 6 4 3   POORNIMA 8.9 9.3 8.8   * 86 100*   TROPI  --  <0.015  --

## 2021-02-23 NOTE — PLAN OF CARE
Speech Language Therapy Discharge Summary    Reason for therapy discharge:    All goals and outcomes met, no further needs identified.    Progress towards therapy goal(s). See goals on Care Plan in [facility] electronic health record for goal details.  Goals met    Therapy recommendation(s):    No further therapy is recommended.  Tolerating regular diet with thin liquids. No further SLP needs.

## 2021-02-23 NOTE — PLAN OF CARE
Pt here with falls, bilateral SDH. A&Ox4 but forgetful. Neuros L facial droop, L hemiparesis 4/5 strength, LUE drift, R pupil oval, generalized weakness. VSS. Tele NSR. Regular diet, thin liquids. Takes pills whole with water. Up with A1/GB/W/BSC. Denies pain. Pt scoring green on the Aggression Stop Light Tool. Discharge to TCU when able.

## 2021-02-23 NOTE — PROGRESS NOTES
Care Management Follow Up    Length of Stay (days): 3    Expected Discharge Date: 02/23/21(TCU)     Concerns to be Addressed:       Patient plan of care discussed at interdisciplinary rounds: Yes    Anticipated Discharge Disposition:       Anticipated Discharge Services:    Anticipated Discharge DME:      Patient/family educated on Medicare website which has current facility and service quality ratings:    Education Provided on the Discharge Plan:    Patient/Family in Agreement with the Plan:      Referrals Placed by CM/SW:    Private pay costs discussed: Not applicable    Additional Information:    SW spoke w/physician who asked SW to address  daughter's question about a medication pt is receiving stating she had already rounded for the day and would not be available to call dtr in pt's room until later, once she had addressed her other patients.  As reporting medication parameters is out of SW scope of practice, JUNE updated Charge RN who went in to speak w/daughter.  SW had been directed to call physician back with outcome of that conversation,however,SW did not call physician back as Charge RN called physician directly to discuss the discharge.    JUNE learned a few minutes later from Charge RN, that physician was not comfortable discharging patient today.    JUNE updated [facility] liaison, who reports they will hold bed until tomorrow, Wed. Per liaisonEsmer, she requests a call tomorrow from JUNE with update. JUNE attempted to reach dtr, Melania; n/a; left a VM on phone number listed (pt confirmed this is dtr's cell: [phone]). JUNE faxed 7 day MAR to facility (per their request) along w/PAS.        BRITTANI Cosme   [facility]  [phone]      UPDATE@7912: Dtr confirms she has received  VM. Is aware pt will be held one more night. SW to update dtr once physician addresses discharge plan tomorrow.     UPDATE@8208: JUNE received request from physician to call [facility] admissions to see if  they could still admit pt today. Per Esmer, they can receive orders up until 5pm; pt could arrive no later than 6pm. SW reported this to Charge RN. Awaiting orders, if applicable.

## 2021-02-23 NOTE — PROVIDER NOTIFICATION
Spoke with NS team. Pts daughter is concerned with how sleepy her mother is. Per NS reduce Keppra dose to 250BID. Follow up scheduled with them 3/3/2021

## 2021-02-23 NOTE — PLAN OF CARE
Vital signs stable. AX0X4. Forgetful. History of previous stroke, Has left droop, left hemiparesis, 4/5 strength, ambulates well with assist of 1.  Lung sounds clear, bowel sounds present. NSR on telemetry. Denies any headaches.  Plan to find rehab.  Fall risk.

## 2021-02-23 NOTE — PLAN OF CARE
Pt here with bilateral SDH. A&Ox4. Neuros - generally weak, headache, baseline L hemiparesis, L droop & R irregular pupil. VSS/BP<140. Orthostatic BP negative. Tele NSR. Regular diet with no straws. Up with A1, GB, W to BSC. Continent of bowel and bladder - large loose BM. C/o moderate headache, decreased with tylenol. Pt scoring green on the Aggression Stop Light Tool. Discharge to TCU pending placement.

## 2021-02-23 NOTE — DISCHARGE INSTRUCTIONS
You are discharging to:    [facility]  [address] [place]  [address]    [phone]    Your risk factors for stroke or TIA (transient ischemic attack):    Your Risk Factors Your Results Normal Ranges   High blood pressure BP Readings from Last 1 Encounters:   02/24/21 128/58    Less than 120/80   Cholesterol              Total Lab Results   Component Value Date    CHOL 238 03/27/2018      Less than 150    Triglycerides   Lab Results   Component Value Date    TRIG 141 03/27/2018    Less than 150   LDL Lab Results   Component Value Date     03/27/2018       Less than 70   HDL Lab Results   Component Value Date    HDL 53 03/27/2018            Greater than 40 (men)  Greater than 50 (women)   Diabetes Recent Labs   Lab 02/22/21  0851   *    Fasting blood glucose    Smoking/tobacco use  Quit smoking and tobacco   Overweight  Lose 1-2 pounds a week   Lack of exercise  30 minutes moderate activity each day   Other risk factors include carotid (neck) artery disease, atrial fibrillation and stress. You may be on new medicine to treat high blood pressure, cholesterol, diabetes or atrial fibrillation.    Understanding Stroke Booklet given to patient. Please refer to booklet for further information.    Stroke warning signs and symptoms - CALL 911 right away for:  - Sudden numbness or weakness in the face, arm or leg (often on one side of the body).  - Sudden confusion or trouble understanding what is going on.  - Sudden blurred or decreased vision in one or both eyes.  - Sudden trouble speaking, loss of balance, dizziness or problems with coordination.  - Sudden, severe headache for no reason.  - Fainting or seizures.  - Symptoms may go away then come back suddenly.

## 2021-02-23 NOTE — PROGRESS NOTES
"[facility]  SPIRITUAL ASSESSMENT Progress Note  [facility] 73    PRIMARY FOCUS:   Goals of care  Symptom/pain management  Emotional/spiritual/Mu-ism distress  Support for coping    ILLNESS CIRCUMSTANCES:   Visited PT per hospital  request. Reviewed documentation. Reflective conversation shared with Jamee which integrated elements of illness and family narratives.   Context of Serious Illness/Symptom(s) - PT recently suffered stroke.  Resources for Support - Jamee stated that she feels supported by a son and daughter-in-law living in [place] [facility] [place] and a daughter living locally.    DISTRESS:   Emotional/Existential/Relational Distress - PT states that she feels like \"I don't have a home anymore.\" She indicates that she does not expect to return to her home in [place].  Spiritual/Rastafarian Distress - PT states that she prays regularly but does that she doesn't think it \"has done much good\".  Social/Cultural/Economic Distress - Jamee states that she has felt socially isolated in the past few months and that she has not been able to be close to people that she loves. She indicated that she fears falling in future.    SPIRITUAL/Anabaptism COPING:   Faith/Julieta - PT states that she believes that God still cares for her but that she is uncertain about what the future holds for her.  Spiritual Practice(s) - As noted, PT engages regularly in prayer but does not feel that it has produced the intended result or been of much help in coping with her current illness.  Emotional/Existential/Relational Connections - Jamee disclosed that she is being encouraged by her daughter-in-law and son to live with them in [place]. She states that she believes that they will provided both companionship and access to excellent medical care.    GOALS OF CARE:  Goals of Care - PT hopes to recover to a level that will allow her to resume the close relationships that she values.  Meaning/Sense-Making - Jamee " states that she cannot understand why she fell and is not able to make sense out of what has happened.    PLAN: PT is aware of the availability of SH services by request. Continue following PT while she remains in hospital.      Colby Mcconnelllain Resident

## 2021-02-23 NOTE — PROGRESS NOTES
Care Management Follow Up    Length of Stay (days): 3    Expected Discharge Date: 02/23/21(TCU)     Concerns to be Addressed:       Patient plan of care discussed at interdisciplinary rounds: Yes    Anticipated Discharge Disposition:       Anticipated Discharge Services:    Anticipated Discharge DME:      Patient/family educated on Medicare website which has current facility and service quality ratings:    Education Provided on the Discharge Plan:    Patient/Family in Agreement with the Plan:      Referrals Placed by CM/SW:    Private pay costs discussed: transportation costs, if applicable    Additional Information:  JUNE received call from [facility] who reports no beds thru end of this week.   JUNE spoke w/[facility] who will re-review current nursing notes on file with their DON; as they had thought pt was a behavior or required memory care. Current notes reveal pt is A&Ox4. JUNE sent additional referral thru DOD to: [facility] (Medicare rating 4).       BRITTANI Cosme   [facility]  [phone]    UPDATE@3093: Pt has been clinically accepted at [facility], awaiting auth. Dtr updated and agrees with plan. Pt may need HE transport. Awaiting call from Ramon w/confirmation for discharge today.     UPDATE@8840: [facility] has obtained insurance auth. They can take pt anytime today, if medically ready. Dtr approves plan and confirms she will transport, however dtr has questions for physician.  JUNE updated staff; paged physician for review.

## 2021-02-24 ENCOUNTER — APPOINTMENT (OUTPATIENT)
Dept: OCCUPATIONAL THERAPY | Facility: CLINIC | Age: 86
DRG: 087 | End: 2021-02-24
Payer: COMMERCIAL

## 2021-02-24 VITALS
RESPIRATION RATE: 16 BRPM | HEART RATE: 89 BPM | DIASTOLIC BLOOD PRESSURE: 60 MMHG | OXYGEN SATURATION: 100 % | HEIGHT: 63 IN | TEMPERATURE: 97.2 F | WEIGHT: 136.1 LBS | BODY MASS INDEX: 24.11 KG/M2 | SYSTOLIC BLOOD PRESSURE: 120 MMHG

## 2021-02-24 PROCEDURE — 97530 THERAPEUTIC ACTIVITIES: CPT | Mod: GO | Performed by: OCCUPATIONAL THERAPIST

## 2021-02-24 PROCEDURE — 99217 PR OBSERVATION CARE DISCHARGE: CPT | Performed by: HOSPITALIST

## 2021-02-24 PROCEDURE — G0378 HOSPITAL OBSERVATION PER HR: HCPCS

## 2021-02-24 PROCEDURE — 97535 SELF CARE MNGMENT TRAINING: CPT | Mod: GO | Performed by: OCCUPATIONAL THERAPIST

## 2021-02-24 PROCEDURE — 250N000013 HC RX MED GY IP 250 OP 250 PS 637: Performed by: INTERNAL MEDICINE

## 2021-02-24 PROCEDURE — 250N000013 HC RX MED GY IP 250 OP 250 PS 637: Performed by: HOSPITALIST

## 2021-02-24 RX ORDER — LEVETIRACETAM 500 MG/1
500 TABLET ORAL EVERY 12 HOURS
DISCHARGE
Start: 2021-02-24 | End: 2023-09-22

## 2021-02-24 RX ADMIN — LEVETIRACETAM 500 MG: 500 TABLET, FILM COATED ORAL at 06:00

## 2021-02-24 RX ADMIN — DORZOLAMIDE HYDROCHLORIDE AND TIMOLOL MALEATE 1 DROP: 20; 5 SOLUTION/ DROPS OPHTHALMIC at 08:16

## 2021-02-24 RX ADMIN — CHOLECALCIFEROL TAB 10 MCG (400 UNIT) 10 MCG: 10 TAB at 08:15

## 2021-02-24 ASSESSMENT — MIFFLIN-ST. JEOR: SCORE: 1016.48

## 2021-02-24 ASSESSMENT — ACTIVITIES OF DAILY LIVING (ADL)
ADLS_ACUITY_SCORE: 22
ADLS_ACUITY_SCORE: 20

## 2021-02-24 NOTE — PLAN OF CARE
Occupational Therapy Discharge Summary    Reason for therapy discharge:    Discharged to transitional care facility.    Progress towards therapy goal(s). See goals on Care Plan in [facility] electronic health record for goal details.  Goals not met.  Barriers to achieving goals:   discharge from facility.    Therapy recommendation(s):    Continued therapy is recommended.  Rationale/Recommendations:  Pt. currently below baseline w/ADL's/functional mobility;pt. would benefit from continued skilled.OT intervention to maximize safety/indep.  Pt. discharging to TCU this afternoon.

## 2021-02-24 NOTE — PLAN OF CARE
Discharge    Patient discharged to  [facility] at [facility] via Daughter @ 1400 with belongings.   Care plan note: Pt here with falls, bilateral SDH. A&Ox4 but forgetful. Neuros L facial droop, L 4/5 strength, LUE drift, R pupil irregular/oval, generalized weakness, sluggish pupils. VSS. Tele NSR. Regular diet, thin liquids. Takes pills whole with water. Up with A1/GB/W/BSC. Denies pain. Pt scoring green on the Aggression Stop Light Tool.

## 2021-02-24 NOTE — PLAN OF CARE
Pt here with bilateral SDH. A&Ox4. Neuros - generally weak, headache, baseline L hemiparesis, L droop & R irregular pupil. VSS/BP<140. Orthostatic BP negative. Tele NSR. Regular diet with no straws. Up with A1, GB, W to BSC. Continent of bowel and bladder. Denies pain. Pt scoring green on the Aggression Stop Light Tool. Discharge to TCU ([facility] in [place]) tomorrow.

## 2021-02-24 NOTE — PLAN OF CARE
Pt here with falls, bilateral SDH. A&Ox4 but forgetful. Neuros L facial droop, L hemiparesis 4/5 strength, LUE drift, R pupil oval, generalized weakness, sluggish pupils. VSS. Tele NSR. Regular diet, thin liquids. Takes pills whole with water. Up with A1/GB/W/BSC. Denies pain. Pt scoring green on the Aggression Stop Light Tool. Discharge to TCU when able.

## 2021-02-24 NOTE — DISCHARGE SUMMARY
[facility]    Discharge Summary  Hospitalist    Date of Admission:  2/20/2021  Date of Discharge:  2/24/2021  Discharging Provider: Seymour Grey MD  Date of Service (when I saw the patient): 02/24/21      History of Present Illness   Jamee Douglas is a 88 year old female with PMH recent admission 1/2021 for bilateral subdural hematomas, HTN, hx CVA, glaucoma, who was admitted on 2/20/2021 with increasing bilateral subdural hematomas after a fall.    Hospital Course   Jamee Douglas was admitted on 2/20/2021.  The following problems were addressed during her hospitalization:    Bilateral subdural, increasing in size  Recurrent falls  History of multiple subarachnoid hemorrhage and SDH and Multiple falls, suspect secondary to stroke deficits. No reversible causes of falling has been identified. Patient was found down at her memory care facility, has no memory of how she fell. Initial CT head 2/20 @ 1214 noted increasing bilateral subdural hematomas and a left frontal scalp hematoma. Repeat head CT 2/20 @ 1804 notes stable hematomas.  -Neurosurgery consult.   - BP goal < 140  - Continue keppra 500 mg bid.  Check level in TCU if excess somnulence noted.  Results to Neurosurgery.  -   Zio patch placed from last hospitalization was looked into and identified that it was  submitted 2/18/2021 and received 2/22/21 with results to be forwarded to Neurologist, Dr Jolly Finnegan of [facility]/Neurology.   -Discussed with daughter 2/23/21 and it appears that patient's impulsiveness has contributed to her recurrent falls.  Patient generally does not wait for assistance upon arising and in AL the first time and Memory Care the second time got up on her own and fell.  Discussed with daughter that after TCU needs to find living situation with higher level of care and staffing that can monitor patient's impulsivity that can include bed and seat alarms or other monitoring to decrease  potential falls due to patient's impulsiveness.     History of acute ischemic stroke of the right ROBBY thought to be secondary from intracranial atherosclerosis versus atheroembolic event from aortic arch plaque  Follow-up Zio patch submitted 2/18/2021,as above,  results to be forwarded to Neurologist, Dr Jloly Finnegan of [facility]/Neurology.  - Hold PTA ASA  - Continue home atorvastatin     Hypertension  - current BP parameters < 140,      Glaucoma: PTA eyedrops      Code Status   DNR / DNI       Primary Care Physician   ERINN BRYAN    Physical Exam   Temp: 97.2  F (36.2  C) Temp src: Axillary BP: 120/60 Pulse: 89   Resp: 16 SpO2: 100 % O2 Device: None (Room air)    Vitals:    02/20/21 1138 02/21/21 0400 02/24/21 0618   Weight: 60.3 kg (133 lb) 60.4 kg (133 lb 2.5 oz) 61.7 kg (136 lb 1.6 oz)     Vital Signs with Ranges  Temp:  [97.2  F (36.2  C)-98  F (36.7  C)] 97.2  F (36.2  C)  Pulse:  [71-89] 89  Resp:  [16] 16  BP: (118-132)/(51-68) 120/60  SpO2:  [95 %-100 %] 100 %  I/O last 3 completed shifts:  In: 240 [P.O.:240]  Out: -     Constitutional:   NAD, alert, calm, cooperative, using her smartphone texting fucntion    HEENT: Eyes nonicteric, oral mucosa moist, left temporal bruise, resolved  Cardiovascular: RRR, normal S1/2, no m/r/g  Respiratory: CTAB, no wheezing or crackles  Vascular: No LE pitting edema  GI: Normoactive bowel sounds, nontender, nondistended  Skin/Integumen: No rashes  Neuro. alert, oriented x3.  Gross motor tested, nonfocal, sensory intact  Psych oriented, affect calm    Discharge Disposition   Discharged to short-term care facility  Condition at discharge: Fair    Consultations This Hospital Stay   NEUROSURGERY IP CONSULT  PHYSICAL THERAPY ADULT IP CONSULT  OCCUPATIONAL THERAPY ADULT IP CONSULT  CARE MANAGEMENT / SOCIAL WORK IP CONSULT  SPEECH LANGUAGE PATH ADULT IP CONSULT  PHYSICAL THERAPY ADULT IP CONSULT  OCCUPATIONAL THERAPY ADULT IP CONSULT    Time Spent on this Encounter    Seymour OLIVARES MD, personally saw the patient today and spent greater than 30 minutes discharging this patient discussing with Nursing and SW discharge plans, medications and follow-up; documentation of discharge order, medications and follow-up to TCU .    Discharge Orders      General info for SNF    Length of Stay Estimate: Short Term Care: Estimated # of Days <30  Condition at Discharge: Improving  Level of care:skilled   Rehabilitation Potential: Fair  Admission H&P remains valid and up-to-date: Yes  Recent Chemotherapy: N/A  Use Nursing Home Standing Orders: Yes     Mantoux instructions    Give two-step Mantoux (PPD) Per Facility Policy Yes     Reason for your hospital stay    Presented after Fall with subdural hematoma     Activity - Up with nursing assistance    Activity per OT/PT     Follow Up and recommended labs and tests    Follow up with longterm physician.  The following labs/tests are recommended: If excess sedation is noted please check Keppra level and if abnormal review with Neurosurgery.    Appointment with NeuroSurgery on March 3, 2021.  Head CT also scheduled prior to appointment..     No CPR- Do NOT Intubate     Physical Therapy Adult Consult    Evaluate and treat as clinically indicated.    Reason:  Generalized weakness     Occupational Therapy Adult Consult    Evaluate and treat as clinically indicated.    Reason:  Generalized weakness     Fall precautions     Advance Diet as Tolerated    Follow this diet upon discharge: Orders Placed This Encounter      Regular Diet Adult No Straws     Discharge Medications   Current Discharge Medication List      START taking these medications    Details   levETIRAcetam (KEPPRA) 500 MG tablet Take 1 tablet (500 mg) by mouth every 12 hours  Qty:      Associated Diagnoses: Subdural hematoma (H)         CONTINUE these medications which have NOT CHANGED    Details   acetaminophen (TYLENOL) 325 MG tablet Take 2 tablets (650 mg) by mouth every 4  hours as needed for mild pain or fever (> 101 F)  Qty:      Associated Diagnoses: Acute ischemic right MCA stroke (H)      atorvastatin (LIPITOR) 40 MG tablet Take 1 tablet (40 mg) by mouth every evening  Qty: 30 tablet, Refills: 0    Associated Diagnoses: Acute ischemic stroke (H)      cholecalciferol (VITAMIN D3) 10 mcg (400 units) TABS tablet Take 1 tablet (10 mcg) by mouth daily  Qty: 30 tablet, Refills: 0    Associated Diagnoses: Preventative health care      dorzolamide-timolol (COSOPT) 2-0.5 % ophthalmic solution Place 1 drop into the right eye 2 times daily  Qty: 10 mL, Refills: 0    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      multivitamin w/minerals (MULTI-VITAMIN) tablet Take 1 tablet by mouth daily  Qty: 30 tablet, Refills: 0    Associated Diagnoses: Preventative health care      polyethylene glycol (MIRALAX) 17 g packet Take 1 packet by mouth daily as needed for constipation      senna-docusate (SENOKOT-S/PERICOLACE) 8.6-50 MG tablet Take 1-2 tablets by mouth 2 times daily as needed for constipation  Qty: 30 tablet, Refills: 0    Associated Diagnoses: Constipation, unspecified constipation type      travoprost BAK FREE (TRAVATAN Z) 0.004 % ophthalmic solution Place 1 drop into both eyes At Bedtime  Qty: 5 mL, Refills: 0    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         STOP taking these medications       aspirin (ASA) 81 MG EC tablet Comments:   Reason for Stopping:             Allergies   Allergies   Allergen Reactions     Penicillins Swelling     Data   Most Recent 3 CBC's:  Recent Labs   Lab Test 02/22/21  0851 02/20/21  1153 02/15/21  0525   WBC 8.5 9.1 7.5   HGB 12.9 12.7 12.0   MCV 95 94 94    280 254      Most Recent 3 BMP's:  Recent Labs   Lab Test 02/22/21  0851 02/20/21  1153 02/18/21  0539    142 144   POTASSIUM 4.0 4.2 4.1   CHLORIDE 108 108 112*   CO2 27 30 29   BUN 15 13 15   CR 0.74 0.89 0.64   ANIONGAP 6 4 3   POORNIMA 8.9 9.3  8.8   * 86 100*       Most Recent TSH, T4 and A1c Labs:  Recent Labs   Lab Test 02/01/21  1006 03/27/18  0909   TSH  --  2.98   A1C 5.8*  --

## 2021-02-24 NOTE — UTILIZATION REVIEW
"Inpatient to Observation note:    Admission Status; Secondary Review Determination         Under the authority of the Utilization Management Committee, the utilization review process indicated a secondary review on the above patient.  The review outcome is based on review of the medical records, discussions with staff, and applying clinical experience noted on the date of the review.          (x) Observation Status Appropriate - This patient does not meet hospital inpatient criteria and is placed in observation status. If this patient's primary payer is Medicare and was admitted as an inpatient, Condition Code 44 should be used and patient status changed to \"observation\".     RATIONALE FOR DETERMINATION   88-year-old female with history of bilateral subdural hematomas, hypertension, CVA was admitted on 2/20/2021 to [facility] ICU with concerns for worsening bilateral subdural hematoma.  CT at the time of presentation was read as slightly increased in size from 0.7 cm on the right to 0.8 cm, from 0.5 cm to 0.6 cm on the left.  She was monitored closely in the ICU however on the second day, the repeat head CT was stable, neurosurgery cleared patient for discharge from neurosurgical standpoint.  Patient was subsequently in the hospital awaiting placement.  Given the fact that patient was cleared for discharge on the second day, patient does not meet inpatient criteria.  Case was also discussed with Dr. Shan Winchester from .    The severity of illness, intensity of service provided, expected LOS and risk for adverse outcome make the care appropriate for further observation; however, doesn't meet criteria for hospital inpatient admission. Dr Grey notified of this determination.    This document was produced using voice recognition software.      The information on this document is developed by the utilization review team in order for the business office to ensure compliance.  This only denotes the " appropriateness of proper admission status and does not reflect the quality of care rendered.         The definitions of Inpatient Status and Observation Status used in making the determination above are those provided in the CMS Coverage Manual, Chapter 1 and Chapter 6, section 70.4.      Sincerely,  Emiliano Fleming MD    Utilization Review  Physician Advisor  [facility].

## 2021-02-24 NOTE — PROGRESS NOTES
Care Management Discharge Note    Discharge Date: 02/24/21  Expected Time of Departure: 14:00    Discharge Disposition: Transitional Care at [facility]  Discharge Services:  Inpatient rehab.    Discharge Transportation: family or friend will provide  SW confirmed with daughter this am that she is planning to pick pt up today at door #2 for discharge to [facility].    Private pay costs discussed: Not applicable    PAS Confirmation Code:  [id number]. PAS completed yesterday and on pt's chart.  Patient/family educated on Medicare website which has current facility and service quality ratings: no    Education Provided on the Discharge Plan:    Persons Notified of Discharge Plans: CTRN,RN,HUC,BB,Netti with [facility]  Patient/Family in Agreement with the Plan:  yes    Handoff Referral Completed: No    Additional Information:    BRITTANI Walter  [facility]  [phone]

## 2021-02-25 ENCOUNTER — TELEPHONE (OUTPATIENT)
Dept: FAMILY MEDICINE | Facility: CLINIC | Age: 86
End: 2021-02-25

## 2021-02-25 NOTE — TELEPHONE ENCOUNTER
patient is in a Memory care unit.  Closing encounter    History of acute ischemic stroke of the right ROBBY thought to be secondary from intracranial atherosclerosis versus atheroembolic event from aortic arch plaque  Follow-up Zio patch submitted 2/18/2021,as above,  results to be forwarded to Neurologist, Dr Jolly Finnegan of [facility]/Neurology.  - Hold PTA ASA  - Continue home atorvastatin

## 2021-02-25 NOTE — TELEPHONE ENCOUNTER
Patient was discharged from [facility] on 02/24/2021 after being treated for Subdural Hematoma (H). Triage please follow up with patient.      .Elena CRUZ    [facility]

## 2021-03-03 ENCOUNTER — HOSPITAL ENCOUNTER (OUTPATIENT)
Dept: CT IMAGING | Facility: CLINIC | Age: 86
End: 2021-03-03
Attending: PHYSICIAN ASSISTANT
Payer: COMMERCIAL

## 2021-03-03 ENCOUNTER — OFFICE VISIT (OUTPATIENT)
Dept: NEUROSURGERY | Facility: CLINIC | Age: 86
End: 2021-03-03
Attending: PHYSICIAN ASSISTANT
Payer: COMMERCIAL

## 2021-03-03 VITALS
HEART RATE: 78 BPM | DIASTOLIC BLOOD PRESSURE: 59 MMHG | WEIGHT: 130 LBS | SYSTOLIC BLOOD PRESSURE: 107 MMHG | OXYGEN SATURATION: 98 % | BODY MASS INDEX: 23.03 KG/M2

## 2021-03-03 DIAGNOSIS — I60.9 SUBARACHNOID HEMORRHAGE (H): ICD-10-CM

## 2021-03-03 DIAGNOSIS — S06.5XAA SUBDURAL HEMATOMA (H): Primary | ICD-10-CM

## 2021-03-03 PROCEDURE — G0463 HOSPITAL OUTPT CLINIC VISIT: HCPCS | Mod: 25

## 2021-03-03 PROCEDURE — 99214 OFFICE O/P EST MOD 30 MIN: CPT | Performed by: PHYSICIAN ASSISTANT

## 2021-03-03 PROCEDURE — 70450 CT HEAD/BRAIN W/O DYE: CPT

## 2021-03-03 NOTE — LETTER
3/3/2021         RE: Jamee Douglas  [address]  [place] [address]        Dear Colleague,    Thank you for referring your patient, Jamee Douglas, to the [facility]. Please see a copy of my visit note below.    Neurosurgery follow-up    Mrs. Douglas is a 88-year-old female who is here for follow-up for subdural hematomas.  She was seen in the emergency department on 2/20/2021 after a fall.  Her head CT scan at that time demonstrated a slightly increased size of her bilateral subdural hematomas repeat scan was stable and the patient was discharged to TCU where she continues to reside.  She had previously been seen few weeks earlier with a similar hospital course.  She apparently discharged home after her first hospital course before she fell on presented again to the hospital.    Today patient presents with her daughter states she is feeling well her daughter reports that physical therapists are impressed by her mother's recovery she is able to stand and walk with her walker independently.    She was initially instructed to be on Keppra for 7 days but has been maintained on this medication since leaving the hospital, which has been several days more than recommended.  She has had some fatigue and lethargy due to this medication seems.  She denies any seizures.    Has not been taking the aspirin 81 mg that she was on before.  She has a plan for follow-up with her neurologist who she was seeing in early January.    Exam    B/P: 107/59, T: Data Unavailable, P: 78, R: Data Unavailable     Alert and oriented no acute distress  Bilateral upper extremities with appropriate strength  Negative pronator drift  Finger-nose slow and accurate bilaterally      Imaging    3/3/2021  Head CT scan demonstrates stable chronic subdural collections along the frontal convexities bilaterally without change.  No evidence of new or increasing intracranial hemorrhage.  When compared  to his CT scan from 2/20/2021.    Assessment    Bilateral chronic subdural hematomas.      Plan    Imaging and case was reviewed with Dr. Camarillo, in terms of further imaging we will defer that at this time to her neurologist which she will be seeing later this month.  We feel that further imaging is not necessarily needed however if there are any concerning features on exam then repeat imaging could be undertaken.  If the patient has any questions or concerns we certainly have a see her back in clinic.  However to limit the amount of visits that she has she can certainly follow-up with her neurologist and if there is any concerns that she can see us back again.      Total time of 30 minutes spent with the patient today in counseling and coordination of care.        Again, thank you for allowing me to participate in the care of your patient.        Sincerely,        Robles Gonzalez PA-C

## 2021-03-03 NOTE — PROGRESS NOTES
Neurosurgery follow-up    Mrs. Douglas is a 88-year-old female who is here for follow-up for subdural hematomas.  She was seen in the emergency department on 2/20/2021 after a fall.  Her head CT scan at that time demonstrated a slightly increased size of her bilateral subdural hematomas repeat scan was stable and the patient was discharged to TCU where she continues to reside.  She had previously been seen few weeks earlier with a similar hospital course.  She apparently discharged home after her first hospital course before she fell on presented again to the hospital.    Today patient presents with her daughter states she is feeling well her daughter reports that physical therapists are impressed by her mother's recovery she is able to stand and walk with her walker independently.    She was initially instructed to be on Keppra for 7 days but has been maintained on this medication since leaving the hospital, which has been several days more than recommended.  She has had some fatigue and lethargy due to this medication seems.  She denies any seizures.    Has not been taking the aspirin 81 mg that she was on before.  She has a plan for follow-up with her neurologist who she was seeing in early January.    Exam    B/P: 107/59, T: Data Unavailable, P: 78, R: Data Unavailable     Alert and oriented no acute distress  Bilateral upper extremities with appropriate strength  Negative pronator drift  Finger-nose slow and accurate bilaterally      Imaging    3/3/2021  Head CT scan demonstrates stable chronic subdural collections along the frontal convexities bilaterally without change.  No evidence of new or increasing intracranial hemorrhage.  When compared to his CT scan from 2/20/2021.    Assessment    Bilateral chronic subdural hematomas.      Plan    Imaging and case was reviewed with Dr. Camarillo, in terms of further imaging we will defer that at this time to her neurologist which she will be seeing later this month.   We feel that further imaging is not necessarily needed however if there are any concerning features on exam then repeat imaging could be undertaken.  If the patient has any questions or concerns we certainly have a see her back in clinic.  However to limit the amount of visits that she has she can certainly follow-up with her neurologist and if there is any concerns that she can see us back again.      Total time of 30 minutes spent with the patient today in counseling and coordination of care.

## 2021-03-03 NOTE — NURSING NOTE
"March 3, 2021 3:40 PM   Jamee Douglas is a 88 year old female who presents for:    Chief Complaint   Patient presents with     Hospital F/U     SDh     Initial Vitals: /59 (BP Location: Right arm)   Pulse 78   Wt 130 lb (59 kg)   LMP 01/01/1970   SpO2 98%   BMI 23.03 kg/m   Estimated body mass index is 23.03 kg/m  as calculated from the following:    Height as of 2/20/21: 5' 3\" (1.6 m).    Weight as of this encounter: 130 lb (59 kg). Body surface area is 1.62 meters squared.  Data Unavailable Comment: Data Unavailable       Clinical concerns: Jamee Douglas is here today for sdh follow up  Frida Samuel MA  "

## 2021-03-10 ENCOUNTER — TELEPHONE (OUTPATIENT)
Dept: NEUROSURGERY | Facility: CLINIC | Age: 86
End: 2021-03-10

## 2021-03-10 NOTE — TELEPHONE ENCOUNTER
"Per Robles Gonzalez PA-C, \"Imaging and case was reviewed with Dr. Camarillo, in terms of further imaging we will defer that at this time to her neurologist which she will be seeing later this month.  We feel that further imaging is not necessarily needed however if there are any concerning features on exam then repeat imaging could be undertaken.  If the patient has any questions or concerns we certainly have a see her back in clinic.  However to limit the amount of visits that she has she can certainly follow-up with her neurologist and if there is any concerns that she can see us back again.\"     Relayed information to emergency contact Melania (daughter). States that she will also inform patient about conversation. Daughter verbalized understanding of plan.   "

## 2021-03-15 ENCOUNTER — DOCUMENTATION ONLY (OUTPATIENT)
Dept: OTHER | Facility: CLINIC | Age: 86
End: 2021-03-15

## 2021-03-28 ENCOUNTER — RECORDS - HEALTHEAST (OUTPATIENT)
Dept: LAB | Facility: CLINIC | Age: 86
End: 2021-03-28

## 2021-03-30 LAB
ALBUMIN SERPL-MCNC: 3.9 G/DL (ref 3.5–5)
ALP SERPL-CCNC: 68 U/L (ref 45–120)
ALT SERPL W P-5'-P-CCNC: 20 U/L (ref 0–45)
ANION GAP SERPL CALCULATED.3IONS-SCNC: 9 MMOL/L (ref 5–18)
AST SERPL W P-5'-P-CCNC: 25 U/L (ref 0–40)
BILIRUB SERPL-MCNC: 0.5 MG/DL (ref 0–1)
BUN SERPL-MCNC: 14 MG/DL (ref 8–28)
CALCIUM SERPL-MCNC: 9.5 MG/DL (ref 8.5–10.5)
CHLORIDE BLD-SCNC: 105 MMOL/L (ref 98–107)
CHOLEST SERPL-MCNC: 123 MG/DL
CO2 SERPL-SCNC: 28 MMOL/L (ref 22–31)
CREAT SERPL-MCNC: 0.81 MG/DL (ref 0.6–1.1)
ERYTHROCYTE [DISTWIDTH] IN BLOOD BY AUTOMATED COUNT: 13.5 % (ref 11–14.5)
FASTING STATUS PATIENT QL REPORTED: ABNORMAL
GFR SERPL CREATININE-BSD FRML MDRD: >60 ML/MIN/1.73M2
GLUCOSE BLD-MCNC: 95 MG/DL (ref 70–125)
HCT VFR BLD AUTO: 39.5 % (ref 35–47)
HDLC SERPL-MCNC: 40 MG/DL
HGB BLD-MCNC: 12.6 G/DL (ref 12–16)
LDLC SERPL CALC-MCNC: 53 MG/DL
MCH RBC QN AUTO: 30.1 PG (ref 27–34)
MCHC RBC AUTO-ENTMCNC: 31.9 G/DL (ref 32–36)
MCV RBC AUTO: 94 FL (ref 80–100)
PLATELET # BLD AUTO: 273 THOU/UL (ref 140–440)
PMV BLD AUTO: 10.5 FL (ref 8.5–12.5)
POTASSIUM BLD-SCNC: 4.2 MMOL/L (ref 3.5–5)
PROT SERPL-MCNC: 6.9 G/DL (ref 6–8)
RBC # BLD AUTO: 4.19 MILL/UL (ref 3.8–5.4)
SODIUM SERPL-SCNC: 142 MMOL/L (ref 136–145)
TRIGL SERPL-MCNC: 150 MG/DL
TSH SERPL DL<=0.005 MIU/L-ACNC: 1.79 UIU/ML (ref 0.3–5)
VIT B12 SERPL-MCNC: 1959 PG/ML (ref 213–816)
WBC: 7.8 THOU/UL (ref 4–11)

## 2023-09-22 ENCOUNTER — HOSPITAL ENCOUNTER (OUTPATIENT)
Facility: CLINIC | Age: 88
Setting detail: OBSERVATION
Discharge: HOME | End: 2023-09-23
Attending: EMERGENCY MEDICINE | Admitting: INTERNAL MEDICINE
Payer: COMMERCIAL

## 2023-09-22 DIAGNOSIS — R19.7 VOMITING AND DIARRHEA: ICD-10-CM

## 2023-09-22 DIAGNOSIS — R53.1 WEAKNESS: ICD-10-CM

## 2023-09-22 DIAGNOSIS — R11.10 VOMITING AND DIARRHEA: ICD-10-CM

## 2023-09-22 DIAGNOSIS — E86.0 DEHYDRATION: ICD-10-CM

## 2023-09-22 LAB
ALBUMIN SERPL BCG-MCNC: 4.3 G/DL (ref 3.5–5.2)
ALBUMIN UR-MCNC: NEGATIVE MG/DL
ALP SERPL-CCNC: 56 U/L (ref 35–104)
ALT SERPL W P-5'-P-CCNC: 21 U/L (ref 0–50)
ANION GAP SERPL CALCULATED.3IONS-SCNC: 11 MMOL/L (ref 7–15)
APPEARANCE UR: CLEAR
AST SERPL W P-5'-P-CCNC: 28 U/L (ref 0–45)
BASOPHILS # BLD AUTO: 0.1 10E3/UL (ref 0–0.2)
BASOPHILS NFR BLD AUTO: 1 %
BILIRUB SERPL-MCNC: 0.5 MG/DL
BILIRUB UR QL STRIP: NEGATIVE
BUN SERPL-MCNC: 21.2 MG/DL (ref 8–23)
CALCIUM SERPL-MCNC: 9.7 MG/DL (ref 8.2–9.6)
CHLORIDE SERPL-SCNC: 105 MMOL/L (ref 98–107)
COLOR UR AUTO: ABNORMAL
CREAT SERPL-MCNC: 1.01 MG/DL (ref 0.51–0.95)
DEPRECATED HCO3 PLAS-SCNC: 25 MMOL/L (ref 22–29)
EGFRCR SERPLBLD CKD-EPI 2021: 52 ML/MIN/1.73M2
EOSINOPHIL # BLD AUTO: 0.1 10E3/UL (ref 0–0.7)
EOSINOPHIL NFR BLD AUTO: 1 %
ERYTHROCYTE [DISTWIDTH] IN BLOOD BY AUTOMATED COUNT: 12 % (ref 10–15)
GLUCOSE SERPL-MCNC: 84 MG/DL (ref 70–99)
GLUCOSE UR STRIP-MCNC: NEGATIVE MG/DL
HCT VFR BLD AUTO: 38.8 % (ref 35–47)
HGB BLD-MCNC: 12.9 G/DL (ref 11.7–15.7)
HGB UR QL STRIP: NEGATIVE
IMM GRANULOCYTES # BLD: 0 10E3/UL
IMM GRANULOCYTES NFR BLD: 0 %
KETONES UR STRIP-MCNC: 10 MG/DL
LEUKOCYTE ESTERASE UR QL STRIP: ABNORMAL
LIPASE SERPL-CCNC: 25 U/L (ref 13–60)
LYMPHOCYTES # BLD AUTO: 2 10E3/UL (ref 0.8–5.3)
LYMPHOCYTES NFR BLD AUTO: 23 %
MCH RBC QN AUTO: 30.6 PG (ref 26.5–33)
MCHC RBC AUTO-ENTMCNC: 33.2 G/DL (ref 31.5–36.5)
MCV RBC AUTO: 92 FL (ref 78–100)
MONOCYTES # BLD AUTO: 0.7 10E3/UL (ref 0–1.3)
MONOCYTES NFR BLD AUTO: 8 %
MUCOUS THREADS #/AREA URNS LPF: PRESENT /LPF
NEUTROPHILS # BLD AUTO: 5.7 10E3/UL (ref 1.6–8.3)
NEUTROPHILS NFR BLD AUTO: 67 %
NITRATE UR QL: NEGATIVE
NRBC # BLD AUTO: 0 10E3/UL
NRBC BLD AUTO-RTO: 0 /100
PH UR STRIP: 6 [PH] (ref 5–7)
PLATELET # BLD AUTO: 207 10E3/UL (ref 150–450)
POTASSIUM SERPL-SCNC: 4 MMOL/L (ref 3.4–5.3)
PROT SERPL-MCNC: 7.1 G/DL (ref 6.4–8.3)
RBC # BLD AUTO: 4.21 10E6/UL (ref 3.8–5.2)
RBC URINE: 1 /HPF
SODIUM SERPL-SCNC: 141 MMOL/L (ref 136–145)
SP GR UR STRIP: 1.01 (ref 1–1.03)
UROBILINOGEN UR STRIP-MCNC: NORMAL MG/DL
WBC # BLD AUTO: 8.5 10E3/UL (ref 4–11)
WBC URINE: 2 /HPF

## 2023-09-22 PROCEDURE — 36415 COLL VENOUS BLD VENIPUNCTURE: CPT | Performed by: EMERGENCY MEDICINE

## 2023-09-22 PROCEDURE — G0378 HOSPITAL OBSERVATION PER HR: HCPCS

## 2023-09-22 PROCEDURE — 81001 URINALYSIS AUTO W/SCOPE: CPT | Performed by: EMERGENCY MEDICINE

## 2023-09-22 PROCEDURE — 96374 THER/PROPH/DIAG INJ IV PUSH: CPT

## 2023-09-22 PROCEDURE — 258N000003 HC RX IP 258 OP 636: Performed by: EMERGENCY MEDICINE

## 2023-09-22 PROCEDURE — 250N000009 HC RX 250

## 2023-09-22 PROCEDURE — 85004 AUTOMATED DIFF WBC COUNT: CPT | Performed by: EMERGENCY MEDICINE

## 2023-09-22 PROCEDURE — 99285 EMERGENCY DEPT VISIT HI MDM: CPT | Mod: 25

## 2023-09-22 PROCEDURE — 80053 COMPREHEN METABOLIC PANEL: CPT | Performed by: EMERGENCY MEDICINE

## 2023-09-22 PROCEDURE — 258N000003 HC RX IP 258 OP 636

## 2023-09-22 PROCEDURE — 250N000011 HC RX IP 250 OP 636: Mod: JZ

## 2023-09-22 PROCEDURE — 83690 ASSAY OF LIPASE: CPT | Performed by: EMERGENCY MEDICINE

## 2023-09-22 PROCEDURE — 250N000013 HC RX MED GY IP 250 OP 250 PS 637

## 2023-09-22 PROCEDURE — 99223 1ST HOSP IP/OBS HIGH 75: CPT

## 2023-09-22 RX ORDER — BRIMONIDINE TARTRATE 2 MG/ML
1 SOLUTION/ DROPS OPHTHALMIC 2 TIMES DAILY
COMMUNITY
Start: 2023-08-25

## 2023-09-22 RX ORDER — DORZOLAMIDE HYDROCHLORIDE AND TIMOLOL MALEATE 20; 5 MG/ML; MG/ML
1 SOLUTION/ DROPS OPHTHALMIC 2 TIMES DAILY
Status: DISCONTINUED | OUTPATIENT
Start: 2023-09-22 | End: 2023-09-23 | Stop reason: HOSPADM

## 2023-09-22 RX ORDER — HYDRALAZINE HYDROCHLORIDE 25 MG/1
50 TABLET, FILM COATED ORAL EVERY 6 HOURS PRN
Status: DISCONTINUED | OUTPATIENT
Start: 2023-09-22 | End: 2023-09-23 | Stop reason: HOSPADM

## 2023-09-22 RX ORDER — BRIMONIDINE TARTRATE 2 MG/ML
1 SOLUTION/ DROPS OPHTHALMIC 2 TIMES DAILY
Status: DISCONTINUED | OUTPATIENT
Start: 2023-09-22 | End: 2023-09-23 | Stop reason: HOSPADM

## 2023-09-22 RX ORDER — SODIUM CHLORIDE, SODIUM LACTATE, POTASSIUM CHLORIDE, CALCIUM CHLORIDE 600; 310; 30; 20 MG/100ML; MG/100ML; MG/100ML; MG/100ML
INJECTION, SOLUTION INTRAVENOUS CONTINUOUS
Status: DISCONTINUED | OUTPATIENT
Start: 2023-09-22 | End: 2023-09-23

## 2023-09-22 RX ORDER — ACETAMINOPHEN 325 MG/1
650 TABLET ORAL EVERY 6 HOURS PRN
Status: DISCONTINUED | OUTPATIENT
Start: 2023-09-22 | End: 2023-09-23 | Stop reason: HOSPADM

## 2023-09-22 RX ORDER — SERTRALINE HYDROCHLORIDE 25 MG/1
25 TABLET, FILM COATED ORAL DAILY
COMMUNITY

## 2023-09-22 RX ORDER — ONDANSETRON 2 MG/ML
4 INJECTION INTRAMUSCULAR; INTRAVENOUS EVERY 6 HOURS PRN
Status: DISCONTINUED | OUTPATIENT
Start: 2023-09-22 | End: 2023-09-23 | Stop reason: HOSPADM

## 2023-09-22 RX ORDER — ACETAMINOPHEN 650 MG/1
650 SUPPOSITORY RECTAL EVERY 6 HOURS PRN
Status: DISCONTINUED | OUTPATIENT
Start: 2023-09-22 | End: 2023-09-23 | Stop reason: HOSPADM

## 2023-09-22 RX ORDER — LABETALOL HYDROCHLORIDE 5 MG/ML
10 INJECTION, SOLUTION INTRAVENOUS EVERY 6 HOURS PRN
Status: CANCELLED | OUTPATIENT
Start: 2023-09-22

## 2023-09-22 RX ORDER — DORZOLAMIDE HYDROCHLORIDE AND TIMOLOL MALEATE 20; 5 MG/ML; MG/ML
1 SOLUTION/ DROPS OPHTHALMIC 2 TIMES DAILY
COMMUNITY

## 2023-09-22 RX ORDER — ONDANSETRON 4 MG/1
4 TABLET, ORALLY DISINTEGRATING ORAL EVERY 6 HOURS PRN
Status: DISCONTINUED | OUTPATIENT
Start: 2023-09-22 | End: 2023-09-23 | Stop reason: HOSPADM

## 2023-09-22 RX ADMIN — HYDRALAZINE HYDROCHLORIDE 50 MG: 25 TABLET ORAL at 16:42

## 2023-09-22 RX ADMIN — SODIUM CHLORIDE 1000 ML: 9 INJECTION, SOLUTION INTRAVENOUS at 13:41

## 2023-09-22 RX ADMIN — BRIMONIDINE TARTRATE 1 DROP: 2 SOLUTION OPHTHALMIC at 19:54

## 2023-09-22 RX ADMIN — DORZOLAMIDE HYDROCHLORIDE AND TIMOLOL MALEATE 1 DROP: 22.3; 6.8 SOLUTION/ DROPS OPHTHALMIC at 19:54

## 2023-09-22 RX ADMIN — ONDANSETRON 4 MG: 2 INJECTION INTRAMUSCULAR; INTRAVENOUS at 20:05

## 2023-09-22 RX ADMIN — SODIUM CHLORIDE, POTASSIUM CHLORIDE, SODIUM LACTATE AND CALCIUM CHLORIDE: 600; 310; 30; 20 INJECTION, SOLUTION INTRAVENOUS at 17:44

## 2023-09-22 ASSESSMENT — ACTIVITIES OF DAILY LIVING (ADL)
ADLS_ACUITY_SCORE: 37
ADLS_ACUITY_SCORE: 39
ADLS_ACUITY_SCORE: 39
ADLS_ACUITY_SCORE: 37
ADLS_ACUITY_SCORE: 39

## 2023-09-22 NOTE — PROGRESS NOTES
Admission/Transfer from: ED  2 RN skin assessment completed. Yes with RN Taty  Significant findings include: none. skin intact  WOC Nurse Consult Ordered? No

## 2023-09-22 NOTE — PROGRESS NOTES
RECEIVING UNIT ED HANDOFF REVIEW    ED Nurse Handoff Report was reviewed by: Opal Cobb RN on September 22, 2023 at 5:02 PM

## 2023-09-22 NOTE — ED NOTES
"[facility]  ED Nurse Handoff Report    ED Chief complaint: Generalized Weakness and Nausea, Vomiting, & Diarrhea      ED Diagnosis:   Final diagnoses:   Vomiting and diarrhea   Dehydration   Weakness       Code Status: To be discussed with MD    Allergies:   Allergies   Allergen Reactions    Pcn [Penicillins] Swelling     Patient reported swelling around mouth and hands       Patient Story: Pt comes in to ER via EMS from home with c/o feeling \"ill\" for last 3 days. Pt states she has felt lethargic and has had n/v/d. Pt also c/o poor appetite. Pt tested negative for COVID at home yesterday. BG 99 for EMS. Hx stroke December 2020.     Focused Assessment: Alert and oriented x4, fatigued, n/v/d that all last occurred yesterday. Denies cough or SOB. Chronic weakness to L leg d/t hx stroke.    Treatments and/or interventions provided: See eMAR.    Patient's response to treatments and/or interventions: Vitals stable.    To be done/followed up on inpatient unit:  See new orders (awaiting stool sample)    Does this patient have any cognitive concerns?:  none    Activity level - Baseline/Home:  Walker  Activity Level - Current:   Stand with assist x2 and Walker    Patient's Preferred language: English   Needed?: No    Isolation: None  Infection: Not Applicable  Patient tested for COVID 19 prior to admission: NO  Bariatric?: No    Vital Signs:   Vitals:    09/22/23 1321 09/22/23 1324 09/22/23 1342 09/22/23 1533   BP: (!) 203/94 (!) 194/77     Pulse:  74     Resp: 18      Temp: 98.1  F (36.7  C)      TempSrc: Oral      SpO2: 97% 98% 98% 96%   Weight: 56.7 kg (125 lb)      Height: 1.626 m (5' 4\")          Cardiac Rhythm:     Was the PSS-3 completed:   Yes  What interventions are required if any?               Family Comments: Daughter in room  OBS brochure/video discussed/provided to patient/family: No              Name of person given brochure if not patient: n/a              Relationship to patient: " n/a    For the majority of the shift this patient's behavior was Green.   Behavioral interventions performed were n/a.    ED NURSE PHONE NUMBER: *[phone]

## 2023-09-22 NOTE — PROGRESS NOTES
Top portion must ALWAYS be completed  PRIMARY Concern: N/V/Diarrhea  SAFETY RISK Concerns (fall risk, behaviors, etc.): Fall risk      Tests/Procedures for NEXT shift: Stool sample needed.  Consults? (Pending/following, signed-off?) PT consult pending  Where is patient from? (Home, TCU, etc.): [facility].  Other Important info for NEXT shift: need stool sample.  Anticipated DC date & active delays: TBD  ________________________________________________________________________  SUMMARY NOTE:  Orientation/Cognitive: AOX4  Observation Goals (Met/ Not Met): not met  Mobility Level/Assist Equipment: AX1-2 gb/walker. Not oob.  Antibiotics & Plan (IV/po, length of tx left): NA  Pain Management: denies  Tele/VS/O2: VSS on RA ex slight BP elevated. Prn hydralazine available.  ABNL Lab/BG: Creat- 1.01  Diet: Reg  Bowel/Bladder: Incontinent bladder.   Skin Concerns: none  Drains/Devices: LR infusing 75 ml/hr.  Patient Stated Goal for Today: none    Observation goals  PRIOR TO DISCHARGE       Comments: -diagnostic tests and consults completed and resulted- not met  -vital signs normal or at patient baseline-partially met, BP slight elevated.  -tolerating oral intake to maintain hydration- met  -returns to baseline functional status-not met  -safe disposition plan has been identified- not met  Nurse to notify provider when observation goals have been met and patient is ready for discharge.

## 2023-09-22 NOTE — H&P
[facility]    History and Physical - Hospitalist Service       Date of Admission:  9/22/2023    Assessment & Plan      Jamee Douglas is a 89 year old female admitted on 9/22/2023. She has a past medical history of ischemic stroke, hyperlipidemia, urinary urgency, and macular degeneration.  She presents to hospital with 4 days of nausea vomiting and diarrhea.    Generalized weakness  Dehydration  Nausea vomiting and diarrhea  Patient reports she was in her usual state of health until Tuesday 9/19 when she had an episode of emesis while out at coffee with her friends.  This was followed with 4 to 5 episodes of diarrhea per day since Tuesday.  She is trailed 4 doses of antidiarrheal medication prior to seeking ED evaluation. She has had minimal oral intake eating half a banana and half an English muffin and 1 bowl of chicken noodle soup. Progressive weakness concerns are as she lives in independent living prompting her to seek emergency evaluation.  -Admit to observation  -Urinalysis negative  -Continuous IV fluids with LR at 75mL/hr  -Enteric stool studies pending  -As needed Zofran  -Repeat basic metabolic panel tomorrow 9/23  -PT consultation    Acute kidney injury secondary to above  Creatinine 1.01 GFR 52  -IV fluids as above  -Recheck BMP tomorrow 9/23    History of ischemic stroke to the right ROBBY secondary to intracranial atherosclerosis with some residual left hemiparesis-December 2020  History of multiple falls following stroke resulting in SDH/SAH  Patient continues with some residual left-sided hemiparesis most notably in her left lower extremity.  She does have gross motor function of this leg and is typically able to ambulate with a walker and tells me she enjoys using her exercise machines and stationary bike at her living facility.  She has been living at the [facility] since 2017 and had spent almost a year in 2021 at acute rehab following the strokes.  He now resides  in the independent living side of the [facility].   -PT consult as above    Elevated blood pressure on admission  While in the emergency department blood pressures ranging from 194//85.  Noted she had just seen her PCP Dr. Antonio this week who recommended she record blood pressures for 2 weeks and was planning to follow-up to discuss initiation of antihypertensive medication.  Given her history of ischemic stroke we will cautiously treat her elevated blood pressure allowing for some level of permissive hypertension as she is asymptomatic.  Limited options secondary to acute kidney injury and chronic left lower extremity edema.  -Will write for 50 mg hydralazine oral q 6hrs for systolic blood pressure greater than 190    Macular degeneration  -Continue twice daily Alphagan ophthalmic solution   -Continue twice daily Cosopt ophthalmic solution        Diet:  Regular diet  DVT Prophylaxis: Pneumatic Compression Devices  Pierce Catheter: Not present  Lines: None     Cardiac Monitoring: None  Code Status:  DNR/DNI    Clinically Significant Risk Factors Present on Admission                  # Hypertension: Noted on problem list                 Disposition Plan      Expected Discharge Date: 09/23/2023                The patient's care was discussed with the Attending Physician, Dr. Santillan .    Elena Krishnamurthy NP  Hospitalist Service  [facility]  Securely message with isango!more info)  Text page via [employer] [place] [employer] Paging/Directory     ______________________________________________________________________    Chief Complaint   Nausea vomiting diarrhea    History is obtained from the patient and daughter En.     History of Present Illness   Jamee Douglas is a 89 year old female admitted on 9/22/2023. She has a past medical history of ischemic stroke, hyperlipidemia, urinary urgency, and macular degeneration.  She presents to hospital with 4 days of nausea vomiting and diarrhea. Patient  reports she was in her usual state of health until Tuesday 9/19 when she had an episode of emesis while out at coffee with her friends. This was followed by 4 to 5 episodes of diarrhea per day since Tuesday.  She trailed 4 doses of antidiarrheal medication prior to seeking ED evaluation as this was ineffective. She has had minimal oral intake over the last 4 days only eating half a banana and half an English muffin and 1 bowl of chicken noodle soup.  Given her progressive weakness concerns as she lives in independent living prompting her to seek emergency evaluation.  Patient lives at the [facility] in independent living and ambulates with a walker at baseline.    Past Medical History    Past Medical History:   Diagnosis Date    Glaucoma     Hyperlipidemia LDL goal < 130     LBP (low back pain)     Osteoarthritis        Past Surgical History   Past Surgical History:   Procedure Laterality Date    ARTHROPLASTY KNEE      HYSTERECTOMY      SURGICAL HISTORY OF -       rights shoulder    SURGICAL HISTORY OF -       glaucoma surgery    SURGICAL HISTORY OF -       back surgery       Prior to Admission Medications   Prior to Admission Medications   Prescriptions Last Dose Informant Patient Reported? Taking?   Cyanocobalamin (B-12 PO) 9/22/2023 at AM Self Yes Yes   Sig: Take 1 tablet by mouth daily   Probiotic Product (PEARLS IC PO) 9/22/2023 at am Self Yes Yes   Sig: Take 1 capsule by mouth daily   acetaminophen (TYLENOL) 325 MG tablet Unknown at PRN Self No Yes   Sig: Take 2 tablets (650 mg) by mouth every 4 hours as needed for mild pain or fever (> 101 F)   atorvastatin (LIPITOR) 40 MG tablet 9/21/2023 at PM Self No Yes   Sig: Take 1 tablet (40 mg) by mouth every evening   brimonidine (ALPHAGAN) 0.2 % ophthalmic solution 9/22/2023 at AM Self Yes Yes   Sig: Place 1 drop into the right eye 2 times daily   cholecalciferol (VITAMIN D3) 10 mcg (400 units) TABS tablet 9/22/2023 at AM Self No Yes   Sig: Take 1 tablet (10 mcg)  by mouth daily   dorzolamide-timolol (COSOPT) 22.3-6.8 MG/ML ophthalmic solution 9/22/2023 at AM Self Yes Yes   Sig: Place 1 drop into both eyes 2 times daily   senna-docusate (SENOKOT-S/PERICOLACE) 8.6-50 MG tablet Unknown at PRN Self No Yes   Sig: Take 1-2 tablets by mouth 2 times daily as needed for constipation   sertraline (ZOLOFT) 25 MG tablet 9/22/2023 at AM Self Yes Yes   Sig: Take 25 mg by mouth daily      Facility-Administered Medications: None        Review of Systems    The 10 point Review of Systems is negative other than noted in the HPI or here.     Social History   I have reviewed this patient's social history and updated it with pertinent information if needed.  Social History     Tobacco Use    Smoking status: Never    Smokeless tobacco: Never   Substance Use Topics    Alcohol use: Yes     Comment: Rare    Drug use: No         Allergies   Allergies   Allergen Reactions    Pcn [Penicillins] Swelling     Patient reported swelling around mouth and hands        Physical Exam   Vital Signs: Temp: 98.1  F (36.7  C) Temp src: Oral BP: (!) 194/77 Pulse: 74   Resp: 18 SpO2: 96 % O2 Device: None (Room air)    Weight: 125 lbs 0 oz    Physical Exam  Constitutional:       Appearance: Normal appearance. She is not toxic-appearing or diaphoretic.   HENT:      Head: Normocephalic.      Mouth/Throat:      Mouth: Mucous membranes are moist.   Eyes:      Extraocular Movements: Extraocular movements intact.   Cardiovascular:      Rate and Rhythm: Normal rate and regular rhythm.      Pulses: Normal pulses.      Heart sounds: Normal heart sounds. No murmur heard.  Pulmonary:      Effort: Pulmonary effort is normal.      Breath sounds: Normal breath sounds.   Abdominal:      Palpations: Abdomen is soft.   Musculoskeletal:      Left lower leg: Edema present.      Comments: Left lower leg edema is baseline since stroke    Skin:     General: Skin is warm and dry.      Capillary Refill: Capillary refill takes less than 2  seconds.   Neurological:      Mental Status: She is alert and oriented to person, place, and time. Mental status is at baseline.      GCS: GCS eye subscore is 4. GCS verbal subscore is 5. GCS motor subscore is 6.      Sensory: Sensation is intact.      Comments: Left lower extremity 2/5 strength, absent-weak dorsal/plantar flexion. Left upper extremity 5/5.    Psychiatric:         Mood and Affect: Mood normal.         Behavior: Behavior normal.         Thought Content: Thought content normal.       Medical Decision Making       75 MINUTES SPENT BY ME on the date of service doing chart review, history, exam, documentation & further activities per the note.      Data     I have personally reviewed the following data over the past 24 hrs:    8.5  \   12.9   / 207     141 105 21.2 /  84   4.0 25 1.01 (H) \     ALT: 21 AST: 28 AP: 56 TBILI: 0.5   ALB: 4.3 TOT PROTEIN: 7.1 LIPASE: 25       Imaging results reviewed over the past 24 hrs:   No results found for this or any previous visit (from the past 24 hour(s)).

## 2023-09-22 NOTE — PHARMACY-ADMISSION MEDICATION HISTORY
Pharmacist Admission Medication History    Admission medication history is complete. The information provided in this note is only as accurate as the sources available at the time of the update.    Medication reconciliation/reorder completed by provider prior to medication history? No    Information Source(s): Patient and CareEverywhere/SureScripts via in-person    Pertinent Information:     Changes made to PTA medication list:  Added: Alphagan, Vit B12, probiotic, sertraline  Deleted: keppra, mvi, miralax, travatan z  Changed: cosopt from right eye to both eyes    Medication Affordability:  Not including over the counter (OTC) medications, was there a time in the past 3 months when you did not take your medications as prescribed because of cost?: No    Allergies reviewed with patient and updates made in EHR: yes    Medication History Completed By: Eileen Salvador RPH 9/22/2023 2:32 PM    Prior to Admission medications    Medication Sig Last Dose Taking? Auth Provider Long Term End Date   acetaminophen (TYLENOL) 325 MG tablet Take 2 tablets (650 mg) by mouth every 4 hours as needed for mild pain or fever (> 101 F) Unknown at PRN Yes Rosalie Stokes PA-C     atorvastatin (LIPITOR) 40 MG tablet Take 1 tablet (40 mg) by mouth every evening 9/21/2023 at PM Yes Rosalie Stokes PA-C Yes    brimonidine (ALPHAGAN) 0.2 % ophthalmic solution Place 1 drop into the right eye 2 times daily 9/22/2023 at AM Yes Unknown, Entered By History     cholecalciferol (VITAMIN D3) 10 mcg (400 units) TABS tablet Take 1 tablet (10 mcg) by mouth daily 9/22/2023 at AM Yes Rosalie Stokes PA-C     Cyanocobalamin (B-12 PO) Take 1 tablet by mouth daily 9/22/2023 at AM Yes Unknown, Entered By History     dorzolamide-timolol (COSOPT) 22.3-6.8 MG/ML ophthalmic solution Place 1 drop into both eyes 2 times daily 9/22/2023 at AM Yes Unknown, Entered By History     Probiotic Product (PEARLS IC PO) Take 1 capsule by mouth daily 9/22/2023 at am Yes  Unknown, Entered By History     senna-docusate (SENOKOT-S/PERICOLACE) 8.6-50 MG tablet Take 1-2 tablets by mouth 2 times daily as needed for constipation Unknown at PRN Yes Rosalie Stokes PA-C     sertraline (ZOLOFT) 25 MG tablet Take 25 mg by mouth daily 9/22/2023 at AM Yes Unknown, Entered By History Yes

## 2023-09-22 NOTE — ED PROVIDER NOTES
History   Chief Complaint:  Weakness    HPI   History supplemented by electronic chart review    Jamee Douglas is a 89 year old female who presents by EMS for evaluation of weakness.  She lives alone in an apartment and normally manages her own affairs, 3 mornings ago she was having coffee with some peers when she developed nonbloody vomiting, and went back to her room.  Over the next few days she has had countless episodes of nonbloody painless diarrhea not associated with any fevers or other symptoms.  No recent antibiotics.  No known sick contacts.  No breathing troubles.  No chest pain.  Today she felt so diffusely weak that she called an ambulance who brought her here after measuring a blood sugar of 99.  She has a history of a stroke in 2020.    Independent Historian: Patient's daughter who later arrived at bedside, who reports that the patient has taken some over-the-counter antidiarrheal medication with partial relief of her diarrhea but no impact on her overall weakness.  Daughter feels that the patient should be hospitalized.    Review of External Notes: I personally performed electronic chart review, she called the [facility] nurse line today reporting several days of vomiting and diarrhea and was advised to get checked out promptly.    Medications:    acetaminophen (TYLENOL) 325 MG tablet  atorvastatin (LIPITOR) 40 MG tablet  brimonidine (ALPHAGAN) 0.2 % ophthalmic solution  cholecalciferol (VITAMIN D3) 10 mcg (400 units) TABS tablet  senna-docusate (SENOKOT-S/PERICOLACE) 8.6-50 MG tablet    Past Medical History:    Past Medical History:   Diagnosis Date    Glaucoma     Hyperlipidemia LDL goal < 130     LBP (low back pain)     Osteoarthritis      Patient Active Problem List    Diagnosis Date Noted    Dehydration 09/22/2023     Priority: Medium    Weakness 09/22/2023     Priority: Medium    Vomiting and diarrhea 09/22/2023     Priority: Medium    Subdural hematoma (H) 02/20/2021     Priority:  "Medium    Acute ischemic right MCA stroke (H) 02/04/2021     Priority: Medium    Subarachnoid hemorrhage (H) 01/29/2021     Priority: Medium    Injury of head, initial encounter 01/29/2021     Priority: Medium    Benign essential hypertension 05/30/2017     Priority: Medium    Advanced directives, counseling/discussion 07/11/2014     Priority: Medium     Advance Care Planning:   ACP Review and Resources Provided:  Reviewed chart for advance care plan.  Jamee Douglas has no plan or code status on file. Mailed available resources and provided with information. Confirmed code status reflects current choices pending further ACP discussions.  Confirmed/documented designated decision maker(s). See permanent comments section of demographics in clinical tab.   Added by Pamela Main on 7/11/2014          Low back pain      Priority: Medium     Diagnosis updated by automated process. Provider to review and confirm.      Hyperlipidemia with target LDL less than 130      Priority: Medium     Diagnosis updated by automated process. Provider to review and confirm.      Osteoarthritis      Priority: Medium    Glaucoma      Priority: Medium        Physical Exam   Patient Vitals for the past 24 hrs:   BP Temp Temp src Pulse Resp SpO2 Height Weight   09/22/23 1533 -- -- -- -- -- 96 % -- --   09/22/23 1342 -- -- -- -- -- 98 % -- --   09/22/23 1324 (!) 194/77 -- -- 74 -- 98 % -- --   09/22/23 1321 (!) 203/94 98.1  F (36.7  C) Oral -- 18 97 % 1.626 m (5' 4\") 56.7 kg (125 lb)      Physical Exam  General: Woman semirecumbent in room 1, daughter later at bedside  HENT: mucous membranes somewhat dry,   CV: rate as above, no LE edema  Resp: normal effort, speaks in full phrases, no stridor, no cough observed  GI: Abdomen soft, completely nontender to deep palpation  MSK: no bony tenderness, no CVAT  Skin: appropriately warm and dry  Neuro: alert, clear speech, oriented, diffusely weak but no nuchal rigidity,  equal, can lift " both legs off bed  Psych: cooperative, pleasant, no evidence of encephalopathy    Emergency Department Course   Laboratory:  Labs Ordered and Resulted from Time of ED Arrival to Time of ED Departure   COMPREHENSIVE METABOLIC PANEL - Abnormal       Result Value    Sodium 141      Potassium 4.0      Chloride 105      Carbon Dioxide (CO2) 25      Anion Gap 11      Urea Nitrogen 21.2      Creatinine 1.01 (*)     Calcium 9.7 (*)     Glucose 84      Alkaline Phosphatase 56      AST 28      ALT 21      Protein Total 7.1      Albumin 4.3      Bilirubin Total 0.5      GFR Estimate 52 (*)    ROUTINE UA WITH MICROSCOPIC - Abnormal    Color Urine Straw      Appearance Urine Clear      Glucose Urine Negative      Bilirubin Urine Negative      Ketones Urine 10 (*)     Specific Gravity Urine 1.010      Blood Urine Negative      pH Urine 6.0      Protein Albumin Urine Negative      Urobilinogen Urine Normal      Nitrite Urine Negative      Leukocyte Esterase Urine Trace (*)     Mucus Urine Present (*)     RBC Urine 1      WBC Urine 2     LIPASE - Normal    Lipase 25     CBC WITH PLATELETS AND DIFFERENTIAL    WBC Count 8.5      RBC Count 4.21      Hemoglobin 12.9      Hematocrit 38.8      MCV 92      MCH 30.6      MCHC 33.2      RDW 12.0      Platelet Count 207      % Neutrophils 67      % Lymphocytes 23      % Monocytes 8      % Eosinophils 1      % Basophils 1      % Immature Granulocytes 0      NRBCs per 100 WBC 0      Absolute Neutrophils 5.7      Absolute Lymphocytes 2.0      Absolute Monocytes 0.7      Absolute Eosinophils 0.1      Absolute Basophils 0.1      Absolute Immature Granulocytes 0.0      Absolute NRBCs 0.0     ENTERIC BACTERIA AND VIRUS PANEL BY PCR      Emergency Department Course:  Reviewed:  I reviewed nursing notes, vitals, and past medical history    Assessments/Consultations/Discussion of Management or Tests :  I obtained history and examined the patient as noted above.   ED Course as of 09/22/23 1536   Fri  Sep 22, 2023   1516 I rechecked patient, spoke with daughter.   1526 I spoke with Elena Krishnamurthy NP, who accepts to Hospitalist service on behalf of Dr. Bailey.     Interventions:  Medications   sodium chloride 0.9% BOLUS 1,000 mL (1,000 mLs Intravenous $New Bag 9/22/23 1341)       Social Determinants of Health affecting care:   Transportation and Social Connections/Isolation    Disposition:  Admit to Dr. Bailey    Impression & Plan    Medical Decision Making:  She presents with generalized weakness that I think is due to dehydration from her recent painless nonbloody vomiting and diarrhea that is led to some hypovolemia, though her vital signs are not indicative of hypovolemic shock and in fact her blood pressure is actually somewhat high.  She does not have any chest pain or other features to suggest acute endorgan damage directly attributable to her elevated blood pressure which has been present for some time.  She was given IV fluids.  In the absence of abdominal pain, fevers, or leukocytosis I did not feel that abdominal CT imaging would be of clinical benefit and the patient agrees.  Highly doubt any acutely surgical process and low suspicion for bacterial process though stool cultures have been sent.  Due to her systemic symptoms and generalized weakness leaving her unable to safely manage at home, the patient, her daughter, and I have agreed for her to be admitted to the hospital for close monitoring and further supportive cares.  She has been accepted by the hospitalist service.    Diagnosis:    ICD-10-CM    1. Vomiting and diarrhea  R11.10     R19.7       2. Dehydration  E86.0       3. Weakness  R53.1          9/22/2023   MD Keren Ayala Jeffrey Alan, MD  09/22/23 4784

## 2023-09-22 NOTE — ED TRIAGE NOTES
"Pt comes in to ER via EMS from home with c/o feeling \"ill\" for last 3 days. Pt states she has felt lethargic and has had n/v/d. Pt also c/o poor appetite. Pt tested negative for COVID at home yesterday. BG 99 for EMS. Hx stroke December 2020.     Triage Assessment       Row Name 09/22/23 1323       Triage Assessment (Adult)    Airway WDL WDL       Respiratory WDL    Respiratory WDL WDL       Skin Circulation/Temperature WDL    Skin Circulation/Temperature WDL WDL       Cardiac WDL    Cardiac WDL WDL       Peripheral/Neurovascular WDL    Peripheral Neurovascular WDL WDL       Cognitive/Neuro/Behavioral WDL    Cognitive/Neuro/Behavioral WDL WDL                    "

## 2023-09-23 ENCOUNTER — APPOINTMENT (OUTPATIENT)
Dept: PHYSICAL THERAPY | Facility: CLINIC | Age: 88
End: 2023-09-23
Payer: COMMERCIAL

## 2023-09-23 VITALS
SYSTOLIC BLOOD PRESSURE: 110 MMHG | TEMPERATURE: 99.1 F | BODY MASS INDEX: 21.34 KG/M2 | HEIGHT: 64 IN | RESPIRATION RATE: 16 BRPM | WEIGHT: 125 LBS | DIASTOLIC BLOOD PRESSURE: 52 MMHG | HEART RATE: 85 BPM | OXYGEN SATURATION: 97 %

## 2023-09-23 LAB
ANION GAP SERPL CALCULATED.3IONS-SCNC: 19 MMOL/L (ref 7–15)
BUN SERPL-MCNC: 21.7 MG/DL (ref 8–23)
CALCIUM SERPL-MCNC: 9.2 MG/DL (ref 8.2–9.6)
CHLORIDE SERPL-SCNC: 103 MMOL/L (ref 98–107)
CREAT SERPL-MCNC: 0.96 MG/DL (ref 0.51–0.95)
DEPRECATED HCO3 PLAS-SCNC: 17 MMOL/L (ref 22–29)
EGFRCR SERPLBLD CKD-EPI 2021: 56 ML/MIN/1.73M2
GLUCOSE SERPL-MCNC: 56 MG/DL (ref 70–99)
POTASSIUM SERPL-SCNC: 3.8 MMOL/L (ref 3.4–5.3)
SODIUM SERPL-SCNC: 139 MMOL/L (ref 136–145)

## 2023-09-23 PROCEDURE — 97161 PT EVAL LOW COMPLEX 20 MIN: CPT | Mod: GP

## 2023-09-23 PROCEDURE — 97116 GAIT TRAINING THERAPY: CPT | Mod: GP

## 2023-09-23 PROCEDURE — 258N000003 HC RX IP 258 OP 636

## 2023-09-23 PROCEDURE — 36415 COLL VENOUS BLD VENIPUNCTURE: CPT

## 2023-09-23 PROCEDURE — 97530 THERAPEUTIC ACTIVITIES: CPT | Mod: GP

## 2023-09-23 PROCEDURE — G0378 HOSPITAL OBSERVATION PER HR: HCPCS

## 2023-09-23 PROCEDURE — 99239 HOSP IP/OBS DSCHRG MGMT >30: CPT | Performed by: NURSE PRACTITIONER

## 2023-09-23 PROCEDURE — 82310 ASSAY OF CALCIUM: CPT

## 2023-09-23 RX ADMIN — DORZOLAMIDE HYDROCHLORIDE AND TIMOLOL MALEATE 1 DROP: 22.3; 6.8 SOLUTION/ DROPS OPHTHALMIC at 08:49

## 2023-09-23 RX ADMIN — SODIUM CHLORIDE, POTASSIUM CHLORIDE, SODIUM LACTATE AND CALCIUM CHLORIDE: 600; 310; 30; 20 INJECTION, SOLUTION INTRAVENOUS at 07:28

## 2023-09-23 RX ADMIN — BRIMONIDINE TARTRATE 1 DROP: 2 SOLUTION OPHTHALMIC at 08:49

## 2023-09-23 ASSESSMENT — ACTIVITIES OF DAILY LIVING (ADL)
ADLS_ACUITY_SCORE: 42
ADLS_ACUITY_SCORE: 41
ADLS_ACUITY_SCORE: 42
ADLS_ACUITY_SCORE: 41
ADLS_ACUITY_SCORE: 39
ADLS_ACUITY_SCORE: 39
ADLS_ACUITY_SCORE: 42

## 2023-09-23 NOTE — PROGRESS NOTES
Observation goals  PRIOR TO DISCHARGE       Comments: -diagnostic tests and consults completed and resulted Not met  -vital signs normal or at patient baseline-Partially Met-T-99.1  -tolerating oral intake to maintain hydration partially met  -returns to baseline functional status Not met  -safe disposition plan has been identified Not met  Nurse to notify provider when observation goals have been met and patient is ready for discharge.

## 2023-09-23 NOTE — PLAN OF CARE
Goal Outcome Evaluation:    Goal Outcome Evaluation:     Top portion must ALWAYS be completed  PRIMARY Concern: N/V/Diarrhea  SAFETY RISK Concerns (fall risk, behaviors, etc.): Fall risk      Tests/Procedures for NEXT shift: None  Consults? (Pending/following, signed-off?) Seen by PT  Where is patient from? (Home, TCU, etc.): [facility].  Other Important info for NEXT shift:   Anticipated DC date & active delays: Today-9/23/23  ________________________________________________________________________  SUMMARY NOTE:  Orientation/Cognitive: AOX4  Observation Goals (Met/ Not Met): not met  Mobility Level/Assist Equipment: AX1-2 gb/walker.   Antibiotics & Plan (IV/po, length of tx left): NA  Pain Management: denies  Tele/VS/O2: VSS except T-99.1, on RA  ABNL Lab/BG: Cr-0.96  Diet: Reg  Bowel/Bladder: Incontinent bladder. [place]  Skin Concerns: none  Drains/Devices: PIV d/c'd  Patient Stated Goal for Today: none    Discharged to home via family transport (daughter) with after visit summary (reviewed with pt).  No new prescriptions.

## 2023-09-23 NOTE — PROGRESS NOTES
09/23/23 1051   Appointment Info   Signing Clinician's Name / Credentials (PT) Edis Valdez DPT   Quick Adds   Quick Adds Certification   Living Environment   People in Home alone   Current Living Arrangements independent living facility   Home Accessibility no concerns   Transportation Anticipated family or friend will provide   Living Environment Comments Reports lives in ILF alone.   Self-Care   Usual Activity Tolerance moderate   Current Activity Tolerance fair   Equipment Currently Used at Home walker, rolling   Fall history within last six months no   Activity/Exercise/Self-Care Comment At baseline uses a 4WW for mobility. Gets assistance w/ cleaning and laudry. Reports does all of her other ADLs independently. Sits and showers. States grab bars in her shower. Was recently working with HHPT but they signed off on her over a month ago because she was doing well.   General Information   Onset of Illness/Injury or Date of Surgery 09/22/23   Referring Physician Elena Krishnamurthy, NP   Patient/Family Therapy Goals Statement (PT) Return to home   Pertinent History of Current Problem (include personal factors and/or comorbidities that impact the POC) Jameecharlie Douglas is a 89 year old female admitted on 9/22/2023. She has a past medical history of ischemic stroke, hyperlipidemia, urinary urgency, and macular degeneration.  She presents to hospital with 4 days of nausea vomiting and diarrhea.   Existing Precautions/Restrictions fall   Cognition   Affect/Mental Status (Cognition) WFL   Orientation Status (Cognition) oriented x 4   Follows Commands (Cognition) WFL   Pain Assessment   Patient Currently in Pain No   Integumentary/Edema   Integumentary/Edema no deficits were identifed   Range of Motion (ROM)   ROM Comment WFLs for mobility and transfers no formal testing completing   Strength (Manual Muscle Testing)   Strength Comments Generalized weakness noted to BLEs. Baseline LLE weaker than RLE   Bed Mobility    Comment, (Bed Mobility) Supine to sitting EOB w/ SBA w/ HOB elevated   Transfers   Comment, (Transfers) Sit to stand w/ 4WW and SBA   Gait/Stairs (Locomotion)   Comment, (Gait/Stairs) 10 ft w/ 4WW and CGA   Balance   Balance Comments No overt LOB noted   Clinical Impression   Criteria for Skilled Therapeutic Intervention Yes, treatment indicated   PT Diagnosis (PT) Impaired ambulation   Influenced by the following impairments Impaired strength, balance and activity tolerance   Functional limitations due to impairments Impaired ADLs, IADLs and functional mobility   Clinical Presentation (PT Evaluation Complexity) Stable/Uncomplicated   Clinical Presentation Rationale Clinical judgment   Clinical Decision Making (Complexity) low complexity   Planned Therapy Interventions (PT) balance training;bed mobility training;gait training;home exercise program;patient/family education;strengthening;transfer training;progressive activity/exercise   Risk & Benefits of therapy have been explained evaluation/treatment results reviewed;care plan/treatment goals reviewed;risks/benefits reviewed;current/potential barriers reviewed;participants voiced agreement with care plan;participants included;patient   PT Total Evaluation Time   PT Eval, Low Complexity Minutes (25770) 10   Therapy Certification   Start of care date 09/23/23   Certification date from 09/23/23   Certification date to 10/03/23   Medical Diagnosis Nausea, vomiting and diarrhea   Physical Therapy Goals   PT Frequency 6x/week   PT Predicted Duration/Target Date for Goal Attainment 10/03/23   PT Goals Bed Mobility;Transfers;Gait   PT: Bed Mobility Independent;Supine to/from sit   PT: Transfers Modified independent;Sit to/from stand   PT: Gait Modified independent;Rolling walker;150 feet   Interventions   Interventions Quick Adds Gait Training;Therapeutic Activity   Therapeutic Activity   Therapeutic Activities: dynamic activities to improve functional performance  Minutes (53198) 8   Symptoms Noted During/After Treatment None   Treatment Detail/Skilled Intervention Pt supine in bed at start of session. Agreeable to PT. Once seated at EOB good sitting balance. Able to do hospital socks and don own socks. Able to don own shoes in sitting. Sit to stand from EOB height x1 w/ 4 WW and SBA. Stand to sit x2 onto EOB w/ 4WW and SBA. Poor eccentric lowering on first attempt. Pt not locking breaks on first attempt. Educated on locking breaks of 4WW w/ transfers Pt verbalizes she did not know this. Betters sequencing on second attempt. x10 sit to stands in a row w/ 4WW and SBA. Good UE hand placement. Cues for tall posture and reaching back w/ sitting. Able to scoot higher  towards HOB seated EOB w/ SBA . Sit to supine w/ HOB flat and SBA. All needs met at end of session w/ call light in place and bed alarm on.   Gait Training   Gait Training Minutes (39330) 15   Symptoms Noted During/After Treatment (Gait Training) none   Treatment Detail/Skilled Intervention Pt ambulating ~300 ft x1 and 170 ft x1 w/ 4WW and CGA progressing to SBA. Good francisco and balance noted throughout. Forward head throughout. Good walker manegment. Pt completing dynamic gait w/ head turns w/o LOB. Able to bend over and  item from floor w/ CGA. Good sequencing w/ locking of breaks of 4WW.   PT Discharge Planning   PT Plan Ambulation distance w/ 4WW, repeat STS, balance, strengthening, dynamic gait   PT Discharge Recommendation (DC Rec) home with home care physical therapy   PT Rationale for DC Rec Pt below baseline mobility but moving well. Lives in ILF. uses a 4WW for mobility. Currently SBA w/ 4WW. Would recommend DC to home w/ HHPT at [place] to improve upon functional mobility and activity tolerance. Pt declining HHPT at this time.   PT Brief overview of current status SBA w/ 4WW   Total Session Time   Timed Code Treatment Minutes 23   Total Session Time (sum of timed and untimed services) 33   [facility]  OUTPATIENT PHYSICAL THERAPY EVALUATION  PLAN OF TREATMENT FOR OUTPATIENT REHABILITATION  (COMPLETE FOR INITIAL CLAIMS ONLY)  Patient's Last Name, First Name, M.I.  YOB: 1932  Jamee Douglas                        Provider's Name  [facility] Medical Record No.  [id number]                             Onset Date:  09/22/23   Start of Care Date:  09/23/23   Type:     _X_PT   ___OT   ___SLP Medical Diagnosis:  Nausea, vomiting and diarrhea              PT Diagnosis:  Impaired ambulation Visits from SOC:  1     See note for plan of treatment, functional goals and certification details    I CERTIFY THE NEED FOR THESE SERVICES FURNISHED UNDER        THIS PLAN OF TREATMENT AND WHILE UNDER MY CARE     (Physician co-signature of this document indicates review and certification of the therapy plan).

## 2023-09-23 NOTE — PLAN OF CARE
Goal Outcome Evaluation:    Top portion must ALWAYS be completed  PRIMARY Concern: N/V/Diarrhea  SAFETY RISK Concerns (fall risk, behaviors, etc.): Fall risk      Tests/Procedures for NEXT shift: None  Consults? (Pending/following, signed-off?) PT consult pending  Where is patient from? (Home, TCU, etc.): [facility].  Other Important info for NEXT shift: Stool sample still needs to be collected  Anticipated DC date & active delays: TBD  ________________________________________________________________________  SUMMARY NOTE:  Orientation/Cognitive: AOX4  Observation Goals (Met/ Not Met): not met  Mobility Level/Assist Equipment: AX1-2 gb/walker. Not oob.  Antibiotics & Plan (IV/po, length of tx left): NA  Pain Management: denies  Tele/VS/O2: VSS on RA  ABNL Lab/BG: AM labs  Diet: Reg  Bowel/Bladder: Incontinent bladder.   Skin Concerns: none  Drains/Devices: LR infusing 75 ml/hr.  Patient Stated Goal for Today: none                 Observation goals  PRIOR TO DISCHARGE       Comments: -diagnostic tests and consults completed and resulted Not met  -vital signs normal or at patient baseline Met  -tolerating oral intake to maintain hydration Not met  -returns to baseline functional status Not met  -safe disposition plan has been identified Not met  Nurse to notify provider when observation goals have been met and patient is ready for discharge.

## 2023-09-23 NOTE — PROGRESS NOTES
Observation goals  PRIOR TO DISCHARGE        Comments: -diagnostic tests and consults completed and resulted Not met  -vital signs normal or at patient baseline Met  -tolerating oral intake to maintain hydration Not met  -returns to baseline functional status Not met  -safe disposition plan has been identified Not met  Nurse to notify provider when observation goals have been met and patient is ready for discharge.

## 2023-09-23 NOTE — CONSULTS
"Received message from charge nurse, pt to discharge home.  Lives at [facility].     Called pt in hospital room.  She confirms she is independent, manages own meds, schedules own appointments, and her daughter is coming to pick her up. Declines Home Health as also documented in PT consult \"Pt declining HHPT at this time. \"     Reviewed chart; pt has  + established PCP at Clinic: [facility], Provider: Sang Bryan, Phone: [phone] most recent visit 9/13/23  + no new meds will be Rx'd   + active insurance [facility] Medicare Advantage   + independent with mobility    No CM needs identified. Did add to AVS \"tips\" for scheduling PCP followup:    Follow up with primary care provider, SANG BRYAN, within 7 days for hospital follow- up.  The following labs/tests are recommended: repeat BMP.    + CALL the [facility] clinic  (phone [phone])   + SAY, key words: \"I need a hospital follow-up appointment within 7 days of my hospital discharge date 09/23/23  + BRING these discharge papers with you to this followup appointment, SHOW your provider that you need the BMP lab    Pt states she plans to get dressed & ready to go.    Melissa Maurer RN, BSN, PHN  [facility]  Inpatient Care Management - FLOAT    "

## 2023-09-23 NOTE — DISCHARGE SUMMARY
[facility]  Hospitalist Discharge Summary      Date of Admission:  9/22/2023  Date of Discharge:  9/23/2023  Discharging Provider: MANOHAR Newton CNP  Discharge Service: Hospitalist Service    Discharge Diagnoses   Generalized weakness  Dehydration 2/2 nausea, vomiting, diarrhea  CHERYL  History of ischemic stroke to the right ROBBY  History of multiple falls  Elevated blood pressure  Macular degeneration    Clinically Significant Risk Factors          Follow-ups Needed After Discharge   Follow up with PCP for recheck BMP    Unresulted Labs Ordered in the Past 30 Days of this Admission       No orders found for last 31 day(s).        These results will be followed up by NA    Discharge Disposition   Discharged to home (independent living)  Condition at discharge: Stable    Hospital Course   Jamee Douglas is a 89 year old female admitted on 9/22/2023. She has a past medical history of ischemic stroke, hyperlipidemia, urinary urgency, and macular degeneration.  She presents to hospital with 4 days of nausea vomiting and diarrhea.     Generalized weakness  Dehydration 2/2 Nausea, vomiting, and diarrhea (suspected gastroenteritis), resolved   Patient reports she was in her usual state of health until Tuesday 9/19 when she had an episode of emesis while out at coffee with her friends.  This was followed with 4 to 5 episodes of diarrhea per day since Tuesday.  She trialed 4 doses of antidiarrheal medication prior to seeking ED evaluation. She has had minimal oral intake eating half a banana and half an English muffin and 1 bowl of chicken noodle soup. Progressive weakness concerns are as she lives in independent living prompting her to seek emergency evaluation.  -Urinalysis negative  -Continuous IV fluids with LR at 75mL/hr, urine output adequate, tolerating PO intake well   -Enteric stool studies unable to be obtained due to self limiting symptoms (no stool while admitted)  -As needed  Zofran (none needed on day of discharge )  -PT consultation - recommending return to independent living   - repeat BMP on 9/23 improved renal function, ongoing acidosis, but likely will continue to improve with resolution of nausea/vomiting/diarrhea     Last 24H PRN:     hydrALAZINE (APRESOLINE) tablet 50 mg, 50 mg at 09/22/23 1642    ondansetron (ZOFRAN ODT) ODT tab 4 mg **OR** ondansetron (ZOFRAN) injection 4 mg, 4 mg at 09/22/23 2005       Acute kidney injury, improving   Creatinine 1.01 GFR 52, recheck 9/23 shows 0.96  -IVF stopped 9/23, tolerating oral intake      History of ischemic stroke to the right ROBBY secondary to intracranial atherosclerosis with some residual left hemiparesis-December 2020  History of multiple falls following stroke resulting in SDH/SAH  Patient continues with some residual left-sided hemiparesis most notably in her left lower extremity.  She does have gross motor function of this leg and is typically able to ambulate with a walker and tells me she enjoys using her exercise machines and stationary bike at her living facility.  She has been living at the [facility] since 2017 and had spent almost a year in 2021 at acute rehab following the strokes.  He now resides in the independent living side of the [facility].   -PT consult completed      Elevated blood pressure, resolved   While in the emergency department blood pressures ranging from 194//85.  Noted she had just seen her PCP Dr. Antonio this week who recommended she record blood pressures for 2 weeks and was planning to follow-up to discuss initiation of antihypertensive medication.  Given her history of ischemic stroke we will cautiously treat her elevated blood pressure allowing for some level of permissive hypertension as she is asymptomatic.  Limited options secondary to acute kidney injury and chronic left lower extremity edema.  -Will write for 50 mg hydralazine oral q 6hrs for systolic blood pressure greater than  "190     /52 (BP Location: Left arm)   Pulse 85   Temp 99.1  F (37.3  C) (Oral)   Resp 16   Ht 1.626 m (5' 4\")   Wt 56.7 kg (125 lb)   LMP 01/01/1970   SpO2 97%   BMI 21.46 kg/m       Macular degeneration  -Continue twice daily Alphagan ophthalmic solution   -Continue twice daily Cosopt ophthalmic solution        Consultations This Hospital Stay   PHYSICAL THERAPY ADULT IP CONSULT  CARE MANAGEMENT / SOCIAL WORK IP CONSULT    Code Status   No CPR- Do NOT Intubate    Time Spent on this Encounter   I, MANOHAR Newton CNP, personally saw the patient today and spent greater than 30 minutes discharging this patient.       MANOHAR Newton CNP  [facility] EXTENDED RECOVERY AND SHORT STAY  [address] [place] [id number]  Phone: [phone]  ______________________________________________________________________    Physical Exam   Vital Signs: Temp: 99.1  F (37.3  C) Temp src: Oral BP: 110/52 Pulse: 85   Resp: 16 SpO2: 97 % O2 Device: None (Room air)    Weight: 125 lbs 0 oz  Physical Exam  Vitals and nursing note reviewed.   Constitutional:       Appearance: Normal appearance.   HENT:      Head: Atraumatic.      Mouth/Throat:      Mouth: Mucous membranes are moist.   Eyes:      Pupils: Pupils are equal, round, and reactive to light.   Cardiovascular:      Rate and Rhythm: Normal rate and regular rhythm.      Pulses: Normal pulses.      Heart sounds: No murmur heard.  Pulmonary:      Effort: Pulmonary effort is normal. No respiratory distress.      Breath sounds: Normal breath sounds. No wheezing.   Abdominal:      General: Abdomen is flat. Bowel sounds are normal. There is no distension.      Palpations: Abdomen is soft.      Tenderness: There is no abdominal tenderness.   Musculoskeletal:         General: Normal range of motion.   Skin:     General: Skin is warm and dry.      Capillary Refill: Capillary refill takes less than 2 seconds.   Neurological:      General: No " focal deficit present.      Mental Status: She is alert and oriented to person, place, and time. Mental status is at baseline.   Psychiatric:         Mood and Affect: Mood normal.         Thought Content: Thought content normal.              Primary Care Physician   ERINN BRYAN    Discharge Orders   No discharge procedures on file.    Significant Results and Procedures   Results for orders placed or performed during the hospital encounter of 03/03/21   CT Head w/o Contrast    Narrative    CT OF THE HEAD WITHOUT CONTRAST 3/3/2021 3:43 PM     COMPARISON: Head CT 2/20/2021    HISTORY: Subarachnoid hemorrhage (H).    TECHNIQUE: 5 mm thick axial CT images of the head were acquired  without IV contrast material.    FINDINGS: Chronic hypodense subdural collections along the frontal  convexities bilaterally again noted without change. There is moderate  diffuse cerebral volume loss. There are subtle patchy areas of  decreased density in the cerebral white matter bilaterally that are  consistent with sequela of chronic small vessel ischemic disease.    The ventricles and basal cisterns are within normal limits in  configuration given the degree of cerebral volume loss.  There is no  midline shift. There are no extra-axial fluid collections.    No intracranial hemorrhage, mass or recent infarct.    The visualized paranasal sinuses are well-aerated. There is no  mastoiditis. There are no fractures of the visualized bones.      Impression    IMPRESSION: Chronic subdural collections along the frontal convexities  bilaterally again noted without change. No evidence for new or  increasing intracranial hemorrhage. Diffuse cerebral volume loss and  cerebral white matter changes consistent with chronic small vessel  ischemic disease. No evidence for acute intracranial pathology.      Radiation dose for this scan was reduced using automated exposure  control, adjustment of the mA and/or kV according to patient size,  or  iterative reconstruction technique.    MIR URBINA MD       Discharge Medications   Current Discharge Medication List        CONTINUE these medications which have NOT CHANGED    Details   acetaminophen (TYLENOL) 325 MG tablet Take 2 tablets (650 mg) by mouth every 4 hours as needed for mild pain or fever (> 101 F)  Qty:      Associated Diagnoses: Acute ischemic right MCA stroke (H)      atorvastatin (LIPITOR) 40 MG tablet Take 1 tablet (40 mg) by mouth every evening  Qty: 30 tablet, Refills: 0    Associated Diagnoses: Acute ischemic stroke (H)      brimonidine (ALPHAGAN) 0.2 % ophthalmic solution Place 1 drop into the right eye 2 times daily      cholecalciferol (VITAMIN D3) 10 mcg (400 units) TABS tablet Take 1 tablet (10 mcg) by mouth daily  Qty: 30 tablet, Refills: 0    Associated Diagnoses: Preventative health care      Cyanocobalamin (B-12 PO) Take 1 tablet by mouth daily      dorzolamide-timolol (COSOPT) 22.3-6.8 MG/ML ophthalmic solution Place 1 drop into both eyes 2 times daily      Probiotic Product (PEARLS IC PO) Take 1 capsule by mouth daily      senna-docusate (SENOKOT-S/PERICOLACE) 8.6-50 MG tablet Take 1-2 tablets by mouth 2 times daily as needed for constipation  Qty: 30 tablet, Refills: 0    Associated Diagnoses: Constipation, unspecified constipation type      sertraline (ZOLOFT) 25 MG tablet Take 25 mg by mouth daily           Allergies   Allergies   Allergen Reactions    Pcn [Penicillins] Swelling     Patient reported swelling around mouth and hands

## 2023-09-23 NOTE — PROGRESS NOTES
Observation goals  PRIOR TO DISCHARGE       Comments: -diagnostic tests and consults completed and resulted-Met  -vital signs normal or at patient baseline-Partially Met-T-99.1  -tolerating oral intake to maintain hydration partially met  -returns to baseline functional status Not met  -safe disposition plan has been identified Not met  Nurse to notify provider when observation goals have been met and patient is ready for discharge.         debilitated/other (specify)/BMI 22, normal for height for weight/contracted

## 2023-09-23 NOTE — PROGRESS NOTES
Observation goals  PRIOR TO DISCHARGE       Comments: -diagnostic tests and consults completed and resulted Not met  -vital signs normal or at patient baseline Met  -tolerating oral intake to maintain hydration partially met  -returns to baseline functional status Not met  -safe disposition plan has been identified Not met  Nurse to notify provider when observation goals have been met and patient is ready for discharge.

## 2023-09-23 NOTE — PLAN OF CARE
Physical Therapy Discharge Summary    Reason for therapy discharge:    Discharged to home with home therapy.    Progress towards therapy goal(s). See goals on Care Plan in [facility] electronic health record for goal details.  Goals partially met.  Barriers to achieving goals:   discharge from facility.    Therapy recommendation(s):    Continued therapy is recommended.  Rationale/Recommendations:  Pt below baseline mobility but moving well. Lives in ILF. uses a 4WW for mobility. Currently SBA w/ 4WW. Would recommend DC to home w/ HHPT at DC to improve upon functional mobility and activity tolerance. Pt declining HHPT at this time.  PT Brief overview of current status: SBA w/ 4WW    Recommendation above provided by last treating therapist.

## 2024-03-21 ENCOUNTER — APPOINTMENT (OUTPATIENT)
Dept: CT IMAGING | Facility: CLINIC | Age: 89
DRG: 084 | End: 2024-03-21
Attending: EMERGENCY MEDICINE
Payer: COMMERCIAL

## 2024-03-21 ENCOUNTER — APPOINTMENT (OUTPATIENT)
Dept: GENERAL RADIOLOGY | Facility: CLINIC | Age: 89
DRG: 084 | End: 2024-03-21
Attending: EMERGENCY MEDICINE
Payer: COMMERCIAL

## 2024-03-21 ENCOUNTER — HOSPITAL ENCOUNTER (INPATIENT)
Facility: CLINIC | Age: 89
LOS: 2 days | Discharge: NURSING, REHAB OR LONG-TERM CARE | DRG: 084 | End: 2024-03-24
Attending: EMERGENCY MEDICINE | Admitting: STUDENT IN AN ORGANIZED HEALTH CARE EDUCATION/TRAINING PROGRAM
Payer: COMMERCIAL

## 2024-03-21 ENCOUNTER — APPOINTMENT (OUTPATIENT)
Dept: GENERAL RADIOLOGY | Facility: CLINIC | Age: 89
DRG: 084 | End: 2024-03-21
Attending: PHYSICIAN ASSISTANT
Payer: COMMERCIAL

## 2024-03-21 ENCOUNTER — APPOINTMENT (OUTPATIENT)
Dept: CT IMAGING | Facility: CLINIC | Age: 89
DRG: 084 | End: 2024-03-21
Attending: STUDENT IN AN ORGANIZED HEALTH CARE EDUCATION/TRAINING PROGRAM
Payer: COMMERCIAL

## 2024-03-21 DIAGNOSIS — S09.90XA CLOSED HEAD INJURY, INITIAL ENCOUNTER: ICD-10-CM

## 2024-03-21 DIAGNOSIS — S06.9XAA UNSPECIFIED INTRACRANIAL INJURY WITH LOSS OF CONSCIOUSNESS STATUS UNKNOWN, INITIAL ENCOUNTER (H): ICD-10-CM

## 2024-03-21 DIAGNOSIS — R55 SYNCOPE, UNSPECIFIED SYNCOPE TYPE: ICD-10-CM

## 2024-03-21 DIAGNOSIS — S01.312A LACERATION OF LEFT EARLOBE, INITIAL ENCOUNTER: ICD-10-CM

## 2024-03-21 DIAGNOSIS — I60.9 SAH (SUBARACHNOID HEMORRHAGE) (H): ICD-10-CM

## 2024-03-21 DIAGNOSIS — S62.337A CLOSED DISPLACED FRACTURE OF NECK OF FIFTH METACARPAL BONE OF LEFT HAND, INITIAL ENCOUNTER: ICD-10-CM

## 2024-03-21 LAB
ALBUMIN SERPL BCG-MCNC: 4.4 G/DL (ref 3.5–5.2)
ALP SERPL-CCNC: 100 U/L (ref 40–150)
ALT SERPL W P-5'-P-CCNC: 70 U/L (ref 0–50)
ANION GAP SERPL CALCULATED.3IONS-SCNC: 10 MMOL/L (ref 7–15)
AST SERPL W P-5'-P-CCNC: 49 U/L (ref 0–45)
ATRIAL RATE - MUSE: 68 BPM
BASOPHILS # BLD AUTO: 0.1 10E3/UL (ref 0–0.2)
BASOPHILS NFR BLD AUTO: 1 %
BILIRUB SERPL-MCNC: 0.4 MG/DL
BUN SERPL-MCNC: 27.4 MG/DL (ref 8–23)
CALCIUM SERPL-MCNC: 9.1 MG/DL (ref 8.2–9.6)
CHLORIDE SERPL-SCNC: 103 MMOL/L (ref 98–107)
CREAT SERPL-MCNC: 1.06 MG/DL (ref 0.51–0.95)
DEPRECATED HCO3 PLAS-SCNC: 25 MMOL/L (ref 22–29)
DIASTOLIC BLOOD PRESSURE - MUSE: NORMAL MMHG
EGFRCR SERPLBLD CKD-EPI 2021: 49 ML/MIN/1.73M2
EOSINOPHIL # BLD AUTO: 0.1 10E3/UL (ref 0–0.7)
EOSINOPHIL NFR BLD AUTO: 1 %
ERYTHROCYTE [DISTWIDTH] IN BLOOD BY AUTOMATED COUNT: 12.1 % (ref 10–15)
GLUCOSE SERPL-MCNC: 102 MG/DL (ref 70–99)
HCT VFR BLD AUTO: 37.7 % (ref 35–47)
HGB BLD-MCNC: 11.9 G/DL (ref 11.7–15.7)
HOLD SPECIMEN: NORMAL
IMM GRANULOCYTES # BLD: 0 10E3/UL
IMM GRANULOCYTES NFR BLD: 0 %
INR PPP: 1.03 (ref 0.85–1.15)
INTERPRETATION ECG - MUSE: NORMAL
LYMPHOCYTES # BLD AUTO: 1.6 10E3/UL (ref 0.8–5.3)
LYMPHOCYTES NFR BLD AUTO: 15 %
MCH RBC QN AUTO: 30.1 PG (ref 26.5–33)
MCHC RBC AUTO-ENTMCNC: 31.6 G/DL (ref 31.5–36.5)
MCV RBC AUTO: 95 FL (ref 78–100)
MONOCYTES # BLD AUTO: 0.7 10E3/UL (ref 0–1.3)
MONOCYTES NFR BLD AUTO: 6 %
NEUTROPHILS # BLD AUTO: 8.1 10E3/UL (ref 1.6–8.3)
NEUTROPHILS NFR BLD AUTO: 77 %
NRBC # BLD AUTO: 0 10E3/UL
NRBC BLD AUTO-RTO: 0 /100
P AXIS - MUSE: 65 DEGREES
PLATELET # BLD AUTO: 258 10E3/UL (ref 150–450)
POTASSIUM SERPL-SCNC: 4.1 MMOL/L (ref 3.4–5.3)
PR INTERVAL - MUSE: 154 MS
PROT SERPL-MCNC: 7 G/DL (ref 6.4–8.3)
QRS DURATION - MUSE: 86 MS
QT - MUSE: 420 MS
QTC - MUSE: 446 MS
R AXIS - MUSE: 21 DEGREES
RADIOLOGIST FLAGS: ABNORMAL
RBC # BLD AUTO: 3.95 10E6/UL (ref 3.8–5.2)
SODIUM SERPL-SCNC: 138 MMOL/L (ref 135–145)
SYSTOLIC BLOOD PRESSURE - MUSE: NORMAL MMHG
T AXIS - MUSE: 15 DEGREES
TROPONIN T SERPL HS-MCNC: 23 NG/L
VENTRICULAR RATE- MUSE: 68 BPM
WBC # BLD AUTO: 10.6 10E3/UL (ref 4–11)

## 2024-03-21 PROCEDURE — 85025 COMPLETE CBC W/AUTO DIFF WBC: CPT | Performed by: EMERGENCY MEDICINE

## 2024-03-21 PROCEDURE — 36415 COLL VENOUS BLD VENIPUNCTURE: CPT | Performed by: EMERGENCY MEDICINE

## 2024-03-21 PROCEDURE — 99291 CRITICAL CARE FIRST HOUR: CPT | Mod: 25

## 2024-03-21 PROCEDURE — 85610 PROTHROMBIN TIME: CPT | Performed by: EMERGENCY MEDICINE

## 2024-03-21 PROCEDURE — 73590 X-RAY EXAM OF LOWER LEG: CPT | Mod: RT

## 2024-03-21 PROCEDURE — 99223 1ST HOSP IP/OBS HIGH 75: CPT | Performed by: STUDENT IN AN ORGANIZED HEALTH CARE EDUCATION/TRAINING PROGRAM

## 2024-03-21 PROCEDURE — 250N000009 HC RX 250: Performed by: STUDENT IN AN ORGANIZED HEALTH CARE EDUCATION/TRAINING PROGRAM

## 2024-03-21 PROCEDURE — G0378 HOSPITAL OBSERVATION PER HR: HCPCS

## 2024-03-21 PROCEDURE — 0HQ3XZZ REPAIR LEFT EAR SKIN, EXTERNAL APPROACH: ICD-10-PCS | Performed by: EMERGENCY MEDICINE

## 2024-03-21 PROCEDURE — 250N000013 HC RX MED GY IP 250 OP 250 PS 637: Performed by: STUDENT IN AN ORGANIZED HEALTH CARE EDUCATION/TRAINING PROGRAM

## 2024-03-21 PROCEDURE — 82040 ASSAY OF SERUM ALBUMIN: CPT | Performed by: EMERGENCY MEDICINE

## 2024-03-21 PROCEDURE — 73140 X-RAY EXAM OF FINGER(S): CPT | Mod: LT

## 2024-03-21 PROCEDURE — 93005 ELECTROCARDIOGRAM TRACING: CPT

## 2024-03-21 PROCEDURE — 70450 CT HEAD/BRAIN W/O DYE: CPT | Mod: 77

## 2024-03-21 PROCEDURE — 250N000011 HC RX IP 250 OP 636

## 2024-03-21 PROCEDURE — 84484 ASSAY OF TROPONIN QUANT: CPT | Performed by: EMERGENCY MEDICINE

## 2024-03-21 PROCEDURE — 12015 RPR F/E/E/N/L/M 7.6-12.5 CM: CPT

## 2024-03-21 PROCEDURE — 70450 CT HEAD/BRAIN W/O DYE: CPT

## 2024-03-21 PROCEDURE — 99203 OFFICE O/P NEW LOW 30 MIN: CPT | Performed by: NURSE PRACTITIONER

## 2024-03-21 PROCEDURE — 71046 X-RAY EXAM CHEST 2 VIEWS: CPT

## 2024-03-21 RX ORDER — ACETAMINOPHEN 325 MG/1
975 TABLET ORAL 3 TIMES DAILY
Status: DISCONTINUED | OUTPATIENT
Start: 2024-03-21 | End: 2024-03-24 | Stop reason: HOSPADM

## 2024-03-21 RX ORDER — AMOXICILLIN 250 MG
1 CAPSULE ORAL 2 TIMES DAILY PRN
Status: DISCONTINUED | OUTPATIENT
Start: 2024-03-21 | End: 2024-03-24 | Stop reason: HOSPADM

## 2024-03-21 RX ORDER — DORZOLAMIDE HYDROCHLORIDE AND TIMOLOL MALEATE 20; 5 MG/ML; MG/ML
1 SOLUTION/ DROPS OPHTHALMIC 2 TIMES DAILY
Status: DISCONTINUED | OUTPATIENT
Start: 2024-03-21 | End: 2024-03-24 | Stop reason: HOSPADM

## 2024-03-21 RX ORDER — SERTRALINE HYDROCHLORIDE 25 MG/1
25 TABLET, FILM COATED ORAL DAILY
Status: DISCONTINUED | OUTPATIENT
Start: 2024-03-21 | End: 2024-03-24 | Stop reason: HOSPADM

## 2024-03-21 RX ORDER — AMOXICILLIN 250 MG
2 CAPSULE ORAL 2 TIMES DAILY PRN
Status: DISCONTINUED | OUTPATIENT
Start: 2024-03-21 | End: 2024-03-24 | Stop reason: HOSPADM

## 2024-03-21 RX ORDER — BRIMONIDINE TARTRATE 2 MG/ML
1 SOLUTION/ DROPS OPHTHALMIC 2 TIMES DAILY
Status: DISCONTINUED | OUTPATIENT
Start: 2024-03-21 | End: 2024-03-24 | Stop reason: HOSPADM

## 2024-03-21 RX ORDER — HYDRALAZINE HYDROCHLORIDE 10 MG/1
10 TABLET, FILM COATED ORAL EVERY 4 HOURS PRN
Status: DISCONTINUED | OUTPATIENT
Start: 2024-03-21 | End: 2024-03-24 | Stop reason: HOSPADM

## 2024-03-21 RX ORDER — ONDANSETRON 2 MG/ML
4 INJECTION INTRAMUSCULAR; INTRAVENOUS EVERY 6 HOURS PRN
Status: DISCONTINUED | OUTPATIENT
Start: 2024-03-21 | End: 2024-03-24 | Stop reason: HOSPADM

## 2024-03-21 RX ORDER — MONTELUKAST SODIUM 4 MG/1
1 TABLET, CHEWABLE ORAL 2 TIMES DAILY
COMMUNITY

## 2024-03-21 RX ORDER — ONDANSETRON 4 MG/1
4 TABLET, ORALLY DISINTEGRATING ORAL EVERY 6 HOURS PRN
Status: DISCONTINUED | OUTPATIENT
Start: 2024-03-21 | End: 2024-03-24 | Stop reason: HOSPADM

## 2024-03-21 RX ORDER — MONTELUKAST SODIUM 4 MG/1
1 TABLET, CHEWABLE ORAL 2 TIMES DAILY
Status: DISCONTINUED | OUTPATIENT
Start: 2024-03-21 | End: 2024-03-24 | Stop reason: HOSPADM

## 2024-03-21 RX ORDER — LORAZEPAM 2 MG/ML
INJECTION INTRAMUSCULAR
Status: COMPLETED
Start: 2024-03-21 | End: 2024-03-21

## 2024-03-21 RX ORDER — ATORVASTATIN CALCIUM 40 MG/1
40 TABLET, FILM COATED ORAL EVERY EVENING
Status: DISCONTINUED | OUTPATIENT
Start: 2024-03-21 | End: 2024-03-24 | Stop reason: HOSPADM

## 2024-03-21 RX ORDER — HYDRALAZINE HYDROCHLORIDE 20 MG/ML
10 INJECTION INTRAMUSCULAR; INTRAVENOUS EVERY 4 HOURS PRN
Status: DISCONTINUED | OUTPATIENT
Start: 2024-03-21 | End: 2024-03-24 | Stop reason: HOSPADM

## 2024-03-21 RX ADMIN — SERTRALINE HYDROCHLORIDE 25 MG: 25 TABLET ORAL at 20:06

## 2024-03-21 RX ADMIN — DORZOLAMIDE HYDROCHLORIDE AND TIMOLOL MALEATE 1 DROP: 20; 5 SOLUTION/ DROPS OPHTHALMIC at 20:07

## 2024-03-21 RX ADMIN — ACETAMINOPHEN 975 MG: 325 TABLET, FILM COATED ORAL at 20:06

## 2024-03-21 RX ADMIN — LORAZEPAM 0.5 MG: 2 INJECTION INTRAMUSCULAR; INTRAVENOUS at 11:59

## 2024-03-21 RX ADMIN — BRIMONIDINE TARTRATE 1 DROP: 2 SOLUTION OPHTHALMIC at 20:07

## 2024-03-21 RX ADMIN — ACETAMINOPHEN 975 MG: 325 TABLET, FILM COATED ORAL at 15:35

## 2024-03-21 RX ADMIN — MONTELUKAST SODIUM 1 G: 4 TABLET, CHEWABLE ORAL at 15:35

## 2024-03-21 RX ADMIN — ATORVASTATIN CALCIUM 40 MG: 40 TABLET, FILM COATED ORAL at 20:06

## 2024-03-21 ASSESSMENT — ACTIVITIES OF DAILY LIVING (ADL)
ADLS_ACUITY_SCORE: 42
ADLS_ACUITY_SCORE: 42
ADLS_ACUITY_SCORE: 40
ADLS_ACUITY_SCORE: 40
ADLS_ACUITY_SCORE: 43
ADLS_ACUITY_SCORE: 40
ADLS_ACUITY_SCORE: 42
ADLS_ACUITY_SCORE: 42
ADLS_ACUITY_SCORE: 40

## 2024-03-21 ASSESSMENT — COLUMBIA-SUICIDE SEVERITY RATING SCALE - C-SSRS
1. IN THE PAST MONTH, HAVE YOU WISHED YOU WERE DEAD OR WISHED YOU COULD GO TO SLEEP AND NOT WAKE UP?: NO
2. HAVE YOU ACTUALLY HAD ANY THOUGHTS OF KILLING YOURSELF IN THE PAST MONTH?: NO
6. HAVE YOU EVER DONE ANYTHING, STARTED TO DO ANYTHING, OR PREPARED TO DO ANYTHING TO END YOUR LIFE?: NO

## 2024-03-21 NOTE — ED TRIAGE NOTES
Pt BIB EMS from [facility] ([facility] in ), after pt sustained a fall in her kitchen. Pt was making her breakfast when she fell, hitting her head on a cabinet, resulting in a laceration behind her left ear and a contusion to her left forehead. Pt called her neighbor, who called the daughter to notify EMS. Pt lost consciousness and does not recall what led to the fall or remember calling her neighbor. Pt is forgetful at baseline, and mildly confused upon arrival to the ED.     Head bandaged, bleeding stopped.     EMS administered 4 mg zofran and 50 mcg fentanyl. /82.

## 2024-03-21 NOTE — H&P
[facility]  History and Physical - Hospitalist Service       Date of Admission:  3/21/2024  PRIMARY CARE PROVIDER:    Sang Antonio    Assessment & Plan   Jamee KANDIS Douglas is a 89 year old female with a past medical history significant for hx of CVA 2020, HLD, Glaucoma, OA who was admitted on 3/21/24 for fall possibly due to syncopal event, right subarachnoid hemorrhage in the superior sulcus and fracture of the left fifth metacarpal.     Fall Possibly due to Syncopal Event   Right Frontal Superior Sulcus Subarachnoid Hemorrhage   Likely Concussion   Hx of CVA (Residual minor left sided weakness)  Patient had a fall where she hit her head on a cabinet. Unclear what caused her fall as she does not remember but could be due to syncope. EKG showing NSR with no acute ischemic changes.    - Tele    - Orthostatic Blood Pressure    - Neuro checks Q4H   - Delirium precautions     - Reorient patient at each interaction  - Give patient window bed if possible  - Keep room lights on and blinds open during day (8am-8pm), low lights 8pm-8am  - Minimize interruptions of sleep at night (consolidate vitals, nursing assessments, medications)    - Repeat Head CT stable      - Hold ASA     - Not listed on home medication list but patient and daughter confirm she is taking it for her hx of CVA     - Neurosurgery Consulted     - Saw patient in the ED     - Recommend CT Head 6 hours after the first      - Repeat CT stable     - No surgical intervention at this time     - SBP goal < 150    - Head of bed 30 degrees     - Will follow up 1 month with head CT prior      - NSGY to arrange      - PRN Hydralazine for SBP > 150      - PT/OT consulted    - SW consulted     Fracture of the Left Fifth Metacarpal  Right Lower Leg Hematoma  ED placed patient in splint      - Ortho Consulted an Signed off     - Remain in left ulnar gutter splint at all times. This must remain clean and dry     - NWB left hand  - WBAT  RLE   - Okay to attempt use of a platform walker as long as the patient is not placing weight on the ulnar aspect of her hand.   - PT/OT/SW  - NV checks Q4H LUE and RLE while inpatient  - Follow up with Dr. Stephanie Coffman, per family's preference, in 2 weeks for repeat x-rays and reevaluation of the left hand      Left Ear Laceration   Frontal Scalp Hematoma  ED provider sutured the extensive laceration    - Sutures stay in for 7 days    - Dressing to stay in place for 2-3 days     Elevated LFTs   - Continue to monitor     Chronic Medical Problems:     Elevated Cr    - Cr: 1.06     - Baseline: 0.90-1.00    HLD    - Continue home Atorvastatin     Glaucoma    - Continue home eye drops     Clinically Significant Risk Factors Present on Admission                  # Hypertension: Noted on problem list                      Diet:  Regular pending repeat CT scan   DVT Prophylaxis: Pneumatic Compression Devices  Pierce Catheter: Not present  Lines: None     Cardiac Monitoring: None  Code Status:  DNR/DNI         Disposition Plan      Expected Discharge Date: 03/22/2024             Entered: Cathleen Lopez MD 03/21/2024, 1:13 PM         Cathleen Lopez MD  Hosptialist [facility]  Securely message with the Vocera Web Console (learn more here)  __________________________________________________    Chief Complaint   Fall    History is obtained from the patient and EMR.      History of Present Illness   Jamee Douglas is a 89 year old female with a past medical history significant for HLD, Glaucoma, OA who presents to the ED on 3/21/24 after having a fall.    Patient was making breakfast this morning when she fell from standing height and hit her head on a cabinet. Patient did have LOC and does not remember what led to the fall. She states that she remembers getting her toast ready and the next she remembers she was on floor bleeding. She called her neighbor and her daughter but  the patient does not remember calling them. Daughter states that the patient sounded out of it when she called. Patient sustained a left ear laceration and contusion to her left forehead. Along with a left hand fracture.     Patient lives by herself in an apartment at [facility]. She is able to do her day to day tasks herself and takes her own medications. Has someone helping with laundry. Walks with a walker. Daughter states that her balance is not the best and she tends to shuffle her feet. Plays poker.     In the ED:   Vitals upon arrival:   Temp: 98.6F, BP: 141/88, HR: 71, RR: 16, Spo2: 97% on RA    Labs:   Na: 138, K: 4.1, Cl: 103, Co2: 25  BUN: 27.4, Cr: 1.06    Tbili: 0.4, AST: 49, ALT: 70, ALKP: 100     Trop: 23    CBC unremarkable     CXR: Negative     EKG showing on my review NSR with no acute ischemic changes.     Left Finger Fracture: [place] dorsal angulated mildly impacted fracture of the distal fifth metacarpal without intra-articular extension.    CT Head:   1. Findings concerning for possible trace acute subarachnoid  hemorrhage in the right superior frontal sulcus. No other CT findings of acute intracranial process. No mass effect.  2. Interval resolution of previously seen bilateral hypodense frontal convexity subdural collections.  3. Brain atrophy and presumed chronic ischemic changes, as described.  4. Left anterior frontal scalp hematoma/contusion. Findings concerning for traumatic laceration/contusion of the left posterior auricle/postauricular soft tissues. No underlying displaced calvarial or skull base fracture identified.    ED provider sutured patients left eat and placed left pinky finger in a splint.     Upon my evaluation in the ED, the patient was being evaluated by Neurosurgery. ED Provider, Dr. Ortiz, spoke to Neurosurgery who had initially recommended a repeat CT head in the morning. After patient and daughter confirming patient takes a baby aspirin daily, they recommended a  repeat CT head 6 hours after the initial one.      The patient states that she has a headache and pain in her left hand. She states that she is otherwise doing okay. She is alert and oriented x 4. The patient is possibly having hallucinations likely due to a concussion.     Denies blurry vision, chest pain, SOB, abdominal pain, nausea, vomiting.     Past Medical History    I have reviewed this patient's medical history and updated it with pertinent information if needed.   Past Medical History:   Diagnosis Date    Glaucoma     Hyperlipidemia LDL goal < 130     LBP (low back pain)     Osteoarthritis        Prior to Admission Medications   Prior to Admission Medications   Prescriptions Last Dose Informant Patient Reported? Taking?   Cyanocobalamin (B-12 PO) 3/20/2024 Self Yes Yes   Sig: Take 1 tablet by mouth daily   acetaminophen (TYLENOL) 325 MG tablet  at PRN Self No Yes   Sig: Take 2 tablets (650 mg) by mouth every 4 hours as needed for mild pain or fever (> 101 F)   atorvastatin (LIPITOR) 40 MG tablet 3/20/2024 Self No Yes   Sig: Take 1 tablet (40 mg) by mouth every evening   brimonidine (ALPHAGAN) 0.2 % ophthalmic solution 3/21/2024 at x1 Self Yes Yes   Sig: Place 1 drop into the right eye 2 times daily   cholecalciferol (VITAMIN D3) 10 mcg (400 units) TABS tablet 3/20/2024 Self No Yes   Sig: Take 1 tablet (10 mcg) by mouth daily   colestipol (COLESTID) 1 g tablet 3/21/2024 at x1 Daughter Yes Yes   Sig: Take 1 g by mouth 2 times daily Take 4 hours before eating, ~0600, 1400   dorzolamide-timolol (COSOPT) 22.3-6.8 MG/ML ophthalmic solution 3/21/2024 at x1 Self Yes Yes   Sig: Place 1 drop into both eyes 2 times daily   sertraline (ZOLOFT) 25 MG tablet 3/20/2024 Self Yes Yes   Sig: Take 25 mg by mouth daily      Facility-Administered Medications: None     Allergies   Allergies   Allergen Reactions    Pcn [Penicillins] Swelling     Patient reported swelling around mouth and hands       Physical Exam   Vital Signs:  Temp: 98.6  F (37  C) Temp src: Oral BP: 115/62 Pulse: 85   Resp: 16 SpO2: 96 % O2 Device: None (Room air)    Weight: 0 lbs 0 oz    Constitutional: Awake, alert, cooperative, no apparent distress.    ENT: Patients head is wrapped in bandage unable to assess left ear or forehead due to bandage, oral pharynx with moist mucus membranes.  Eyes pupils are equal, round and reactive to light; extra occular movements intact.  Normal sclera.    Pulmonary: No increased work of breathing, good air exchange, clear to auscultation bilaterally, no crackles or wheezing.  Cardiovascular: Regular rate and rhythm, normal S1 and S2  GI: Normal bowel sounds, soft, non-distended, non-tender.    Skin/Integumen: Ace wrap on right shin   Neuro: CN II-XII grossly intact.  RUE and RLE: 5/5 strength and sensation intact. LUE: unable to assess due to being in splint. LLE: 4/5 strength and sensation intact   Psych:  Alert and oriented x 4. Does seem to be seeing things intermittently   Extremities: No lower extremity edema noted      Medical Decision Making       80 MINUTES SPENT BY ME on the date of service doing chart review, history, exam, documentation & further activities per the note.         Data   Data reviewed today: I reviewed all medications, new labs and imaging results over the last 24 hours. I personally reviewed the EKG tracing showing on my review NSR with no acute ischemic changes .      I have personally reviewed the following data over the past 24 hrs:    10.6  \   11.9   / 258     138 103 27.4 (H) /  102 (H)   4.1 25 1.06 (H) \     ALT: 70 (H) AST: 49 (H) AP: 100 TBILI: 0.4   ALB: 4.4 TOT PROTEIN: 7.0 LIPASE: N/A     Trop: 23 (H) BNP: N/A     INR:  1.03 PTT:  N/A   D-dimer:  N/A Fibrinogen:  N/A       Imaging results reviewed over the past 24 hrs:   Recent Results (from the past 24 hour(s))   Chest XR,  PA & LAT    Narrative    CHEST TWO VIEWS  3/21/2024 10:40 AM     HISTORY: Syncope.    COMPARISON: None.      Impression     IMPRESSION: No acute cardiopulmonary disease.    NALLELY LEON MD         SYSTEM ID:  [id number]   Fingers XR, 2-3 views, left    Narrative    EXAM: XR FINGER LEFT G/E 2 VIEWS  DATE/TIME: 3/21/2024 10:41 AM    INDICATION: trauma  COMPARISON: None available.       Impression    IMPRESSION: [place] dorsal angulated mildly impacted fracture of the  distal fifth metacarpal without intra-articular extension.    Diffuse osseous demineralization. Probable prior trapeziectomy and  suspension arthroplasty of the first CMC. Partial visualization of  plate and screw fixation of the distal radius. Degenerative arthrosis  involving multiple joints of the left hand and wrist, greatest to an  advanced degree involving the second through fifth PIP and first IP  joints.    NOTE: ABNORMAL REPORT    THE DICTATION ABOVE DESCRIBES AN ABNORMAL REPORT FOR WHICH FOLLOW-UP  IS NEEDED.    KONSTANTIN GARCIA DO         SYSTEM ID:  [id number]   Head CT w/o contrast   Result Value    Radiologist flags Findings concerning for possible trace acute (AA)    Narrative    CT SCAN OF THE HEAD WITHOUT CONTRAST   3/21/2024 10:48 AM     HISTORY: Trauma.    TECHNIQUE:  Axial images of the head and coronal reformations without  IV contrast material. Radiation dose for this scan was reduced using  automated exposure control, adjustment of the mA and/or kV according  to patient size, or iterative reconstruction technique.    COMPARISON: CT of the head dated 3/3/2021.    FINDINGS: The previously demonstrated hypodense bilateral frontal  convexity subdural collections have resolved. Small chronic infarcts  in the bilateral basal ganglia/corona radiata regions appear  unchanged. There is at least moderate patchy and confluent nonspecific  hypoattenuation throughout the cerebral white matter, which may be due  to chronic small vessel ischemic disease. Mild generalized brain  parenchymal volume loss. No large transcortical acute or subacute  infarct.    Subtle focus of  hyperattenuation raising concern for possible trace  acute subarachnoid hemorrhage in the right superior frontal sulcus  (series 5 image 19). Artifact is not entirely excluded. No other  findings concerning for acute intracranial hemorrhage. No extra-axial  fluid collection or mass effect.    Irregular soft tissue swelling and evidence for laceration involving  the posterior aspect of the left auricle and postauricular soft  tissues overlying the mastoid process of the left temporal bone. Small  left anterior frontal scalp hematoma. No obvious underlying displaced  calvarial or skull base fracture identified. Visualized aspects of the  paranasal sinuses and mastoids are clear.      Impression    IMPRESSION:  1. Findings concerning for possible trace acute subarachnoid  hemorrhage in the right superior frontal sulcus. No other CT findings  of acute intracranial process. No mass effect.  2. Interval resolution of previously seen bilateral hypodense frontal  convexity subdural collections.  3. Brain atrophy and presumed chronic ischemic changes, as described.  4. Left anterior frontal scalp hematoma/contusion. Findings concerning  for traumatic laceration/contusion of the left posterior  auricle/postauricular soft tissues. No underlying displaced calvarial  or skull base fracture identified.    [Critical Result: Findings concerning for possible trace acute  intracranial hemorrhage]    Finding was identified on 3/21/2024 10:50 AM.     MITZI BABCOCK YARED was contacted by Dr. Cadet on 3/21/2024 11:04 AM  and verbalized understanding of the critical result.     JOSELUIS CADET MD         SYSTEM ID:  [id number]

## 2024-03-21 NOTE — PROGRESS NOTES
RECEIVING UNIT ED HANDOFF REVIEW    ED Nurse Handoff Report was reviewed by: Vero Lutz RN on March 21, 2024 at 4:34 PM

## 2024-03-21 NOTE — ED NOTES
Bed: ED15  Expected date:   Expected time:   Means of arrival:   Comments:  [facility] - 89F Ear Lac, Head Strike, positive LOC

## 2024-03-21 NOTE — ED NOTES
At 1250,  mmHg. Pt had recently been moving after having laceration sutured. RN notified MD regarding BP parameters. Per Dr. Ortiz, OK to continue monitoring at this time.

## 2024-03-21 NOTE — ED NOTES
Splint, assist with laceration cleaning, ace bandage to the knee, and sling was put in the room but not needed while in bed. Patient was changed into a new gown due to blood, sheets and chux were changed and replaced due to blood. Patient was put on a purewick and is now resting in the room with her family member. Patient was given the call light and family will ring when they are going to leave.

## 2024-03-21 NOTE — ED PROVIDER NOTES
History     Chief Complaint:  Fall, Laceration, and Head Injury       HPI   Jamee Douglas is a 89 year old female who presents to the emergency department after passing out falling and hitting her head.  She suffered a significant laceration to her left ear.  She denies neck pain chest pain belly pain back pain arm or leg discomfort except for her left little finger.  Her shots are up-to-date      Independent Historian:    Daughter    Review of External Notes:  None    Medications:    acetaminophen (TYLENOL) 325 MG tablet  atorvastatin (LIPITOR) 40 MG tablet  brimonidine (ALPHAGAN) 0.2 % ophthalmic solution  cholecalciferol (VITAMIN D3) 10 mcg (400 units) TABS tablet  colestipol (COLESTID) 1 g tablet  Cyanocobalamin (B-12 PO)  dorzolamide-timolol (COSOPT) 22.3-6.8 MG/ML ophthalmic solution  sertraline (ZOLOFT) 25 MG tablet        Past Medical History:    Past Medical History:   Diagnosis Date    Glaucoma     Hyperlipidemia LDL goal < 130     LBP (low back pain)     Osteoarthritis        Past Surgical History:    Past Surgical History:   Procedure Laterality Date    ARTHROPLASTY KNEE      HYSTERECTOMY      SURGICAL HISTORY OF -       rights shoulder    SURGICAL HISTORY OF -       glaucoma surgery    SURGICAL HISTORY OF -       back surgery          Physical Exam   Patient Vitals for the past 24 hrs:   BP Temp Temp src Pulse Resp SpO2   03/21/24 1430 (!) 178/82 -- -- 80 -- 97 %   03/21/24 1400 137/76 -- -- 83 -- 93 %   03/21/24 1330 (!) 157/79 -- -- 86 16 94 %   03/21/24 1323 -- -- -- -- -- 93 %   03/21/24 1300 115/62 -- -- 85 16 96 %   03/21/24 1230 (!) 157/82 -- -- 99 -- --   03/21/24 1200 (!) 205/112 -- -- 96 -- 97 %   03/21/24 1155 -- -- -- -- -- 97 %   03/21/24 1154 -- -- -- -- -- 96 %   03/21/24 1100 (!) 141/88 -- -- 71 -- 94 %   03/21/24 1015 (!) 148/66 -- -- 70 -- 96 %   03/21/24 0950 (!) 186/87 98.6  F (37  C) Oral 73 16 98 %        Physical Exam  Constitutional: Elderly white female supine wearing  gauze bandage around her head and ear  HENT: Ecchymosis with small hematoma left forehead.  Left ear: 2 cm laceration involving the jesse.  8 cm laceration which starts on the auricular rim and extends across the entire back of the ear and overlying the mastoid process.  No obvious cartilage involvement noted.  Eyes: EOM are normal. Pupils are equal, round, and reactive to light.   Neck: Normal range of motion. No JVD present. No cervical adenopathy.  No posterior midline tenderness.  Cardiovascular: Regular rhythm.  Exam reveals no gallop and no friction rub.    No murmur heard.  Pulmonary/Chest: Bilateral breath sounds normal. No wheezes, rhonchi or rales.  Abdominal: Soft. No tenderness. No rebound or guarding.   Musculoskeletal: No edema.  Skin abrasion and tear left elbow 2 cm.  Left hand reveals swelling of the distal metacarpal of the little finger.  Bony tenderness present and patient has pain with range of motion of the little finger.  Hematoma right mid lateral leg.  Lymphadenopathy: No lymphadenopathy.   Neurological: Alert and oriented to person, place, and time. Normal strength. Coordination normal.  GCS 15 fluent speech normal sensation to light touch   skin: Skin is warm and dry. No rash noted. No erythema.      Emergency Department Course   ECG  Normal sinus rhythm nonspecific ST-T changes probable LVH  Rate 68 bpm. ME interval 154 ms. QRS duration 86 ms. QT/QTc 420/446 ms. P-R-T axes 65 2115.    Imaging:  Head CT w/o contrast   Final Result   Abnormal   IMPRESSION:   1. Findings concerning for possible trace acute subarachnoid   hemorrhage in the right superior frontal sulcus. No other CT findings   of acute intracranial process. No mass effect.   2. Interval resolution of previously seen bilateral hypodense frontal   convexity subdural collections.   3. Brain atrophy and presumed chronic ischemic changes, as described.   4. Left anterior frontal scalp hematoma/contusion. Findings concerning   for  traumatic laceration/contusion of the left posterior   auricle/postauricular soft tissues. No underlying displaced calvarial   or skull base fracture identified.      [Critical Result: Findings concerning for possible trace acute   intracranial hemorrhage]      Finding was identified on 3/21/2024 10:50 AM.       MITZI VAIL was contacted by Dr. Cadet on 3/21/2024 11:04 AM   and verbalized understanding of the critical result.       JOSELUIS CADET MD            SYSTEM ID:  [id number]      Fingers XR, 2-3 views, left   Final Result   IMPRESSION: [place] dorsal angulated mildly impacted fracture of the   distal fifth metacarpal without intra-articular extension.      Diffuse osseous demineralization. Probable prior trapeziectomy and   suspension arthroplasty of the first CMC. Partial visualization of   plate and screw fixation of the distal radius. Degenerative arthrosis   involving multiple joints of the left hand and wrist, greatest to an   advanced degree involving the second through fifth PIP and first IP   joints.      NOTE: ABNORMAL REPORT      THE DICTATION ABOVE DESCRIBES AN ABNORMAL REPORT FOR WHICH FOLLOW-UP   IS NEEDED.      KONSTANTIN GARCIA DO            SYSTEM ID:  [id number]      Chest XR,  PA & LAT   Final Result   IMPRESSION: No acute cardiopulmonary disease.      NALLELY LEON MD            SYSTEM ID:  [id number]      CT Head w/o Contrast    (Results Pending)       Laboratory:  Labs Ordered and Resulted from Time of ED Arrival to Time of ED Departure   COMPREHENSIVE METABOLIC PANEL - Abnormal       Result Value    Sodium 138      Potassium 4.1      Carbon Dioxide (CO2) 25      Anion Gap 10      Urea Nitrogen 27.4 (*)     Creatinine 1.06 (*)     GFR Estimate 49 (*)     Calcium 9.1      Chloride 103      Glucose 102 (*)     Alkaline Phosphatase 100      AST 49 (*)     ALT 70 (*)     Protein Total 7.0      Albumin 4.4      Bilirubin Total 0.4     TROPONIN T, HIGH SENSITIVITY - Abnormal    Troponin T, High  Sensitivity 23 (*)    INR - Normal    INR 1.03     CBC WITH PLATELETS AND DIFFERENTIAL    WBC Count 10.6      RBC Count 3.95      Hemoglobin 11.9      Hematocrit 37.7      MCV 95      MCH 30.1      MCHC 31.6      RDW 12.1      Platelet Count 258      % Neutrophils 77      % Lymphocytes 15      % Monocytes 6      % Eosinophils 1      % Basophils 1      % Immature Granulocytes 0      NRBCs per 100 WBC 0      Absolute Neutrophils 8.1      Absolute Lymphocytes 1.6      Absolute Monocytes 0.7      Absolute Eosinophils 0.1      Absolute Basophils 0.1      Absolute Immature Granulocytes 0.0      Absolute NRBCs 0.0          Procedures   Ear laceration repair: Auricular block placed using 0.5% bupivacaine.  Once anesthesia occurred skin was copiously cleaned and irrigated with normal saline.  I then approximated all skin edges with 5-0 Ethilon simple with reasonably good hemostasis.  Total length of laceration of your 10 cm.  Mastoid dressing was then placed.  Patient did require 1 dose of Ativan for relaxation during procedure.    Procedure  Short arm splint: Left ulnar gutter splint placed by nursing for boxer's fracture.  This was checked by me for form fit and function and adjusted appropriately.    Emergency Department Course & Assessments:    Interventions:  Medications   colestipol (COLESTID) tablet 1 g (has no administration in time range)   acetaminophen (TYLENOL) tablet 975 mg (has no administration in time range)   LORazepam (ATIVAN) 2 MG/ML injection (0.5 mg  $Given 3/21/24 1150)        Assessments:  Seen and examined.  Ear sutured.  Splint applied    Independent Interpretation (X-rays, CTs, rhythm strip):  Chest and hand x-ray reviewed by me    Consultations/Discussion of Management or Tests:  Neurosurgery hospitalist       Social Determinants of Health affecting care:  None     Disposition:  Admit    Impression & Plan        Medical Decision Making:  Patient passed out suddenly without pre-existing warning.   This has not happened before she struck her head and suffered a significant left ear laceration.  Scan shows very small subarachnoid hemorrhage.  Hand x-ray shows distal metacarpal fracture.  Ear laceration was repaired and mastoid dressing placed.  Patient will be admitted for further evaluation and treatment      Diagnosis:    ICD-10-CM    1. Syncope, unspecified syncope type  R55       2. Closed head injury, initial encounter  S09.90XA       3. SAH (subarachnoid hemorrhage) (H)  I60.9       4. Laceration of left earlobe, initial encounter  S01.312A       5. Closed displaced fracture of neck of fifth metacarpal bone of left hand, initial encounter  S62.337A            Discharge Medications:  New Prescriptions    No medications on file          Joseph Ortiz MD  3/21/2024   Joseph Ortiz MD Steinman, Randall Ira, MD  03/21/24 1505

## 2024-03-21 NOTE — CONSULTS
[facility]    Orthopedic Consultation    Jamee Douglas MRN# [id number]   Age: 89 year old YOB: 1932     Date of Admission: 3/21/2024    Reason for consult: Left fifth metacarpal fracture       Requesting physician: Cathleen Lopez MD       Level of consult: One-time consult to assist in determining a diagnosis and to recommend an appropriate treatment plan           Assessment and Plan:   Assessment:   Acute, reportedly closed, apex dorsally angulated, impacted left fifth metacarpal neck fracture  Right anterior lower leg hematoma  Possible acute, trace subarachnoid hemorrhage      Plan:   The patient's history and clinical/diagnostic findings were reviewed with the on-call orthopedic trauma surgeon, Dr. Kevin Yancey. Patient reportedly sustained an unwitnessed fall on 3/21/24 resulting in a possible SAH, left ear laceration, right lower leg hematoma, and left fifth metacarpal fracture. Fall was possibly syncopal in nature. Patient was placed in a left ulnar gutter splint in the ED, which is well-fitting and comfortable. NVI to exposed digits. Radiographs reviewed of the left hand demonstrate a fifth metacarpal neck fracture in overall reasonable alignment. Radiographs obtained of the right tibia/fibula after my encounter due to hematoma in the area are negative for fracture and the visualized TKA appears well-fixed without obvious periprosthetic fracture. No signs of compartment syndrome to the right lower leg, and the patient is not anticoagulated, which is reassuring. NVI to the RLE. Recommend nonoperative management as follows:    -Remain in left ulnar gutter splint at all times. This must remain clean and dry. Sling for comfort, but this should be removed often for elbow and shoulder ROM to prevent stiffness.  -NWB left hand. WBAT RLE. Okay to attempt use of a platform walker as long as the patient is not placing weight on the ulnar aspect of her  hand.  -Encourage elevation of the left hand and right lower leg (blue foam ramp ordered), use of cold compresses, compression wrap to the right lower leg (removed BID for skin checks), and pain regimen per the medicine team.  -NV checks Q4H LUE and RLE while inpatient.  -PT and OT consults.  -SW consult for discharge planning.  -Follow up with Dr. Stephanie Coffman, per family's preference, in 2 weeks for repeat x-rays and reevaluation of the left hand. Patient and daughter understand and agree with this plan. All questions answered. Ortho team will sign off at this time.    Please contact orthopedic trauma team if any questions or concerns arise.           Chief Complaint:   Left hand fracture         History of Present Illness:   Medical history obtained via chart review and discussion with the patient and her daughter at bedside. Jamee Douglas is a 89 year old right-hand dominant female with past medical history of CVA in 2020, HLD, glaucoma, and osteoarthritis who experienced a possible syncopal event and unwitnessed fall earlier today (3/21/24) resulting in a possible SAH and a left fifth metacarpal fracture. The patient states that she was standing in her kitchen today with her walker when she suddenly fell. She reportedly hit her head on a cabinet and lacerated her left ear. Patient also noted pain and swelling to the left hand and right lower leg. Patient was taken by EMS to [facility] ED for further evaluation. Patient was found to have a left fifth metacarpal fracture and was placed in an ulnar gutter splint. Patient also notes that she has swelling and bruising to her right lower leg. Ace wrap was applied to the area, but they deny having had x-rays taken so far. Currently, the patient is sleepy, but overall comfortable. Somewhat hard of hearing due to reported blood that leaked into her left ear. She states that her left hand feels fine and pain is currently controlled. Denies numbness and tingling to the  left hand. Endorses bilateral foot peripheral neuropathy. No known prior injuries or surgeries to the left hand. She notes a recent hematoma to her left forearm. S/p right TKA. Patient uses a walker at all times whil ambulating. No known PTA anticoagulants, but the patient does take ASA 81 mg daily. No known recent illnesses. Previously tolerating PO intake.          Past Medical History:     Past Medical History:   Diagnosis Date    Glaucoma     Hyperlipidemia LDL goal < 130     LBP (low back pain)     Osteoarthritis              Past Surgical History:     Past Surgical History:   Procedure Laterality Date    ARTHROPLASTY KNEE      HYSTERECTOMY      SURGICAL HISTORY OF -       rights shoulder    SURGICAL HISTORY OF -       glaucoma surgery    SURGICAL HISTORY OF -       back surgery             Social History:     Social History     Tobacco Use    Smoking status: Never    Smokeless tobacco: Never   Substance Use Topics    Alcohol use: Yes     Comment: Rare             Family History:     Family History   Problem Relation Age of Onset    Other - See Comments Mother         POLYCYTHEMIA VERA    Prostate Cancer Father     Colon Cancer Brother     Lung Cancer Sister              Immunizations:     VACCINE/DOSE   Diptheria   DPT   DTAP   HBIG   Hepatitis A   Hepatitis B   HIB   Influenza   Measles   Meningococcal   MMR   Mumps   Pneumococcal   Polio   Rubella   Small Pox   TDAP   Varicella   Zoster             Allergies:     Allergies   Allergen Reactions    Pcn [Penicillins] Swelling     Patient reported swelling around mouth and hands             Medications:     Current Facility-Administered Medications   Medication    acetaminophen (TYLENOL) tablet 975 mg    atorvastatin (LIPITOR) tablet 40 mg    brimonidine (ALPHAGAN) 0.2 % ophthalmic solution 1 drop    colestipol (COLESTID) tablet 1 g    dorzolamide-timolol (COSOPT) ophthalmic solution 1 drop    hydrALAZINE (APRESOLINE) tablet 10 mg    Or    hydrALAZINE  (APRESOLINE) injection 10 mg    ondansetron (ZOFRAN ODT) ODT tab 4 mg    Or    ondansetron (ZOFRAN) injection 4 mg    senna-docusate (SENOKOT-S/PERICOLACE) 8.6-50 MG per tablet 1 tablet    Or    senna-docusate (SENOKOT-S/PERICOLACE) 8.6-50 MG per tablet 2 tablet    sertraline (ZOLOFT) tablet 25 mg             Review of Systems:   CV: NEGATIVE for chest pain, palpitations or peripheral edema  C: NEGATIVE for fever, chills, change in weight  E/M: NEGATIVE for ear, mouth and throat problems  R: NEGATIVE for significant cough or SOB          Physical Exam:   All vitals have been reviewed  Patient Vitals for the past 24 hrs:   BP Temp Temp src Pulse Resp SpO2   03/21/24 1737 (!) 140/62 98.5  F (36.9  C) Oral 84 16 96 %   03/21/24 1700 119/70 -- -- -- -- --   03/21/24 1600 (!) 150/69 -- -- 80 -- --   03/21/24 1535 (!) 170/79 -- -- -- -- --   03/21/24 1500 (!) 178/89 -- -- 79 -- --   03/21/24 1451 -- -- -- -- -- 94 %   03/21/24 1450 -- -- -- -- -- 96 %   03/21/24 1430 (!) 178/82 -- -- 80 -- 97 %   03/21/24 1400 137/76 -- -- 83 -- 93 %   03/21/24 1330 (!) 157/79 -- -- 86 16 94 %   03/21/24 1323 -- -- -- -- -- 93 %   03/21/24 1300 115/62 -- -- 85 16 96 %   03/21/24 1250 (!) 176/89 -- -- 92 -- 94 %   03/21/24 1230 (!) 157/82 -- -- 99 -- --   03/21/24 1200 (!) 205/112 -- -- 96 -- 97 %   03/21/24 1155 -- -- -- -- -- 97 %   03/21/24 1154 -- -- -- -- -- 96 %   03/21/24 1100 (!) 141/88 -- -- 71 -- 94 %   03/21/24 1015 (!) 148/66 -- -- 70 -- 96 %   03/21/24 0950 (!) 186/87 98.6  F (37  C) Oral 73 16 98 %     No intake or output data in the 24 hours ending 03/21/24 1808    Constitutional: Pleasant, alert but intermittently sleepy, appropriate, following commands. NAD.  HEENT: Head normocephalic. Head wrap in place due to left ear laceration s/p repair. Pupils equal round and reactive.  Respiratory: Unlabored breathing no audible wheeze  Cardiovascular: Regular rate and rhythm per pulses.  GI: Abdomen is  non-distended.  Lymph/Hematologic: No lymphadenopathy in areas examined.  Genitourinary: No noel  Skin: No rashes, no cyanosis, no edema.  Musculoskeletal: Left upper extremity: Ulnar gutter splint intact. No open wounds, erythema, or ecchymosis to the visualized areas distal and proximal to the splint. Old, resolving ecchymosis to the left posterior shoulder. No swelling of the left 1st-3rd digits. No palpable elbow effusion. Nontender to the exposed digits, medial and lateral epicondyles of the humerus, olecranon, along the humerus, and the anterior and posterior GH joint. Patient able to actively range the 1st-3rd digits, but limited by splint. Able to actively move the elbow. Capillary refill <2 seconds. SILT A/M/R/U nerve distributions.  Right lower extremity: Skin intact to the examined areas. Well-healed surgical scars to the right knee. Ecchymosis and swelling consistent with a hematoma to the anterior proximal third of the pretibial region. Area is swollen, but there is no tenseness. Hematoma is compressible and nontender. Nontender to the right medial and lateral joint lines of the knee, popliteal fossa, calf, lateral and medial malleoli. Patient able to actively flex and extend the knee, DF and PF the ankle without increased pain. Full toe ROM. DP pulse 2+. Toes are warm. SILT.  Neurologic: GCS 15, A&OX3, fatigued          Data:   All laboratory data reviewed  Results for orders placed or performed during the hospital encounter of 03/21/24   Head CT w/o contrast     Status: Abnormal   Result Value Ref Range    Radiologist flags Findings concerning for possible trace acute (AA)     Narrative    CT SCAN OF THE HEAD WITHOUT CONTRAST   3/21/2024 10:48 AM     HISTORY: Trauma.    TECHNIQUE:  Axial images of the head and coronal reformations without  IV contrast material. Radiation dose for this scan was reduced using  automated exposure control, adjustment of the mA and/or kV according  to patient size, or  iterative reconstruction technique.    COMPARISON: CT of the head dated 3/3/2021.    FINDINGS: The previously demonstrated hypodense bilateral frontal  convexity subdural collections have resolved. Small chronic infarcts  in the bilateral basal ganglia/corona radiata regions appear  unchanged. There is at least moderate patchy and confluent nonspecific  hypoattenuation throughout the cerebral white matter, which may be due  to chronic small vessel ischemic disease. Mild generalized brain  parenchymal volume loss. No large transcortical acute or subacute  infarct.    Subtle focus of hyperattenuation raising concern for possible trace  acute subarachnoid hemorrhage in the right superior frontal sulcus  (series 5 image 19). Artifact is not entirely excluded. No other  findings concerning for acute intracranial hemorrhage. No extra-axial  fluid collection or mass effect.    Irregular soft tissue swelling and evidence for laceration involving  the posterior aspect of the left auricle and postauricular soft  tissues overlying the mastoid process of the left temporal bone. Small  left anterior frontal scalp hematoma. No obvious underlying displaced  calvarial or skull base fracture identified. Visualized aspects of the  paranasal sinuses and mastoids are clear.      Impression    IMPRESSION:  1. Findings concerning for possible trace acute subarachnoid  hemorrhage in the right superior frontal sulcus. No other CT findings  of acute intracranial process. No mass effect.  2. Interval resolution of previously seen bilateral hypodense frontal  convexity subdural collections.  3. Brain atrophy and presumed chronic ischemic changes, as described.  4. Left anterior frontal scalp hematoma/contusion. Findings concerning  for traumatic laceration/contusion of the left posterior  auricle/postauricular soft tissues. No underlying displaced calvarial  or skull base fracture identified.    [Critical Result: Findings concerning for  possible trace acute  intracranial hemorrhage]    Finding was identified on 3/21/2024 10:50 AM.     MITZI VAIL was contacted by Dr. Cadet on 3/21/2024 11:04 AM  and verbalized understanding of the critical result.     JOSELUIS CADET MD         SYSTEM ID:  [id number]   Fingers XR, 2-3 views, left     Status: None    Narrative    EXAM: XR FINGER LEFT G/E 2 VIEWS  DATE/TIME: 3/21/2024 10:41 AM    INDICATION: trauma  COMPARISON: None available.       Impression    IMPRESSION: [place] dorsal angulated mildly impacted fracture of the  distal fifth metacarpal without intra-articular extension.    Diffuse osseous demineralization. Probable prior trapeziectomy and  suspension arthroplasty of the first CMC. Partial visualization of  plate and screw fixation of the distal radius. Degenerative arthrosis  involving multiple joints of the left hand and wrist, greatest to an  advanced degree involving the second through fifth PIP and first IP  joints.    NOTE: ABNORMAL REPORT    THE DICTATION ABOVE DESCRIBES AN ABNORMAL REPORT FOR WHICH FOLLOW-UP  IS NEEDED.    KONSTANTIN GARCIA DO         SYSTEM ID:  [id number]   Chest XR,  PA & LAT     Status: None    Narrative    CHEST TWO VIEWS  3/21/2024 10:40 AM     HISTORY: Syncope.    COMPARISON: None.      Impression    IMPRESSION: No acute cardiopulmonary disease.    NALLELY LEON MD         SYSTEM ID:  [id number]   XR Tibia & Fibula Port Right 2 Views     Status: None    Narrative    XR TIBIA & FIBULA PORT RIGHT 2 VIEWS   3/21/2024 4:25 PM     HISTORY:  Right lower leg bruise and swelling, ensure no fracture    Comparison: None.      Impression    IMPRESSION:  1. Total knee arthroplasty. Excellent position and alignment of  components. No evidence of complication or periprosthetic fracture.  2. Arterial calcifications.  3. Diffuse bone demineralization.  4. The tibia and fibula appear normal otherwise.    ROSEMARY TOVAR MD         SYSTEM ID:  [id number]   INR     Status: Normal    Result Value Ref Range    INR 1.03 0.85 - 1.15   Comprehensive metabolic panel     Status: Abnormal   Result Value Ref Range    Sodium 138 135 - 145 mmol/L    Potassium 4.1 3.4 - 5.3 mmol/L    Carbon Dioxide (CO2) 25 22 - 29 mmol/L    Anion Gap 10 7 - 15 mmol/L    Urea Nitrogen 27.4 (H) 8.0 - 23.0 mg/dL    Creatinine 1.06 (H) 0.51 - 0.95 mg/dL    GFR Estimate 49 (L) >60 mL/min/1.73m2    Calcium 9.1 8.2 - 9.6 mg/dL    Chloride 103 98 - 107 mmol/L    Glucose 102 (H) 70 - 99 mg/dL    Alkaline Phosphatase 100 40 - 150 U/L    AST 49 (H) 0 - 45 U/L    ALT 70 (H) 0 - 50 U/L    Protein Total 7.0 6.4 - 8.3 g/dL    Albumin 4.4 3.5 - 5.2 g/dL    Bilirubin Total 0.4 <=1.2 mg/dL   Troponin T, High Sensitivity     Status: Abnormal   Result Value Ref Range    Troponin T, High Sensitivity 23 (H) <=14 ng/L   Extra Tube ([place] Draw)     Status: None    Narrative    The following orders were created for panel order Extra Tube ([place] Draw).  Procedure                               Abnormality         Status                     ---------                               -----------         ------                     Extra Red Top Tube[[id number]]                               Final result                 Please view results for these tests on the individual orders.   CBC with platelets and differential     Status: None   Result Value Ref Range    WBC Count 10.6 4.0 - 11.0 10e3/uL    RBC Count 3.95 3.80 - 5.20 10e6/uL    Hemoglobin 11.9 11.7 - 15.7 g/dL    Hematocrit 37.7 35.0 - 47.0 %    MCV 95 78 - 100 fL    MCH 30.1 26.5 - 33.0 pg    MCHC 31.6 31.5 - 36.5 g/dL    RDW 12.1 10.0 - 15.0 %    Platelet Count 258 150 - 450 10e3/uL    % Neutrophils 77 %    % Lymphocytes 15 %    % Monocytes 6 %    % Eosinophils 1 %    % Basophils 1 %    % Immature Granulocytes 0 %    NRBCs per 100 WBC 0 <1 /100    Absolute Neutrophils 8.1 1.6 - 8.3 10e3/uL    Absolute Lymphocytes 1.6 0.8 - 5.3 10e3/uL    Absolute Monocytes 0.7 0.0 - 1.3 10e3/uL    Absolute  Eosinophils 0.1 0.0 - 0.7 10e3/uL    Absolute Basophils 0.1 0.0 - 0.2 10e3/uL    Absolute Immature Granulocytes 0.0 <=0.4 10e3/uL    Absolute NRBCs 0.0 10e3/uL   Extra Red Top Tube     Status: None   Result Value Ref Range    Hold Specimen JIC    EKG 12-lead, tracing only     Status: None   Result Value Ref Range    Systolic Blood Pressure  mmHg    Diastolic Blood Pressure  mmHg    Ventricular Rate 68 BPM    Atrial Rate 68 BPM    CA Interval 154 ms    QRS Duration 86 ms     ms    QTc 446 ms    P Axis 65 degrees    R AXIS 21 degrees    T Axis 15 degrees    Interpretation ECG       Sinus rhythm  Moderate voltage criteria for LVH, may be normal variant ( Sokolow-Salas , [place] product )  Borderline ECG  When compared with ECG of 01-FEB-2021 19:25,  No significant change was found  Confirmed by GENERATED REPORT, COMPUTER ([id number]),  CHRISTY MAYERS ([id number]) on 3/21/2024 12:22:16 PM     CBC with platelets differential     Status: None    Narrative    The following orders were created for panel order CBC with platelets differential.  Procedure                               Abnormality         Status                     ---------                               -----------         ------                     CBC with platelets and d...[[id number]]                      Final result                 Please view results for these tests on the individual orders.          Attestation:  I have reviewed today's vital signs, notes, medications, labs and imaging with Dr. Kevin Yancey.  Amount of time performed on this consult: 50 minutes.    Jacqueline Ohara PA-C  [facility]

## 2024-03-21 NOTE — CONSULTS
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 Neurosurgery was consulted for a possible trace acute subarachnoid hemorrhage in the right superior frontal sulcus. Patient also has a left fifth metacarpal fracture and left ear laceration.     -Admission through Hospitalist for syncope work up  -No surgical intervention planned at this time   -Repeat head CT in 6 hours, or sooner if patient develops neuro exam changes   -Hold any aspirin or blood thinners   -SBP less than 150  -HOB 30 degrees   -Continue to monitor neuro exam   -Recommend outpatient follow-up with [facility]   -Appreciate assistance from specialties     I have discussed the following assessment and plan with Dr. Aranda.     Martina Zamudio, [facility]  Tel [phone]  Pager [phone]    Code Status    Prior    Reason for Consult   I was asked by Dr. Ortiz to evaluate this patient for SAH, fall.    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 Patient reports she fell and  hit her  head at home today, but she does not remember what caused the  fall. She was  brought to the  ED via EMS. Neurosurgery was consulted for a possible trace acute subarachnoid hemorrhage in the right superior frontal sulcus. She  also has a left fifth metacarpal fracture and left ear laceration. On exam, patient reports a headache. Denies dizziness, nausea, vomiting, weakness, numbness, or vision or speech changes. Per daughter at bedside, patient is forgetful at baseline, but seems more confused since the fall.     Past Medical History   I have reviewed this patient's medical history and updated it with pertinent information if needed.   Past Medical History:   Diagnosis Date    Glaucoma     Hyperlipidemia LDL goal < 130     LBP (low back pain)     Osteoarthritis        Past Surgical History   I have reviewed this patient's surgical history and updated it with pertinent information if needed.  Past Surgical History:   Procedure Laterality Date    ARTHROPLASTY KNEE      HYSTERECTOMY      SURGICAL HISTORY OF -       rights shoulder    SURGICAL HISTORY OF -       glaucoma surgery    SURGICAL HISTORY OF -       back surgery       Prior to Admission Medications   Prior to Admission Medications   Prescriptions Last Dose Informant Patient Reported? Taking?   Cyanocobalamin (B-12 PO) 3/20/2024 Self Yes Yes   Sig: Take 1 tablet by mouth daily   acetaminophen (TYLENOL) 325 MG tablet  at PRN Self No Yes   Sig: Take 2 tablets (650 mg) by mouth every 4 hours as needed for mild pain or fever (> 101 F)   atorvastatin (LIPITOR) 40 MG tablet 3/20/2024 Self No Yes   Sig: Take 1 tablet (40 mg) by mouth every evening   brimonidine (ALPHAGAN) 0.2 % ophthalmic solution 3/21/2024 at x1 Self Yes Yes   Sig: Place 1 drop into the right eye 2 times daily   cholecalciferol (VITAMIN D3) 10 mcg (400 units) TABS tablet 3/20/2024 Self No Yes   Sig: Take 1 tablet (10 mcg) by mouth daily   colestipol (COLESTID) 1 g tablet 3/21/2024 at x1 Daughter Yes Yes   Sig: Take 1 g by mouth 2 times daily Take 4 hours before eating, ~0600, 1400   dorzolamide-timolol (COSOPT) 22.3-6.8 MG/ML ophthalmic solution 3/21/2024 at x1 Self Yes Yes   Sig: Place 1 drop into both eyes 2 times daily    sertraline (ZOLOFT) 25 MG tablet 3/20/2024 Self Yes Yes   Sig: Take 25 mg by mouth daily      Facility-Administered Medications: None     Allergies   Allergies   Allergen Reactions    Pcn [Penicillins] Swelling     Patient reported swelling around mouth and hands       Social History   I have reviewed this patient's social history and updated it with pertinent information if needed. Jamee Douglas  reports that she has never smoked. She has never used smokeless tobacco. She reports current alcohol use. She reports that she does not use drugs.    Family History   I have reviewed this patient's family history and updated it with pertinent information if needed.   Family History   Problem Relation Age of Onset    Other - See Comments Mother         POLYCYTHEMIA VERA    Prostate Cancer Father     Colon Cancer Brother     Lung Cancer Sister        Review of Systems   10 point ROS negative other than symptoms noted in HPI.    Physical Exam   Vital signs with ranges:   Temp:  [98.6  F (37  C)] 98.6  F (37  C)  Pulse:  [70-99] 85  Resp:  [16] 16  BP: (115-205)/() 115/62  SpO2:  [93 %-98 %] 93 %  0 lbs 0 oz    HEENT:  Normocephalic, atraumatic. Left ear laceration, bandage in place.   Heart:  No peripheral edema  Lungs:  No SOB  Skin:  Left ear laceration, bandage in place.     NEUROLOGICAL EXAMINATION:   Mental status:  Alert and Oriented x 3, mild confusion, forgetful at baseline, speech is fluent, following commands  Cranial nerves:  II-XII intact.   Motor:    RUE 5/5  RLE 5/5    Per daughter, patient has mild left sided weakness s/p CVA  LUE - able to lift arm off the bed, exam limited due to cast for left fifth metacarpal fracture  LLE 5/5     Sensation:  Intact to light touch   Reflexes:   Negative Clonus  Coordination:  Smooth finger to nose testing.   Negative pronator drift.    Gait: Per daughter, patient ambulates with a walker at baseline.     Data   CT SCAN OF THE HEAD WITHOUT CONTRAST   3/21/2024  10:48 AM      HISTORY: Trauma.     TECHNIQUE:  Axial images of the head and coronal reformations without  IV contrast material. Radiation dose for this scan was reduced using  automated exposure control, adjustment of the mA and/or kV according  to patient size, or iterative reconstruction technique.     COMPARISON: CT of the head dated 3/3/2021.     FINDINGS: The previously demonstrated hypodense bilateral frontal  convexity subdural collections have resolved. Small chronic infarcts  in the bilateral basal ganglia/corona radiata regions appear  unchanged. There is at least moderate patchy and confluent nonspecific  hypoattenuation throughout the cerebral white matter, which may be due  to chronic small vessel ischemic disease. Mild generalized brain  parenchymal volume loss. No large transcortical acute or subacute  infarct.     Subtle focus of hyperattenuation raising concern for possible trace  acute subarachnoid hemorrhage in the right superior frontal sulcus  (series 5 image 19). Artifact is not entirely excluded. No other  findings concerning for acute intracranial hemorrhage. No extra-axial  fluid collection or mass effect.     Irregular soft tissue swelling and evidence for laceration involving  the posterior aspect of the left auricle and postauricular soft  tissues overlying the mastoid process of the left temporal bone. Small  left anterior frontal scalp hematoma. No obvious underlying displaced  calvarial or skull base fracture identified. Visualized aspects of the  paranasal sinuses and mastoids are clear.                                                                      IMPRESSION:  1. Findings concerning for possible trace acute subarachnoid  hemorrhage in the right superior frontal sulcus. No other CT findings  of acute intracranial process. No mass effect.  2. Interval resolution of previously seen bilateral hypodense frontal  convexity subdural collections.  3. Brain atrophy and presumed chronic  ischemic changes, as described.  4. Left anterior frontal scalp hematoma/contusion. Findings concerning  for traumatic laceration/contusion of the left posterior  auricle/postauricular soft tissues. No underlying displaced calvarial  or skull base fracture identified.     [Critical Result: Findings concerning for possible trace acute  intracranial hemorrhage]    CBC RESULTS:   Recent Labs   Lab Test 03/21/24  1010   WBC 10.6   RBC 3.95   HGB 11.9   HCT 37.7   MCV 95   MCH 30.1   MCHC 31.6   RDW 12.1        Basic Metabolic Panel:  Lab Results   Component Value Date     03/21/2024     02/22/2021      Lab Results   Component Value Date    POTASSIUM 4.1 03/21/2024    POTASSIUM 4.0 02/22/2021     Lab Results   Component Value Date    CHLORIDE 103 03/21/2024    CHLORIDE 108 02/22/2021     Lab Results   Component Value Date    POORNIMA 9.1 03/21/2024    POORNIMA 8.9 02/22/2021     Lab Results   Component Value Date    CO2 25 03/21/2024    CO2 27 02/22/2021     Lab Results   Component Value Date    BUN 27.4 03/21/2024    BUN 15 02/22/2021     Lab Results   Component Value Date    CR 1.06 03/21/2024    CR 0.74 02/22/2021     Lab Results   Component Value Date     03/21/2024     02/22/2021     INR:  Lab Results   Component Value Date    INR 1.03 03/21/2024    INR 1.06 02/01/2021    INR 1.00 01/29/2021

## 2024-03-21 NOTE — ED NOTES
[facility]  ED Nurse Handoff Report    ED Chief complaint: Fall, Laceration, and Head Injury      ED Diagnosis:   Final diagnoses:   Syncope, unspecified syncope type   Closed head injury, initial encounter   SAH (subarachnoid hemorrhage) (H)   Laceration of left earlobe, initial encounter   Closed displaced fracture of neck of fifth metacarpal bone of left hand, initial encounter       Code Status: Hospitalist to discuss with patient    Allergies:   Allergies   Allergen Reactions    Pcn [Penicillins] Swelling     Patient reported swelling around mouth and hands       Patient Story:   Pt BIB EMS from [facility] ([facility] in ), after pt sustained a fall in her kitchen. Pt was making her breakfast when she fell, hitting her head on a cabinet, resulting in a laceration behind her left ear and a contusion to her left forehead. Pt called her neighbor, who called the daughter to notify EMS. Pt lost consciousness and does not recall what led to the fall or remember calling her neighbor. Pt is forgetful at baseline, and mildly confused upon arrival to the ED.      Focused Assessment:  Laceration behind left ear, contusion to left forehead     Treatments and/or interventions provided: Sutured by provider, dressing applied  Head CT w/o contrast   Final Result   Abnormal   IMPRESSION:   1. Findings concerning for possible trace acute subarachnoid   hemorrhage in the right superior frontal sulcus. No other CT findings   of acute intracranial process. No mass effect.   2. Interval resolution of previously seen bilateral hypodense frontal   convexity subdural collections.   3. Brain atrophy and presumed chronic ischemic changes, as described.   4. Left anterior frontal scalp hematoma/contusion. Findings concerning   for traumatic laceration/contusion of the left posterior   auricle/postauricular soft tissues. No underlying displaced calvarial   or skull base fracture identified.      [Critical Result: Findings  concerning for possible trace acute   intracranial hemorrhage]      Finding was identified on 3/21/2024 10:50 AM.       MITZI VAIL was contacted by Dr. Cadet on 3/21/2024 11:04 AM   and verbalized understanding of the critical result.       JOSELUIS CADET MD            SYSTEM ID:  [id number]      Fingers XR, 2-3 views, left   Final Result   IMPRESSION: [place] dorsal angulated mildly impacted fracture of the   distal fifth metacarpal without intra-articular extension.      Diffuse osseous demineralization. Probable prior trapeziectomy and   suspension arthroplasty of the first CMC. Partial visualization of   plate and screw fixation of the distal radius. Degenerative arthrosis   involving multiple joints of the left hand and wrist, greatest to an   advanced degree involving the second through fifth PIP and first IP   joints.      NOTE: ABNORMAL REPORT      THE DICTATION ABOVE DESCRIBES AN ABNORMAL REPORT FOR WHICH FOLLOW-UP   IS NEEDED.      KONSTANTIN GARCIA DO            SYSTEM ID:  [id number]      Chest XR,  PA & LAT   Final Result   IMPRESSION: No acute cardiopulmonary disease.      NALLELY LEON MD            SYSTEM ID:  [id number]      CT Head w/o Contrast    (Results Pending)       Patient's response to treatments and/or interventions: Pt tolerated suturing with mild discomfort, dressing clean/dry/intact now    To be done/followed up on inpatient unit:  Dysphagia screen, NPO until 1650 CT results are reviewed     Does this patient have any cognitive concerns?:  Baseline forgetfulness    Activity level - Baseline/Home:  Independent  Activity Level - Current:   Stand with assist x2    Patient's Preferred language: English   Needed?: No    Isolation: None  Infection: Not Applicable  Patient tested for COVID 19 prior to admission: NO  Bariatric?: No    Vital Signs:   Vitals:    03/21/24 1230 03/21/24 1300 03/21/24 1323 03/21/24 1330   BP: (!) 157/82 115/62  (!) 157/79   Pulse: 99 85  86   Resp:  16  16    Temp:       TempSrc:       SpO2:  96% 93% 94%       Cardiac Rhythm:     Was the PSS-3 completed:   Yes  What interventions are required if any?               Family Comments: pt's dtr @ bedside  OBS brochure/video discussed/provided to patient/family: Yes              Name of person given brochure if not patient: dtr    For the majority of the shift this patient's behavior was Green.   Behavioral interventions performed were none.    ED NURSE PHONE NUMBER: [phone]

## 2024-03-21 NOTE — PLAN OF CARE
Pt arrived to the unit around 1720 from the ED. A&Ox 4 but forgetful. Neuro intact. Purewick in place. PIV SL. Dressing to left ear and right shin. Regular diet.

## 2024-03-21 NOTE — PHARMACY-ADMISSION MEDICATION HISTORY
Pharmacist Admission Medication History    Admission medication history is complete. The information provided in this note is only as accurate as the sources available at the time of the update.    Information Source(s): Patient, Family member, and CareEverywhere/SureScripts via in-person    Pertinent Information:   -dorzolamide/timolol: per Surescripts, Rx states R eye but pt and daughter state she uses this in both eyes    Changes made to PTA medication list:  Added: colestipol  Deleted: probiotic, senna.docusate  Changed: None    Allergies reviewed with patient and updates made in EHR: no, already reviewed by RN    Medication History Completed By: Britta Gomes RPH 3/21/2024 1:11 PM    PTA Med List   Medication Sig Last Dose    acetaminophen (TYLENOL) 325 MG tablet Take 2 tablets (650 mg) by mouth every 4 hours as needed for mild pain or fever (> 101 F)  at PRN    atorvastatin (LIPITOR) 40 MG tablet Take 1 tablet (40 mg) by mouth every evening 3/20/2024    brimonidine (ALPHAGAN) 0.2 % ophthalmic solution Place 1 drop into the right eye 2 times daily 3/21/2024 at x1    cholecalciferol (VITAMIN D3) 10 mcg (400 units) TABS tablet Take 1 tablet (10 mcg) by mouth daily 3/20/2024    colestipol (COLESTID) 1 g tablet Take 1 g by mouth 2 times daily Take 4 hours before eating, ~0600, 1400 3/21/2024 at x1    Cyanocobalamin (B-12 PO) Take 1 tablet by mouth daily 3/20/2024    dorzolamide-timolol (COSOPT) 22.3-6.8 MG/ML ophthalmic solution Place 1 drop into both eyes 2 times daily 3/21/2024 at x1    sertraline (ZOLOFT) 25 MG tablet Take 25 mg by mouth daily 3/20/2024

## 2024-03-22 ENCOUNTER — APPOINTMENT (OUTPATIENT)
Dept: PHYSICAL THERAPY | Facility: CLINIC | Age: 89
DRG: 084 | End: 2024-03-22
Attending: STUDENT IN AN ORGANIZED HEALTH CARE EDUCATION/TRAINING PROGRAM
Payer: COMMERCIAL

## 2024-03-22 PROBLEM — S06.9XAA UNSPECIFIED INTRACRANIAL INJURY WITH LOSS OF CONSCIOUSNESS STATUS UNKNOWN, INITIAL ENCOUNTER (H): Status: ACTIVE | Noted: 2024-03-22

## 2024-03-22 PROBLEM — S62.337A CLOSED DISPLACED FRACTURE OF NECK OF FIFTH METACARPAL BONE OF LEFT HAND, INITIAL ENCOUNTER: Status: ACTIVE | Noted: 2024-03-22

## 2024-03-22 LAB
ALBUMIN SERPL BCG-MCNC: 3.8 G/DL (ref 3.5–5.2)
ALP SERPL-CCNC: 84 U/L (ref 40–150)
ALT SERPL W P-5'-P-CCNC: 60 U/L (ref 0–50)
ANION GAP SERPL CALCULATED.3IONS-SCNC: 12 MMOL/L (ref 7–15)
AST SERPL W P-5'-P-CCNC: 45 U/L (ref 0–45)
BILIRUB SERPL-MCNC: 0.3 MG/DL
BUN SERPL-MCNC: 31.3 MG/DL (ref 8–23)
CALCIUM SERPL-MCNC: 8.8 MG/DL (ref 8.2–9.6)
CHLORIDE SERPL-SCNC: 103 MMOL/L (ref 98–107)
CREAT SERPL-MCNC: 1.08 MG/DL (ref 0.51–0.95)
DEPRECATED HCO3 PLAS-SCNC: 22 MMOL/L (ref 22–29)
EGFRCR SERPLBLD CKD-EPI 2021: 48 ML/MIN/1.73M2
ERYTHROCYTE [DISTWIDTH] IN BLOOD BY AUTOMATED COUNT: 12.3 % (ref 10–15)
GLUCOSE SERPL-MCNC: 91 MG/DL (ref 70–99)
HCT VFR BLD AUTO: 32.2 % (ref 35–47)
HGB BLD-MCNC: 10.3 G/DL (ref 11.7–15.7)
MCH RBC QN AUTO: 30.2 PG (ref 26.5–33)
MCHC RBC AUTO-ENTMCNC: 32 G/DL (ref 31.5–36.5)
MCV RBC AUTO: 94 FL (ref 78–100)
PLATELET # BLD AUTO: 217 10E3/UL (ref 150–450)
POTASSIUM SERPL-SCNC: 4 MMOL/L (ref 3.4–5.3)
PROT SERPL-MCNC: 6.2 G/DL (ref 6.4–8.3)
RBC # BLD AUTO: 3.41 10E6/UL (ref 3.8–5.2)
SODIUM SERPL-SCNC: 137 MMOL/L (ref 135–145)
WBC # BLD AUTO: 9.8 10E3/UL (ref 4–11)

## 2024-03-22 PROCEDURE — G0378 HOSPITAL OBSERVATION PER HR: HCPCS

## 2024-03-22 PROCEDURE — 120N000001 HC R&B MED SURG/OB

## 2024-03-22 PROCEDURE — 97161 PT EVAL LOW COMPLEX 20 MIN: CPT | Mod: GP | Performed by: PHYSICAL THERAPIST

## 2024-03-22 PROCEDURE — 85014 HEMATOCRIT: CPT | Performed by: STUDENT IN AN ORGANIZED HEALTH CARE EDUCATION/TRAINING PROGRAM

## 2024-03-22 PROCEDURE — 250N000013 HC RX MED GY IP 250 OP 250 PS 637: Performed by: STUDENT IN AN ORGANIZED HEALTH CARE EDUCATION/TRAINING PROGRAM

## 2024-03-22 PROCEDURE — 97530 THERAPEUTIC ACTIVITIES: CPT | Mod: GP | Performed by: PHYSICAL THERAPIST

## 2024-03-22 PROCEDURE — 80053 COMPREHEN METABOLIC PANEL: CPT | Performed by: STUDENT IN AN ORGANIZED HEALTH CARE EDUCATION/TRAINING PROGRAM

## 2024-03-22 PROCEDURE — 36415 COLL VENOUS BLD VENIPUNCTURE: CPT | Performed by: STUDENT IN AN ORGANIZED HEALTH CARE EDUCATION/TRAINING PROGRAM

## 2024-03-22 PROCEDURE — 97116 GAIT TRAINING THERAPY: CPT | Mod: GP | Performed by: PHYSICAL THERAPIST

## 2024-03-22 PROCEDURE — 99232 SBSQ HOSP IP/OBS MODERATE 35: CPT | Performed by: STUDENT IN AN ORGANIZED HEALTH CARE EDUCATION/TRAINING PROGRAM

## 2024-03-22 RX ADMIN — ACETAMINOPHEN 975 MG: 325 TABLET, FILM COATED ORAL at 13:32

## 2024-03-22 RX ADMIN — MONTELUKAST SODIUM 1 G: 4 TABLET, CHEWABLE ORAL at 05:36

## 2024-03-22 RX ADMIN — ACETAMINOPHEN 975 MG: 325 TABLET, FILM COATED ORAL at 08:07

## 2024-03-22 RX ADMIN — DORZOLAMIDE HYDROCHLORIDE AND TIMOLOL MALEATE 1 DROP: 20; 5 SOLUTION/ DROPS OPHTHALMIC at 20:14

## 2024-03-22 RX ADMIN — BRIMONIDINE TARTRATE 1 DROP: 2 SOLUTION OPHTHALMIC at 20:14

## 2024-03-22 RX ADMIN — SERTRALINE HYDROCHLORIDE 25 MG: 25 TABLET ORAL at 08:06

## 2024-03-22 RX ADMIN — HYDRALAZINE HYDROCHLORIDE 10 MG: 10 TABLET ORAL at 23:43

## 2024-03-22 RX ADMIN — ATORVASTATIN CALCIUM 40 MG: 40 TABLET, FILM COATED ORAL at 20:14

## 2024-03-22 RX ADMIN — BRIMONIDINE TARTRATE 1 DROP: 2 SOLUTION OPHTHALMIC at 08:07

## 2024-03-22 RX ADMIN — DORZOLAMIDE HYDROCHLORIDE AND TIMOLOL MALEATE 1 DROP: 20; 5 SOLUTION/ DROPS OPHTHALMIC at 08:21

## 2024-03-22 RX ADMIN — ACETAMINOPHEN 975 MG: 325 TABLET, FILM COATED ORAL at 20:14

## 2024-03-22 RX ADMIN — MONTELUKAST SODIUM 1 G: 4 TABLET, CHEWABLE ORAL at 14:37

## 2024-03-22 ASSESSMENT — ACTIVITIES OF DAILY LIVING (ADL)
ADLS_ACUITY_SCORE: 52
ADLS_ACUITY_SCORE: 43
ADLS_ACUITY_SCORE: 52
ADLS_ACUITY_SCORE: 43
ADLS_ACUITY_SCORE: 43
ADLS_ACUITY_SCORE: 52
ADLS_ACUITY_SCORE: 43
ADLS_ACUITY_SCORE: 52
ADLS_ACUITY_SCORE: 43
ADLS_ACUITY_SCORE: 52
ADLS_ACUITY_SCORE: 48
ADLS_ACUITY_SCORE: 52
ADLS_ACUITY_SCORE: 43

## 2024-03-22 NOTE — PROGRESS NOTES
Rpt head CT stable   No NSGY intervention indicated  Rounding MICHEL will check in on rounds tomorrow to review activity restrictions and follow up and sign off   Will follow up 1 month with head CT prior, our office will coordinate. NSGY discharge orders and instructions placed    Dr. Aranda in agreement    EXAM: CT HEAD W/O CONTRAST  LOCATION: [facility]  DATE: 3/21/2024     INDICATION: Repeat CT to evaluate possible trace acute subarachnoid hemorrhage in the right superior frontal sulcus seen on previous CT head  COMPARISON: 03/21/2024 10:40 AM  TECHNIQUE: Routine CT Head without IV contrast. Multiplanar reformats. Dose reduction techniques were used.     FINDINGS:  INTRACRANIAL CONTENTS: Subtle subarachnoid hemorrhage at the right frontoparietal convexity is similar prior. Negative for mass effect or midline shift. No CT evidence of acute infarct. Moderate presumed chronic small vessel ischemic changes. Mild   generalized volume loss. No hydrocephalus. Intracranial atherosclerosis is present.      VISUALIZED ORBITS/SINUSES/MASTOIDS: No intraorbital abnormality. No paranasal sinus mucosal disease. No middle ear or mastoid effusion.     BONES/SOFT TISSUES: [Frontal scalp swelling/hematoma. Negative for calvarial fracture. Nonspecific soft tissue swelling and irregularity involving the left external auditory canal and left ear.                                                                      IMPRESSION:  1.  Subtle subarachnoid hemorrhage at the right frontoparietal convexity is similar prior.  2.  Negative for acute infarct or hydrocephalus.  3.  Nonspecific soft tissue swelling and irregularity involving the left external auditory canal and left ear. Recommend correlation with direct visualization.

## 2024-03-22 NOTE — CONSULTS
Care Management Initial Consult    General Information  Assessment completed with: Patient, Family, Patient and Daughter Olinda at bedside  Type of CM/SW Visit: CM Role Introduction    Primary Care Provider verified and updated as needed: Yes   Readmission within the last 30 days: no previous admission in last 30 days      Reason for Consult: discharge planning  Advance Care Planning:            Communication Assessment  Patient's communication style: spoken language (English or Bilingual)    Hearing Difficulty or Deaf: yes        Cognitive  Cognitive/Neuro/Behavioral: WDL  Level of Consciousness: alert  Arousal Level: opens eyes spontaneously  Orientation: disoriented to, time (knows the month but not the day)  Mood/Behavior: cooperative  Best Language: 0 - No aphasia  Speech: clear, logical    Living Environment:   People in home: alone     Current living Arrangements: apartment      Able to return to prior arrangements: other (see comments) (Per Daughter PT recommending rehab)       Family/Social Support:  Care provided by: self  Provides care for: no one  Marital Status:   Children, Other (specify) (has housecleaning private duty 2xweek)          Description of Support System: Supportive, Involved    Support Assessment: Adequate family and caregiver support, Adequate social supports    Current Resources:   Patient receiving home care services: No     Community Resources: None  Equipment currently used at home: walker, rolling  Supplies currently used at home: Hearing Aid Batteries    Employment/Financial:  Employment Status: retired        Financial Concerns: none   Referral to Financial Worker: No       Does the patient's insurance plan have a 3 day qualifying hospital stay waiver?  No    Lifestyle & Psychosocial Needs:  Social Determinants of Health     Food Insecurity: Not on file   Depression: Not at risk (1/12/2019)    PHQ-2     PHQ-2 Score: 0   Housing Stability: Not on file   Tobacco Use: Low Risk   (3/15/2021)    Patient History     Smoking Tobacco Use: Never     Smokeless Tobacco Use: Never     Passive Exposure: Not on file   Financial Resource Strain: Not on file   Alcohol Use: Not on file   Transportation Needs: Not on file   Physical Activity: Not on file   Interpersonal Safety: Not on file   Stress: Not on file   Social Connections: Not on file       Functional Status:  Prior to admission patient needed assistance: Independent with all ADL's uses a walker in the home. Has hearing aides. Patient has housecleaning 2x/week private duty. Daughter provides transportation             Mental Health Status:          Chemical Dependency Status:                Values/Beliefs:  Spiritual, Cultural Beliefs, Bahai Practices, Values that affect care:                 Additional Information:  Writer met with both Patient and Daughter Olinda at the bedside. Explained role and scope of safe discharge planning. Went over the Medicare for MOON observation status and answered all questions.  Prior to admission patient independent with all ADL's with use of a walker. She manages her own meds, does her own cooking and has private duty cleaning services 2x week.  Patient's Daughter informed this writer that Physical therapy is recommending TCU.  Patient and Daughter are in agreement.  Writer went over goal of TCU 7-10 days with a goal of PT/OT for strengthening prior to get to baseline and return to home.  Patient is aware that we would submit supporting documentation to support TCU and TCU would work on [facility] prior authorization.  Patient and Daughter presented with [facility] TCU list and Medicare.gov star ratings. They are aware that once she is cleared medically we would work on next level of care.  Writer submitted TCU referrals to the followin-[place]  2-[place]  Writer kept list in room in case of additional referrals needed. Preference if for a private room and they are aware this is billed to them (65/day or  more depending on facility).  Patient and Daughter are aware that we can set up [facility] at discharge if needed, both in agreement and aware of 95/base 6/mile billing to the patient.  Updated SW on the above. Patient likely ready pending final recommendations.  Care Transitions will continue to follow for further discharge planning needs as identified.        Vivian Kwok RN, BSN, ACM   Care Transitions Specialist  [facility]  Care Transitions Specialist  [id number] [email or web address]  [email or web address]  Office: [phone]  Fax: [phone]  [facility]           07-Apr-2020 18:40

## 2024-03-22 NOTE — PLAN OF CARE
Pt here with SAH. A&Ox3-4; disoriented to time occasionally, forgetful. [place]. Neuros intact except for baseline L sided deficits and R eye ptosis. Slight L field cut, L droop, and LLE 4/5. Numbness and tingling from BLE knees and down. LUE strength seems to be slightly weaker than R and unable to assess  d/t splint. Splint in place for 5th metacarpal fx. VSS on RA, SBP<150. Tele NSR. Regular diet, thin liquids. Takes pills whole. Up with a2 pivot to commode. Cont/incontinent, purewick in place overnight. Face and scattered bruising. L ear and R shin lacerations- dressings CDI.  Denies pain. Plan for therapies. Discharge pending.

## 2024-03-22 NOTE — PLAN OF CARE
Alert and oriented X 4, forgetful. Up with A2 with GB/WW. Residual neuro deficits from previous CVA-L droop, L field cut, LLE weakness 3/5. Dressing removed from multiple L ear lacerations, left EDGAR per ENT recommendations. Hematoma noted in outer lobe, MD aware. ENT to assess tomorrow. Tolerating diet. Purewick in place for incontinence. Daughter visiting at bedside. Tele SR. Continue to monitor.

## 2024-03-22 NOTE — PROGRESS NOTES
[facility]    Medicine Progress Note - Hospitalist Service    Date of Admission:  3/21/2024    Assessment & Plan     Expand All Collapse All    [facility]  History and Physical - Hospitalist Service       Date of Admission:  3/21/2024  PRIMARY CARE PROVIDER:    Sang Antonio        Assessment & Plan  Jamee KANDIS Douglas is a 89 year old female with a past medical history significant for hx of CVA 2020, HLD, Glaucoma, OA who was admitted on 3/21/24 for fall possibly due to syncopal event, right subarachnoid hemorrhage in the superior sulcus and fracture of the left fifth metacarpal.      Fall Possibly due to Syncopal Event   Right Frontal Superior Sulcus Subarachnoid Hemorrhage   Likely Concussion   Hx of CVA (Residual minor left sided weakness)  Patient had a fall where she hit her head on a cabinet. Unclear what caused her fall as she does not remember but could be due to syncope. EKG showing NSR with no acute ischemic changes.                - Tele                - Orthostatic Blood Pressure                      - Repeat Head CT stable and NSG no surgical intervention                              - Hold ASA .Patient educated not to take aspirin   -Continue neuro checks and monitor closely for neurodeficits                                                              - No surgical intervention at this time                             - SBP goal < 150                            - Head of bed 30 degrees                             - Will follow up 1 month with head CT prior                                                    - NSGY to arrange                   - PRN Hydralazine for SBP > 150                   - PT/OT consulted and recommeding TCU                - SW consulted      Fracture of the Left Fifth Metacarpal  Right Lower Leg Hematoma  ED placed patient in splint                   - Ortho Consulted an Signed off                             - Remain in  left ulnar gutter splint at all times. This must remain clean and dry                             - NWB left hand  - WBAT RLE   - Okay to attempt use of a platform walker as long as the patient is not placing weight on the ulnar aspect of her hand.   - PT/OT/SW  - NV checks Q4H LUE and RLE while inpatient  - Follow up with Dr. Stephanie Coffman, per family's preference, in 2 weeks for repeat x-rays and reevaluation of the left hand       Left Ear Laceration   Frontal Scalp Hematoma  ED provider sutured the extensive laceration                - Sutures stay in for 7 days                - Dressing to stay in place for 2-3 days       # Anemia  Hb dropped from 11.9 to 10.3 likey due to hematoma  Will monitor Hb closely      Elevated LFTs Improving                - Continue to monitor      Chronic Medical Problems:      Elevated Cr                - Cr: 1.06                             - Baseline: 0.90-1.00  -Creatinine 1.08  on 3/22/22     HLD                - Continue home Atorvastatin      Glaucoma                - Continue home eye drops                   Diet: Regular Diet Adult    DVT Prophylaxis: Pneumatic Compression Devices  Pierce Catheter: Not present  Lines: None     Cardiac Monitoring: ACTIVE order. Indication: Syncope  Code Status: No CPR- Do NOT Intubate      Clinically Significant Risk Factors Present on Admission                  # Hypertension: Noted on problem list          # Financial/Environmental Concerns: none         Disposition Plan      Expected Discharge Date: 03/24/2024      Destination: home with help/services;inpatient rehabilitation facility          Discharge likely in the next 24 hours pending stable neurological exam and also will check hemoglobin again in the morning.    Will need placement due to his urine I discussed with      Discussed plan with patient's nurse     Irma Santillan MD  Hospitalist Service  [facility]  Securely message with  Giulia (more info)  Text page via [facility] Paging/Directory   ______________________________________________________________________    Interval History   Patient seen and examined at bedside.  Pain is well-controlled.  Denies any chest pain.  Daughter present at bedside.  Patient has been home embolism hide previous falls.  She also passed out before.  She does not have any history of cardiac arrhythmia.   Physical Exam   Vital Signs: Temp: 98  F (36.7  C) Temp src: Oral BP: 122/51 Pulse: 73   Resp: 16 SpO2: 97 % O2 Device: None (Room air)    Weight: 0 lbs 0 oz    Physical Exam  HENT:      Head:      Comments: Left ear covered with bandage   Cardiovascular:      Rate and Rhythm: Normal rate and regular rhythm.   Pulmonary:      Effort: No respiratory distress.      Breath sounds: Normal breath sounds. No wheezing.   Abdominal:      General: There is no distension.      Palpations: Abdomen is soft.      Tenderness: There is no abdominal tenderness.   Musculoskeletal:      Comments: Left UE in splint          Medical Decision Making       45 MINUTES SPENT BY ME on the date of service doing chart review, history, exam, documentation & further activities per the note.      Data     I have personally reviewed the following data over the past 24 hrs:    9.8  \   10.3 (L)   / 217     137 103 31.3 (H) /  91   4.0 22 1.08 (H) \     ALT: 60 (H) AST: 45 AP: 84 TBILI: 0.3   ALB: 3.8 TOT PROTEIN: 6.2 (L) LIPASE: N/A       Imaging results reviewed over the past 24 hrs:   Recent Results (from the past 24 hour(s))   XR Tibia & Fibula Port Right 2 Views    Narrative    XR TIBIA & FIBULA PORT RIGHT 2 VIEWS   3/21/2024 4:25 PM     HISTORY:  Right lower leg bruise and swelling, ensure no fracture    Comparison: None.      Impression    IMPRESSION:  1. Total knee arthroplasty. Excellent position and alignment of  components. No evidence of complication or periprosthetic fracture.  2. Arterial calcifications.  3. Diffuse bone  demineralization.  4. The tibia and fibula appear normal otherwise.    ROSEMARY TOVAR MD         SYSTEM ID:  [id number]   CT Head w/o Contrast    Narrative    EXAM: CT HEAD W/O CONTRAST  LOCATION: [facility]  DATE: 3/21/2024    INDICATION: Repeat CT to evaluate possible trace acute subarachnoid hemorrhage in the right superior frontal sulcus seen on previous CT head  COMPARISON: 03/21/2024 10:40 AM  TECHNIQUE: Routine CT Head without IV contrast. Multiplanar reformats. Dose reduction techniques were used.    FINDINGS:  INTRACRANIAL CONTENTS: Subtle subarachnoid hemorrhage at the right frontoparietal convexity is similar prior. Negative for mass effect or midline shift. No CT evidence of acute infarct. Moderate presumed chronic small vessel ischemic changes. Mild   generalized volume loss. No hydrocephalus. Intracranial atherosclerosis is present.     VISUALIZED ORBITS/SINUSES/MASTOIDS: No intraorbital abnormality. No paranasal sinus mucosal disease. No middle ear or mastoid effusion.    BONES/SOFT TISSUES: [Frontal scalp swelling/hematoma. Negative for calvarial fracture. Nonspecific soft tissue swelling and irregularity involving the left external auditory canal and left ear.      Impression    IMPRESSION:  1.  Subtle subarachnoid hemorrhage at the right frontoparietal convexity is similar prior.  2.  Negative for acute infarct or hydrocephalus.  3.  Nonspecific soft tissue swelling and irregularity involving the left external auditory canal and left ear. Recommend correlation with direct visualization.

## 2024-03-22 NOTE — PLAN OF CARE
Pt here with SAH. A&Ox3-4, disoriented to exact date. Neuros forgetful, generalized weakness, L sided deficits d/t previous stroke, L droop, L field cut, LLE 4/5, R eye ptosis, N/T from BLE. Unable to assess  d/t L splint on arm; splint in place for 5th metacarpal fracture. VSS, SBP <150. Tele NSR. Reg diet, thin liquids. Takes pills whole. Incontinent, per wick in place. Up with Ax2 GBW. Complains of pain 3-5/10, managed with scheduled tylenol. Face, R leg and scattered bruising. L ear and R shin lacerations, dressings CDI. Intermittently refuses repositioning. Pt scoring green on the Aggression Stop Light Tool. Plan to discharge to TCU. Discharge pending.

## 2024-03-22 NOTE — PROVIDER NOTIFICATION
MD Notification    Notified Person: MD    Notified Person Name: Dr. Santillan    Notification Date/Time: 3/22/24 @ 1648    Notification Interaction: Vocera    Purpose of Notification: Dressing removed from L ear, hematoma now noted on lobe. Please assess.    Orders Received: Assessed per MD, discussed with ENT. ENT consult ordered. May leave dressing off, apply ice.    Comments:

## 2024-03-22 NOTE — UTILIZATION REVIEW
Admission Status; Secondary Review Determination       Under the authority of the Utilization Management Committee, the utilization review process indicated a secondary review on the above patient. The review outcome is based on review of the medical records, discussions with staff, and applying clinical experience noted on the date of the review.     (x) Inpatient Status Appropriate - This patient's medical care is consistent with medical management for inpatient care and reasonable inpatient medical practice.     RATIONALE FOR DETERMINATION     Patient requires inpatient admission versus short stay observation or outpatient treatment for the following reasons:         The Patient is 89 y/o  woman,  with PMHx significant for prior CVA 2020, HLD, glaucoma, OA had a syncopal episode at home followed by a fall and head trauma, brought to the hospital she was diagnosed with right subarachnoid hemorrhage in the superior sulcus, ear laceration and fracture of the left fifth metacarpal.  Neurosurgical consult assessed the patient no surgical intervention planned at this time but they recommend to do frequent neurochecks.  The repeat head CT shows again the presence of the right subarachnoid hemorrhage    Physical therapy assessed the patient and currently she requires assistance with all her functional mobility, she is alert risk for falls since she is unsafe to return home.  Physical therapy recommended TCU    Patient developed anemia with admission hemoglobin 11.9, today 10.3    89-year-old woman with syncopal episode admitted with right subarachnoid hemorrhage, ear laceration, metacarpal fracture is unable to walk, hemoglobin dropped today, worrisome for bleeding.  She needs close monitoring of the neurologic status and her hemoglobin.    The expected length of stay at the time of admission was more than 2 nights because of the severity of illness, intensity of service provided, and risk for adverse outcome.  Inpatient admission is appropriate.     Dr Santillan notified     This document was produced using voice recognition software       The information on this document is developed by the utilization review team in order for the business office to ensure compliance. This only denotes the appropriateness of proper admission status and does not reflect the quality of care rendered.   The definitions of Inpatient Status and Observation Status used in making the determination above are those provided in the CMS Coverage Manual, Chapter 1 and Chapter 6, section 70.4.   Sincerely,   GIANCARLO CASTILLO MD   Utilization Review  Physician Advisor  [facility]

## 2024-03-22 NOTE — PROGRESS NOTES
"   03/22/24 1000   Appointment Info   Signing Clinician's Name / Credentials (PT) Jimmy Yancey DPT   Rehab Comments (PT) Platform walker on LUE   Quick Adds   Quick Adds Certification       Present no   Living Environment   People in Home alone   Current Living Arrangements independent living facility   Home Accessibility no concerns   Transportation Anticipated family or friend will provide   Living Environment Comments Pt lives alone in an ILF. No stairs. Pt reports her daughter will pick her up upon discharge and is able to provide intermittent assist.   Self-Care   Usual Activity Tolerance good   Current Activity Tolerance moderate   Regular Exercise No   Equipment Currently Used at Home walker, rolling   Fall history within last six months yes   Number of times patient has fallen within last six months 3   Activity/Exercise/Self-Care Comment Pt reports being IND at baseline with all ADLs. Pt ambulates w/ a 4WW at baseline.   General Information   Onset of Illness/Injury or Date of Surgery 03/22/24   Referring Physician Cathleen Lopez MD   Patient/Family Therapy Goals Statement (PT) \"To get better\"   Pertinent History of Current Problem (include personal factors and/or comorbidities that impact the POC) Per Chart: Jamee Douglas is a 89 year old female with a past medical history significant for hx of CVA 2020, HLD, Glaucoma, OA who was admitted on 3/21/24 for fall possibly due to syncopal event, right subarachnoid hemorrhage in the superior sulcus and fracture of the left fifth metacarpal.   Existing Precautions/Restrictions fall   Weight-Bearing Status - LUE nonweight-bearing   Weight-Bearing Status - LLE full weight-bearing   Weight-Bearing Status - RLE full weight-bearing   Cognition   Orientation Status (Cognition) oriented x 3   Pain Assessment   Patient Currently in Pain Yes, see Vital Sign flowsheet  (L hand pain)   Integumentary/Edema   Integumentary/Edema Comments L " hand in splint   Posture    Posture Forward head position;Protracted shoulders   Range of Motion (ROM)   Range of Motion ROM is WFL   Strength (Manual Muscle Testing)   Strength (Manual Muscle Testing) Deficits observed during functional mobility   Strength Comments BLE Hip Flexion: 3+/5   Bed Mobility   Comment, (Bed Mobility) Supine>sit w/ mod A x 1   Transfers   Comment, (Transfers) Sit>stand w/ platform walker and min A x 2   Gait/Stairs (Locomotion)   [place] Level (Gait) minimum assist (75% patient effort);2 person assist   Assistive Device (Gait) walker, platform   Distance in Feet (Gait) 5'   Balance   Balance Comments Adequate static sitting balance; pt using a platform walker   Sensory Examination   Sensory Perception patient reports no sensory changes   Clinical Impression   Criteria for Skilled Therapeutic Intervention Yes, treatment indicated   PT Diagnosis (PT) Impaired gait   Influenced by the following impairments Decreased activity tolerance; decreased balance; decreased strength   Functional limitations due to impairments Impaired functional mobility   Clinical Presentation (PT Evaluation Complexity) stable   Clinical Presentation Rationale Clinical judgement   Clinical Decision Making (Complexity) low complexity   Planned Therapy Interventions (PT) balance training;bed mobility training;gait training;patient/family education;strengthening;transfer training;progressive activity/exercise   Risk & Benefits of therapy have been explained evaluation/treatment results reviewed;care plan/treatment goals reviewed;risks/benefits reviewed;current/potential barriers reviewed;participants voiced agreement with care plan;participants included;patient   PT Total Evaluation Time   PT Sandee Low Complexity Minutes (34897) 10   Therapy Certification   Start of care date 03/22/24   Certification date from 03/22/24   Certification date to 03/27/24   Medical Diagnosis Subarachnoid hemorrhage   Physical Therapy  Goals   PT Frequency 5x/week   PT Predicted Duration/Target Date for Goal Attainment 03/27/24   PT Goals Bed Mobility;Transfers;Gait   PT: Bed Mobility Independent;Supine to/from sit   PT: Transfers Modified independent;Sit to/from stand;Assistive device;Within precautions   PT: Gait Modified independent;Assistive device;Within precautions;150 feet   Interventions   Interventions Quick Adds Gait Training;Therapeutic Activity   Therapeutic Activity   Therapeutic Activities: dynamic activities to improve functional performance Minutes (21622) 15   Symptoms Noted During/After Treatment Fatigue;Increased pain   Treatment Detail/Skilled Intervention Greeted pt supine in bed, agreed to PT. VSS on RA throughout session. PT educated pt on NWBing status on LUE, pt voiced understanding. Pt performed supine>sit w/ mod A x 1, needing assist for trunk control. Additional assist to scoot to EOB. Pt able to sit at EOB unsupported without LOB. Pt performed sit>stand x 4 w/ platform walker and min A x 2, needing assist to stand fully upright. Verbal cues for hand placement. After ambulation, pt returned to supine w/ min A x 1, needing assist to safely lift BLEs back into bed and reposition self. Discussed discharge recommendations with pt, in agreement for TCU. Pt ended session supine in bed, with all needs met and call light within reach.   Gait Training   Gait Training Minutes (47624) 14   Symptoms Noted During/After Treatment (Gait Training) fatigue   Treatment Detail/Skilled Intervention Pt ambulated w/ platform walker and min A x 1 for safety. Pt ambulated with decreased gait speed, shuffled gait, forward flexed posture, and unsteady. Verbal cues for upright gaze and posture, to increase step length, and pacing. Good carryover with cues. Pt unsteady but no LOB.   Distance in Feet 50'   PT Discharge Planning   PT Plan Bed mobility; repeat sit>stands; progress gait w/ platform walker   PT Discharge Recommendation (DC Rec)  Transitional Care Facility   PT Rationale for DC Rec Pt is below baseline. Pt currently requiring assist with all functional mobility. Pt presenting with deficits in activity tolerance, balance, and strength. Due to these deficits, pt is a falls risk and unsafe to return home alone at this time. Pt would benefit from continued skilled PT services via TCU to address deficits and improve IND with safety and functional mobility.   PT Brief overview of current status Recommend nursing use A x 1 for transfers   Total Session Time   Timed Code Treatment Minutes 29   Total Session Time (sum of timed and untimed services) 39     [facility]  OUTPATIENT PHYSICAL THERAPY EVALUATION  PLAN OF TREATMENT FOR OUTPATIENT REHABILITATION  (COMPLETE FOR INITIAL CLAIMS ONLY)  Patient's Last Name, First Name, M.I.  YOB: 1932  Jamee Douglas                        Provider's Name  [facility] Medical Record No.  [id number]                             Onset Date:  03/22/24   Start of Care Date:  03/22/24   Type:     _X_PT   ___OT   ___SLP Medical Diagnosis:  Subarachnoid hemorrhage              PT Diagnosis:  Impaired gait Visits from SOC:  1     See note for plan of treatment, functional goals and certification details    I CERTIFY THE NEED FOR THESE SERVICES FURNISHED UNDER        THIS PLAN OF TREATMENT AND WHILE UNDER MY CARE     (Physician co-signature of this document indicates review and certification of the therapy plan).

## 2024-03-22 NOTE — PROGRESS NOTES
Subjective     No acute events overnight. Endorsees mild headache not different than it's been. No changes overall in her function. Denies nausea or changes in vision.  Working with therapies.     Objective     VITAL SIGNS    Temp:  [98  F (36.7  C)-98.7  F (37.1  C)] 98.3  F (36.8  C)  Pulse:  [66-84] 66  Resp:  [14-18] 14  BP: ()/(44-89) 106/44  SpO2:  [93 %-97 %] 96 %       PHYSICAL EXAMINATION    Left weakness from previous CVA, CN II-XII intact, alert and oriented x3 with fluent speech, following commands, for me right upper 5/5 can not dest left due to cast, intact light touch.        IMPRESSION/REPORT/PLAN:  (R55) Syncope, unspecified syncope type  (S09.90XA) Closed head injury, initial encounter  (I60.9) SAH (subarachnoid hemorrhage) (H)    No surgical intervention planned at this time   -Repeat head CT stable  -Hold any aspirin or blood thinners   -SBP less than 160  -HOB 30 degrees   -Continue to monitor neuro exam   -Recommend outpatient follow-up with [facility]   -Will follow up 1 month with head CT prior, our office will coordinate. NSGY discharge orders and instructions placed   -Neurosurgery will sign off. Please page with concerns.

## 2024-03-22 NOTE — PROGRESS NOTES
Care Management Follow Up    Length of Stay (days): 0    Expected Discharge Date: 03/22/2024     Concerns to be Addressed: discharge planning     Patient plan of care discussed at interdisciplinary rounds: Yes    Anticipated Discharge Disposition: Transitional Care  Anticipated Discharge Services: Transportation Services, therapies    Patient/family educated on Medicare website which has current facility and service quality ratings: yes ([facility] list and Medicare.gov)  Education Provided on the Discharge Plan: Yes  Patient/Family in Agreement with the Plan: yes    Referrals Placed by CM/SW: External Care Coordination, Internal Clinic Care Coordination, Post Acute Facilities  Private pay costs discussed: Not applicable    Additional Information:  Patient accepted to [facility], they need insurance authorization approval before discharge. JUNE spoke to hospitalist and patient is not ready today, possibly will be tomorrow or Sunday. SW called [facility], as they were first preference, to see if they have a response today or a bed available, left voicemail with Sheila in admissions. Patient does want a private room.     ADD: 1430  Have not heard back from [facility]. SW met with patient to confirm plan for an eventual discharge to [facility]. Patient's private room is $46 a day and she is agreeable to this fee. She would like her daughter to wheel her over. SW called daughter Olinda and discussed [facility] not responding and the private room at [facility]. She is agreeable and will wheel patient over when it is time for discharge (when patient is medically stable and insurance authorization has been received). Margie in admissions began auth today for [facility].     Oma Roche, MSW, SW   Social Work   [facility]

## 2024-03-23 LAB
ANION GAP SERPL CALCULATED.3IONS-SCNC: 15 MMOL/L (ref 7–15)
BUN SERPL-MCNC: 30.9 MG/DL (ref 8–23)
CALCIUM SERPL-MCNC: 8.9 MG/DL (ref 8.2–9.6)
CHLORIDE SERPL-SCNC: 105 MMOL/L (ref 98–107)
CREAT SERPL-MCNC: 0.87 MG/DL (ref 0.51–0.95)
DEPRECATED HCO3 PLAS-SCNC: 16 MMOL/L (ref 22–29)
EGFRCR SERPLBLD CKD-EPI 2021: 63 ML/MIN/1.73M2
ERYTHROCYTE [DISTWIDTH] IN BLOOD BY AUTOMATED COUNT: 12 % (ref 10–15)
GLUCOSE SERPL-MCNC: 104 MG/DL (ref 70–99)
HCT VFR BLD AUTO: 31 % (ref 35–47)
HGB BLD-MCNC: 9.9 G/DL (ref 11.7–15.7)
MCH RBC QN AUTO: 30.3 PG (ref 26.5–33)
MCHC RBC AUTO-ENTMCNC: 31.9 G/DL (ref 31.5–36.5)
MCV RBC AUTO: 95 FL (ref 78–100)
PLATELET # BLD AUTO: 230 10E3/UL (ref 150–450)
POTASSIUM SERPL-SCNC: 4.1 MMOL/L (ref 3.4–5.3)
RBC # BLD AUTO: 3.27 10E6/UL (ref 3.8–5.2)
SODIUM SERPL-SCNC: 136 MMOL/L (ref 135–145)
WBC # BLD AUTO: 8.3 10E3/UL (ref 4–11)

## 2024-03-23 PROCEDURE — 80048 BASIC METABOLIC PNL TOTAL CA: CPT | Performed by: STUDENT IN AN ORGANIZED HEALTH CARE EDUCATION/TRAINING PROGRAM

## 2024-03-23 PROCEDURE — 250N000013 HC RX MED GY IP 250 OP 250 PS 637: Performed by: STUDENT IN AN ORGANIZED HEALTH CARE EDUCATION/TRAINING PROGRAM

## 2024-03-23 PROCEDURE — 99232 SBSQ HOSP IP/OBS MODERATE 35: CPT | Performed by: STUDENT IN AN ORGANIZED HEALTH CARE EDUCATION/TRAINING PROGRAM

## 2024-03-23 PROCEDURE — 85027 COMPLETE CBC AUTOMATED: CPT | Performed by: STUDENT IN AN ORGANIZED HEALTH CARE EDUCATION/TRAINING PROGRAM

## 2024-03-23 PROCEDURE — 120N000001 HC R&B MED SURG/OB

## 2024-03-23 PROCEDURE — 36415 COLL VENOUS BLD VENIPUNCTURE: CPT | Performed by: STUDENT IN AN ORGANIZED HEALTH CARE EDUCATION/TRAINING PROGRAM

## 2024-03-23 RX ORDER — ACETAMINOPHEN 650 MG/1
650 SUPPOSITORY RECTAL EVERY 4 HOURS PRN
Status: DISCONTINUED | OUTPATIENT
Start: 2024-03-23 | End: 2024-03-23

## 2024-03-23 RX ORDER — ACETAMINOPHEN 325 MG/1
650 TABLET ORAL EVERY 4 HOURS PRN
Status: DISCONTINUED | OUTPATIENT
Start: 2024-03-23 | End: 2024-03-23

## 2024-03-23 RX ADMIN — SERTRALINE HYDROCHLORIDE 25 MG: 25 TABLET ORAL at 08:04

## 2024-03-23 RX ADMIN — DORZOLAMIDE HYDROCHLORIDE AND TIMOLOL MALEATE 1 DROP: 20; 5 SOLUTION/ DROPS OPHTHALMIC at 20:21

## 2024-03-23 RX ADMIN — BRIMONIDINE TARTRATE 1 DROP: 2 SOLUTION OPHTHALMIC at 08:07

## 2024-03-23 RX ADMIN — ACETAMINOPHEN 975 MG: 325 TABLET, FILM COATED ORAL at 08:04

## 2024-03-23 RX ADMIN — MONTELUKAST SODIUM 1 G: 4 TABLET, CHEWABLE ORAL at 14:41

## 2024-03-23 RX ADMIN — DORZOLAMIDE HYDROCHLORIDE AND TIMOLOL MALEATE 1 DROP: 20; 5 SOLUTION/ DROPS OPHTHALMIC at 08:02

## 2024-03-23 RX ADMIN — BRIMONIDINE TARTRATE 1 DROP: 2 SOLUTION OPHTHALMIC at 20:20

## 2024-03-23 RX ADMIN — MONTELUKAST SODIUM 1 G: 4 TABLET, CHEWABLE ORAL at 06:09

## 2024-03-23 RX ADMIN — ACETAMINOPHEN 975 MG: 325 TABLET, FILM COATED ORAL at 13:11

## 2024-03-23 RX ADMIN — ATORVASTATIN CALCIUM 40 MG: 40 TABLET, FILM COATED ORAL at 20:16

## 2024-03-23 RX ADMIN — ACETAMINOPHEN 975 MG: 325 TABLET, FILM COATED ORAL at 20:16

## 2024-03-23 ASSESSMENT — ACTIVITIES OF DAILY LIVING (ADL)
ADLS_ACUITY_SCORE: 52
ADLS_ACUITY_SCORE: 51
ADLS_ACUITY_SCORE: 52
ADLS_ACUITY_SCORE: 56
ADLS_ACUITY_SCORE: 51
ADLS_ACUITY_SCORE: 56
ADLS_ACUITY_SCORE: 52
ADLS_ACUITY_SCORE: 51
ADLS_ACUITY_SCORE: 56
ADLS_ACUITY_SCORE: 51

## 2024-03-23 NOTE — PROGRESS NOTES
Care Management Follow Up    Length of Stay (days): 1    Expected Discharge Date: 03/24/2024     Concerns to be Addressed: discharge planning     Patient plan of care discussed at interdisciplinary rounds: Yes    Anticipated Discharge Disposition: Transitional Care     Anticipated Discharge Services: Transportation Services  Anticipated Discharge DME: None, Other (see comment) (possible DME for adaptive equipment on LUE NWB)    Patient/family educated on Medicare website which has current facility and service quality ratings: yes ([facility] list and Medicare.gov)  Education Provided on the Discharge Plan: Yes  Patient/Family in Agreement with the Plan: yes    Referrals Placed by CM/SW: External Care Coordination, Internal Clinic Care Coordination, Post Acute Facilities  Private pay costs discussed: Not applicable    Additional Information:  Patient to be seen by ENT today per hospitalist. Writer still waiting for authorization from [facility] to be able to go to TCU. Writer will inform MD if patient is able to get authorization. Writer followed up with Liliana from [place] to check about authorization and patient has authorization. MD is wanting to wait until tomorrow for patient to discharge to TCU.      Osorio Peters [facility]  Care Management

## 2024-03-23 NOTE — PLAN OF CARE
Pt here with SAH. A&Ox4. Neuros forgetful, generalized weakness, L sided deficits d/t previous stroke, L droop, L field cut, LLE 4/5, N/T from BLE. R eye ptosis. Weak grasp on L hand d/t splint on arm; splint in place for 5th metacarpal fracture. VSS, SBP <150. Tele NSR. Reg diet, thin liquids. Takes pills whole. Incontinent, perwick in place. Up with Ax1 GBW. Complains of pain 3-5/10, managed with scheduled tylenol and ice packs. Face, R shin and scattered bruising. L ear lacerations, sutures in place, ice applied as needed. Pt scoring green on the Aggression Stop Light Tool. Plan to discharge to TCU. Discharge pending.

## 2024-03-23 NOTE — PROGRESS NOTES
[facility]    Medicine Progress Note - Hospitalist Service    Date of Admission:  3/21/2024    Assessment & Plan   Expand All Collapse All    [facility]  History and Physical - Hospitalist Service       Date of Admission:  3/21/2024  PRIMARY CARE PROVIDER:    Sang Antonio        Assessment & Plan  Jamee KANDIS Douglas is a 89 year old female with a past medical history significant for hx of CVA 2020, HLD, Glaucoma, OA who was admitted on 3/21/24 for fall possibly due to syncopal event, right subarachnoid hemorrhage in the superior sulcus and fracture of the left fifth metacarpal.      Fall Possibly due to Syncopal Event   Right Frontal Superior Sulcus Subarachnoid Hemorrhage   Likely Concussion   Hx of CVA (Residual minor left sided weakness)  Patient had a fall where she hit her head on a cabinet. Unclear what caused her fall as she does not remember but could be due to syncope. EKG showing NSR with no acute ischemic changes.                - Tele                - Orthostatic Blood Pressure                      - Repeat Head CT stable and NSG no surgical intervention                              - Hold ASA .Patient educated not to take aspirin   -Continue neuro checks and monitor closely for neurodeficits                                                              - No surgical intervention at this time                             - SBP goal < 150                            - Head of bed 30 degrees                             - Will follow up 1 month with head CT prior                                                    - NSGY to arrange                   - PRN Hydralazine for SBP > 150                   - PT/OT consulted and recommeding TCU                - SW consulted      Fracture of the Left Fifth Metacarpal  Right Lower Leg Hematoma  ED placed patient in splint                   - Ortho Consulted an Signed off                             - Remain in  left ulnar gutter splint at all times. This must remain clean and dry                             - NWB left hand  - WBAT RLE   - Okay to attempt use of a platform walker as long as the patient is not placing weight on the ulnar aspect of her hand.   - PT/OT/SW  - NV checks Q4H LUE and RLE while inpatient  - Follow up with Dr. Stephanie Coffman, per family's preference, in 2 weeks for repeat x-rays and reevaluation of the left hand       Left Ear Laceration   Frontal Scalp Hematoma  # Ear ecchymosis  ED provider sutured the extensive laceration                - Sutures stay in for 7 days                - Dressing to stay in place for 2-3 days   -ENT consulted likely conservative management  -Apply ice      # Anemia  Hb dropped from 11.9 to 10.3-9.9  likey due to hematoma  Will monitor Hb closely      Elevated LFTs Improving                - Continue to monitor   -Recheck LFTs tomorrow     Chronic Medical Problems:      Elevated Cr                - Cr: 1.06                             - Baseline: 0.90-1.00  -Creatinine 1.08  on 3/22/22     HLD                - Continue home Atorvastatin      Glaucoma                - Continue home eye drops         # Low bicarbonate level  -Bicarbonate 16  -No Diarrhea.  -Will recheck tomorrow                        Diet: Regular Diet Adult    DVT Prophylaxis: Pneumatic Compression Devices  Pierce Catheter: Not present  Lines: None     Cardiac Monitoring: ACTIVE order. Indication: Syncope  Code Status: No CPR- Do NOT Intubate      Clinically Significant Risk Factors Present on Admission                  # Hypertension: Noted on problem list          # Financial/Environmental Concerns: none         Disposition Plan      Expected Discharge Date: 03/24/2024      Destination: home with help/services;inpatient rehabilitation facility          ENT consult.  Pending stability of hemoglobin and improvement of bicarbonate likely discharge to TCU tomorrow    Irma Santillan,  MD  Hospitalist Service  [facility]  Securely message with [employer] (more info)  Text page via [employer] Paging/Directory   ______________________________________________________________________    Interval History   Patient seen and examined at bedside.  Her pain is well-controlled.  She does not have any shortness of breath or chest pain.  Physical Exam   Vital Signs: Temp: 97.9  F (36.6  C) Temp src: Oral BP: (!) 145/77 Pulse: 69   Resp: 16 SpO2: 97 % O2 Device: None (Room air)    Weight: 0 lbs 0 oz    Physical Exam  HENT:      Head:      Comments: Right ear ecchymosis with no bleeding around the suture site  Cardiovascular:      Rate and Rhythm: Normal rate and regular rhythm.   Pulmonary:      Effort: Pulmonary effort is normal.      Breath sounds: Normal breath sounds.   Abdominal:      General: There is no distension.      Palpations: Abdomen is soft.      Tenderness: There is no abdominal tenderness.   Skin:     Findings: Bruising present.          Medical Decision Making       45 MINUTES SPENT BY ME on the date of service doing chart review, history, exam, documentation & further activities per the note.      Data     I have personally reviewed the following data over the past 24 hrs:    8.3  \   9.9 (L)   / 230     136 105 30.9 (H) /  104 (H)   4.1 16 (L) 0.87 \       Imaging results reviewed over the past 24 hrs:   No results found for this or any previous visit (from the past 24 hour(s)).

## 2024-03-23 NOTE — CONSULTS
[facility] Consultation by [facility]    Jamee Douglas MRN# [id number]   Age: 89 year old YOB: 1932     Date of Admission:  3/21/2024  Date of Consult:    03/23/24    Reason for consult: Left ear laceration       Requesting physician: Irma Santillan MD                           Chief Complaint:   Fall, Laceration, and Head Injury              HPI:      Jamee Douglas is a 89 year old female with a past medical history significant for hx of CVA 2020, HLD, Glaucoma, OA who was admitted on 3/21/24 for fall possibly due to syncopal event, right subarachnoid hemorrhage in the superior sulcus and fracture of the left fifth metacarpal.      Fall Possibly due to Syncopal Event   Right Frontal Superior Sulcus Subarachnoid Hemorrhage   Likely Concussion   Hx of CVA (Residual minor left sided weakness)  Patient had a fall where she hit her head on a cabinet. Unclear what caused her fall as she does not remember but could be due to syncope. EKG showing NSR with no acute ischemic changes.                - Tele                - Orthostatic Blood Pressure                - Neuro checks Q4H               - Delirium precautions                             - Reorient patient at each interaction  - Give patient window bed if possible  - Keep room lights on and blinds open during day (8am-8pm), low lights 8pm-8am  - Minimize interruptions of sleep at night (consolidate vitals, nursing assessments, medications)     - Repeat Head CT stable    Patient reports falling at home in her kitchen.  She does not recall the details of the fall specifically and is amnestic to calling her daughter or others for help immediately after the fall.  She was taken to the emergency department [facility] where amongst sustaining a left upper extremity fracture and right lower extremity bruising she sustained contusion to the left face and temporoparietal skull and a laceration of the  "left auricle.  This was repaired in the emergency department.  She feels her hearing is normal on the left.  She has no otalgia or otorrhea.    Previous tests and diagnostic procedures: see \"Tests and Procedures\" and \"PFSH\".               Past Medical History:     Past Medical History:   Diagnosis Date    Glaucoma     Hyperlipidemia LDL goal < 130     LBP (low back pain)     Osteoarthritis                Past Surgical History:     Past Surgical History:   Procedure Laterality Date    ARTHROPLASTY KNEE      HYSTERECTOMY      SURGICAL HISTORY OF -       rights shoulder    SURGICAL HISTORY OF -       glaucoma surgery    SURGICAL HISTORY OF -       back surgery               Social History:     Social History     Socioeconomic History    Marital status:      Spouse name: Not on file    Number of children: Not on file    Years of education: Not on file    Highest education level: Not on file   Occupational History    Not on file   Tobacco Use    Smoking status: Never    Smokeless tobacco: Never   Substance and Sexual Activity    Alcohol use: Yes     Comment: Rare    Drug use: No    Sexual activity: Never   Other Topics Concern    Parent/sibling w/ CABG, MI or angioplasty before 65F 55M? Not Asked   Social History Narrative    Not on file     Social Determinants of Health     Financial Resource Strain: Not on file   Food Insecurity: Not on file   Transportation Needs: Not on file   Physical Activity: Not on file   Stress: Not on file   Social Connections: Not on file   Interpersonal Safety: Not on file   Housing Stability: Not on file               Family History:     Family History   Problem Relation Age of Onset    Other - See Comments Mother         POLYCYTHEMIA VERA    Prostate Cancer Father     Colon Cancer Brother     Lung Cancer Sister                Immunizations:     Immunization History   Administered Date(s) Administered    COVID-19 12+ (2023-24) (MODERNA) 10/30/2023    COVID-19 Bivalent 12+ (Pfizer) " 11/10/2022    COVID-19 Monovalent 18+ (Moderna) 12/28/2020, 01/25/2021, 10/28/2021, 05/10/2022    Influenza (High Dose) 3 valent vaccine 10/04/2017, 09/13/2020    Pneumo Conj 13-V (2010&after) 01/01/2015    Pneumococcal 23 valent 10/20/2017    TDAP (Adacel,Boostrix) 06/14/2014    Zoster recombinant adjuvanted (SHINGRIX) 05/04/2018, 07/27/2018, 08/24/2018               Allergies:   Pcn [penicillins]          Medications:     Current Facility-Administered Medications:     acetaminophen (TYLENOL) tablet 975 mg, 975 mg, Oral, TID, Cathleen Lopez MD, 975 mg at 03/23/24 1311    atorvastatin (LIPITOR) tablet 40 mg, 40 mg, Oral, QPM, Cathleen Lopez MD, 40 mg at 03/22/24 2014    brimonidine (ALPHAGAN) 0.2 % ophthalmic solution 1 drop, 1 drop, Right Eye, BID, Cathleen Lopez MD, 1 drop at 03/23/24 0807    colestipol (COLESTID) tablet 1 g, 1 g, Oral, BID, Cathleen Lopez MD, 1 g at 03/23/24 0609    dorzolamide-timolol (COSOPT) ophthalmic solution 1 drop, 1 drop, Both Eyes, BID, Cathleen Lopez MD, 1 drop at 03/23/24 0802    hydrALAZINE (APRESOLINE) tablet 10 mg, 10 mg, Oral, Q4H PRN, 10 mg at 03/22/24 5997 **OR** hydrALAZINE (APRESOLINE) injection 10 mg, 10 mg, Intravenous, Q4H PRN, Cathleen Lopez MD    ondansetron (ZOFRAN ODT) ODT tab 4 mg, 4 mg, Oral, Q6H PRN **OR** ondansetron (ZOFRAN) injection 4 mg, 4 mg, Intravenous, Q6H PRN, Cathleen Lopez MD    senna-docusate (SENOKOT-S/PERICOLACE) 8.6-50 MG per tablet 1 tablet, 1 tablet, Oral, BID PRN **OR** senna-docusate (SENOKOT-S/PERICOLACE) 8.6-50 MG per tablet 2 tablet, 2 tablet, Oral, BID PRN, Cathleen Lopez MD    sertraline (ZOLOFT) tablet 25 mg, 25 mg, Oral, Daily, Cathleen Lopez MD, 25 mg at 03/23/24 0804          Review of Systems:   Review Of Systems  Skin: bruising  Eyes: negative  Ears/Nose/Throat: negative  Respiratory: No shortness of breath, dyspnea on exertion, cough, or hemoptysis  Cardiovascular:  negative  Gastrointestinal: negative  Genitourinary: negative  Musculoskeletal: fracture  Neurologic: as above and syncope  Psychiatric: negative  Hematologic/Lymphatic/Immunologic: negative  Endocrine: negative          Physical exam:   CONSTITUTION:    BP (!) 145/77 (BP Location: Right arm)   Pulse 69   Temp 97.9  F (36.6  C) (Oral)   Resp 16   LMP 01/01/1970   SpO2 97%      General appearance:Well developed, well nourished and groomed. No apparent acute or chronic distress.    Ability to communicate: normal.       HEAD, FACE, SALIVARY GLANDS AND TMJ:    Inspection of Head and Face: No step-offs or suspicion for fracture.  Ecchymosis diffusely left zygomatic and temporal parietal face and skull.    Palpation/Percussion of the Face: Mild tenderness left  Palpation of Parotid and Submaxillary glands: Normal.    Facial Mobility: Normal.       EYES: Ocular Motility: gaze appears conjugate in all positions; no evident nystagmus.       EAR, NOSE, MOUTH AND THROAT:    Pinna: Right normal.  Left diffusely ecchymotic with no significant hematoma.  Scabbed clot in the conchal bowl and postauricular region.  Sutures in place at the sites.  External Nose: Normal.    Otoscopic exam: Normal canals and tympanic membranes, bilaterally.    Hearing: Conversational speech rarely or never misunderstands words.       NECK AND THYROID:    Neck: normal symmetry; trachea midline; normal laryngeal crepitation; no adenopathy; no neck masses; no skin lesions; no scars.    Thyroid: normal size; no masses or tenderness.       LYMPH NODES: Neck Nodes: normal, no adenopathy.       NEUROLOGIC:    Level of orientation: normal to time, place, person and situation.    Cranial nerves: Cranial nerves II-XII grossly intact and symmetrical.       PSYCHIATRIC:    Level of consciousness: awake and alert.    Judgment and insight: normal.    Mood and affect: normal and appropriate to the situation.             Data:     Results for orders placed or  performed during the hospital encounter of 03/21/24   Head CT w/o contrast     Status: Abnormal   Result Value Ref Range    Radiologist flags Findings concerning for possible trace acute (AA)     Narrative    CT SCAN OF THE HEAD WITHOUT CONTRAST   3/21/2024 10:48 AM     HISTORY: Trauma.    TECHNIQUE:  Axial images of the head and coronal reformations without  IV contrast material. Radiation dose for this scan was reduced using  automated exposure control, adjustment of the mA and/or kV according  to patient size, or iterative reconstruction technique.    COMPARISON: CT of the head dated 3/3/2021.    FINDINGS: The previously demonstrated hypodense bilateral frontal  convexity subdural collections have resolved. Small chronic infarcts  in the bilateral basal ganglia/corona radiata regions appear  unchanged. There is at least moderate patchy and confluent nonspecific  hypoattenuation throughout the cerebral white matter, which may be due  to chronic small vessel ischemic disease. Mild generalized brain  parenchymal volume loss. No large transcortical acute or subacute  infarct.    Subtle focus of hyperattenuation raising concern for possible trace  acute subarachnoid hemorrhage in the right superior frontal sulcus  (series 5 image 19). Artifact is not entirely excluded. No other  findings concerning for acute intracranial hemorrhage. No extra-axial  fluid collection or mass effect.    Irregular soft tissue swelling and evidence for laceration involving  the posterior aspect of the left auricle and postauricular soft  tissues overlying the mastoid process of the left temporal bone. Small  left anterior frontal scalp hematoma. No obvious underlying displaced  calvarial or skull base fracture identified. Visualized aspects of the  paranasal sinuses and mastoids are clear.      Impression    IMPRESSION:  1. Findings concerning for possible trace acute subarachnoid  hemorrhage in the right superior frontal sulcus. No other  CT findings  of acute intracranial process. No mass effect.  2. Interval resolution of previously seen bilateral hypodense frontal  convexity subdural collections.  3. Brain atrophy and presumed chronic ischemic changes, as described.  4. Left anterior frontal scalp hematoma/contusion. Findings concerning  for traumatic laceration/contusion of the left posterior  auricle/postauricular soft tissues. No underlying displaced calvarial  or skull base fracture identified.    [Critical Result: Findings concerning for possible trace acute  intracranial hemorrhage]    Finding was identified on 3/21/2024 10:50 AM.     MITZI BABCOCK YARED was contacted by Dr. Cadet on 3/21/2024 11:04 AM  and verbalized understanding of the critical result.     JOSELUIS CADET MD         SYSTEM ID:  [id number]   Fingers XR, 2-3 views, left     Status: None    Narrative    EXAM: XR FINGER LEFT G/E 2 VIEWS  DATE/TIME: 3/21/2024 10:41 AM    INDICATION: trauma  COMPARISON: None available.       Impression    IMPRESSION: [place] dorsal angulated mildly impacted fracture of the  distal fifth metacarpal without intra-articular extension.    Diffuse osseous demineralization. Probable prior trapeziectomy and  suspension arthroplasty of the first CMC. Partial visualization of  plate and screw fixation of the distal radius. Degenerative arthrosis  involving multiple joints of the left hand and wrist, greatest to an  advanced degree involving the second through fifth PIP and first IP  joints.    NOTE: ABNORMAL REPORT    THE DICTATION ABOVE DESCRIBES AN ABNORMAL REPORT FOR WHICH FOLLOW-UP  IS NEEDED.    KONSTANTIN GARCIA DO         SYSTEM ID:  [id number]   Chest XR,  PA & LAT     Status: None    Narrative    CHEST TWO VIEWS  3/21/2024 10:40 AM     HISTORY: Syncope.    COMPARISON: None.      Impression    IMPRESSION: No acute cardiopulmonary disease.    NALLELY LEON MD         SYSTEM ID:  [id number]   CT Head w/o Contrast     Status: None    Narrative    EXAM: CT  HEAD W/O CONTRAST  LOCATION: [facility]  DATE: 3/21/2024    INDICATION: Repeat CT to evaluate possible trace acute subarachnoid hemorrhage in the right superior frontal sulcus seen on previous CT head  COMPARISON: 03/21/2024 10:40 AM  TECHNIQUE: Routine CT Head without IV contrast. Multiplanar reformats. Dose reduction techniques were used.    FINDINGS:  INTRACRANIAL CONTENTS: Subtle subarachnoid hemorrhage at the right frontoparietal convexity is similar prior. Negative for mass effect or midline shift. No CT evidence of acute infarct. Moderate presumed chronic small vessel ischemic changes. Mild   generalized volume loss. No hydrocephalus. Intracranial atherosclerosis is present.     VISUALIZED ORBITS/SINUSES/MASTOIDS: No intraorbital abnormality. No paranasal sinus mucosal disease. No middle ear or mastoid effusion.    BONES/SOFT TISSUES: [Frontal scalp swelling/hematoma. Negative for calvarial fracture. Nonspecific soft tissue swelling and irregularity involving the left external auditory canal and left ear.      Impression    IMPRESSION:  1.  Subtle subarachnoid hemorrhage at the right frontoparietal convexity is similar prior.  2.  Negative for acute infarct or hydrocephalus.  3.  Nonspecific soft tissue swelling and irregularity involving the left external auditory canal and left ear. Recommend correlation with direct visualization.     XR Tibia & Fibula Port Right 2 Views     Status: None    Narrative    XR TIBIA & FIBULA PORT RIGHT 2 VIEWS   3/21/2024 4:25 PM     HISTORY:  Right lower leg bruise and swelling, ensure no fracture    Comparison: None.      Impression    IMPRESSION:  1. Total knee arthroplasty. Excellent position and alignment of  components. No evidence of complication or periprosthetic fracture.  2. Arterial calcifications.  3. Diffuse bone demineralization.  4. The tibia and fibula appear normal otherwise.    ROSEMARY TOVAR MD         SYSTEM ID:  [id number]   INR      Status: Normal   Result Value Ref Range    INR 1.03 0.85 - 1.15   Comprehensive metabolic panel     Status: Abnormal   Result Value Ref Range    Sodium 138 135 - 145 mmol/L    Potassium 4.1 3.4 - 5.3 mmol/L    Carbon Dioxide (CO2) 25 22 - 29 mmol/L    Anion Gap 10 7 - 15 mmol/L    Urea Nitrogen 27.4 (H) 8.0 - 23.0 mg/dL    Creatinine 1.06 (H) 0.51 - 0.95 mg/dL    GFR Estimate 49 (L) >60 mL/min/1.73m2    Calcium 9.1 8.2 - 9.6 mg/dL    Chloride 103 98 - 107 mmol/L    Glucose 102 (H) 70 - 99 mg/dL    Alkaline Phosphatase 100 40 - 150 U/L    AST 49 (H) 0 - 45 U/L    ALT 70 (H) 0 - 50 U/L    Protein Total 7.0 6.4 - 8.3 g/dL    Albumin 4.4 3.5 - 5.2 g/dL    Bilirubin Total 0.4 <=1.2 mg/dL   Troponin T, High Sensitivity     Status: Abnormal   Result Value Ref Range    Troponin T, High Sensitivity 23 (H) <=14 ng/L   Extra Tube ([place] Draw)     Status: None    Narrative    The following orders were created for panel order Extra Tube ([place] Draw).  Procedure                               Abnormality         Status                     ---------                               -----------         ------                     Extra Red Top Tube[[id number]]                               Final result                 Please view results for these tests on the individual orders.   CBC with platelets and differential     Status: None   Result Value Ref Range    WBC Count 10.6 4.0 - 11.0 10e3/uL    RBC Count 3.95 3.80 - 5.20 10e6/uL    Hemoglobin 11.9 11.7 - 15.7 g/dL    Hematocrit 37.7 35.0 - 47.0 %    MCV 95 78 - 100 fL    MCH 30.1 26.5 - 33.0 pg    MCHC 31.6 31.5 - 36.5 g/dL    RDW 12.1 10.0 - 15.0 %    Platelet Count 258 150 - 450 10e3/uL    % Neutrophils 77 %    % Lymphocytes 15 %    % Monocytes 6 %    % Eosinophils 1 %    % Basophils 1 %    % Immature Granulocytes 0 %    NRBCs per 100 WBC 0 <1 /100    Absolute Neutrophils 8.1 1.6 - 8.3 10e3/uL    Absolute Lymphocytes 1.6 0.8 - 5.3 10e3/uL    Absolute Monocytes 0.7 0.0 - 1.3  10e3/uL    Absolute Eosinophils 0.1 0.0 - 0.7 10e3/uL    Absolute Basophils 0.1 0.0 - 0.2 10e3/uL    Absolute Immature Granulocytes 0.0 <=0.4 10e3/uL    Absolute NRBCs 0.0 10e3/uL   Extra Red Top Tube     Status: None   Result Value Ref Range    Hold Specimen [facility]    CBC with platelets     Status: Abnormal   Result Value Ref Range    WBC Count 9.8 4.0 - 11.0 10e3/uL    RBC Count 3.41 (L) 3.80 - 5.20 10e6/uL    Hemoglobin 10.3 (L) 11.7 - 15.7 g/dL    Hematocrit 32.2 (L) 35.0 - 47.0 %    MCV 94 78 - 100 fL    MCH 30.2 26.5 - 33.0 pg    MCHC 32.0 31.5 - 36.5 g/dL    RDW 12.3 10.0 - 15.0 %    Platelet Count 217 150 - 450 10e3/uL   Comprehensive metabolic panel     Status: Abnormal   Result Value Ref Range    Sodium 137 135 - 145 mmol/L    Potassium 4.0 3.4 - 5.3 mmol/L    Carbon Dioxide (CO2) 22 22 - 29 mmol/L    Anion Gap 12 7 - 15 mmol/L    Urea Nitrogen 31.3 (H) 8.0 - 23.0 mg/dL    Creatinine 1.08 (H) 0.51 - 0.95 mg/dL    GFR Estimate 48 (L) >60 mL/min/1.73m2    Calcium 8.8 8.2 - 9.6 mg/dL    Chloride 103 98 - 107 mmol/L    Glucose 91 70 - 99 mg/dL    Alkaline Phosphatase 84 40 - 150 U/L    AST 45 0 - 45 U/L    ALT 60 (H) 0 - 50 U/L    Protein Total 6.2 (L) 6.4 - 8.3 g/dL    Albumin 3.8 3.5 - 5.2 g/dL    Bilirubin Total 0.3 <=1.2 mg/dL   Basic metabolic panel     Status: Abnormal   Result Value Ref Range    Sodium 136 135 - 145 mmol/L    Potassium 4.1 3.4 - 5.3 mmol/L    Chloride 105 98 - 107 mmol/L    Carbon Dioxide (CO2) 16 (L) 22 - 29 mmol/L    Anion Gap 15 7 - 15 mmol/L    Urea Nitrogen 30.9 (H) 8.0 - 23.0 mg/dL    Creatinine 0.87 0.51 - 0.95 mg/dL    GFR Estimate 63 >60 mL/min/1.73m2    Calcium 8.9 8.2 - 9.6 mg/dL    Glucose 104 (H) 70 - 99 mg/dL   CBC with platelets     Status: Abnormal   Result Value Ref Range    WBC Count 8.3 4.0 - 11.0 10e3/uL    RBC Count 3.27 (L) 3.80 - 5.20 10e6/uL    Hemoglobin 9.9 (L) 11.7 - 15.7 g/dL    Hematocrit 31.0 (L) 35.0 - 47.0 %    MCV 95 78 - 100 fL    MCH 30.3 26.5 - 33.0 pg     MCHC 31.9 31.5 - 36.5 g/dL    RDW 12.0 10.0 - 15.0 %    Platelet Count 230 150 - 450 10e3/uL   EKG 12-lead, tracing only     Status: None   Result Value Ref Range    Systolic Blood Pressure  mmHg    Diastolic Blood Pressure  mmHg    Ventricular Rate 68 BPM    Atrial Rate 68 BPM    OH Interval 154 ms    QRS Duration 86 ms     ms    QTc 446 ms    P Axis 65 degrees    R AXIS 21 degrees    T Axis 15 degrees    Interpretation ECG       Sinus rhythm  Moderate voltage criteria for LVH, may be normal variant ( Sokolow-Salas , Joey product )  Borderline ECG  When compared with ECG of 01-FEB-2021 19:25,  No significant change was found  Confirmed by GENERATED REPORT, COMPUTER ([id number]),  CHRISTY MAYERS ([id number]) on 3/21/2024 12:22:16 PM     CBC with platelets differential     Status: None    Narrative    The following orders were created for panel order CBC with platelets differential.  Procedure                               Abnormality         Status                     ---------                               -----------         ------                     CBC with platelets and d...[[id number]]                      Final result                 Please view results for these tests on the individual orders.        Assessment and Plan:   Jamee is a very pleasant 89-year-old female who fell while at home in her kitchen yesterday.  She does not recall the specifics of the fall nor the approximate events leading up to calling her daughter and requesting help.  She sustained a laceration as well as contusion to the auricle and contusion of the left face and skull.  She had laceration repair in the emergency department.  Sutures are in good position there is no bleeding.  The auricle is diffusely ecchymotic with no substantial hematoma.  Recommend antibiotic coverage for staph such as cephalexin over the next 5 days.  Recommend follow-up in the emergency department or with primary care for suture removal in 5 to 7 days.   Please call with any questions or concerns.    Attestation:  I have reviewed today's vital signs, notes, medications, medication allergies, and PFSH/ROSlabs and imaging.    Cedrick Prado MD

## 2024-03-23 NOTE — PLAN OF CARE
Reason for Admission: Fell; R SDH    Cognitive/Mentation: A/Ox 4  Neuros/CMS: Intact ex generalized weakness, forgetful, residual deficits from previous stroke- L facial droop, L field cut, baseline neuropathy, eye ptosis, numbness/tingling in BLEs  VS: VSS on RA. Permissive HTN; SBP <150' PRN Hydralazine given x1  Tele: SR.  GI/: INC/INC  Pulmonary: LS clear.  Pain: 3/10; Schedules Tylenol given.     Drains/Lines: External catheter, 1 PIV SL  Skin: L ear and head laceration, stitches in place; Edema, Hematoma, Erthyema noted, EDGAR; ENT consulted. 5th metacarpal fracture on L hand- splint in place  Activity: Assist x 2 with lift.  Diet: Regular with thin liquids. Takes pills whole w/ water.     Therapies recs: PT/OT/SW  Discharge: Plan to discharge to TCU/ [facility]    Aggression Stoplight Tool: Green    End of shift summary: H/x of previous stroke

## 2024-03-24 ENCOUNTER — LAB REQUISITION (OUTPATIENT)
Dept: LAB | Facility: CLINIC | Age: 89
End: 2024-03-24

## 2024-03-24 ENCOUNTER — DOCUMENTATION ONLY (OUTPATIENT)
Dept: GERIATRICS | Facility: CLINIC | Age: 89
End: 2024-03-24
Payer: COMMERCIAL

## 2024-03-24 VITALS
DIASTOLIC BLOOD PRESSURE: 53 MMHG | OXYGEN SATURATION: 97 % | HEART RATE: 75 BPM | TEMPERATURE: 97.8 F | RESPIRATION RATE: 18 BRPM | SYSTOLIC BLOOD PRESSURE: 119 MMHG

## 2024-03-24 DIAGNOSIS — Z11.1 ENCOUNTER FOR SCREENING FOR RESPIRATORY TUBERCULOSIS: ICD-10-CM

## 2024-03-24 LAB
ANION GAP SERPL CALCULATED.3IONS-SCNC: 14 MMOL/L (ref 7–15)
BUN SERPL-MCNC: 22.2 MG/DL (ref 8–23)
CALCIUM SERPL-MCNC: 8.6 MG/DL (ref 8.2–9.6)
CHLORIDE SERPL-SCNC: 106 MMOL/L (ref 98–107)
CREAT SERPL-MCNC: 0.86 MG/DL (ref 0.51–0.95)
DEPRECATED HCO3 PLAS-SCNC: 20 MMOL/L (ref 22–29)
EGFRCR SERPLBLD CKD-EPI 2021: 63 ML/MIN/1.73M2
ERYTHROCYTE [DISTWIDTH] IN BLOOD BY AUTOMATED COUNT: 12.2 % (ref 10–15)
GLUCOSE SERPL-MCNC: 97 MG/DL (ref 70–99)
HCT VFR BLD AUTO: 31.4 % (ref 35–47)
HGB BLD-MCNC: 9.9 G/DL (ref 11.7–15.7)
MCH RBC QN AUTO: 30 PG (ref 26.5–33)
MCHC RBC AUTO-ENTMCNC: 31.5 G/DL (ref 31.5–36.5)
MCV RBC AUTO: 95 FL (ref 78–100)
PLATELET # BLD AUTO: 222 10E3/UL (ref 150–450)
POTASSIUM SERPL-SCNC: 4.1 MMOL/L (ref 3.4–5.3)
RBC # BLD AUTO: 3.3 10E6/UL (ref 3.8–5.2)
SODIUM SERPL-SCNC: 140 MMOL/L (ref 135–145)
WBC # BLD AUTO: 8.4 10E3/UL (ref 4–11)

## 2024-03-24 PROCEDURE — 80048 BASIC METABOLIC PNL TOTAL CA: CPT | Performed by: STUDENT IN AN ORGANIZED HEALTH CARE EDUCATION/TRAINING PROGRAM

## 2024-03-24 PROCEDURE — 250N000013 HC RX MED GY IP 250 OP 250 PS 637: Performed by: STUDENT IN AN ORGANIZED HEALTH CARE EDUCATION/TRAINING PROGRAM

## 2024-03-24 PROCEDURE — 36415 COLL VENOUS BLD VENIPUNCTURE: CPT | Performed by: STUDENT IN AN ORGANIZED HEALTH CARE EDUCATION/TRAINING PROGRAM

## 2024-03-24 PROCEDURE — 85027 COMPLETE CBC AUTOMATED: CPT | Performed by: STUDENT IN AN ORGANIZED HEALTH CARE EDUCATION/TRAINING PROGRAM

## 2024-03-24 PROCEDURE — 99239 HOSP IP/OBS DSCHRG MGMT >30: CPT | Performed by: STUDENT IN AN ORGANIZED HEALTH CARE EDUCATION/TRAINING PROGRAM

## 2024-03-24 RX ORDER — CEPHALEXIN 500 MG/1
500 CAPSULE ORAL EVERY 6 HOURS
DISCHARGE
Start: 2024-03-24 | End: 2024-03-29

## 2024-03-24 RX ORDER — CEPHALEXIN 500 MG/1
500 CAPSULE ORAL EVERY 6 HOURS SCHEDULED
Status: DISCONTINUED | OUTPATIENT
Start: 2024-03-24 | End: 2024-03-24 | Stop reason: HOSPADM

## 2024-03-24 RX ADMIN — SERTRALINE HYDROCHLORIDE 25 MG: 25 TABLET ORAL at 08:03

## 2024-03-24 RX ADMIN — ACETAMINOPHEN 975 MG: 325 TABLET, FILM COATED ORAL at 08:03

## 2024-03-24 RX ADMIN — DORZOLAMIDE HYDROCHLORIDE AND TIMOLOL MALEATE 1 DROP: 20; 5 SOLUTION/ DROPS OPHTHALMIC at 08:01

## 2024-03-24 RX ADMIN — MONTELUKAST SODIUM 1 G: 4 TABLET, CHEWABLE ORAL at 05:10

## 2024-03-24 RX ADMIN — MONTELUKAST SODIUM 1 G: 4 TABLET, CHEWABLE ORAL at 14:16

## 2024-03-24 RX ADMIN — BRIMONIDINE TARTRATE 1 DROP: 2 SOLUTION OPHTHALMIC at 08:09

## 2024-03-24 RX ADMIN — CEPHALEXIN 500 MG: 500 CAPSULE ORAL at 11:07

## 2024-03-24 RX ADMIN — ACETAMINOPHEN 975 MG: 325 TABLET, FILM COATED ORAL at 13:06

## 2024-03-24 ASSESSMENT — ACTIVITIES OF DAILY LIVING (ADL)
ADLS_ACUITY_SCORE: 51

## 2024-03-24 NOTE — DISCHARGE SUMMARY
[facility]  Hospitalist Discharge Summary      Date of Admission:  3/21/2024  Date of Discharge:  3/24/2024  Discharging Provider: Irma Santillan MD  Discharge Service: Hospitalist Service    Discharge Diagnoses   Fall  Right Frontal Superior Sulcus Subarachnoid Hemorrhage     Fracture of the Left Fifth Metacarpal  Right Lower Leg Hematoma    Left Ear Laceration   Frontal Scalp Hematoma  # Ear ecchymosis      Clinically Significant Risk Factors          Follow-ups Needed After Discharge   Follow-up Appointments     Follow Up and recommended labs and tests      Follow up with Nursing home physician.  Cbc and bmp        Follow-up and recommended labs and tests       Your Neurosurgical follow-up appointments have been recommended 1 month   with head CT prior. You may call [phone] to make, confirm or change   your appointment dates and/or times.        Follow-up and recommended labs and tests       Follow-up with Dr. Coffman/Kelly Rodriguez PA-C ([facility]   Orthopedics) at 2 weeks postop for reevaluation. No need for follow up   labs or testing prior to this appointment.     Please contact Dr. Coffman's care coordinator, Vaibhav, at [phone] to   schedule or for any questions related to your orthopedic injury/surgery.    [facility] Orthoped[facility] June After Hours Phone: [phone]            Unresulted Labs Ordered in the Past 30 Days of this Admission       No orders found from 2/20/2024 to 3/22/2024.          Discharge Disposition   Discharged to TCU  Condition at discharge: Stable    Hospital Course   Expand All Collapse All    [facility]  History and Physical - Hospitalist Service       Date of Admission:  3/21/2024  PRIMARY CARE PROVIDER:    Sang Antonio        Assessment & Plan  Jamee KANDIS Douglas is a 89 year old female with a past medical history significant for hx of CVA 2020, HLD, Glaucoma, OA who was admitted on 3/21/24 for  fall possibly due to syncopal event, right subarachnoid hemorrhage in the superior sulcus and fracture of the left fifth metacarpal.      Fall Possibly due to Syncopal Event   Right Frontal Superior Sulcus Subarachnoid Hemorrhage   Likely Concussion   Hx of CVA (Residual minor left sided weakness)  Patient had a fall where she hit her head on a cabinet. Unclear what caused her fall as she does not remember but could be due to syncope. EKG showing NSR with no acute ischemic changes.                - Tele                - Orthostatic Blood Pressure                      - Repeat Head CT stable and NSG no surgical intervention                              - Hold ASA .Patient educated not to take aspirin   -Continue neuro checks and monitor closely for neurodeficits                                                              - No surgical intervention at this time                             - SBP goal < 150                            - Head of bed 30 degrees                             - Will follow up 1 month with head CT prior                                                    - NSGY to arrange                   - Continue to monitor blood pressure                 - PT/OT consulted and recommeding TCU                -Discharged to TCU     Fracture of the Left Fifth Metacarpal  Right Lower Leg Hematoma  ED placed patient in splint                   - Ortho Consulted an Signed off                             - Remain in left ulnar gutter splint at all times. This must remain clean and dry                             - NWB left hand  - WBAT RLE   - Okay to attempt use of a platform walker as long as the patient is not placing weight on the ulnar aspect of her hand.   - PT/OT/SW  - Follow up with Dr. Stephanie Coffman, per family's preference, in 2 weeks for repeat x-rays and reevaluation of the left hand       Left Ear Laceration   Frontal Scalp Hematoma  # Ear ecchymosis  ED provider sutured the extensive laceration                 - Sutures removal in 5-7 days                --ENT consulted likely conservative management  -Apply ice  -Discharged on cephalexein for 5 days ( Pencillin allergy but tolerated cefazolin in the past )      # Anemia  Hb dropped from 11.9 to 10.3-9.9  likey due to hematoma  Hb stable   Elevated LFTs Improving                - Continue to monitor   -Outpatient follow up   Chronic Medical Problems:      Elevated Cr                - Cr: 1.06                             - Baseline: 0.90-1.00  -Creatinine 1.08  on 3/22/22     HLD                - Continue home Atorvastatin      Glaucoma                - Continue home eye drops         # Low bicarbonate level improving  -Bicarbonate improvef to 20  -No Diarrhea.  -                Consultations This Hospital Stay   NEUROSURGERY IP CONSULT  ORTHOPEDIC SURGERY IP CONSULT  PHYSICAL THERAPY ADULT IP CONSULT  OCCUPATIONAL THERAPY ADULT IP CONSULT  CARE MANAGEMENT / SOCIAL WORK IP CONSULT  ENT IP CONSULT  PHYSICAL THERAPY ADULT IP CONSULT  OCCUPATIONAL THERAPY ADULT IP CONSULT    Code Status   No CPR- Do NOT Intubate    Time Spent on this Encounter   I, Irma Santillan MD, personally saw the patient today and spent greater than 30 minutes discharging this patient.       Irma Santillan MD  [facility] NEUROSCIENCE UNIT  [address] [place] [address]  Phone: [phone]  ______________________________________________________________________    Physical Exam   Vital Signs: Temp: 97.8  F (36.6  C) Temp src: Oral BP: 119/53 Pulse: 75   Resp: 18 SpO2: 97 % O2 Device: None (Room air)    Weight: 0 lbs 0 oz  Physical Exam  HENT:      Ears:      Comments: Left ear echymoses with sutures +  Cardiovascular:      Rate and Rhythm: Normal rate and regular rhythm.   Abdominal:      General: There is no distension.      Palpations: Abdomen is soft.      Tenderness: There is no abdominal tenderness.   Skin:     Findings: Bruising present.    Neurological:      Mental Status: She is alert.             Primary Care Physician   ERINN BRYAN    Discharge Orders      CT Head w/o contrast*     Follow-up and recommended labs and tests     Your Neurosurgical follow-up appointments have been recommended 1 month with head CT prior. You may call [phone] to make, confirm or change your appointment dates and/or times.     Follow-up and recommended labs and tests     Follow-up with Dr. Coffman/Kelly Rodriguez PA-C ([facility]) at 2 weeks postop for reevaluation. No need for follow up labs or testing prior to this appointment.     Please contact Dr. Coffman's care coordinator, Vaibhav, at [phone] to schedule or for any questions related to your orthopedic injury/surgery.    [facility] Orth[facility] June After Hours Phone: [phone]     General info for SNF    Length of Stay Estimate: Short Term Care: Estimated # of Days <30  Condition at Discharge: Improving  Level of care:skilled   Rehabilitation Potential: Fair  Admission H&P remains valid and up-to-date: Yes  Recent Chemotherapy: N/A  Use Nursing Home Standing Orders: Yes     Mantoux instructions    Give two-step Mantoux (PPD) Per Facility Policy Yes     Follow Up and recommended labs and tests    Follow up with Nursing home physician.  Cbc and bmp     Reason for your hospital stay    Fall, Sub Arachnoid hemorrage, ear laceration     Additional Discharge Instructions    Stop taking  Aspirin or any antiplatelet medication or blood thinner     Activity    Your activity upon discharge: Non weight bearing left hand. Weight bearing as tolerated RLE. Okay to attempt use of a platform walker as long as  not placing weight on the ulnar aspect of her hand.     Activity    Your activity upon discharge: Limit heavy lifting until follow up visit. Ok to walk as tolerated. No high impact activities until follow-up visit.     Activity - Up with nursing assistance    Non weight bearing left hand.  Weight bearing as tolerated RLE. Okay to attempt use of a platform walker as long as  not placing weight on the ulnar aspect of her hand.  Limit heavy lifting until follow up visit. Ok to walk as tolerated. No high impact activities until follow-up visit.     Physical Therapy Adult Consult    Evaluate and treat as clinically indicated.    Reason:  Weakness fall     Occupational Therapy Adult Consult    Evaluate and treat as clinically indicated.    Reason: Weakness , fall     Fall precautions     Diet    Follow this diet upon discharge: Orders Placed This Encounter      Regular Diet Adult       Significant Results and Procedures   Most Recent 3 CBC's:  Recent Labs   Lab Test 03/24/24  0729 03/23/24  1043 03/22/24  0729   WBC 8.4 8.3 9.8   HGB 9.9* 9.9* 10.3*   MCV 95 95 94    230 217     Most Recent 3 BMP's:  Recent Labs   Lab Test 03/24/24  0729 03/23/24  0721 03/22/24  0729    136 137   POTASSIUM 4.1 4.1 4.0   CHLORIDE 106 105 103   CO2 20* 16* 22   BUN 22.2 30.9* 31.3*   CR 0.86 0.87 1.08*   ANIONGAP 14 15 12   POORNIMA 8.6 8.9 8.8   GLC 97 104* 91       Discharge Medications   Current Discharge Medication List        START taking these medications    Details   cephALEXin (KEFLEX) 500 MG capsule Take 1 capsule (500 mg) by mouth every 6 hours for 5 days    Associated Diagnoses: Laceration of left earlobe, initial encounter           CONTINUE these medications which have NOT CHANGED    Details   acetaminophen (TYLENOL) 325 MG tablet Take 2 tablets (650 mg) by mouth every 4 hours as needed for mild pain or fever (> 101 F)  Qty:      Associated Diagnoses: Acute ischemic right MCA stroke (H)      atorvastatin (LIPITOR) 40 MG tablet Take 1 tablet (40 mg) by mouth every evening  Qty: 30 tablet, Refills: 0    Associated Diagnoses: Acute ischemic stroke (H)      brimonidine (ALPHAGAN) 0.2 % ophthalmic solution Place 1 drop into the right eye 2 times daily      cholecalciferol (VITAMIN D3) 10 mcg (400 units) TABS  tablet Take 1 tablet (10 mcg) by mouth daily  Qty: 30 tablet, Refills: 0    Associated Diagnoses: Preventative health care      colestipol (COLESTID) 1 g tablet Take 1 g by mouth 2 times daily Take 4 hours before eating, ~0600, 1400      Cyanocobalamin (B-12 PO) Take 1 tablet by mouth daily      dorzolamide-timolol (COSOPT) 22.3-6.8 MG/ML ophthalmic solution Place 1 drop into both eyes 2 times daily      sertraline (ZOLOFT) 25 MG tablet Take 25 mg by mouth daily           Allergies   Allergies   Allergen Reactions    Pcn [Penicillins] Swelling     Patient reported swelling around mouth and hands

## 2024-03-24 NOTE — PLAN OF CARE
[id number]. Pt here with R SAH and fall. A&Ox4. Neuros forgetful, generalized weakness, L sided deficits d/t previous stroke, L droop, L field cut, LLE 4/5, N/T from BLE. R eye ptosis. Weak grasp on L hand w/ splint on arm; in place for 5th metacarpal fracture. VSS, SBP <150. Tele NSR. Reg diet, thin liquids. Takes pills whole. Incontinent, perwick in place. Up with Ax1 GBW. Complains of pain 3-5/10 in L arm and ear, managed with scheduled tylenol and ice packs. Bruising scattered, on face, L ear, neck, and R shin. L ear lacerations, sutures in place, ice applied as needed. Pt scoring green on the Aggression Stop Light Tool. Patient discharged. Family providing transportation to [place] [facility]. IV removed. All belongings sent with patient. Discharge education given to patient and family, no further questions at this time.

## 2024-03-24 NOTE — PLAN OF CARE
Pt here with R SAH. A&Ox4. Neuros L droop, L field cut, LLE 4/5, R eye ptosis. VSS<150. Tele NSR. Regular diet, thin liquids. Takes pills whole. Up with A1GBW. Denies pain. Pt scoring green on the Aggression Stop Light Tool. PW in place. T/R q2. Pending TCU availability. Discharge pending.

## 2024-03-24 NOTE — PROGRESS NOTES
Care Management Discharge Note    Discharge Date: 03/24/2024       Discharge Disposition: Transitional Care    Discharge Services: Transportation Services    Discharge DME: None, Other (see comment) (possible DME for adaptive equipment on LUE NWB)    Discharge Transportation: family or friend will provide, health plan transportation (possible need for [facility] wheelchair)    Private pay costs discussed: Not applicable    Does the patient's insurance plan have a 3 day qualifying hospital stay waiver?  No    PAS Confirmation Code:    Patient/family educated on Medicare website which has current facility and service quality ratings: yes ([facility] list and Medicare.gov)    Education Provided on the Discharge Plan: Yes  Persons Notified of Discharge Plans: HUC, Charge RN, RN, MD, Facility, Patient and family  Patient/Family in Agreement with the Plan: yes    Handoff Referral Completed: No    Additional Information:  Patient will discharge today with family transporting between 3-4 over the [address] to [place]. Writer informed facility and nurses about discharge. Writer called patient's daughter Olinda who will be coming to transport the patient. Patient has PAS completed and faxed to the facility. No scripts are needed to be hand faxed to the facility. Patient will be discharging to [facility].     Osorio Peters [facility]  Care Management

## 2024-03-25 ENCOUNTER — TRANSITIONAL CARE UNIT VISIT (OUTPATIENT)
Dept: GERIATRICS | Facility: CLINIC | Age: 89
End: 2024-03-25
Payer: COMMERCIAL

## 2024-03-25 VITALS
SYSTOLIC BLOOD PRESSURE: 122 MMHG | DIASTOLIC BLOOD PRESSURE: 67 MMHG | OXYGEN SATURATION: 99 % | TEMPERATURE: 97.6 F | RESPIRATION RATE: 18 BRPM | HEART RATE: 88 BPM

## 2024-03-25 DIAGNOSIS — E78.5 HYPERLIPIDEMIA WITH TARGET LDL LESS THAN 130: ICD-10-CM

## 2024-03-25 DIAGNOSIS — Z78.9 IMPAIRED MOBILITY AND ACTIVITIES OF DAILY LIVING: ICD-10-CM

## 2024-03-25 DIAGNOSIS — D62 ANEMIA DUE TO BLOOD LOSS, ACUTE: ICD-10-CM

## 2024-03-25 DIAGNOSIS — S09.90XA INJURY OF HEAD, INITIAL ENCOUNTER: ICD-10-CM

## 2024-03-25 DIAGNOSIS — S62.337A CLOSED DISPLACED FRACTURE OF NECK OF FIFTH METACARPAL BONE OF LEFT HAND, INITIAL ENCOUNTER: ICD-10-CM

## 2024-03-25 DIAGNOSIS — Z74.09 IMPAIRED MOBILITY AND ACTIVITIES OF DAILY LIVING: ICD-10-CM

## 2024-03-25 DIAGNOSIS — I60.9 SAH (SUBARACHNOID HEMORRHAGE) (H): ICD-10-CM

## 2024-03-25 DIAGNOSIS — I60.9 SUBARACHNOID HEMORRHAGE (H): ICD-10-CM

## 2024-03-25 DIAGNOSIS — R53.81 PHYSICAL DECONDITIONING: ICD-10-CM
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
## 2024-03-25 DIAGNOSIS — S01.312A LACERATION OF LEFT EARLOBE, INITIAL ENCOUNTER: ICD-10-CM

## 2024-03-25 PROCEDURE — 86481 TB AG RESPONSE T-CELL SUSP: CPT | Performed by: PHYSICIAN ASSISTANT

## 2024-03-25 PROCEDURE — P9604 ONE-WAY ALLOW PRORATED TRIP: HCPCS | Performed by: PHYSICIAN ASSISTANT

## 2024-03-25 PROCEDURE — 99309 SBSQ NF CARE MODERATE MDM 30: CPT | Performed by: PHYSICIAN ASSISTANT

## 2024-03-25 RX ORDER — ACETAMINOPHEN 500 MG
500 TABLET ORAL 3 TIMES DAILY
Status: ON HOLD | COMMUNITY
End: 2024-06-05

## 2024-03-25 NOTE — PLAN OF CARE
"Physical Therapy Discharge Summary    Reason for therapy discharge:    Discharged to transitional care facility.    Progress towards therapy goal(s). See goals on Care Plan in [facility] electronic health record for goal details.  Goals partially met.  Barriers to achieving goals:   discharge from facility.    Therapy recommendation(s):    Continued therapy is recommended.  Rationale/Recommendations:  per most recent PT note: \"Pt is below baseline. Pt currently requiring assist with all functional mobility. Pt presenting with deficits in activity tolerance, balance, and strength. Due to these deficits, pt is a falls risk and unsafe to return home alone at this time. Pt would benefit from continued skilled PT services via TCU to address deficits and improve IND with safety and functional mobility.\" .      "

## 2024-03-25 NOTE — PROGRESS NOTES
[facility] GERIATRICS    PRIMARY CARE PROVIDER AND CLINIC:  ERINN BRYAN MD, [address]  / [place] [id number]  Chief Complaint   Patient presents with    Hospital F/U      [place] Medical Record Number:  [id number]  Place of Service where encounter took place:  [facility] ([facility]) [[id number]]      HPI:    Patient is a 89-year-old female with past medical history of CVA in 2020 with residual left-sided weakness, hyperlipidemia, glaucoma, osteoarthritis who was admitted at [facility] from 3/21 - 3/24, 2024 after presenting following a fall of unclear etiology with closed head injury and right leg, left hand pain.  Workup in ED revealed a right superior sulcus subarachnoid hemorrhage, significant facial bruising and associated frontal cephalohematoma, left ear ecchymosis with laceration, right lower leg hematoma and fracture of left fifth metacarpal.  Ear laceration was repaired using sutures.  She was seen in consultation with neurosurgery with nonoperative management recommended.  Aspirin was held.  CT was stable on repeat.  She was also seen in consultation with orthopedics, ulnar gutter splint was placed to left hand with recommendations for nonweightbearing status, cleared to use platform walker.  Also seen by ENT for ear laceration, who also noted contusion to or call with diffuse ecchymosis.  Antibiotic coverage was recommended and recommended outpatient suture removal in 5 to 7 days. Following therapy evaluations patient was referred to TCU for ongoing rehab, medical management.    Patient is presently evaluated as initial visit.  She is resting in bed on interview, has worked with both physical and occupational therapies today and is tired.  Denies chest pain, shortness of breath.  States that her ear has been bothersome today, pointing to the posterior aspect of the ear.  Denies tinnitus or dizziness.  Is tolerating oral intake, no constipation.  Reports that pain is  controlled in her wrist and leg, feels the leg is markedly improving. Prior to hospitalization lives in assisted living facility, plans to return. Confirms DNR/DNI.    CODE STATUS/ADVANCE DIRECTIVES DISCUSSION:  Prior  DNR / DNI  ALLERGIES:   Allergies   Allergen Reactions    Pcn [Penicillins] Swelling     Patient reported swelling around mouth and hands      PAST MEDICAL HISTORY:   Past Medical History:   Diagnosis Date    Glaucoma     Hyperlipidemia LDL goal < 130     LBP (low back pain)     Osteoarthritis       PAST SURGICAL HISTORY:   has a past surgical history that includes Hysterectomy; surgical history of - ; Arthroplasty knee; surgical history of - ; and surgical history of - .  FAMILY HISTORY: family history includes Colon Cancer in her brother; Lung Cancer in her sister; Other - See Comments in her mother; Prostate Cancer in her father.  SOCIAL HISTORY:   reports that she has never smoked. She has never used smokeless tobacco. She reports current alcohol use. She reports that she does not use drugs.  Patient's living condition: lives in an assisted living facility    Post Discharge Medication Reconciliation Status:   MED REC REQUIRED  Post Medication Reconciliation Status: discharge medications reconciled, continue medications without change       Current Outpatient Medications   Medication Sig    acetaminophen (TYLENOL) 500 MG tablet Take 1,000 mg by mouth 3 times daily    atorvastatin (LIPITOR) 40 MG tablet Take 1 tablet (40 mg) by mouth every evening    brimonidine (ALPHAGAN) 0.2 % ophthalmic solution Place 1 drop into the right eye 2 times daily    cephALEXin (KEFLEX) 500 MG capsule Take 1 capsule (500 mg) by mouth every 6 hours for 5 days    cholecalciferol (VITAMIN D3) 10 mcg (400 units) TABS tablet Take 1 tablet (10 mcg) by mouth daily    colestipol (COLESTID) 1 g tablet Take 1 g by mouth 2 times daily Take 4 hours before eating, ~0600, 1400    Cyanocobalamin (B-12 PO) Take 1 tablet by mouth daily     dorzolamide-timolol (COSOPT) 22.3-6.8 MG/ML ophthalmic solution Place 1 drop into both eyes 2 times daily    sertraline (ZOLOFT) 25 MG tablet Take 25 mg by mouth daily     No current facility-administered medications for this visit.       ROS:  4 point ROS including Respiratory, CV, GI and , other than that noted in the HPI,  is negative    Vitals:  /67   Pulse 88   Temp 97.6  F (36.4  C)   Resp 18   LMP 01/01/1970   SpO2 99%   Exam:  GEN: well-developed, well-nourished, appears comfortable  HEENT: Normocephalic, extensive ecchymosis present to left frontal scalp extending through left ear and into mastoid, mandibular angle; ear is edematous without drainage or warmth, sutures in place; EOM intact bilaterally, nose & mouth patent, mucous membranes moist  CHEST: lungs CTA bilaterally, no increased work of breathing, no wheeze, crackles, rhonchi  HEART: RRR, S1 & S2  ABD: soft, nontender, nondistended, no guarding or rigidity, +BS in all 4 quadrants  MSK: AROM bilateral UE/LE, pedal & radial pulses 2+ bilaterally  NEURO: awake, alert, oriented to name, place, and time. CN II-XII grossly intact. Sensation grossly intact to light touch.   SKIN: warm & dry without rash, no pedal edema      Lab/Diagnostic data:  Labs done in SNF are in [facility]. Please refer to them using EPIC/Care Everywhere., Recent labs in EPIC reviewed by me today. , and Most Recent 3 CBC's:  Recent Labs   Lab Test 03/24/24  0729 03/23/24  1043 03/22/24  0729   WBC 8.4 8.3 9.8   HGB 9.9* 9.9* 10.3*   MCV 95 95 94    230 217     Most Recent 3 BMP's:  Recent Labs   Lab Test 03/24/24  0729 03/23/24  0721 03/22/24  0729    136 137   POTASSIUM 4.1 4.1 4.0   CHLORIDE 106 105 103   CO2 20* 16* 22   BUN 22.2 30.9* 31.3*   CR 0.86 0.87 1.08*   ANIONGAP 14 15 12   POORNIMA 8.6 8.9 8.8   GLC 97 104* 91       ASSESSMENT/PLAN:    Fall  Left frontal cephalohematoma  Right superior frontal sulcus acute subarachnoid hemorrhage  As above,  nonoperative management.  Repeat head imaging was stable.  Neurologically intact on exam today.  Concern for possible syncopal etiology, EKG and orthostatic blood pressures were negative.  No evidence of arrhythmia while inpatient.  TTE ED or outpatient cardiac monitoring was not pursued.  -Monitor neurologic status  -Holding ASA  -Follow-up with neurosurgery as directed  -Consider outpatient Zio patch monitoring upon TCU discharge    Left ear laceration s/p repair 3/21/2024  Seen in consult with ENT, felt to be healing well without additional injury to inner ear.  -Suture removal in 5 to 7 days  -Continues on Keflex for infection prevention 500 mg every 6 hours    Left fifth metacarpal fracture  Right lower leg hematoma  Physical deconditioning  Impaired mobility and ADLs  Placed in ulnar gutter splint in the ED, pain controlled.  -Continue pain control with Tylenol 1000 mg 3 times daily  -NWB to left hand, cleared to attempt use of platform walker as long as there is no weightbearing on ulnar aspect of the hand  -PT/OT evaluations  -SW for discharge planning  -Follow-up with orthopedics as an outpatient    Anemia  Hgb 11.9--9.9, suspected 2/2 hematoma.  Stable at time of discharge.  -CBC 3/28    Hyperlipidemia  Hx CVA  -Holding aspirin as noted above  -Continues on atorvastatin    Glaucoma  Chronic, stable.  -Continue eyedrops    Electronically signed by:  Huy Bah PA-C

## 2024-03-26 LAB
GAMMA INTERFERON BACKGROUND BLD IA-ACNC: 0.05 IU/ML
M TB IFN-G BLD-IMP: NEGATIVE
M TB IFN-G CD4+ BCKGRND COR BLD-ACNC: 3.47 IU/ML
MITOGEN IGNF BCKGRD COR BLD-ACNC: 0.02 IU/ML
MITOGEN IGNF BCKGRD COR BLD-ACNC: 0.02 IU/ML
QUANTIFERON MITOGEN: 3.52 IU/ML
QUANTIFERON NIL TUBE: 0.05 IU/ML
QUANTIFERON TB1 TUBE: 0.07 IU/ML
QUANTIFERON TB2 TUBE: 0.07

## 2024-03-27 PROBLEM — S06.5XAA SUBDURAL HEMATOMA (H): Status: RESOLVED | Noted: 2021-02-20 | Resolved: 2024-03-27

## 2024-03-27 PROBLEM — S06.9XAA UNSPECIFIED INTRACRANIAL INJURY WITH LOSS OF CONSCIOUSNESS STATUS UNKNOWN, INITIAL ENCOUNTER (H): Status: RESOLVED | Noted: 2024-03-22 | Resolved: 2024-03-27

## 2024-03-29 ENCOUNTER — TELEPHONE (OUTPATIENT)
Dept: GERIATRICS | Facility: CLINIC | Age: 89
End: 2024-03-29

## 2024-03-29 ENCOUNTER — TRANSITIONAL CARE UNIT VISIT (OUTPATIENT)
Dept: GERIATRICS | Facility: CLINIC | Age: 89
End: 2024-03-29
Payer: COMMERCIAL

## 2024-03-29 VITALS
DIASTOLIC BLOOD PRESSURE: 58 MMHG | OXYGEN SATURATION: 98 % | WEIGHT: 114.1 LBS | HEIGHT: 62 IN | HEART RATE: 74 BPM | BODY MASS INDEX: 20.99 KG/M2 | TEMPERATURE: 97.8 F | SYSTOLIC BLOOD PRESSURE: 114 MMHG | RESPIRATION RATE: 18 BRPM

## 2024-03-29 DIAGNOSIS — R53.81 PHYSICAL DECONDITIONING: ICD-10-CM

## 2024-03-29 DIAGNOSIS — E78.5 HYPERLIPIDEMIA WITH TARGET LDL LESS THAN 130: ICD-10-CM

## 2024-03-29 DIAGNOSIS — D62 ANEMIA DUE TO BLOOD LOSS, ACUTE: ICD-10-CM

## 2024-03-29 DIAGNOSIS — Z78.9 IMPAIRED MOBILITY AND ACTIVITIES OF DAILY LIVING: ICD-10-CM

## 2024-03-29 DIAGNOSIS — S09.90XA INJURY OF HEAD, INITIAL ENCOUNTER: ICD-10-CM

## 2024-03-29 DIAGNOSIS — Z74.09 IMPAIRED MOBILITY AND ACTIVITIES OF DAILY LIVING: ICD-10-CM

## 2024-03-29 DIAGNOSIS — I60.9 SAH (SUBARACHNOID HEMORRHAGE) (H): ICD-10-CM

## 2024-03-29 DIAGNOSIS — I60.9 SUBARACHNOID HEMORRHAGE (H): ICD-10-CM
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
## 2024-03-29 DIAGNOSIS — S62.337A CLOSED DISPLACED FRACTURE OF NECK OF FIFTH METACARPAL BONE OF LEFT HAND, INITIAL ENCOUNTER: ICD-10-CM

## 2024-03-29 DIAGNOSIS — M25.552 HIP PAIN, LEFT: Primary | ICD-10-CM

## 2024-03-29 DIAGNOSIS — S01.312A LACERATION OF LEFT EARLOBE, INITIAL ENCOUNTER: ICD-10-CM

## 2024-03-29 DIAGNOSIS — S80.11XD LEG HEMATOMA, RIGHT, SUBSEQUENT ENCOUNTER: ICD-10-CM

## 2024-03-29 PROCEDURE — 99309 SBSQ NF CARE MODERATE MDM 30: CPT | Performed by: PHYSICIAN ASSISTANT

## 2024-03-29 RX ORDER — OXYCODONE HYDROCHLORIDE 5 MG/1
2.5 TABLET ORAL EVERY 6 HOURS PRN
Qty: 12 TABLET | Refills: 0 | Status: SHIPPED | OUTPATIENT
Start: 2024-03-29 | End: 2024-04-07

## 2024-03-29 NOTE — TELEPHONE ENCOUNTER
[facility] Triage Nurse Telephone Encounter    Provider: Huy Bah PA-C  Facility: [facility] Facility Type:  TCU    Caller: Nessa    Allergies:    Allergies   Allergen Reactions    Pcn [Penicillins] Swelling     Patient reported swelling around mouth and hands        Reason for call: has SOB, lungs are clear. However O2 sats are 83-84% on 2LPM, they bumped her O2 up to 5LPM and she's still satting at 88-89%. /79  HR 72    Verbal Order/Direction given by Provider:   - Send to ER for evaluation    Provider giving Order:  Huy Bah PA-C    Verbal Order given to: Nessa Galeana RN

## 2024-03-29 NOTE — PROGRESS NOTES
[facility] GERIATRICS    Chief Complaint   Patient presents with    RECHECK     HPI:  Jamee Douglas is a 89 year old  (8/1/1932), who is being seen today for an episodic care visit at: [facility] (TCU) [[id number]].     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  Workup in ED revealed a right superior sulcus subarachnoid hemorrhage, significant facial bruising and associated frontal cephalohematoma, left ear ecchymosis with laceration, right lower leg hematoma and fracture of left fifth metacarpal.  Ear laceration was repaired using sutures.  She was seen in consultation with neurosurgery with nonoperative management recommended.  Aspirin was held.  CT was stable on repeat.  She was also seen in consultation with orthopedics, ulnar gutter splint was placed to left hand with recommendations for nonweightbearing status, cleared to use platform walker.  Also seen by ENT for ear laceration, who also noted contusion to auricle with diffuse ecchymosis.  Antibiotic coverage was recommended and recommended outpatient suture removal in 5 to 7 days. Following therapy evaluations patient was referred to TCU for ongoing rehab, medical management.     Today, pt is seen in follow-up. Sitting up in chair at bedside. Has reported increased pain to the R leg hematoma, asks for something stronger than tylenol. Also reports new onset left hip pain, has been more bothersome since being at TCU. States was present at time of fall but is more painful lately. Denies cp, sob. Tolerating oral intake. Denies constipation.     Allergies, and PMH/PSH reviewed in EPIC today.  REVIEW OF SYSTEMS:  4 point ROS including Respiratory, CV, GI and , other  "than that noted in the HPI,  is negative    Objective:   /58   Pulse 74   Temp 97.8  F (36.6  C)   Resp 18   Ht 1.575 m (5' 2\")   Wt 51.8 kg (114 lb 1.6 oz)   LMP 01/01/1970   SpO2 98%   BMI 20.87 kg/m    GEN: well-developed, well-nourished, appears comfortable  HEENT: Normocephalic, extensive ecchymosis present to left frontal scalp extending through left ear and into mastoid, mandibular angle; ear is edematous without drainage or warmth, sutures in place; EOM intact bilaterally, nose & mouth patent, mucous membranes moist  CHEST: lungs CTA bilaterally, no increased work of breathing, no wheeze, crackles, rhonchi  HEART: RRR, S1 & S2  ABD: soft, nontender, nondistended, no guarding or rigidity, +BS in all 4 quadrants  MSK: AROM bilateral UE/LE, RUE in short arm splint, RLE with localized edema to proximal tib/fib; pedal & radial pulses 2+ bilaterally  NEURO: awake, alert, oriented to name, place, and time. CN II-XII grossly intact. Sensation grossly intact to light touch.   SKIN: warm & dry without rash, no pedal edema    Labs done in SNF are in [facility]. Please refer to them using EPIC/Care Everywhere., Recent labs in EPIC reviewed by me today. , and Most Recent 3 CBC's:  Recent Labs   Lab Test 03/24/24  0729 03/23/24  1043 03/22/24  0729   WBC 8.4 8.3 9.8   HGB 9.9* 9.9* 10.3*   MCV 95 95 94    230 217     Most Recent 3 BMP's:  Recent Labs   Lab Test 03/24/24  0729 03/23/24  0721 03/22/24  0729    136 137   POTASSIUM 4.1 4.1 4.0   CHLORIDE 106 105 103   CO2 20* 16* 22   BUN 22.2 30.9* 31.3*   CR 0.86 0.87 1.08*   ANIONGAP 14 15 12   POORNIMA 8.6 8.9 8.8   GLC 97 104* 91       Assessment/Plan:    L hip pain  Has been present since fall, worse lately. Chart reviewed, no dedicated imaging. Denies constipation.  -L hip 2-view, pelvis AP/lat XRs    Fall  Left frontal cephalohematoma  Right superior frontal sulcus acute subarachnoid hemorrhage  As above, nonoperative management.  Repeat head " imaging was stable.  Neurologically intact on exam today.  Concern for possible syncopal etiology, EKG and orthostatic blood pressures were negative.  No evidence of arrhythmia while inpatient.  TTE ED or outpatient cardiac monitoring was not pursued.  -Monitor neurologic status  -Holding ASA  -Follow-up with neurosurgery as directed  -Consider outpatient Zio patch monitoring upon TCU discharge     Left ear laceration s/p repair 3/21/2024  Seen in consult with ENT, felt to be healing well without additional injury to inner ear. Ear edema has improved, laceration without evidence of infection.  -Suture removal     Left fifth metacarpal fracture  Right lower leg hematoma  Physical deconditioning  Impaired mobility and ADLs  Placed in ulnar gutter splint in the ED. Pain uncontrolled, worsened to R lower leg with point tenderness along proximal tib/fib. Compartments soft.  -Continue pain control with Tylenol 1000 mg 3 times daily  -Add oxycodone 2.5mg q6h PRN severe pain  -NWB to left hand, cleared to attempt use of platform walker as long as there is no weightbearing on ulnar aspect of the hand  -PT/OT continuing  -SW for discharge planning  -Follow-up with orthopedics as an outpatient     Anemia  Hgb 11.9--9.9, suspected 2/2 hematoma.  Stable at time of discharge.     Hyperlipidemia  Hx CVA  -Holding aspirin as noted above  -Continues on atorvastatin     Glaucoma  Chronic, stable.  -Continue eyedrops    MED REC REQUIRED  Post Medication Reconciliation Status: patient was not discharged from an inpatient facility or TCU      Electronically signed by: Huy Bah PA-C

## 2024-04-01 ENCOUNTER — TRANSITIONAL CARE UNIT VISIT (OUTPATIENT)
Dept: GERIATRICS | Facility: CLINIC | Age: 89
End: 2024-04-01
Payer: COMMERCIAL

## 2024-04-01 VITALS
TEMPERATURE: 97.3 F | HEART RATE: 77 BPM | RESPIRATION RATE: 18 BRPM | SYSTOLIC BLOOD PRESSURE: 156 MMHG | OXYGEN SATURATION: 96 % | BODY MASS INDEX: 20.8 KG/M2 | DIASTOLIC BLOOD PRESSURE: 69 MMHG | WEIGHT: 113 LBS | HEIGHT: 62 IN

## 2024-04-01 DIAGNOSIS — D62 ANEMIA DUE TO BLOOD LOSS, ACUTE: ICD-10-CM

## 2024-04-01 DIAGNOSIS — Z74.09 IMPAIRED MOBILITY AND ACTIVITIES OF DAILY LIVING: ICD-10-CM

## 2024-04-01 DIAGNOSIS — I60.9 SAH (SUBARACHNOID HEMORRHAGE) (H): ICD-10-CM

## 2024-04-01 DIAGNOSIS — Z78.9 IMPAIRED MOBILITY AND ACTIVITIES OF DAILY LIVING: ICD-10-CM

## 2024-04-01 DIAGNOSIS — S01.312A LACERATION OF LEFT EARLOBE, INITIAL ENCOUNTER: ICD-10-CM

## 2024-04-01 DIAGNOSIS — S09.90XA INJURY OF HEAD, INITIAL ENCOUNTER: ICD-10-CM

## 2024-04-01 DIAGNOSIS — R53.81 PHYSICAL DECONDITIONING: ICD-10-CM

## 2024-04-01 DIAGNOSIS — S80.11XD LEG HEMATOMA, RIGHT, SUBSEQUENT ENCOUNTER: ICD-10-CM

## 2024-04-01 DIAGNOSIS — S62.337A CLOSED DISPLACED FRACTURE OF NECK OF FIFTH METACARPAL BONE OF LEFT HAND, INITIAL ENCOUNTER: ICD-10-CM

## 2024-04-01 DIAGNOSIS — M25.552 HIP PAIN, LEFT: ICD-10-CM
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
## 2024-04-01 DIAGNOSIS — I60.9 SUBARACHNOID HEMORRHAGE (H): ICD-10-CM

## 2024-04-01 PROBLEM — H26.9 CATARACT: Status: ACTIVE | Noted: 2024-04-01

## 2024-04-01 PROBLEM — G81.94 LEFT HEMIPLEGIA (H): Status: ACTIVE | Noted: 2020-12-10

## 2024-04-01 PROBLEM — R73.03 PRE-DIABETES: Status: ACTIVE | Noted: 2020-12-10

## 2024-04-01 PROBLEM — I10 HTN (HYPERTENSION): Status: ACTIVE | Noted: 2024-04-01

## 2024-04-01 PROBLEM — Z86.73 HISTORY OF STROKE WITHIN LAST YEAR: Status: ACTIVE | Noted: 2020-12-17

## 2024-04-01 PROBLEM — I67.2 INTRACRANIAL ATHEROSCLEROSIS: Status: ACTIVE | Noted: 2021-01-22

## 2024-04-01 PROBLEM — M54.16 LUMBAR RADICULOPATHY: Status: ACTIVE | Noted: 2018-12-11

## 2024-04-01 PROCEDURE — 99309 SBSQ NF CARE MODERATE MDM 30: CPT | Performed by: PHYSICIAN ASSISTANT

## 2024-04-01 NOTE — LETTER
4/1/2024        RE: Jamee Douglas  [address]  [facility] [address]        [facility]    No chief complaint on file.    HPI:  Jamee Douglas is a 89 year old  (8/1/1932), who is being seen today for an episodic care visit at: No question data found..     ***    Today, pt is seen in follow-up. ***    Allergies, and PMH/PSH reviewed in EPIC today.  REVIEW OF SYSTEMS:  4 point ROS including Respiratory, CV, GI and , other than that noted in the HPI,  is negative    Objective:   LMP 01/01/1970   {Nursing home physical exam :082201}    {fgslab:790046}    Assessment/Plan:    ***    MED REC REQUIRED{TIP  Click the link below to document or use med rec list, use list to pull in response :[id number]}  Post Medication Reconciliation Status: {MED REC LIST:798347}      Electronically signed by: Jarrett Smith            Sincerely,        Huy Bah PA-C

## 2024-04-01 NOTE — PROGRESS NOTES
[facility] GERIATRICS    Chief Complaint   Patient presents with    Nursing Home Acute     HPI:  Jamee Douglas is a 89 year old  (8/1/1932), who is being seen today for an episodic care visit at: [facility] ([facility]) [[id number]].     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  Workup in ED revealed a right superior sulcus subarachnoid hemorrhage, significant facial bruising and associated frontal cephalohematoma, left ear ecchymosis with laceration, right lower leg hematoma and fracture of left fifth metacarpal.  Ear laceration was repaired.  She was seen in consultation with neurosurgery with nonoperative management recommended.  Aspirin was held.  CT was stable on repeat.  She was also seen in consultation with orthopedics, ulnar gutter splint was placed to left hand with recommendations for nonweightbearing status, cleared to use platform walker.  Also seen by ENT for ear laceration, who also noted contusion to auricle with diffuse ecchymosis.  Antibiotic coverage was recommended along with outpatient suture removal in 5 to 7 days. Following therapy evaluations patient was referred to TCU for ongoing rehab, medical management.      Today, pt is seen in follow-up. Resting in chair on interview. Reports she is doing well, pain control improved and is ambulating in therapies with platform walker. No cp, sob. No lightheadedness/dizziness. Ear is feeling better, sutures have been removed. Reviewed results of imaging we ordered last visit, pt is glad to hear she does not have additional injuries. No further concerns today. Has ortho follow-up soon, planning to get a less bulky splint.    On review of facility records, BPs  "113-131, HRs 73-78. Minimal use of oxycodone.    Allergies, and PMH/PSH reviewed in [facility] today.  REVIEW OF SYSTEMS:  4 point ROS including Respiratory, CV, GI and , other than that noted in the HPI,  is negative    Objective:   BP (!) 156/69   Pulse 77   Temp 97.3  F (36.3  C)   Resp 18   Ht 1.575 m (5' 2\")   Wt 51.3 kg (113 lb)   LMP 01/01/1970   SpO2 96%   BMI 20.67 kg/m    GEN: well-developed, well-nourished, appears comfortable  HEENT: Normocephalic, extensive ecchymosis present to left frontal scalp extending through left ear and into mastoid, mandibular angle; ear is edematous without drainage or warmth, sutures have since been removed and wound remains c/d/i; EOM intact bilaterally, nose & mouth patent, mucous membranes moist  CHEST: lungs CTA bilaterally, no increased work of breathing, no wheeze, crackles, rhonchi  HEART: RRR, S1 & S2  ABD: soft, nontender, nondistended, no guarding or rigidity, +BS in all 4 quadrants  MSK: AROM bilateral UE/LE, RUE in short arm splint, RLE with localized edema to proximal tib/fib; pedal & radial pulses 2+ bilaterally  NEURO: awake, alert, oriented to name, place, and time. CN II-XII grossly intact. Sensation grossly intact to light touch.   SKIN: warm & dry without rash, no pedal edema    Recent labs in [facility] reviewed by me today.  and Most Recent 3 CBC's:  Recent Labs   Lab Test 03/24/24  0729 03/23/24  1043 03/22/24  0729   WBC 8.4 8.3 9.8   HGB 9.9* 9.9* 10.3*   MCV 95 95 94    230 217     Most Recent 3 BMP's:  Recent Labs   Lab Test 03/24/24  0729 03/23/24  0721 03/22/24  0729    136 137   POTASSIUM 4.1 4.1 4.0   CHLORIDE 106 105 103   CO2 20* 16* 22   BUN 22.2 30.9* 31.3*   CR 0.86 0.87 1.08*   ANIONGAP 14 15 12   POORNIMA 8.6 8.9 8.8   GLC 97 104* 91     Most Recent 2 LFT's:  Recent Labs   Lab Test 03/22/24  0729 03/21/24  1010   AST 45 49*   ALT 60* 70*   ALKPHOS 84 100   BILITOTAL 0.3 0.4       Assessment/Plan:     Fall  Left frontal " cephalohematoma  Right superior frontal sulcus acute subarachnoid hemorrhage  As above, nonoperative management.  Repeat head imaging was stable.  Neurologically intact on exam today.  Concern for possible syncopal etiology, EKG and orthostatic blood pressures were negative.  No evidence of arrhythmia while inpatient.  TTE or outpatient cardiac monitoring was not pursued.  -Monitor neurologic status  -Holding ASA  -Follow-up with neurosurgery as directed  -Consider outpatient Zio patch monitoring upon TCU discharge     Left ear laceration s/p repair 3/21/2024  Seen in consult with ENT, felt to be healing well without additional injury to inner ear. Ear edema has improved, sutures removed from laceration and healing well.  -Monitor     Left fifth metacarpal fracture  Right lower leg hematoma  L hip pain, improved  Physical deconditioning  Impaired mobility and ADLs  Placed in ulnar gutter splint in the ED. Reported new onset L hip pain, plain films neg at TCU. Also had repeat imaging to R tib/fib due to persistent pain, neg. Pain control improved with PRN oxycodone.  -Continue pain control with Tylenol 1000 mg 3 times daily, oxy 2.5mg q6h PRN  -NWB to left hand, cleared to attempt use of platform walker as long as there is no weightbearing on ulnar aspect of the hand  -PT/OT continuing  -SW for discharge planning  -Follow-up with orthopedics as an outpatient     Anemia  Hgb 11.9--9.9, suspected 2/2 hematoma.  Stable at time of discharge. Follow-up labs requested for 3/27 not performed.  -CBC 4/4     Hyperlipidemia  Hx CVA  -Holding aspirin as noted above  -Continues on atorvastatin     Glaucoma  Chronic, stable.  -Continue eyedrops    MED REC REQUIRED  Post Medication Reconciliation Status: patient was not discharged from an inpatient facility or TCU      Electronically signed by: Huy Bah PA-C

## 2024-04-03 ENCOUNTER — LAB REQUISITION (OUTPATIENT)
Dept: LAB | Facility: CLINIC | Age: 89
End: 2024-04-03
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
## 2024-04-04 LAB
ANION GAP SERPL CALCULATED.3IONS-SCNC: 10 MMOL/L (ref 7–15)
BUN SERPL-MCNC: 19 MG/DL (ref 8–23)
CALCIUM SERPL-MCNC: 9 MG/DL (ref 8.2–9.6)
CHLORIDE SERPL-SCNC: 106 MMOL/L (ref 98–107)
CREAT SERPL-MCNC: 0.91 MG/DL (ref 0.51–0.95)
DEPRECATED HCO3 PLAS-SCNC: 24 MMOL/L (ref 22–29)
EGFRCR SERPLBLD CKD-EPI 2021: 59 ML/MIN/1.73M2
ERYTHROCYTE [DISTWIDTH] IN BLOOD BY AUTOMATED COUNT: 13.2 % (ref 10–15)
GLUCOSE SERPL-MCNC: 92 MG/DL (ref 70–99)
HCT VFR BLD AUTO: 32.4 % (ref 35–47)
HGB BLD-MCNC: 10.1 G/DL (ref 11.7–15.7)
MCH RBC QN AUTO: 29.9 PG (ref 26.5–33)
MCHC RBC AUTO-ENTMCNC: 31.2 G/DL (ref 31.5–36.5)
MCV RBC AUTO: 96 FL (ref 78–100)
PLATELET # BLD AUTO: 279 10E3/UL (ref 150–450)
POTASSIUM SERPL-SCNC: 3.8 MMOL/L (ref 3.4–5.3)
RBC # BLD AUTO: 3.38 10E6/UL (ref 3.8–5.2)
SODIUM SERPL-SCNC: 140 MMOL/L (ref 135–145)
WBC # BLD AUTO: 6 10E3/UL (ref 4–11)

## 2024-04-04 PROCEDURE — 80048 BASIC METABOLIC PNL TOTAL CA: CPT | Performed by: PHYSICIAN ASSISTANT

## 2024-04-04 PROCEDURE — 85027 COMPLETE CBC AUTOMATED: CPT | Performed by: PHYSICIAN ASSISTANT

## 2024-04-04 PROCEDURE — P9604 ONE-WAY ALLOW PRORATED TRIP: HCPCS | Performed by: PHYSICIAN ASSISTANT

## 2024-04-04 PROCEDURE — 36415 COLL VENOUS BLD VENIPUNCTURE: CPT | Performed by: PHYSICIAN ASSISTANT

## 2024-04-05 ENCOUNTER — DISCHARGE SUMMARY NURSING HOME (OUTPATIENT)
Dept: GERIATRICS | Facility: CLINIC | Age: 89
End: 2024-04-05
Payer: COMMERCIAL

## 2024-04-05 VITALS
HEART RATE: 75 BPM | BODY MASS INDEX: 20.91 KG/M2 | HEIGHT: 62 IN | RESPIRATION RATE: 18 BRPM | DIASTOLIC BLOOD PRESSURE: 68 MMHG | SYSTOLIC BLOOD PRESSURE: 122 MMHG | OXYGEN SATURATION: 99 % | TEMPERATURE: 98.7 F | WEIGHT: 113.6 LBS

## 2024-04-05 DIAGNOSIS — Z74.09 IMPAIRED MOBILITY AND ACTIVITIES OF DAILY LIVING: ICD-10-CM

## 2024-04-05 DIAGNOSIS — D62 ANEMIA DUE TO BLOOD LOSS, ACUTE: ICD-10-CM

## 2024-04-05 DIAGNOSIS — Z78.9 IMPAIRED MOBILITY AND ACTIVITIES OF DAILY LIVING: ICD-10-CM
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
## 2024-04-05 DIAGNOSIS — R53.81 PHYSICAL DECONDITIONING: ICD-10-CM

## 2024-04-05 DIAGNOSIS — S09.90XA INJURY OF HEAD, INITIAL ENCOUNTER: ICD-10-CM

## 2024-04-05 DIAGNOSIS — S01.312A LACERATION OF LEFT EARLOBE, INITIAL ENCOUNTER: Primary | ICD-10-CM

## 2024-04-05 DIAGNOSIS — M25.552 HIP PAIN, LEFT: ICD-10-CM

## 2024-04-05 DIAGNOSIS — S62.337A CLOSED DISPLACED FRACTURE OF NECK OF FIFTH METACARPAL BONE OF LEFT HAND, INITIAL ENCOUNTER: ICD-10-CM

## 2024-04-05 DIAGNOSIS — S80.11XD LEG HEMATOMA, RIGHT, SUBSEQUENT ENCOUNTER: ICD-10-CM

## 2024-04-05 DIAGNOSIS — I60.9 SAH (SUBARACHNOID HEMORRHAGE) (H): ICD-10-CM

## 2024-04-05 DIAGNOSIS — I60.9 SUBARACHNOID HEMORRHAGE (H): ICD-10-CM

## 2024-04-05 PROCEDURE — 99316 NF DSCHRG MGMT 30 MIN+: CPT | Performed by: PHYSICIAN ASSISTANT

## 2024-04-05 NOTE — PROGRESS NOTES
[facility] GERIATRICS DISCHARGE SUMMARY  PATIENT'S NAME: Jamee Douglas  YOB: 1932  MEDICAL RECORD NUMBER:  [id number]  Place of Service where encounter took place:  [facility] ([facility]) [[id number]]    PRIMARY CARE PROVIDER AND CLINIC RESPONSIBLE AFTER TRANSFER:   ERINN BRYAN MD, [address]  / [place] [id number]    Non-FMG Provider     Transferring providers: Huy Bah PA-C, Dr. Alexsander MD  Recent Hospitalization/ED:  [facility] stay 3/21/24 to 3/24/24.  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  Workup in ED revealed a right superior sulcus subarachnoid hemorrhage, significant facial bruising and associated frontal cephalohematoma, left ear ecchymosis with laceration, right lower leg hematoma and fracture of left fifth metacarpal.  Ear laceration was repaired.  She was seen in consultation with neurosurgery with nonoperative management recommended.  Aspirin was held.  CT was stable on repeat.  She was also seen in consultation with orthopedics, ulnar gutter splint was placed to left hand with recommendations for nonweightbearing status, cleared to use platform walker.  Also seen by ENT for ear laceration, who also noted contusion to auricle with diffuse ecchymosis.  Antibiotic coverage was  recommended along with outpatient suture removal in 5 to 7 days. Following therapy evaluations patient was referred to TCU for ongoing rehab, medical management.      Pt is seen today for discharge planning. She is resting in recliner at bedside, reports she is doing well. Has minimally requiring oxycodone for discomfort, left hip and right knee pain are improving. Denies sob. Has been seen in follow-up with orthopedics and transitioned to a splint, much more comfortable. Continues working in therapies. Looking forward to discharging, feels ready. The following were managed during TCU stay;    Fall  Left frontal cephalohematoma  Right superior frontal sulcus acute subarachnoid hemorrhage  As above, nonoperative management.  Repeat head imaging was stable.  Neurologically intact on exam today.  Concern for possible syncopal etiology, EKG and orthostatic blood pressures were negative.  No evidence of arrhythmia while inpatient.  TTE or outpatient cardiac monitoring was not pursued.  -Holding ASA, resume per neurosurgery  -Follow-up with neurosurgery as directed  -Consider outpatient Zio patch monitoring upon PCP follow-up     Left ear laceration s/p repair 3/21/2024  Seen in consult with ENT, felt to be healing well without additional injury to inner ear. Ear edema has improved, sutures removed from laceration and healing well.     Left fifth metacarpal fracture  Right lower leg hematoma  L hip pain, improved  Physical deconditioning  Impaired mobility and ADLs  Placed in ulnar gutter splint in the ED. Reported new onset L hip pain, plain films neg at TCU. Also had repeat imaging to R tib/fib due to persistent pain, neg. Pain control improved with PRN oxycodone.  -Continue pain control with Tylenol 1000 mg 3 times daily, oxy 2.5mg q6h PRN  -NWB to left hand, cleared to attempt use of platform walker as long as there is no weightbearing on ulnar aspect of the hand  -PT/OT continuing  -Follow-up with orthopedics as an  "outpatient     Anemia  Hgb 11.9--9.9, suspected 2/2 hematoma.  Stable on follow-up, value 10.1.     Hyperlipidemia  Hx CVA  -Holding aspirin as noted above  -Continues on atorvastatin     Glaucoma  Chronic, stable.  -Continue eyedrops    Discharge Medications:  MED REC REQUIRED  Post Medication Reconciliation Status: patient was not discharged from an inpatient facility or TCU     Current Outpatient Medications   Medication Sig Dispense Refill    oxyCODONE (ROXICODONE) 5 MG tablet Take 0.5 tablets (2.5 mg) by mouth every 6 hours as needed for pain 15 tablet 0    acetaminophen (TYLENOL) 500 MG tablet Take 1,000 mg by mouth 3 times daily      atorvastatin (LIPITOR) 40 MG tablet Take 1 tablet (40 mg) by mouth every evening 30 tablet 0    brimonidine (ALPHAGAN) 0.2 % ophthalmic solution Place 1 drop into the right eye 2 times daily      cholecalciferol (VITAMIN D3) 10 mcg (400 units) TABS tablet Take 1 tablet (10 mcg) by mouth daily 30 tablet 0    colestipol (COLESTID) 1 g tablet Take 1 g by mouth 2 times daily Take 4 hours before eating, ~0600, 1400      Cyanocobalamin (B-12 PO) Take 1 tablet by mouth daily      dorzolamide-timolol (COSOPT) 22.3-6.8 MG/ML ophthalmic solution Place 1 drop into both eyes 2 times daily      sertraline (ZOLOFT) 25 MG tablet Take 25 mg by mouth daily         Controlled medications:   Medication oxycodone electronically prescribed to home pharmacy     Past Medical History:   Past Medical History:   Diagnosis Date    Glaucoma     Hyperlipidemia LDL goal < 130     LBP (low back pain)     Osteoarthritis      Physical Exam:   Vitals: /68   Pulse 75   Temp 98.7  F (37.1  C)   Resp 18   Ht 1.575 m (5' 2\")   Wt 51.5 kg (113 lb 9.6 oz)   LMP 01/01/1970   SpO2 99%   BMI 20.78 kg/m    BMI: Body mass index is 20.78 kg/m .  GEN: well-developed, well-nourished, appears comfortable  HEENT: Normocephalic, extensive ecchymosis present to left frontal scalp extending through left ear and into " mastoid, mandibular angle; ear is edematous without drainage or warmth, sutures have since been removed and wound remains c/d/i; EOM intact bilaterally, nose & mouth patent, mucous membranes moist  CHEST: lungs CTA bilaterally, no increased work of breathing, no wheeze, crackles, rhonchi  HEART: RRR, S1 & S2  ABD: soft, nontender, nondistended, no guarding or rigidity, +BS in all 4 quadrants  MSK: AROM bilateral UE/LE, RUE in short arm removable splint, RLE with localized edema to proximal tib/fib; pedal & radial pulses 2+ bilaterally  NEURO: awake, alert, oriented to name, place, and time. CN II-XII grossly intact. Sensation grossly intact to light touch.   SKIN: warm & dry without rash, no pedal edema    SNF labs: Recent labs in [facility] reviewed by me today.  and Most Recent 3 CBC's:  Recent Labs   Lab Test 04/04/24  0638 03/24/24  0729 03/23/24  1043   WBC 6.0 8.4 8.3   HGB 10.1* 9.9* 9.9*   MCV 96 95 95    222 230     Most Recent 3 BMP's:  Recent Labs   Lab Test 04/04/24  0638 03/24/24  0729 03/23/24  0721    140 136   POTASSIUM 3.8 4.1 4.1   CHLORIDE 106 106 105   CO2 24 20* 16*   BUN 19.0 22.2 30.9*   CR 0.91 0.86 0.87   ANIONGAP 10 14 15   POORNIMA 9.0 8.6 8.9   GLC 92 97 104*       DISCHARGE PLAN:  Follow up labs: No labs orders/due  Medical Follow Up:      Follow up with primary care provider in 1-2 weeks  [facility] scheduled appointments:     Discharge Services: Home Care:  Occupational Therapy, Physical Therapy, Registered Nurse, and Home Health Aide  Discharge Instructions Verbalized to Patient at Discharge:   None    TOTAL DISCHARGE TIME:   Greater than 30min  Electronically signed by:  Huy Bah PA-C     Documentation of Face to Face and Certification for Home Health Services    I certify that services are/were furnished while this patient was under the care of a physician and that a physician or an allowed non-physician practitioner (NPP), had a face-to-face encounter that meets the  physician face-to-face encounter requirements. The encounter was in whole, or in part, related to the primary reason for home health. The patient is confined to his/her home and needs intermittent skilled nursing, physical therapy, speech-language pathology, or the continued need for occupational therapy. A plan of care has been established by a physician and is periodically reviewed by a physician.  Date of Face-to-Face Encounter: 4/5/2024.    I certify that, based on my findings, the following services are medically necessary home health services: Nursing, Occupational Therapy, and Physical Therapy.    My clinical findings support the need for the above skilled services because: Requires assistance of another person or specialized equipment to access medical services because patient: Range of motion limitations prevents ability to exit home safely...    Patient to re-establish plan of care with their PCP within 7-10 days after leaving the facility to reestablish care.  Medicare certified SADI provider: Huy Bah PA-C  Date: April 5, 2024

## 2024-04-07 RX ORDER — OXYCODONE HYDROCHLORIDE 5 MG/1
2.5 TABLET ORAL EVERY 6 HOURS PRN
Qty: 15 TABLET | Refills: 0 | Status: SHIPPED | OUTPATIENT
Start: 2024-04-07 | End: 2024-06-03

## 2024-06-03 ENCOUNTER — APPOINTMENT (OUTPATIENT)
Dept: GENERAL RADIOLOGY | Facility: CLINIC | Age: 89
DRG: 521 | End: 2024-06-03
Attending: EMERGENCY MEDICINE
Payer: COMMERCIAL

## 2024-06-03 ENCOUNTER — HOSPITAL ENCOUNTER (INPATIENT)
Facility: CLINIC | Age: 89
LOS: 8 days | Discharge: NURSING, REHAB OR LONG-TERM CARE | DRG: 521 | End: 2024-06-11
Attending: EMERGENCY MEDICINE | Admitting: HOSPITALIST
Payer: COMMERCIAL

## 2024-06-03 DIAGNOSIS — S72.002A CLOSED FRACTURE OF NECK OF LEFT FEMUR, INITIAL ENCOUNTER (H): ICD-10-CM

## 2024-06-03 DIAGNOSIS — R41.0 DELIRIUM: Primary | ICD-10-CM

## 2024-06-03 LAB
ABO/RH(D): NORMAL
ALBUMIN SERPL BCG-MCNC: 4.3 G/DL (ref 3.5–5.2)
ALP SERPL-CCNC: 62 U/L (ref 40–150)
ALT SERPL W P-5'-P-CCNC: 33 U/L (ref 0–50)
ANION GAP SERPL CALCULATED.3IONS-SCNC: 13 MMOL/L (ref 7–15)
ANTIBODY SCREEN: NEGATIVE
AST SERPL W P-5'-P-CCNC: 38 U/L (ref 0–45)
ATRIAL RATE - MUSE: 107 BPM
BASOPHILS # BLD AUTO: 0.1 10E3/UL (ref 0–0.2)
BASOPHILS NFR BLD AUTO: 0 %
BILIRUB SERPL-MCNC: 0.6 MG/DL
BUN SERPL-MCNC: 28.1 MG/DL (ref 8–23)
CALCIUM SERPL-MCNC: 9.2 MG/DL (ref 8.2–9.6)
CHLORIDE SERPL-SCNC: 102 MMOL/L (ref 98–107)
CREAT SERPL-MCNC: 1.1 MG/DL (ref 0.51–0.95)
CREAT SERPL-MCNC: 1.1 MG/DL (ref 0.51–0.95)
DEPRECATED HCO3 PLAS-SCNC: 24 MMOL/L (ref 22–29)
DIASTOLIC BLOOD PRESSURE - MUSE: NORMAL MMHG
EGFRCR SERPLBLD CKD-EPI 2021: 47 ML/MIN/1.73M2
EGFRCR SERPLBLD CKD-EPI 2021: 47 ML/MIN/1.73M2
EOSINOPHIL # BLD AUTO: 0 10E3/UL (ref 0–0.7)
EOSINOPHIL NFR BLD AUTO: 0 %
ERYTHROCYTE [DISTWIDTH] IN BLOOD BY AUTOMATED COUNT: 12.7 % (ref 10–15)
FLUAV RNA SPEC QL NAA+PROBE: NEGATIVE
FLUBV RNA RESP QL NAA+PROBE: NEGATIVE
GLUCOSE SERPL-MCNC: 113 MG/DL (ref 70–99)
HCT VFR BLD AUTO: 39.9 % (ref 35–47)
HGB BLD-MCNC: 12.7 G/DL (ref 11.7–15.7)
HOLD SPECIMEN: 0
HOLD SPECIMEN: NORMAL
IMM GRANULOCYTES # BLD: 0.1 10E3/UL
IMM GRANULOCYTES NFR BLD: 1 %
INR PPP: 1.08 (ref 0.85–1.15)
INTERPRETATION ECG - MUSE: NORMAL
LYMPHOCYTES # BLD AUTO: 1.2 10E3/UL (ref 0.8–5.3)
LYMPHOCYTES NFR BLD AUTO: 11 %
MCH RBC QN AUTO: 30.5 PG (ref 26.5–33)
MCHC RBC AUTO-ENTMCNC: 31.8 G/DL (ref 31.5–36.5)
MCV RBC AUTO: 96 FL (ref 78–100)
MONOCYTES # BLD AUTO: 1.2 10E3/UL (ref 0–1.3)
MONOCYTES NFR BLD AUTO: 11 %
NEUTROPHILS # BLD AUTO: 9 10E3/UL (ref 1.6–8.3)
NEUTROPHILS NFR BLD AUTO: 77 %
NRBC # BLD AUTO: 0 10E3/UL
NRBC BLD AUTO-RTO: 0 /100
P AXIS - MUSE: 50 DEGREES
PLATELET # BLD AUTO: 170 10E3/UL (ref 150–450)
POTASSIUM SERPL-SCNC: 4.4 MMOL/L (ref 3.4–5.3)
PR INTERVAL - MUSE: 174 MS
PROT SERPL-MCNC: 7.3 G/DL (ref 6.4–8.3)
QRS DURATION - MUSE: 88 MS
QT - MUSE: 342 MS
QTC - MUSE: 456 MS
R AXIS - MUSE: 51 DEGREES
RBC # BLD AUTO: 4.17 10E6/UL (ref 3.8–5.2)
RSV RNA SPEC NAA+PROBE: NEGATIVE
SARS-COV-2 RNA RESP QL NAA+PROBE: POSITIVE
SODIUM SERPL-SCNC: 139 MMOL/L (ref 135–145)
SPECIMEN EXPIRATION DATE: NORMAL
SYSTOLIC BLOOD PRESSURE - MUSE: NORMAL MMHG
T AXIS - MUSE: -57 DEGREES
VENTRICULAR RATE- MUSE: 107 BPM
WBC # BLD AUTO: 11.6 10E3/UL (ref 4–11)

## 2024-06-03 PROCEDURE — 80053 COMPREHEN METABOLIC PANEL: CPT | Performed by: EMERGENCY MEDICINE

## 2024-06-03 PROCEDURE — 250N000011 HC RX IP 250 OP 636: Performed by: EMERGENCY MEDICINE

## 2024-06-03 PROCEDURE — 250N000013 HC RX MED GY IP 250 OP 250 PS 637: Performed by: HOSPITALIST

## 2024-06-03 PROCEDURE — 85041 AUTOMATED RBC COUNT: CPT | Performed by: EMERGENCY MEDICINE

## 2024-06-03 PROCEDURE — 99285 EMERGENCY DEPT VISIT HI MDM: CPT | Mod: 25

## 2024-06-03 PROCEDURE — 85610 PROTHROMBIN TIME: CPT | Performed by: EMERGENCY MEDICINE

## 2024-06-03 PROCEDURE — 99223 1ST HOSP IP/OBS HIGH 75: CPT | Performed by: HOSPITALIST

## 2024-06-03 PROCEDURE — 258N000003 HC RX IP 258 OP 636: Performed by: HOSPITALIST

## 2024-06-03 PROCEDURE — 87637 SARSCOV2&INF A&B&RSV AMP PRB: CPT | Performed by: HOSPITALIST

## 2024-06-03 PROCEDURE — 250N000011 HC RX IP 250 OP 636: Performed by: HOSPITALIST

## 2024-06-03 PROCEDURE — 73502 X-RAY EXAM HIP UNI 2-3 VIEWS: CPT

## 2024-06-03 PROCEDURE — 86900 BLOOD TYPING SEROLOGIC ABO: CPT | Performed by: EMERGENCY MEDICINE

## 2024-06-03 PROCEDURE — 71045 X-RAY EXAM CHEST 1 VIEW: CPT

## 2024-06-03 PROCEDURE — 93005 ELECTROCARDIOGRAM TRACING: CPT

## 2024-06-03 PROCEDURE — 96376 TX/PRO/DX INJ SAME DRUG ADON: CPT

## 2024-06-03 PROCEDURE — 36415 COLL VENOUS BLD VENIPUNCTURE: CPT | Performed by: EMERGENCY MEDICINE

## 2024-06-03 PROCEDURE — 120N000001 HC R&B MED SURG/OB

## 2024-06-03 PROCEDURE — 258N000003 HC RX IP 258 OP 636: Performed by: EMERGENCY MEDICINE

## 2024-06-03 PROCEDURE — 96361 HYDRATE IV INFUSION ADD-ON: CPT

## 2024-06-03 PROCEDURE — 96374 THER/PROPH/DIAG INJ IV PUSH: CPT

## 2024-06-03 RX ORDER — ATORVASTATIN CALCIUM 40 MG/1
40 TABLET, FILM COATED ORAL EVERY EVENING
Status: DISCONTINUED | OUTPATIENT
Start: 2024-06-03 | End: 2024-06-11 | Stop reason: HOSPADM

## 2024-06-03 RX ORDER — QUETIAPINE FUMARATE 25 MG/1
25 TABLET, FILM COATED ORAL ONCE
Status: COMPLETED | OUTPATIENT
Start: 2024-06-03 | End: 2024-06-03

## 2024-06-03 RX ORDER — ONDANSETRON 4 MG/1
4 TABLET, ORALLY DISINTEGRATING ORAL EVERY 6 HOURS PRN
Status: DISCONTINUED | OUTPATIENT
Start: 2024-06-03 | End: 2024-06-11 | Stop reason: HOSPADM

## 2024-06-03 RX ORDER — HYDRALAZINE HYDROCHLORIDE 20 MG/ML
10 INJECTION INTRAMUSCULAR; INTRAVENOUS EVERY 6 HOURS PRN
Status: DISCONTINUED | OUTPATIENT
Start: 2024-06-03 | End: 2024-06-11 | Stop reason: HOSPADM

## 2024-06-03 RX ORDER — CEFAZOLIN SODIUM 2 G/100ML
2 INJECTION, SOLUTION INTRAVENOUS
Status: CANCELLED | OUTPATIENT
Start: 2024-06-03

## 2024-06-03 RX ORDER — ACETAMINOPHEN 650 MG/1
650 SUPPOSITORY RECTAL EVERY 4 HOURS PRN
Status: DISCONTINUED | OUTPATIENT
Start: 2024-06-03 | End: 2024-06-11 | Stop reason: HOSPADM

## 2024-06-03 RX ORDER — TRANEXAMIC ACID 10 MG/ML
1 INJECTION, SOLUTION INTRAVENOUS ONCE
Status: CANCELLED | OUTPATIENT
Start: 2024-06-03 | End: 2024-06-03

## 2024-06-03 RX ORDER — NALOXONE HYDROCHLORIDE 0.4 MG/ML
0.2 INJECTION, SOLUTION INTRAMUSCULAR; INTRAVENOUS; SUBCUTANEOUS
Status: DISCONTINUED | OUTPATIENT
Start: 2024-06-03 | End: 2024-06-11 | Stop reason: HOSPADM

## 2024-06-03 RX ORDER — MONTELUKAST SODIUM 4 MG/1
1 TABLET, CHEWABLE ORAL 2 TIMES DAILY
Status: DISCONTINUED | OUTPATIENT
Start: 2024-06-04 | End: 2024-06-11 | Stop reason: HOSPADM

## 2024-06-03 RX ORDER — SERTRALINE HYDROCHLORIDE 25 MG/1
25 TABLET, FILM COATED ORAL DAILY
Status: DISCONTINUED | OUTPATIENT
Start: 2024-06-04 | End: 2024-06-11 | Stop reason: HOSPADM

## 2024-06-03 RX ORDER — OXYCODONE HYDROCHLORIDE 5 MG/1
5 TABLET ORAL EVERY 4 HOURS PRN
Status: DISCONTINUED | OUTPATIENT
Start: 2024-06-03 | End: 2024-06-11 | Stop reason: HOSPADM

## 2024-06-03 RX ORDER — ENOXAPARIN SODIUM 100 MG/ML
30 INJECTION SUBCUTANEOUS EVERY 24 HOURS
Status: DISCONTINUED | OUTPATIENT
Start: 2024-06-03 | End: 2024-06-04

## 2024-06-03 RX ORDER — AMOXICILLIN 250 MG
1 CAPSULE ORAL 2 TIMES DAILY PRN
Status: DISCONTINUED | OUTPATIENT
Start: 2024-06-03 | End: 2024-06-11 | Stop reason: HOSPADM

## 2024-06-03 RX ORDER — ACETAMINOPHEN 325 MG/1
650 TABLET ORAL EVERY 4 HOURS PRN
Status: DISCONTINUED | OUTPATIENT
Start: 2024-06-03 | End: 2024-06-11 | Stop reason: HOSPADM

## 2024-06-03 RX ORDER — ACETAMINOPHEN 500 MG
1000 TABLET ORAL 3 TIMES DAILY
Status: DISCONTINUED | OUTPATIENT
Start: 2024-06-03 | End: 2024-06-11 | Stop reason: HOSPADM

## 2024-06-03 RX ORDER — HYDROMORPHONE HYDROCHLORIDE 1 MG/ML
0.5 INJECTION, SOLUTION INTRAMUSCULAR; INTRAVENOUS; SUBCUTANEOUS EVERY 30 MIN PRN
Status: COMPLETED | OUTPATIENT
Start: 2024-06-03 | End: 2024-06-03

## 2024-06-03 RX ORDER — SODIUM CHLORIDE 9 MG/ML
INJECTION, SOLUTION INTRAVENOUS CONTINUOUS
Status: DISCONTINUED | OUTPATIENT
Start: 2024-06-03 | End: 2024-06-03

## 2024-06-03 RX ORDER — ONDANSETRON 2 MG/ML
4 INJECTION INTRAMUSCULAR; INTRAVENOUS EVERY 6 HOURS PRN
Status: DISCONTINUED | OUTPATIENT
Start: 2024-06-03 | End: 2024-06-11 | Stop reason: HOSPADM

## 2024-06-03 RX ORDER — CALCIUM CARBONATE 500 MG/1
1000 TABLET, CHEWABLE ORAL 4 TIMES DAILY PRN
Status: DISCONTINUED | OUTPATIENT
Start: 2024-06-03 | End: 2024-06-11 | Stop reason: HOSPADM

## 2024-06-03 RX ORDER — LIDOCAINE 40 MG/G
CREAM TOPICAL
Status: DISCONTINUED | OUTPATIENT
Start: 2024-06-03 | End: 2024-06-11 | Stop reason: HOSPADM

## 2024-06-03 RX ORDER — CEFAZOLIN SODIUM 2 G/100ML
2 INJECTION, SOLUTION INTRAVENOUS SEE ADMIN INSTRUCTIONS
Status: CANCELLED | OUTPATIENT
Start: 2024-06-03

## 2024-06-03 RX ORDER — NALOXONE HYDROCHLORIDE 0.4 MG/ML
0.4 INJECTION, SOLUTION INTRAMUSCULAR; INTRAVENOUS; SUBCUTANEOUS
Status: DISCONTINUED | OUTPATIENT
Start: 2024-06-03 | End: 2024-06-11 | Stop reason: HOSPADM

## 2024-06-03 RX ORDER — SODIUM CHLORIDE 9 MG/ML
INJECTION, SOLUTION INTRAVENOUS CONTINUOUS
Status: DISCONTINUED | OUTPATIENT
Start: 2024-06-03 | End: 2024-06-05

## 2024-06-03 RX ORDER — AMOXICILLIN 250 MG
2 CAPSULE ORAL 2 TIMES DAILY PRN
Status: DISCONTINUED | OUTPATIENT
Start: 2024-06-03 | End: 2024-06-11 | Stop reason: HOSPADM

## 2024-06-03 RX ADMIN — HYDROMORPHONE HYDROCHLORIDE 0.5 MG: 1 INJECTION, SOLUTION INTRAMUSCULAR; INTRAVENOUS; SUBCUTANEOUS at 17:16

## 2024-06-03 RX ADMIN — HYDROMORPHONE HYDROCHLORIDE 0.5 MG: 1 INJECTION, SOLUTION INTRAMUSCULAR; INTRAVENOUS; SUBCUTANEOUS at 13:45

## 2024-06-03 RX ADMIN — QUETIAPINE FUMARATE 25 MG: 25 TABLET ORAL at 18:55

## 2024-06-03 RX ADMIN — OXYCODONE HYDROCHLORIDE 5 MG: 5 TABLET ORAL at 21:35

## 2024-06-03 RX ADMIN — SODIUM CHLORIDE: 9 INJECTION, SOLUTION INTRAVENOUS at 21:35

## 2024-06-03 RX ADMIN — ENOXAPARIN SODIUM 30 MG: 30 INJECTION SUBCUTANEOUS at 21:32

## 2024-06-03 RX ADMIN — SODIUM CHLORIDE 1000 ML: 9 INJECTION, SOLUTION INTRAVENOUS at 13:50

## 2024-06-03 RX ADMIN — HYDROMORPHONE HYDROCHLORIDE 0.5 MG: 1 INJECTION, SOLUTION INTRAMUSCULAR; INTRAVENOUS; SUBCUTANEOUS at 15:01

## 2024-06-03 RX ADMIN — ACETAMINOPHEN 1000 MG: 500 TABLET, FILM COATED ORAL at 21:32

## 2024-06-03 RX ADMIN — ATORVASTATIN CALCIUM 40 MG: 40 TABLET, FILM COATED ORAL at 21:31

## 2024-06-03 ASSESSMENT — ACTIVITIES OF DAILY LIVING (ADL)
ADLS_ACUITY_SCORE: 40

## 2024-06-03 NOTE — ED NOTES
[facility]  ED Nurse Handoff Report    ED Chief complaint: Fall, Hip Pain, and Shortness of Breath      ED Diagnosis:   Final diagnoses:   Closed fracture of neck of left femur, initial encounter (H)       Code Status: Full Code    Allergies:   Allergies   Allergen Reactions    Pcn [Penicillins] Swelling     Patient reported swelling around mouth and hands       Patient Story:   Pt fell coming out of the clinic today.    Focused Assessment:    Neuro: Alert, oriented x 3  Respiratory:Clear lung sounds, on room air   Cardiology:  Tachy at times   Gastrointestinal: soft, non tender, non distended   Genitourinary/Renal:  Purewick  Musculoskeletal: moves all extremities, L hip pain with movement  Skin: Intact skin   Lines: PIV    Labs Ordered and Resulted from Time of ED Arrival to Time of ED Departure   COMPREHENSIVE METABOLIC PANEL - Abnormal       Result Value    Sodium 139      Potassium 4.4      Carbon Dioxide (CO2) 24      Anion Gap 13      Urea Nitrogen 28.1 (*)     Creatinine 1.10 (*)     GFR Estimate 47 (*)     Calcium 9.2      Chloride 102      Glucose 113 (*)     Alkaline Phosphatase 62      AST 38      ALT 33      Protein Total 7.3      Albumin 4.3      Bilirubin Total 0.6     CBC WITH PLATELETS AND DIFFERENTIAL - Abnormal    WBC Count 11.6 (*)     RBC Count 4.17      Hemoglobin 12.7      Hematocrit 39.9      MCV 96      MCH 30.5      MCHC 31.8      RDW 12.7      Platelet Count 170      % Neutrophils 77      % Lymphocytes 11      % Monocytes 11      % Eosinophils 0      % Basophils 0      % Immature Granulocytes 1      NRBCs per 100 WBC 0      Absolute Neutrophils 9.0 (*)     Absolute Lymphocytes 1.2      Absolute Monocytes 1.2      Absolute Eosinophils 0.0      Absolute Basophils 0.1      Absolute Immature Granulocytes 0.1      Absolute NRBCs 0.0     INFLUENZA A/B, RSV, & SARS-COV2 PCR - Abnormal    Influenza A PCR Negative      Influenza B PCR Negative      RSV PCR Negative      SARS CoV2  PCR Positive (*)    INR - Normal    INR 1.08     ROUTINE UA WITH MICROSCOPIC REFLEX TO CULTURE   TYPE AND SCREEN, ADULT    ABO/RH(D) O POS      Antibody Screen Negative      SPECIMEN EXPIRATION DATE [id number]     ABO/RH TYPE AND SCREEN        XR Pelvis and Hip Left 1 View   Final Result   IMPRESSION: Left femoral neck fracture. There is 3 cm of displacement   and impaction. There is a moderate degree of apex superolateral and   anterior angulation. Incidentally noted are degenerative and   postoperative changes in the spine and diffuse bone demineralization.      ROSEMARY TOVAR MD            SYSTEM ID:  [id number]      XR Chest 1 View   Final Result   IMPRESSION: No acute disease.      JOSELUIS KENT MD            SYSTEM ID:  [id number]            Treatments and/or interventions provided:      Medications   sodium chloride 0.9 % infusion (has no administration in time range)   hydrALAZINE (APRESOLINE) injection 10 mg (0 mg Intravenous Return to Cabinet 6/3/24 1603)   sodium chloride 0.9% BOLUS 1,000 mL (1,000 mLs Intravenous $New Bag 6/3/24 1350)   HYDROmorphone (PF) (DILAUDID) injection 0.5 mg (0.5 mg Intravenous $Given 6/3/24 1716)        Patient's response to treatments and/or interventions:   Resting comfortably    To be done/followed up on inpatient unit:    See any in-patient orders    Does this patient have any cognitive concerns?: Forgetful    Activity level - Baseline/Home:    Walker    Activity Level - Current:     Total Care    Patient's Preferred language: English     Needed?: No    Isolation: Covid  Infection: Not Applicable  COVID r/o and special precautions  Patient tested for COVID 19 prior to admission: YES    Bariatric?: No    Vital Signs:   Vitals:    06/03/24 1607 06/03/24 1609 06/03/24 1624 06/03/24 1709   BP:       Pulse:       Resp:       Temp:       SpO2: 95% 95% 95% 98%       Cardiac Rhythm:     Was the PSS-3 completed:   Yes    Family Comments: Daughter in room    For the  majority of the shift this patient's behavior was Green.   Behavioral interventions performed were None    ED NURSE PHONE NUMBER: Rn [phone]

## 2024-06-03 NOTE — H&P
[facility]    History and Physical  Hospitalist       Date of Admission:  6/3/2024    Assessment & Plan   Jamee Douglas is a 89 year old female with a previous history of stroke in 2020 with residual left-sided weakness, hyperlipidemia, osteoarthritis and glaucoma who was visiting primary clinic for cold symptoms and while exiting the building, the door closed on her and she had a fall and landed on the left side of her body.  Was unable to get up after this or bear weight on her left leg.  EMS was called and patient was transferred to [facility]    #1 left hip fracture s/p mechanical fall  History of recurrent falls  -Patient was given one-time dose of IV fentanyl 75 mcg with good improvement with her pain.  -Pelvic x-ray showed left femoral neck fracture with 3 cm displacement and impaction.  Moderate degree of apex superolateral and anterior angulation.  -Chest x-ray with no acute findings.  -Oral oxycodone, Tylenol and IV Dilaudid ordered.  -Ortho consulted.  -Lovenox for DVT prophylaxis.  -N.p.o. until Ortho evaluation.  -Preop evaluation-revised cardiac risk index is at 1 point giving her a class II risk with 6% 30-day risk of death, MI and cardiac arrest.  - EKG ordered   -urinalysis ordered     -Patient is at risk of recurrent falls and was recently admitted on 3/21/2024 for a fall with a subarachnoid hemorrhage and lower leg hematoma that was managed conservatively.  Family has just arranged for her to move into an assisted living facility from her current independent living facility.  -PT OT ordered.      #History of stroke with residual left-sided weakness  -Patient is on atorvastatin 40 mg daily.  Continue for now.  -At baseline she is able to walk long distances with the help of a walker.    #Depression-on sertraline 25 mg daily.    #Hypertension-patient is not on any antihypertensives at home.  Currently systolic blood pressures over 180 likely secondary to anxiety  and pain from the fall.  On IV as needed hydralazine for SBP greater than 160.      #Chronic diarrhea-on colestipol 1 g 2 times daily, 4 hours prior to eating.      Clinically Significant Risk Factors Present on Admission                  # Hypertension: Noted on problem list                DVT Prophylaxis: Enoxaparin (Lovenox) SQ  Code Status: Full Code  Medically Ready for Discharge: Anticipated in 2-4 Days        Ana Maria Sahu MD, MD  [phone] (p)  [phone] (c)    Primary Care Physician   ERINN BRYAN    Chief Complaint   Left hip fracture    History is obtained from the patient and review of medical records.    History of Present Illness   Jamee Douglas is a 89 year old female with a previous history of stroke in 2020 with residual left-sided weakness, hyperlipidemia, osteoarthritis and glaucoma who was visiting primary clinic for cold symptoms and while exiting the building she got stuck in the roundabout and had a fall and landed on the left side of her body.  Was unable to get up after this or bear weight on her left leg.  EMS was called and patient was transferred to ER [facility].    In the ER pelvic x-ray showed left hip fracture with displacement and impaction.  Patient is being admitted under inpatient service to the hospitalist team for her hip fracture.  Patient is complaining of moderate pain and did receive IV fentanyl which helped.  Daughter at bedside.  Currently living in independent living facility.    Patient has been having some mild cough cold and nasal irritation over the last few days and was visiting her primary care for this prior to the fall.  They did test her for COVID.  Report is still pending.  Denies any fever, chills, nausea, vomiting, abdominal pain.    Past Medical History    I have reviewed this patient's medical history and updated it with pertinent information if needed.   Past Medical History:   Diagnosis Date    Glaucoma     Hyperlipidemia LDL goal < 130      LBP (low back pain)     Osteoarthritis      Past Surgical History   I have reviewed this patient's surgical history and updated it with pertinent information if needed.  Past Surgical History:   Procedure Laterality Date    ARTHROPLASTY KNEE      HYSTERECTOMY      SURGICAL HISTORY OF -       rights shoulder    SURGICAL HISTORY OF -       glaucoma surgery    SURGICAL HISTORY OF -       back surgery     Prior to Admission Medications   Prior to Admission Medications   Prescriptions Last Dose Informant Patient Reported? Taking?   Cyanocobalamin (B-12 PO)  Self Yes No   Sig: Take 1 tablet by mouth daily   acetaminophen (TYLENOL) 500 MG tablet   Yes No   Sig: Take 1,000 mg by mouth 3 times daily   atorvastatin (LIPITOR) 40 MG tablet  Self No No   Sig: Take 1 tablet (40 mg) by mouth every evening   brimonidine (ALPHAGAN) 0.2 % ophthalmic solution  Self Yes No   Sig: Place 1 drop into the right eye 2 times daily   cholecalciferol (VITAMIN D3) 10 mcg (400 units) TABS tablet  Self No No   Sig: Take 1 tablet (10 mcg) by mouth daily   colestipol (COLESTID) 1 g tablet  Daughter Yes No   Sig: Take 1 g by mouth 2 times daily Take 4 hours before eating, ~0600, 1400   dorzolamide-timolol (COSOPT) 22.3-6.8 MG/ML ophthalmic solution  Self Yes No   Sig: Place 1 drop into both eyes 2 times daily   oxyCODONE (ROXICODONE) 5 MG tablet   No No   Sig: Take 0.5 tablets (2.5 mg) by mouth every 6 hours as needed for pain   sertraline (ZOLOFT) 25 MG tablet  Self Yes No   Sig: Take 25 mg by mouth daily      Facility-Administered Medications: None     Allergies   Allergies   Allergen Reactions    Pcn [Penicillins] Swelling     Patient reported swelling around mouth and hands     Social History   I have reviewed this patient's social history and updated it with pertinent information if needed.   Jamee  reports that she has never smoked. She has never used smokeless tobacco. She reports current alcohol use. She reports that she does not use  drugs. She     Family History   I have reviewed this patient's family history and updated it with pertinent information if needed.    Problem (# of Occurrences) Relation (Name,Age of Onset)    Prostate Cancer (1) Father    Other - See Comments (1) Mother: POLYCYTHEMIA VERA    Colon Cancer (1) Brother    Lung Cancer (1) Sister          Review of Systems   The 10 point Review of Systems is negative other than noted in the HPI or here.     Physical Exam   Temp: 98  F (36.7  C)   BP: (!) 165/77 Pulse: 93   Resp: 20 SpO2: 91 % O2 Device: Nasal cannula Oxygen Delivery: 2 LPM  Vital Signs with Ranges  Temp:  [98  F (36.7  C)] 98  F (36.7  C)  Pulse:  [87-93] 93  Resp:  [20] 20  BP: (165-177)/(77-80) 165/77  SpO2:  [91 %-98 %] 91 %  0 lbs 0 oz    Physical Exam  Constitutional:       Comments: Old and frail   Cardiovascular:      Rate and Rhythm: Regular rhythm. Tachycardia present.      Pulses: Normal pulses.      Heart sounds: Normal heart sounds.   Pulmonary:      Effort: Pulmonary effort is normal. No respiratory distress.      Breath sounds: Normal breath sounds.   Abdominal:      General: Abdomen is flat. Bowel sounds are normal. There is no distension.      Tenderness: There is no abdominal tenderness. There is no guarding.   Musculoskeletal:      Comments: Left leg shortened and externally rotated   Skin:     General: Skin is warm and dry.   Neurological:      General: No focal deficit present.         Data   Data reviewed today:  I personally reviewed the x-ray pelvis and hip image(s) showing left femoral neck fracture.  There is 3 cm of displacement and impaction.  There is moderate degree of apex superolateral and anterior angulation. .  Recent Labs   Lab 06/03/24  1354 06/03/24  1321   WBC  --  11.6*   HGB  --  12.7   MCV  --  96   PLT  --  170   INR 1.08  --    NA  --  139   POTASSIUM  --  4.4   CHLORIDE  --  102   CO2  --  24   BUN  --  28.1*   CR  --  1.10*   ANIONGAP  --  13   POORNIMA  --  9.2   GLC  --  113*    ALBUMIN  --  4.3   PROTTOTAL  --  7.3   BILITOTAL  --  0.6   ALKPHOS  --  62   ALT  --  33   AST  --  38       Recent Results (from the past 24 hour(s))   XR Chest 1 View    Narrative    CHEST ONE VIEW  6/3/2024 2:15 PM     HISTORY: Cough.    COMPARISON: March 21, 2024      Impression    IMPRESSION: No acute disease.    JOSELUIS KENT MD         SYSTEM ID:  [id number]   XR Pelvis and Hip Left 1 View    Narrative    PELVIS AND HIP LEFT ONE VIEW Carolyn 3, 2024 2:15 PM     HISTORY: Fall, pain.    COMPARISON: None.      Impression    IMPRESSION: Left femoral neck fracture. There is 3 cm of displacement  and impaction. There is a moderate degree of apex superolateral and  anterior angulation. Incidentally noted are degenerative and  postoperative changes in the spine and diffuse bone demineralization.    ROSEMARY TOVAR MD         SYSTEM ID:  [id number]

## 2024-06-03 NOTE — PHARMACY-ADMISSION MEDICATION HISTORY
"Pharmacist Admission Medication History    Admission medication history is complete. The information provided in this note is only as accurate as the sources available at the time of the update.    Information Source(s): Patient, Family member, and CareEverywhere/SureScripts via in-person    Pertinent Information:   -brimonidine: daughter reports patient was told to stop this medication at recent eye doctor visit. Patient states that she has continued to use this medication. Daughter made it sounds like they were told it was probably not helping. Unclear why it is still being used.   -dorzolamide/timolol: per Surescripts, directions are to right eye but patient/daughter state this is being used in both eyes.    Changes made to PTA medication list:  Added: \"unknown to patient\" Tylenol PM  Deleted: oxycodone  Changed: acetaminophen (1000 mg TID --> 500 mg TID), vitamin B12 (daily --> BID)    Allergies reviewed with patient and updates made in EHR: no, already reviewed by RN    Medication History Completed By: Britta Gomes RPH 6/3/2024 5:01 PM    PTA Med List   Medication Sig Note Last Dose    acetaminophen (TYLENOL) 500 MG tablet Take 500 mg by mouth 3 times daily  6/2/2024    atorvastatin (LIPITOR) 40 MG tablet Take 1 tablet (40 mg) by mouth every evening  6/1/2024    brimonidine (ALPHAGAN) 0.2 % ophthalmic solution Place 1 drop into the right eye 2 times daily 6/3/2024: 6/3/24: daughter reports patient was told to stop this medication at eye appt 5/15 but patient reports she has continued to use 6/2/2024    cholecalciferol (VITAMIN D3) 10 mcg (400 units) TABS tablet Take 1 tablet (10 mcg) by mouth daily  Past Week    colestipol (COLESTID) 1 g tablet Take 1 g by mouth 2 times daily Take 4 hours before eating, ~0600, 1400  Past Week    Cyanocobalamin (B-12 PO) Take 1 tablet by mouth 2 times daily  Past Week    dorzolamide-timolol (COSOPT) 22.3-6.8 MG/ML ophthalmic solution Place 1 drop into both eyes 2 times daily  " 6/2/2024    sertraline (ZOLOFT) 25 MG tablet Take 25 mg by mouth daily  6/1/2024    UNKNOWN TO PATIENT Tylenol PM. 1 tab at bedtime. Dose unknown  6/2/2024

## 2024-06-03 NOTE — CONSULTS
[facility]    Orthopedic Consultation    Jamee Douglas MRN# [id number]   Age: 89 year old YOB: 1932     Date of Admission:  6/3/2024    Reason for consult: Left hip femoral neck fracture        Requesting provider: BOYD Sahu       Level of consult: Consult, follow and place orders           Assessment and Plan:   Assessment:   Left hip femoral neck osteoporotic fracture, fall from standing      Plan:   Patient scheduled for a left hip hemiarthroplasty tomorrow pending patient is optimized for surgery per hospitalist.    Surgeon: Dr Danielle  NPO Status effective midnight   NWB/Bedrest until postop  Vit D ordered   Hold anticoagulants if taken: none PTA   Pain medication as needed, minimize narcotics as able             Chief Complaint:   Left hip pain          History of Present Illness:   Jamee Douglas is a 89 year old female with a previous history of stroke in 2020 with residual left-sided weakness, hyperlipidemia, osteoarthritis and glaucoma who was visiting primary clinic for cold symptoms and while exiting the building she got stuck in the roundabout and had a fall and landed on the left side of her body.  Was unable to get up after this or bear weight on her left leg. Xrays revealed a left hip fracture.   Patient uses a walker at baseline.           Past Medical History:     Past Medical History:   Diagnosis Date    Glaucoma     Hyperlipidemia LDL goal < 130     LBP (low back pain)     Osteoarthritis              Past Surgical History:     Past Surgical History:   Procedure Laterality Date    ARTHROPLASTY KNEE      HYSTERECTOMY      SURGICAL HISTORY OF -       rights shoulder    SURGICAL HISTORY OF -       glaucoma surgery    SURGICAL HISTORY OF -       back surgery             Social History:     Social History     Tobacco Use    Smoking status: Never    Smokeless tobacco: Never   Substance Use Topics    Alcohol use: Yes     Comment: Rare              Family History:     Family History   Problem Relation Age of Onset    Other - See Comments Mother         POLYCYTHEMIA VERA    Prostate Cancer Father     Colon Cancer Brother     Lung Cancer Sister              Immunizations:     VACCINE/DOSE   Diptheria   DPT   DTAP   HBIG   Hepatitis A   Hepatitis B   HIB   Influenza   Measles   Meningococcal   MMR   Mumps   Pneumococcal   Polio   Rubella   Small Pox   TDAP   Varicella   Zoster             Allergies:     Allergies   Allergen Reactions    Pcn [Penicillins] Swelling     Patient reported swelling around mouth and hands             Medications:     Current Facility-Administered Medications   Medication Dose Route Frequency Provider Last Rate Last Admin    hydrALAZINE (APRESOLINE) injection 10 mg  10 mg Intravenous Q6H PRN Ana Maria Sahu MD        HYDROmorphone (PF) (DILAUDID) injection 0.5 mg  0.5 mg Intravenous Q30 Min PRN Alber Bartlett MD   0.5 mg at 06/03/24 1501    sodium chloride 0.9 % infusion   Intravenous Continuous Ana Maria Sahu MD         Current Outpatient Medications   Medication Sig Dispense Refill    acetaminophen (TYLENOL) 500 MG tablet Take 1,000 mg by mouth 3 times daily      atorvastatin (LIPITOR) 40 MG tablet Take 1 tablet (40 mg) by mouth every evening 30 tablet 0    brimonidine (ALPHAGAN) 0.2 % ophthalmic solution Place 1 drop into the right eye 2 times daily      cholecalciferol (VITAMIN D3) 10 mcg (400 units) TABS tablet Take 1 tablet (10 mcg) by mouth daily 30 tablet 0    colestipol (COLESTID) 1 g tablet Take 1 g by mouth 2 times daily Take 4 hours before eating, ~0600, 1400      Cyanocobalamin (B-12 PO) Take 1 tablet by mouth daily      dorzolamide-timolol (COSOPT) 22.3-6.8 MG/ML ophthalmic solution Place 1 drop into both eyes 2 times daily      oxyCODONE (ROXICODONE) 5 MG tablet Take 0.5 tablets (2.5 mg) by mouth every 6 hours as needed for pain 15 tablet 0    sertraline (ZOLOFT) 25 MG tablet Take 25 mg by mouth daily                Review of Systems:   ROS:  10 point ROS neg other than the symptoms noted above in the HPI.            Physical Exam:   All vitals have been reviewed  Patient Vitals for the past 24 hrs:   BP Temp Pulse Resp SpO2   06/03/24 1355 (!) 165/77 -- 93 -- 91 %   06/03/24 1350 -- 98  F (36.7  C) -- -- --   06/03/24 1319 (!) 177/80 -- 87 20 98 %     No intake or output data in the 24 hours ending 06/03/24 1539      Physical Exam   Temp: 98  F (36.7  C)   BP: (!) 165/77 Pulse: 93   Resp: 20 SpO2: 91 % O2 Device: Nasal cannula Oxygen Delivery: 2 LPM  Vital Signs with Ranges  Temp:  [98  F (36.7  C)] 98  F (36.7  C)  Pulse:  [87-93] 93  Resp:  [20] 20  BP: (165-177)/(77-80) 165/77  SpO2:  [91 %-98 %] 91 %  0 lbs 0 oz    Constitutional: Pleasant, alert, appropriate, following commands.  HEENT: Head atraumatic normocephalic. Pupils equal round and reactive to light.  Respiratory: Unlabored breathing no audible wheeze  Cardiovascular: Regular rate and rhythm per pulses  GI: Abdomen non-distended.  Lymph/Hematologic: No lymphadenopathy in areas examined  Genitourinary:  No noel  Skin: No rashes, no cyanosis, no edema.  Musculoskeletal: On physical exam of the left lower extremity, patient's leg is resting in a shortened and externally rotated position.  No skin abrasions seen at the hip.  Patient is able to dorsi and plantarflex both ankles.  Mild weakness on the left.  Reports equal sensation to light touch on the left versus right lower extremities.  Distal pulses are intact and equal bilaterally.      Neurologic: normal without focal findings, mental status, speech normal, alert and oriented x iii  Neuropsychiatric: stable             Data:   All laboratory data reviewed  Results for orders placed or performed during the hospital encounter of 06/03/24   XR Pelvis and Hip Left 1 View     Status: None    Narrative    PELVIS AND HIP LEFT ONE VIEW Carolyn 3, 2024 2:15 PM     HISTORY: Fall, pain.    COMPARISON: None.       Impression    IMPRESSION: Left femoral neck fracture. There is 3 cm of displacement  and impaction. There is a moderate degree of apex superolateral and  anterior angulation. Incidentally noted are degenerative and  postoperative changes in the spine and diffuse bone demineralization.    ROSEMARY TOVAR MD         SYSTEM ID:  [id number]   XR Chest 1 View     Status: None    Narrative    CHEST ONE VIEW  6/3/2024 2:15 PM     HISTORY: Cough.    COMPARISON: March 21, 2024      Impression    IMPRESSION: No acute disease.    JOSELUIS KENT MD         SYSTEM ID:  [id number]   [place] Draw     Status: None    Narrative    The following orders were created for panel order [place] Draw.  Procedure                               Abnormality         Status                     ---------                               -----------         ------                     Extra Green Top (Lithium...[[id number]]                      Final result               Extra Purple Top Tube[[id number]]                            Final result                 Please view results for these tests on the individual orders.   Extra Green Top (Lithium Heparin) Tube     Status: None   Result Value Ref Range    Hold Specimen JIC    Extra Purple Top Tube     Status: None   Result Value Ref Range    Hold Specimen 0    INR     Status: Normal   Result Value Ref Range    INR 1.08 0.85 - 1.15   Comprehensive metabolic panel     Status: Abnormal   Result Value Ref Range    Sodium 139 135 - 145 mmol/L    Potassium 4.4 3.4 - 5.3 mmol/L    Carbon Dioxide (CO2) 24 22 - 29 mmol/L    Anion Gap 13 7 - 15 mmol/L    Urea Nitrogen 28.1 (H) 8.0 - 23.0 mg/dL    Creatinine 1.10 (H) 0.51 - 0.95 mg/dL    GFR Estimate 47 (L) >60 mL/min/1.73m2    Calcium 9.2 8.2 - 9.6 mg/dL    Chloride 102 98 - 107 mmol/L    Glucose 113 (H) 70 - 99 mg/dL    Alkaline Phosphatase 62 40 - 150 U/L    AST 38 0 - 45 U/L    ALT 33 0 - 50 U/L    Protein Total 7.3 6.4 - 8.3 g/dL    Albumin 4.3 3.5 - 5.2 g/dL     Bilirubin Total 0.6 <=1.2 mg/dL   CBC with platelets and differential     Status: Abnormal   Result Value Ref Range    WBC Count 11.6 (H) 4.0 - 11.0 10e3/uL    RBC Count 4.17 3.80 - 5.20 10e6/uL    Hemoglobin 12.7 11.7 - 15.7 g/dL    Hematocrit 39.9 35.0 - 47.0 %    MCV 96 78 - 100 fL    MCH 30.5 26.5 - 33.0 pg    MCHC 31.8 31.5 - 36.5 g/dL    RDW 12.7 10.0 - 15.0 %    Platelet Count 170 150 - 450 10e3/uL    % Neutrophils 77 %    % Lymphocytes 11 %    % Monocytes 11 %    % Eosinophils 0 %    % Basophils 0 %    % Immature Granulocytes 1 %    NRBCs per 100 WBC 0 <1 /100    Absolute Neutrophils 9.0 (H) 1.6 - 8.3 10e3/uL    Absolute Lymphocytes 1.2 0.8 - 5.3 10e3/uL    Absolute Monocytes 1.2 0.0 - 1.3 10e3/uL    Absolute Eosinophils 0.0 0.0 - 0.7 10e3/uL    Absolute Basophils 0.1 0.0 - 0.2 10e3/uL    Absolute Immature Granulocytes 0.1 <=0.4 10e3/uL    Absolute NRBCs 0.0 10e3/uL   Adult Type and Screen     Status: None   Result Value Ref Range    ABO/RH(D) O POS     Antibody Screen Negative Negative    SPECIMEN EXPIRATION DATE [id number]    CBC with platelets differential     Status: Abnormal    Narrative    The following orders were created for panel order CBC with platelets differential.  Procedure                               Abnormality         Status                     ---------                               -----------         ------                     CBC with platelets and d...[[id number]]  Abnormal            Final result                 Please view results for these tests on the individual orders.   ABO/Rh type and screen     Status: None    Narrative    The following orders were created for panel order ABO/Rh type and screen.  Procedure                               Abnormality         Status                     ---------                               -----------         ------                     Adult Type and Screen[[id number]]                            Final result                 Please view  results for these tests on the individual orders.          Attestation:  I have reviewed today's vital signs, notes, medications, labs and imaging with Dr. Danielle.  Amount of time performed on this consult: 45 minutes.    Filomena Inman PA-C

## 2024-06-03 NOTE — ED NOTES
Pt restless, moves legs around often, takes blankets off and on. Daughter in room and feeling like pt is restless also. Asking for meds.

## 2024-06-03 NOTE — ED TRIAGE NOTES
BIBA from clinic where pt got caught in the door exiting the building. The pt fell onto left side and injured hip. Pt has abrasion on left arm. Pt given fentanyl in rig 75 mcg. With good results, bringing pain from  8/10 to 3/10.     Triage Assessment (Adult)       Row Name 06/03/24 1322          Triage Assessment    Airway WDL WDL        Respiratory WDL    Respiratory WDL WDL        Skin Circulation/Temperature WDL    Skin Circulation/Temperature WDL X  abrasion on left arm        Cardiac WDL    Cardiac WDL X  HTN        Peripheral/Neurovascular WDL    Peripheral Neurovascular WDL WDL        Cognitive/Neuro/Behavioral WDL    Cognitive/Neuro/Behavioral WDL WDL

## 2024-06-03 NOTE — ED NOTES
Bed: ED22  Expected date:   Expected time:   Means of arrival:   Comments:  448  89 F poss hip fx/fall/fentanyl given  1303

## 2024-06-03 NOTE — ED PROVIDER NOTES
Emergency Department Note      History of Present Illness     Chief Complaint  Fall with left hip pain    HPI  Jamee Douglas is a 89 year old female who presents via EMS with a fall and left hip pain. Patient states that she went to the clinic today for her cold symptoms, which she has had for few days. Patient reports that upon her exit, the door closed on her and she fell without hitting her head. Patient endorses left hip pain assessed as a 4 out of 10. Patient denies headache, neck pain, or any numbness, tingling, or weakness to her left foot. Of note, patient states she has cold symptoms that include a cough, runny nose, sore throat, and vomiting for the past 3 days.  She had a rapid strep test and a UA performed in the clinic today which were unremarkable. Patient denies fever, chest pain, shortness of breath, abdominal pain, dysuria, or diarrhea.  She denies any other injuries other than a small abrasion to the left elbow.     Independent Historian  None    Review of External Notes  I reviewed patient's discharge summary from 03/24/2024  I reviewed patient's family practice visit note from today  I reviewed patient's nurse triage note from today  Past Medical History   Medical History and Problem List  Hyperlipidemia with target LDL less than 130  Osteoarthritis  Glaucoma  Benign essential hypertension  Subarachnoid hemorrhage (H)  Acute ischemic right MCA stroke (H)  Cataract  HTN (hypertension)  Intracranial atherosclerosis  Left hemiplegia (H)  Lumbar radiculopathy  Postoperative anemia due to acute blood loss  Pre-diabetes    Medications  Lipitor  Roxicodone  Colestid  Zoloft    Surgical History   Knee arthroplasty  Hysterectomy  Right shoulder surgery  Glaucoma surgery  Back surgery  Physical Exam   Patient Vitals for the past 24 hrs:   BP Temp Pulse Resp SpO2   06/03/24 1553 -- -- -- -- 90 %   06/03/24 1547 -- -- -- -- 93 %   06/03/24 1526 -- -- -- -- 92 %   06/03/24 1506 (!) 188/83 -- 96 -- --    06/03/24 1355 (!) 165/77 -- 93 -- 91 %   06/03/24 1350 -- 98  F (36.7  C) -- -- --   06/03/24 1319 (!) 177/80 -- 87 20 98 %     Physical Exam  Nursing note and vitals reviewed.  Constitutional:  Oriented to person, place, and time. Cooperative.  Appears uncomfortable.  HENT:   Nose:    Nose normal.   Mouth/Throat:   Mucous membranes are normal.   Eyes:    Conjunctivae normal and EOM are normal.      Pupils are equal, round, and reactive to light.   Neck:    Trachea normal.   Cardiovascular:  Normal rate, regular rhythm, normal heart sounds and normal pulses. No murmur heard.  Pulmonary/Chest:  Effort normal and breath sounds normal.   Abdominal:   Soft. Normal appearance and bowel sounds are normal.      There is no tenderness.      There is no rebound and no CVA tenderness.   Musculoskeletal:  The left lower extremity is shortened and slightly externally rotated with tenderness to palpation over the left hip and with attempts at range of motion of the left hip.  Extremities otherwise atraumatic x 4.   Lymphadenopathy:  No cervical adenopathy.   Neurological:   Alert and oriented to person, place, and time. Normal strength.      No cranial nerve deficit or sensory deficit. GCS eye subscore is 4. GCS verbal subscore is 5. GCS motor subscore is 6.   Skin:    Small abrasion to the left elbow. No rash noted.   Psychiatric:   Normal mood and affect.    Diagnostics     ECG  ECG taken at 1552, ECG read at 1557  Sinus tachycardia  Left ventricular hypertrophy with repolarization abnormality (Sokolow-Salas, Romhilt-Hansen)   Rate 107 bpm. MN interval 174 ms. QRS duration 88 ms. QT/QTc 342/456 ms. P-R-T axes 50 -51 -57.     Lab Results   Labs Ordered and Resulted from Time of ED Arrival to Time of ED Departure   COMPREHENSIVE METABOLIC PANEL - Abnormal       Result Value    Sodium 139      Potassium 4.4      Carbon Dioxide (CO2) 24      Anion Gap 13      Urea Nitrogen 28.1 (*)     Creatinine 1.10 (*)     GFR Estimate 47 (*)      Calcium 9.2      Chloride 102      Glucose 113 (*)     Alkaline Phosphatase 62      AST 38      ALT 33      Protein Total 7.3      Albumin 4.3      Bilirubin Total 0.6     CBC WITH PLATELETS AND DIFFERENTIAL - Abnormal    WBC Count 11.6 (*)     RBC Count 4.17      Hemoglobin 12.7      Hematocrit 39.9      MCV 96      MCH 30.5      MCHC 31.8      RDW 12.7      Platelet Count 170      % Neutrophils 77      % Lymphocytes 11      % Monocytes 11      % Eosinophils 0      % Basophils 0      % Immature Granulocytes 1      NRBCs per 100 WBC 0      Absolute Neutrophils 9.0 (*)     Absolute Lymphocytes 1.2      Absolute Monocytes 1.2      Absolute Eosinophils 0.0      Absolute Basophils 0.1      Absolute Immature Granulocytes 0.1      Absolute NRBCs 0.0     INR - Normal    INR 1.08     ROUTINE UA WITH MICROSCOPIC REFLEX TO CULTURE   INFLUENZA A/B, RSV, & SARS-COV2 PCR   INFLUENZA A/B, RSV, & SARS-COV2 PCR   TYPE AND SCREEN, ADULT    ABO/RH(D) O POS      Antibody Screen Negative      SPECIMEN EXPIRATION DATE [id number]     ABO/RH TYPE AND SCREEN       Imaging  XR Pelvis and Hip Left 1 View   Final Result   IMPRESSION: Left femoral neck fracture. There is 3 cm of displacement   and impaction. There is a moderate degree of apex superolateral and   anterior angulation. Incidentally noted are degenerative and   postoperative changes in the spine and diffuse bone demineralization.      ROSEMARY TOVAR MD            SYSTEM ID:  [id number]      XR Chest 1 View   Final Result   IMPRESSION: No acute disease.      JOSELUIS KENT MD            SYSTEM ID:  [id number]          Independent Interpretation  I independently interpreted the patient's left hip x-rays and agree with the reading of a left femoral neck fracture.  I also independently interpreted the patient's chest x-ray and agree with the negative reading for infiltrate.  ED Course    Medications Administered  Medications   HYDROmorphone (PF) (DILAUDID) injection 0.5 mg  (0.5 mg Intravenous $Given 6/3/24 1501)   sodium chloride 0.9 % infusion (has no administration in time range)   hydrALAZINE (APRESOLINE) injection 10 mg (has no administration in time range)   sodium chloride 0.9% BOLUS 1,000 mL (1,000 mLs Intravenous $New Bag 6/3/24 1350)       Procedures  Procedures     Discussion of Management  Admitting Hospitalist, Dr. Cullen    Social Determinants of Health adding to complexity of care  None    ED Course  ED Course as of 06/03/24 1604   Mon Jun 03, 2024   1320 I obtained history and examined the patient as noted above     1500 I spoke to Dr. Sahu, hospitalist, who will accept my patient     Medical Decision Making / Diagnosis   CMS Diagnoses: None    MIPS     None    [facility]  Jamee Douglas is a 89 year old female brought in by ambulance after she fell and injured her left hip.  Clinically I was quite suspicious she would have a hip fracture, and that was confirmed by x-ray.  She had the above workup obtained including a chest x-ray, EKG, and blood work as well.  She does not appear to have anything else significantly going on such as pneumonia.  She clearly needs to come into the hospital for further evaluation and management.  I subsequently spoke with Dr. Sahu, who will be admitting her.    Disposition  The patient was admitted to the hospital.     ICD-10 Codes:    ICD-10-CM    1. Closed fracture of neck of left femur, initial encounter (H)  S72.002A Case Request: LEFT HIP HEMIARTHROPLASTY     Case Request: LEFT HIP HEMIARTHROPLASTY             Scribe Disclosure:  I, Gera Herrera, am serving as a scribe at 1:37 PM on 6/3/2024 to document services personally performed by Alber Bartlett MD based on my observations and the provider's statements to me.        Alber Bartlett MD  06/03/24 3159

## 2024-06-04 ENCOUNTER — APPOINTMENT (OUTPATIENT)
Dept: PHYSICAL THERAPY | Facility: CLINIC | Age: 89
DRG: 521 | End: 2024-06-04
Attending: HOSPITALIST
Payer: COMMERCIAL

## 2024-06-04 ENCOUNTER — ANESTHESIA (OUTPATIENT)
Dept: SURGERY | Facility: CLINIC | Age: 89
DRG: 521 | End: 2024-06-04
Payer: COMMERCIAL

## 2024-06-04 ENCOUNTER — ANESTHESIA EVENT (OUTPATIENT)
Dept: SURGERY | Facility: CLINIC | Age: 89
DRG: 521 | End: 2024-06-04
Payer: COMMERCIAL

## 2024-06-04 ENCOUNTER — APPOINTMENT (OUTPATIENT)
Dept: GENERAL RADIOLOGY | Facility: CLINIC | Age: 89
DRG: 521 | End: 2024-06-04
Attending: STUDENT IN AN ORGANIZED HEALTH CARE EDUCATION/TRAINING PROGRAM
Payer: COMMERCIAL

## 2024-06-04 LAB
ANION GAP SERPL CALCULATED.3IONS-SCNC: 10 MMOL/L (ref 7–15)
BUN SERPL-MCNC: 25.2 MG/DL (ref 8–23)
CALCIUM SERPL-MCNC: 8.4 MG/DL (ref 8.2–9.6)
CHLORIDE SERPL-SCNC: 105 MMOL/L (ref 98–107)
CREAT SERPL-MCNC: 0.86 MG/DL (ref 0.51–0.95)
DEPRECATED HCO3 PLAS-SCNC: 22 MMOL/L (ref 22–29)
EGFRCR SERPLBLD CKD-EPI 2021: 63 ML/MIN/1.73M2
ERYTHROCYTE [DISTWIDTH] IN BLOOD BY AUTOMATED COUNT: 13.1 % (ref 10–15)
GLUCOSE SERPL-MCNC: 100 MG/DL (ref 70–99)
HCT VFR BLD AUTO: 34.3 % (ref 35–47)
HGB BLD-MCNC: 10.9 G/DL (ref 11.7–15.7)
MCH RBC QN AUTO: 29.8 PG (ref 26.5–33)
MCHC RBC AUTO-ENTMCNC: 31.8 G/DL (ref 31.5–36.5)
MCV RBC AUTO: 94 FL (ref 78–100)
PLATELET # BLD AUTO: 122 10E3/UL (ref 150–450)
POTASSIUM SERPL-SCNC: 3.5 MMOL/L (ref 3.4–5.3)
RBC # BLD AUTO: 3.66 10E6/UL (ref 3.8–5.2)
SODIUM SERPL-SCNC: 137 MMOL/L (ref 135–145)
WBC # BLD AUTO: 11.3 10E3/UL (ref 4–11)

## 2024-06-04 PROCEDURE — 250N000013 HC RX MED GY IP 250 OP 250 PS 637: Performed by: HOSPITALIST

## 2024-06-04 PROCEDURE — 99100 ANES PT EXTEME AGE<1 YR&>70: CPT | Performed by: NURSE ANESTHETIST, CERTIFIED REGISTERED

## 2024-06-04 PROCEDURE — 250N000025 HC SEVOFLURANE, PER MIN: Performed by: ORTHOPAEDIC SURGERY

## 2024-06-04 PROCEDURE — 250N000011 HC RX IP 250 OP 636: Performed by: STUDENT IN AN ORGANIZED HEALTH CARE EDUCATION/TRAINING PROGRAM

## 2024-06-04 PROCEDURE — 80048 BASIC METABOLIC PNL TOTAL CA: CPT | Performed by: HOSPITALIST

## 2024-06-04 PROCEDURE — 27125 PARTIAL HIP REPLACEMENT: CPT | Performed by: NURSE ANESTHETIST, CERTIFIED REGISTERED

## 2024-06-04 PROCEDURE — P9045 ALBUMIN (HUMAN), 5%, 250 ML: HCPCS | Mod: JZ | Performed by: NURSE ANESTHETIST, CERTIFIED REGISTERED

## 2024-06-04 PROCEDURE — 97530 THERAPEUTIC ACTIVITIES: CPT | Mod: GP

## 2024-06-04 PROCEDURE — 250N000011 HC RX IP 250 OP 636: Mod: JZ | Performed by: ANESTHESIOLOGY

## 2024-06-04 PROCEDURE — 27125 PARTIAL HIP REPLACEMENT: CPT | Performed by: ANESTHESIOLOGY

## 2024-06-04 PROCEDURE — 250N000013 HC RX MED GY IP 250 OP 250 PS 637: Performed by: PHYSICIAN ASSISTANT

## 2024-06-04 PROCEDURE — 360N000077 HC SURGERY LEVEL 4, PER MIN: Performed by: ORTHOPAEDIC SURGERY

## 2024-06-04 PROCEDURE — 258N000001 HC RX 258: Performed by: ORTHOPAEDIC SURGERY

## 2024-06-04 PROCEDURE — 85027 COMPLETE CBC AUTOMATED: CPT | Performed by: HOSPITALIST

## 2024-06-04 PROCEDURE — 258N000003 HC RX IP 258 OP 636: Performed by: NURSE ANESTHETIST, CERTIFIED REGISTERED

## 2024-06-04 PROCEDURE — 36415 COLL VENOUS BLD VENIPUNCTURE: CPT | Performed by: PHYSICIAN ASSISTANT

## 2024-06-04 PROCEDURE — 250N000009 HC RX 250: Performed by: ORTHOPAEDIC SURGERY

## 2024-06-04 PROCEDURE — 272N000001 HC OR GENERAL SUPPLY STERILE: Performed by: ORTHOPAEDIC SURGERY

## 2024-06-04 PROCEDURE — 710N000011 HC RECOVERY PHASE 1, LEVEL 3, PER MIN: Performed by: ORTHOPAEDIC SURGERY

## 2024-06-04 PROCEDURE — C1713 ANCHOR/SCREW BN/BN,TIS/BN: HCPCS | Performed by: ORTHOPAEDIC SURGERY

## 2024-06-04 PROCEDURE — 370N000017 HC ANESTHESIA TECHNICAL FEE, PER MIN: Performed by: ORTHOPAEDIC SURGERY

## 2024-06-04 PROCEDURE — 278N000064 HC OR IMPLANT OTHER OPNP: Performed by: ORTHOPAEDIC SURGERY

## 2024-06-04 PROCEDURE — 99233 SBSQ HOSP IP/OBS HIGH 50: CPT | Performed by: STUDENT IN AN ORGANIZED HEALTH CARE EDUCATION/TRAINING PROGRAM

## 2024-06-04 PROCEDURE — 999N000065 XR PELVIS PORT 1/2 VIEWS

## 2024-06-04 PROCEDURE — 82306 VITAMIN D 25 HYDROXY: CPT | Performed by: PHYSICIAN ASSISTANT

## 2024-06-04 PROCEDURE — 250N000009 HC RX 250: Performed by: ANESTHESIOLOGY

## 2024-06-04 PROCEDURE — 250N000011 HC RX IP 250 OP 636: Performed by: NURSE ANESTHETIST, CERTIFIED REGISTERED

## 2024-06-04 PROCEDURE — 250N000009 HC RX 250: Performed by: NURSE ANESTHETIST, CERTIFIED REGISTERED

## 2024-06-04 PROCEDURE — 258N000003 HC RX IP 258 OP 636: Performed by: HOSPITALIST

## 2024-06-04 PROCEDURE — 36415 COLL VENOUS BLD VENIPUNCTURE: CPT | Performed by: HOSPITALIST

## 2024-06-04 PROCEDURE — C1776 JOINT DEVICE (IMPLANTABLE): HCPCS | Performed by: ORTHOPAEDIC SURGERY

## 2024-06-04 PROCEDURE — 120N000001 HC R&B MED SURG/OB

## 2024-06-04 PROCEDURE — 250N000013 HC RX MED GY IP 250 OP 250 PS 637: Performed by: STUDENT IN AN ORGANIZED HEALTH CARE EDUCATION/TRAINING PROGRAM

## 2024-06-04 PROCEDURE — 97161 PT EVAL LOW COMPLEX 20 MIN: CPT | Mod: GP

## 2024-06-04 DEVICE — SELF CENTERING BI-POLAR HEAD 28MM ID 43MM OD
Type: IMPLANTABLE DEVICE | Site: HIP | Status: FUNCTIONAL
Brand: SELF CENTERING

## 2024-06-04 DEVICE — SUMMIT FEMORAL STEM 12/14 TAPER CEMENTED SIZE 3 STD 108MM
Type: IMPLANTABLE DEVICE | Site: HIP | Status: FUNCTIONAL
Brand: SUMMIT

## 2024-06-04 DEVICE — BONE CEMENT SIMPLEX FULL DOSE 6191-1-001: Type: IMPLANTABLE DEVICE | Site: HIP | Status: FUNCTIONAL

## 2024-06-04 DEVICE — CEMENTRALIZER STEM CENTRALIZER 9.25MM CEMENTED
Type: IMPLANTABLE DEVICE | Site: HIP | Status: FUNCTIONAL
Brand: CEMENTRALIZER

## 2024-06-04 DEVICE — IMPLANTABLE DEVICE: Type: IMPLANTABLE DEVICE | Site: HIP | Status: FUNCTIONAL

## 2024-06-04 DEVICE — ARTICUL/EZE FEMORAL HEAD DIAMETER 28MM +8.5 12/14 TAPER
Type: IMPLANTABLE DEVICE | Site: HIP | Status: FUNCTIONAL
Brand: ARTICUL/EZE

## 2024-06-04 RX ORDER — EPHEDRINE SULFATE 50 MG/ML
INJECTION, SOLUTION INTRAMUSCULAR; INTRAVENOUS; SUBCUTANEOUS PRN
Status: DISCONTINUED | OUTPATIENT
Start: 2024-06-04 | End: 2024-06-04

## 2024-06-04 RX ORDER — DEXAMETHASONE SODIUM PHOSPHATE 4 MG/ML
INJECTION, SOLUTION INTRA-ARTICULAR; INTRALESIONAL; INTRAMUSCULAR; INTRAVENOUS; SOFT TISSUE PRN
Status: DISCONTINUED | OUTPATIENT
Start: 2024-06-04 | End: 2024-06-04

## 2024-06-04 RX ORDER — POLYETHYLENE GLYCOL 3350 17 G/17G
17 POWDER, FOR SOLUTION ORAL DAILY
Status: DISCONTINUED | OUTPATIENT
Start: 2024-06-05 | End: 2024-06-11 | Stop reason: HOSPADM

## 2024-06-04 RX ORDER — SODIUM CHLORIDE, SODIUM LACTATE, POTASSIUM CHLORIDE, CALCIUM CHLORIDE 600; 310; 30; 20 MG/100ML; MG/100ML; MG/100ML; MG/100ML
INJECTION, SOLUTION INTRAVENOUS CONTINUOUS PRN
Status: DISCONTINUED | OUTPATIENT
Start: 2024-06-04 | End: 2024-06-04

## 2024-06-04 RX ORDER — ACETAMINOPHEN 325 MG/1
975 TABLET ORAL EVERY 8 HOURS
Status: DISCONTINUED | OUTPATIENT
Start: 2024-06-04 | End: 2024-06-04

## 2024-06-04 RX ORDER — DEXAMETHASONE SODIUM PHOSPHATE 4 MG/ML
4 INJECTION, SOLUTION INTRA-ARTICULAR; INTRALESIONAL; INTRAMUSCULAR; INTRAVENOUS; SOFT TISSUE
Status: DISCONTINUED | OUTPATIENT
Start: 2024-06-04 | End: 2024-06-04 | Stop reason: HOSPADM

## 2024-06-04 RX ORDER — AMOXICILLIN 250 MG
1 CAPSULE ORAL 2 TIMES DAILY
Status: DISCONTINUED | OUTPATIENT
Start: 2024-06-04 | End: 2024-06-11 | Stop reason: HOSPADM

## 2024-06-04 RX ORDER — ONDANSETRON 4 MG/1
4 TABLET, ORALLY DISINTEGRATING ORAL EVERY 6 HOURS PRN
Status: DISCONTINUED | OUTPATIENT
Start: 2024-06-04 | End: 2024-06-04

## 2024-06-04 RX ORDER — NALOXONE HYDROCHLORIDE 0.4 MG/ML
0.1 INJECTION, SOLUTION INTRAMUSCULAR; INTRAVENOUS; SUBCUTANEOUS
Status: DISCONTINUED | OUTPATIENT
Start: 2024-06-04 | End: 2024-06-04 | Stop reason: HOSPADM

## 2024-06-04 RX ORDER — ONDANSETRON 2 MG/ML
INJECTION INTRAMUSCULAR; INTRAVENOUS PRN
Status: DISCONTINUED | OUTPATIENT
Start: 2024-06-04 | End: 2024-06-04

## 2024-06-04 RX ORDER — ONDANSETRON 2 MG/ML
4 INJECTION INTRAMUSCULAR; INTRAVENOUS EVERY 30 MIN PRN
Status: DISCONTINUED | OUTPATIENT
Start: 2024-06-04 | End: 2024-06-04 | Stop reason: HOSPADM

## 2024-06-04 RX ORDER — HYDROMORPHONE HCL IN WATER/PF 6 MG/30 ML
0.1 PATIENT CONTROLLED ANALGESIA SYRINGE INTRAVENOUS
Status: DISCONTINUED | OUTPATIENT
Start: 2024-06-04 | End: 2024-06-11 | Stop reason: HOSPADM

## 2024-06-04 RX ORDER — CEFAZOLIN SODIUM 1 G/3ML
1 INJECTION, POWDER, FOR SOLUTION INTRAMUSCULAR; INTRAVENOUS EVERY 8 HOURS
Qty: 10 ML | Refills: 0 | Status: COMPLETED | OUTPATIENT
Start: 2024-06-04 | End: 2024-06-05

## 2024-06-04 RX ORDER — LIDOCAINE 40 MG/G
CREAM TOPICAL
Status: DISCONTINUED | OUTPATIENT
Start: 2024-06-04 | End: 2024-06-04

## 2024-06-04 RX ORDER — SODIUM CHLORIDE, SODIUM LACTATE, POTASSIUM CHLORIDE, CALCIUM CHLORIDE 600; 310; 30; 20 MG/100ML; MG/100ML; MG/100ML; MG/100ML
INJECTION, SOLUTION INTRAVENOUS CONTINUOUS
Status: DISCONTINUED | OUTPATIENT
Start: 2024-06-04 | End: 2024-06-04 | Stop reason: HOSPADM

## 2024-06-04 RX ORDER — ENOXAPARIN SODIUM 100 MG/ML
40 INJECTION SUBCUTANEOUS EVERY 24 HOURS
Status: DISCONTINUED | OUTPATIENT
Start: 2024-06-04 | End: 2024-06-11 | Stop reason: HOSPADM

## 2024-06-04 RX ORDER — ONDANSETRON 2 MG/ML
4 INJECTION INTRAMUSCULAR; INTRAVENOUS EVERY 6 HOURS PRN
Status: DISCONTINUED | OUTPATIENT
Start: 2024-06-04 | End: 2024-06-04

## 2024-06-04 RX ORDER — PROPOFOL 10 MG/ML
INJECTION, EMULSION INTRAVENOUS PRN
Status: DISCONTINUED | OUTPATIENT
Start: 2024-06-04 | End: 2024-06-04

## 2024-06-04 RX ORDER — MAGNESIUM HYDROXIDE 1200 MG/15ML
LIQUID ORAL PRN
Status: DISCONTINUED | OUTPATIENT
Start: 2024-06-04 | End: 2024-06-04 | Stop reason: HOSPADM

## 2024-06-04 RX ORDER — ONDANSETRON 4 MG/1
4 TABLET, ORALLY DISINTEGRATING ORAL EVERY 30 MIN PRN
Status: DISCONTINUED | OUTPATIENT
Start: 2024-06-04 | End: 2024-06-04 | Stop reason: HOSPADM

## 2024-06-04 RX ORDER — SODIUM CHLORIDE, SODIUM LACTATE, POTASSIUM CHLORIDE, CALCIUM CHLORIDE 600; 310; 30; 20 MG/100ML; MG/100ML; MG/100ML; MG/100ML
INJECTION, SOLUTION INTRAVENOUS CONTINUOUS
Status: DISCONTINUED | OUTPATIENT
Start: 2024-06-04 | End: 2024-06-04

## 2024-06-04 RX ORDER — CEFAZOLIN SODIUM 1 G/3ML
INJECTION, POWDER, FOR SOLUTION INTRAMUSCULAR; INTRAVENOUS PRN
Status: DISCONTINUED | OUTPATIENT
Start: 2024-06-04 | End: 2024-06-04

## 2024-06-04 RX ORDER — OXYCODONE HYDROCHLORIDE 5 MG/1
5 TABLET ORAL EVERY 4 HOURS PRN
Status: DISCONTINUED | OUTPATIENT
Start: 2024-06-04 | End: 2024-06-04

## 2024-06-04 RX ORDER — HYDROMORPHONE HCL IN WATER/PF 6 MG/30 ML
0.2 PATIENT CONTROLLED ANALGESIA SYRINGE INTRAVENOUS
Status: DISCONTINUED | OUTPATIENT
Start: 2024-06-04 | End: 2024-06-11 | Stop reason: HOSPADM

## 2024-06-04 RX ORDER — HYDROMORPHONE HCL IN WATER/PF 6 MG/30 ML
0.4 PATIENT CONTROLLED ANALGESIA SYRINGE INTRAVENOUS EVERY 5 MIN PRN
Status: DISCONTINUED | OUTPATIENT
Start: 2024-06-04 | End: 2024-06-04 | Stop reason: HOSPADM

## 2024-06-04 RX ORDER — FENTANYL CITRATE 50 UG/ML
50 INJECTION, SOLUTION INTRAMUSCULAR; INTRAVENOUS EVERY 5 MIN PRN
Status: DISCONTINUED | OUTPATIENT
Start: 2024-06-04 | End: 2024-06-04 | Stop reason: HOSPADM

## 2024-06-04 RX ORDER — FENTANYL CITRATE 50 UG/ML
INJECTION, SOLUTION INTRAMUSCULAR; INTRAVENOUS PRN
Status: DISCONTINUED | OUTPATIENT
Start: 2024-06-04 | End: 2024-06-04

## 2024-06-04 RX ORDER — CHOLECALCIFEROL (VITAMIN D3) 50 MCG
50 TABLET ORAL DAILY
Status: DISCONTINUED | OUTPATIENT
Start: 2024-06-04 | End: 2024-06-11 | Stop reason: HOSPADM

## 2024-06-04 RX ORDER — LIDOCAINE HYDROCHLORIDE 20 MG/ML
INJECTION, SOLUTION INFILTRATION; PERINEURAL PRN
Status: DISCONTINUED | OUTPATIENT
Start: 2024-06-04 | End: 2024-06-04

## 2024-06-04 RX ORDER — HYDROMORPHONE HCL IN WATER/PF 6 MG/30 ML
0.2 PATIENT CONTROLLED ANALGESIA SYRINGE INTRAVENOUS EVERY 5 MIN PRN
Status: DISCONTINUED | OUTPATIENT
Start: 2024-06-04 | End: 2024-06-04 | Stop reason: HOSPADM

## 2024-06-04 RX ORDER — ACETAMINOPHEN 325 MG/1
650 TABLET ORAL EVERY 4 HOURS PRN
Status: DISCONTINUED | OUTPATIENT
Start: 2024-06-07 | End: 2024-06-04

## 2024-06-04 RX ORDER — PROCHLORPERAZINE MALEATE 5 MG
5 TABLET ORAL EVERY 6 HOURS PRN
Status: DISCONTINUED | OUTPATIENT
Start: 2024-06-04 | End: 2024-06-11 | Stop reason: HOSPADM

## 2024-06-04 RX ORDER — FENTANYL CITRATE 50 UG/ML
25 INJECTION, SOLUTION INTRAMUSCULAR; INTRAVENOUS EVERY 5 MIN PRN
Status: DISCONTINUED | OUTPATIENT
Start: 2024-06-04 | End: 2024-06-04 | Stop reason: HOSPADM

## 2024-06-04 RX ORDER — BISACODYL 10 MG
10 SUPPOSITORY, RECTAL RECTAL DAILY PRN
Status: DISCONTINUED | OUTPATIENT
Start: 2024-06-07 | End: 2024-06-11 | Stop reason: HOSPADM

## 2024-06-04 RX ADMIN — SUGAMMADEX 100 MG: 100 INJECTION, SOLUTION INTRAVENOUS at 14:27

## 2024-06-04 RX ADMIN — Medication 10 MG: at 12:34

## 2024-06-04 RX ADMIN — FENTANYL CITRATE 25 MCG: 50 INJECTION INTRAMUSCULAR; INTRAVENOUS at 13:08

## 2024-06-04 RX ADMIN — Medication 5 MG: at 14:15

## 2024-06-04 RX ADMIN — Medication 5 MG: at 13:35

## 2024-06-04 RX ADMIN — SODIUM CHLORIDE, POTASSIUM CHLORIDE, SODIUM LACTATE AND CALCIUM CHLORIDE: 600; 310; 30; 20 INJECTION, SOLUTION INTRAVENOUS at 13:55

## 2024-06-04 RX ADMIN — CEFAZOLIN 2 G: 1 INJECTION, POWDER, FOR SOLUTION INTRAMUSCULAR; INTRAVENOUS at 12:43

## 2024-06-04 RX ADMIN — OXYCODONE HYDROCHLORIDE 2.5 MG: 5 TABLET ORAL at 18:15

## 2024-06-04 RX ADMIN — FENTANYL CITRATE 50 MCG: 50 INJECTION, SOLUTION INTRAMUSCULAR; INTRAVENOUS at 15:00

## 2024-06-04 RX ADMIN — SERTRALINE HYDROCHLORIDE 25 MG: 25 TABLET ORAL at 08:42

## 2024-06-04 RX ADMIN — PHENYLEPHRINE HYDROCHLORIDE 150 MCG: 10 INJECTION INTRAVENOUS at 12:33

## 2024-06-04 RX ADMIN — TRANEXAMIC ACID 1 G: 1 INJECTION, SOLUTION INTRAVENOUS at 12:49

## 2024-06-04 RX ADMIN — FENTANYL CITRATE 25 MCG: 50 INJECTION INTRAMUSCULAR; INTRAVENOUS at 12:23

## 2024-06-04 RX ADMIN — MONTELUKAST SODIUM 1 G: 4 TABLET, CHEWABLE ORAL at 06:17

## 2024-06-04 RX ADMIN — ATORVASTATIN CALCIUM 40 MG: 40 TABLET, FILM COATED ORAL at 20:31

## 2024-06-04 RX ADMIN — Medication 30 ML: at 12:14

## 2024-06-04 RX ADMIN — FENTANYL CITRATE 25 MCG: 50 INJECTION INTRAMUSCULAR; INTRAVENOUS at 12:14

## 2024-06-04 RX ADMIN — ONDANSETRON 4 MG: 2 INJECTION INTRAMUSCULAR; INTRAVENOUS at 14:16

## 2024-06-04 RX ADMIN — OXYCODONE HYDROCHLORIDE 5 MG: 5 TABLET ORAL at 06:16

## 2024-06-04 RX ADMIN — Medication 5 MG: at 13:38

## 2024-06-04 RX ADMIN — FENTANYL CITRATE 50 MCG: 50 INJECTION, SOLUTION INTRAMUSCULAR; INTRAVENOUS at 15:21

## 2024-06-04 RX ADMIN — FENTANYL CITRATE 25 MCG: 50 INJECTION INTRAMUSCULAR; INTRAVENOUS at 14:20

## 2024-06-04 RX ADMIN — Medication 50 MCG: at 16:29

## 2024-06-04 RX ADMIN — PHENYLEPHRINE HYDROCHLORIDE 0.5 MCG/KG/MIN: 10 INJECTION INTRAVENOUS at 12:33

## 2024-06-04 RX ADMIN — CEFAZOLIN 1 G: 1 INJECTION, POWDER, FOR SOLUTION INTRAMUSCULAR; INTRAVENOUS at 20:30

## 2024-06-04 RX ADMIN — ACETAMINOPHEN 1000 MG: 500 TABLET, FILM COATED ORAL at 16:29

## 2024-06-04 RX ADMIN — PHENYLEPHRINE HYDROCHLORIDE 100 MCG: 10 INJECTION INTRAVENOUS at 12:36

## 2024-06-04 RX ADMIN — DEXAMETHASONE SODIUM PHOSPHATE 4 MG: 4 INJECTION, SOLUTION INTRA-ARTICULAR; INTRALESIONAL; INTRAMUSCULAR; INTRAVENOUS; SOFT TISSUE at 12:40

## 2024-06-04 RX ADMIN — SENNOSIDES AND DOCUSATE SODIUM 1 TABLET: 50; 8.6 TABLET ORAL at 20:31

## 2024-06-04 RX ADMIN — SODIUM CHLORIDE: 9 INJECTION, SOLUTION INTRAVENOUS at 08:59

## 2024-06-04 RX ADMIN — ROCURONIUM BROMIDE 30 MG: 50 INJECTION, SOLUTION INTRAVENOUS at 12:23

## 2024-06-04 RX ADMIN — TRANEXAMIC ACID 1 G: 1 INJECTION, SOLUTION INTRAVENOUS at 13:55

## 2024-06-04 RX ADMIN — PHENYLEPHRINE HYDROCHLORIDE 150 MCG: 10 INJECTION INTRAVENOUS at 12:32

## 2024-06-04 RX ADMIN — ALBUMIN (HUMAN): 12.5 SOLUTION INTRAVENOUS at 12:36

## 2024-06-04 RX ADMIN — LIDOCAINE HYDROCHLORIDE 60 MG: 20 INJECTION, SOLUTION INFILTRATION; PERINEURAL at 12:23

## 2024-06-04 RX ADMIN — ACETAMINOPHEN 1000 MG: 500 TABLET, FILM COATED ORAL at 08:42

## 2024-06-04 RX ADMIN — PHENYLEPHRINE HYDROCHLORIDE 100 MCG: 10 INJECTION INTRAVENOUS at 12:56

## 2024-06-04 RX ADMIN — PROPOFOL 70 MG: 10 INJECTION, EMULSION INTRAVENOUS at 12:23

## 2024-06-04 RX ADMIN — ROCURONIUM BROMIDE 10 MG: 50 INJECTION, SOLUTION INTRAVENOUS at 13:10

## 2024-06-04 ASSESSMENT — ACTIVITIES OF DAILY LIVING (ADL)
ADLS_ACUITY_SCORE: 35
ADLS_ACUITY_SCORE: 35
ADLS_ACUITY_SCORE: 39
ADLS_ACUITY_SCORE: 40
ADLS_ACUITY_SCORE: 39
ADLS_ACUITY_SCORE: 35
ADLS_ACUITY_SCORE: 39
ADLS_ACUITY_SCORE: 39
ADLS_ACUITY_SCORE: 35
ADLS_ACUITY_SCORE: 39
ADLS_ACUITY_SCORE: 35
ADLS_ACUITY_SCORE: 35
ADLS_ACUITY_SCORE: 39
ADLS_ACUITY_SCORE: 35
ADLS_ACUITY_SCORE: 39
ADLS_ACUITY_SCORE: 35
ADLS_ACUITY_SCORE: 39
ADLS_ACUITY_SCORE: 39

## 2024-06-04 ASSESSMENT — LIFESTYLE VARIABLES: TOBACCO_USE: 0

## 2024-06-04 ASSESSMENT — ENCOUNTER SYMPTOMS: SEIZURES: 0

## 2024-06-04 NOTE — ANESTHESIA POSTPROCEDURE EVALUATION
Patient: Jamee Douglas    Procedure: Procedure(s):  LEFT HIP HEMIARTHROPLASTY       Anesthesia Type:  General    Note:     Postop Pain Control: Uneventful            Sign Out: Well controlled pain   PONV: No   Neuro/Psych: Uneventful            Sign Out: Acceptable/Baseline neuro status   Airway/Respiratory: Uneventful            Sign Out: Acceptable/Baseline resp. status   CV/Hemodynamics: Uneventful            Sign Out: Acceptable CV status; No obvious hypovolemia; No obvious fluid overload   Other NRE:    DID A NON-ROUTINE EVENT OCCUR? No           Last vitals:  Vitals Value Taken Time   /70 06/04/24 1500   Temp 36  C (96.8  F) 06/04/24 1438   Pulse     Resp 14 06/04/24 1505   SpO2 96 % 06/04/24 1500       Electronically Signed By: Ro Daigle MD, MD  June 4, 2024  3:20 PM

## 2024-06-04 NOTE — PROGRESS NOTES
[facility]    Medicine Progress Note - Hospitalist Service    Date of Admission:  6/3/2024    Assessment & Plan     Jamee Douglas is a 89 year old female with a previous history of stroke in 2020 with residual left-sided weakness, hyperlipidemia, osteoarthritis and glaucoma who was visiting primary clinic for cold symptoms and while exiting the building, the door closed on her and she had a fall and landed on the left side of her body. Was unable to get up after this or put weight on left leg. EMS was called and patient was transferred to [facility].     Left hip fracture s/p mechanical fall s/p hemiarthroplasty 6/4/24  History of recurrent falls  Went to doctor for cold symptoms. Suffered a mechanical fall while at doctors office. Pelvic x-ray showed left femoral neck fracture with 3 cm displacement and impaction.  Moderate degree of apex superolateral and anterior angulation. Chest x-ray with no acute findings. Underwent ORIF on 6/4.  -Oral oxycodone, Tylenol and IV Dilaudid ordered.  -Ortho consulted, recommendations appreciated   -DVT prophylaxis per ortho team post-operatively, currently on lovenox    -Advance diet as tolerated after surgery    -PT OT ordered.    COVID19  Sore Throat  Denies cough or shortness of breath. Has a sore throat. Found to be COVID positive. Vitally stable and off oxygen.  - supportive treatment    New T-wave inversions  I assumed care 6/4. I reviewed pre-operative EKG showing possible new T-wave versions in inferior and lateral leads. These are new compared to EKG from March. No troponin was collected. Patient denied any chest pain or anginal equivalents prior to surgical intervention. Preop evaluation-revised cardiac risk index is at 1 point giving her a class II risk with 6% 30-day risk of death, MI and cardiac arrest.  - Plan to repeat EKG post-operatively, if persistent T-wave changes will obtain TTE      History of stroke with residual  left-sided weakness  -Patient is on atorvastatin 40 mg daily.  Continue for now.  -At baseline she is able to walk long distances with the help of a walker.     Depression-on sertraline 25 mg daily.     Hypertension-patient is not on any antihypertensives at home.  Currently systolic blood pressures over 180 likely secondary to anxiety and pain from the fall.  On IV as needed hydralazine for SBP greater than 160. Blood pressure more stable on 6/4.      Chronic diarrhea-on colestipol 1 g 2 times daily, 4 hours prior to eating.    Right frontal superior sulcus subarachnoid hemorrhage  Per chart review the patient suffered a subarachnoid hemorrhage on 3/21/24. Therapeutic anti-coagulation is contraindicated.           Diet: NPO per Anesthesia Guidelines for Procedure/Surgery Except for: Meds    DVT Prophylaxis: Defer to orthopedic surgery   Pierce Catheter: Not present  Lines: None     Cardiac Monitoring: None  Code Status: Full Code      Clinically Significant Risk Factors Present on Admission                  # Hypertension: Noted on problem list                 # Financial/Environmental Concerns:           Disposition Plan     Medically Ready for Discharge: Anticipated Tomorrow         Casimiro Pérez DO  Hospitalist Service  [facility]  Securely message with Smart Destinations (more info)  Text page via Figure 1 Paging/Directory   ______________________________________________________________________    Interval History     - Assumed care on 6/4  - Patient having significant leg pain  - Denies chest pain, shortness of breath, nausea, diaphoresis or any other new symptoms   - Updated patient and her daughter at bedside   - No other acute concerns at this time     Physical Exam   Vital Signs: Temp: 98  F (36.7  C) Temp src: Oral BP: 134/79 Pulse: 101   Resp: 15 SpO2: 97 % O2 Device: Oxymask Oxygen Delivery: 2 LPM  Weight: 123 lbs 7.32 oz    General Appearance: Awake Alert, laying flat, appears in  pain  Respiratory: Clear anteriorly, no wheezes or rales, cannot roll or sit up 2/2 to pain  Cardiovascular: Regular rate and rhythm, no murmurs, rubs, or gallops  GI: Abdomen soft, bowel sounds present  Skin: No visible rash  Other: Normal affect      Medical Decision Making       50 MINUTES SPENT BY ME on the date of service doing chart review, history, exam, documentation & further activities per the note.      Data   ------------------------- PAST 24 HR DATA REVIEWED -----------------------------------------------    I have personally reviewed the following data over the past 24 hrs:    11.6 (H)  \   12.7   / 170     139 102 28.1 (H) /  113 (H)   4.4 24 1.10 (H); 1.10 (H) \     ALT: 33 AST: 38 AP: 62 TBILI: 0.6   ALB: 4.3 TOT PROTEIN: 7.3 LIPASE: N/A     INR:  1.08 PTT:  N/A   D-dimer:  N/A Fibrinogen:  N/A       Imaging results reviewed over the past 24 hrs:   Recent Results (from the past 24 hour(s))   XR Chest 1 View    Narrative    CHEST ONE VIEW  6/3/2024 2:15 PM     HISTORY: Cough.    COMPARISON: March 21, 2024      Impression    IMPRESSION: No acute disease.    JOSELUIS KENT MD         SYSTEM ID:  [id number]   XR Pelvis and Hip Left 1 View    Narrative    PELVIS AND HIP LEFT ONE VIEW Carolyn 3, 2024 2:15 PM     HISTORY: Fall, pain.    COMPARISON: None.      Impression    IMPRESSION: Left femoral neck fracture. There is 3 cm of displacement  and impaction. There is a moderate degree of apex superolateral and  anterior angulation. Incidentally noted are degenerative and  postoperative changes in the spine and diffuse bone demineralization.    ROSEMARY TOVAR MD         SYSTEM ID:  [id number]

## 2024-06-04 NOTE — PLAN OF CARE
Goal Outcome Evaluation:    Ed admit transferred to floor at 2100  Diagnosis: fall, L hip fracture  Orientation/Cognitive: Alert and oriented X4/ forgetful  Mobility Level/: Bedrest  Pain Management: pain controlled with oxycodone and Tylenol   Tele/VS/O2: VSS@2L oxymask   Diet: NPO  Bowel/Bladder: pure wick in place  Skin Concerns: L hip pain, L arm/elbow abrasion   Drains/Devices: PIV infusing NS at 100ml/hr  ISO/TYPE: COVID/special precaution.   Discharge date/time;TBD, pending surgery.

## 2024-06-04 NOTE — OP NOTE
ORTHOPEDIC OPERATIVE REPORT     Patient Name:  Jamee Douglas 89 year old female  :  1932  MR Number:   [id number]  CSN Number:   [id number]    Date of Service: 2024 10:25 AM    Surgeon:  Edis Danielle DO    Assistant(s):  Olga Yarbrough PA-C    OR Staff:  Circulator: Eric Bird RN  Physician Assistant: Olga Yarbrough PA-C  Relief Circulator: Pankaj Bird RN  Scrub Person: Eva Davenport Tyler    Preoperative Diagnosis:  left displaced subcapital femoral neck fracture    Postoperative Diagnosis:  Same as above    Procedure(s):  left hip hemiarthroplasty    Anesthesia Type:  general    Estimated Blood Loss:  50cc    Specimens: none    Drain(s), Tube(s), Packing: none    Complications:  none    Implants/Grafts:   Implant Name Type Inv. Item Serial No.  Lot No. LRB No. Used Action   BONE CEMENT SIMPLEX FULL DOSE 6191-1-001 - [id number] Cement, Bone BONE CEMENT SIMPLEX FULL DOSE 6191-1-001  [employer] [id number] Left 2 Implanted   Small Cement restrictor Total Joint Component/Insert   MAR[employer]LE [id number] Left 1 Implanted   IMP STEM CENTRALIZER DEPUY 9.25MM 1376- - [id number] Total Joint Component/Insert IMP STEM CENTRALIZER DEPUY 9.25MM 1376-  [employer]- [id number] Left 1 Implanted   STEM FEMORAL SUMMIT STD CEMENTED SZ 3 - [id number] Total Joint Component/Insert STEM FEMORAL SUMMIT STD CEMENTED SZ 3  [employer]- [id number] Left 1 Implanted   IMP HEAD FEMORAL DEPUY BIPOLAR [id number] - [id number] Total Joint Component/Insert IMP HEAD FEMORAL DEPUY BIPOLAR [id number]  [employer]- [id number] Left 1 Implanted   IMP HEAD FEMORAL DEPUY [place] 28MM +8.5MM 1365- - [id number] Total Joint Component/Insert IMP HEAD FEMORAL DEPUY [place] 28MM +8.5MM 1365-  [employer]- [id number] Left 1 Implanted              Indications:  The patient is a 89 year old female with a left femoral neck fracture  resulting from a ground level fall while she was at the doctors office to be seen for congestion.  Presented from home.  The patient denies antecedent pain, and was found to have a normal neurovascular exam and skin envelope.  She was diagnosed with COVID as well, and precautions observed.  Radiographs demonstrated the displaced femoral neck fracture.  The patient is an appropriately indicated candidate for surgical treatment of the femoral neck fracture with hemiarthroplasty as I discussed with her and her daughter GERALDO Prakash.  I discussed the risks and benefits of the procedure, including the risks of infection, wound healing complication, neurovascular injury, instability, limb length discrepancy, need for revision surgery, and the medical risks of anesthesia including MI, stroke, and death.  Benefits include early mobilization, and reduction in the risks associated with nonoperative hip fracture management.  Alternatives to surgery were also discussed, including non-operative management, which I did not recommend.  The patient was appropriately seen and cleared for surgery per the hospitalist team.  The patient was in agreement with the plan to proceed, and the informed consent was signed and documented.  I met with the patient pre-operatively and marked the operative extremity with their agreement.  We proceeded to the operating room.     Procedure in Detail:  The patient underwent general anesthesia, and was positioned in the lateral decubitus position with the operative hip up, all bony prominences well padded, and an axillary roll placed.  The operative extremity was prepped and draped in usual sterile fashion.  A procedural pause was conducted to verify correct patient, correct extremity, presence of the surgeons initials on the operative extremity, administration of IV antibiotics. After generalized agreement, a posterior Southern approach to the hip was performed.  The fascia was split in line with the  incision to the tip of the greater trochanter, then curved posteriorly to parallel the gluteal fibers.  The short external rotators were taken down subperiosteally using cautery, and a posterior capsulotomy was performed, preserving the labrum.  A neck cut was then made approximately 1.5cm above the lesser trochanter, and the femoral head was then removed, sized, and trialed within the acetabulum.  The acetabulum was inspected and cleared of foreign material.  We then placed the femoral neck retractor, and sequentially reamed and broached the femur to an appropriate size.  We selected a size 3 stem.  We then cemented the stem after placing a sponge in the acetabulum to prevent cement extrusion, preparing the femur, and placing a cement restrictor distally at the appropriate depth.  Trials were assembled to the final implanted stem, assessing for limb lengths, soft tissue tension, and stability.  The implant was stable in all positions, dislocating only with maximal flexion, adduction, and internal rotation.  We then thoroughly cleaned the trunion, and impacted the final bipolar head, which was a 43 +8.5 bipolar.  The hip was reduced, and rechecked for stability.  The wound was copiously irrigated with saline.  A side to side capsule repair was performed with #1 vicryl, and the short external rotators were reapproximated to their insertion with #2 Ticron.  The incision was then closed in layers, with #1 Vicryl in the fascia and IT band, 2-0 vicryl in the subcutaneous tissue, and running subcuticular monocryl and skin glue for the skin.  Sterile dressings were then applied.  The patient was then turned over to anesthesia for transfer to PACU and extubation.    Olga Yarbrough PA-C was needed for and participated in all the aspects of the case, including preoperative preparation and positioning of the patient, intraoperative manipulation of the limb, retraction of soft tissues, protection of vital structures,  suctioning of bodily fluids and wound closure as well as dressing application.    Postoperative Plan:  Activity / weight bearing: WBAT, PT/OT, gait aids, fall precautions, hip dislocation precautions x6 weeks  Anticoagulation VTE ppx: mechanical SCDs, pharmacologic lovenox 40 subq daily 30 days  Antibiotics: perioperative for 24 hours  Analgesia: multimodal regimen, limit sedating meds, narcotics for severe pain  Discharge planning: pending progress with PT, appreciate social work assistance, discussed with Jamee's daughter Olinda postoperatively as well  Follow up: 2-3 weeks in office for incision check, hip radiographs, and further evaluation       Edis Danielle DO, MSc  6/4/2024  2:06 PM

## 2024-06-04 NOTE — ANESTHESIA PROCEDURE NOTES
Fascia iliaca Procedure Note    Pre-Procedure   Staff -        Anesthesiologist:  Justen Fofana MD       Performed By: anesthesiologist       Location: pre-op       Pre-Anesthestic Checklist: patient identified, IV checked, site marked, risks and benefits discussed, informed consent, monitors and equipment checked, pre-op evaluation, at physician/surgeon's request and post-op pain management  Timeout:       Correct Patient: Yes        Correct Procedure: Yes        Correct Site: Yes        Correct Position: Yes        Correct Laterality: Yes        Site Marked: Yes  Procedure Documentation  Procedure: Fascia iliaca       Laterality: left       Patient Position: supine       Patient Prep/Sterile Barriers: sterile gloves, mask, patient draped       Skin prep: Chloraprep       Local skin infiltrated with mL of 1% lidocaine.        Needle Type: other       Needle Gauge: 22.        Needle Length (millimeters): 100        Ultrasound guided       1. Ultrasound was used to identify targeted nerve, plexus, vascular marker, or fascial plane and place a needle adjacent to it in real-time.       2. Ultrasound was used to visualize the spread of anesthetic in close proximity to the above referenced structure.       3. A permanent image is entered into the patient's record.       4. The visualized anatomic structures appeared normal.       5. There were no apparent abnormal pathologic findings.    Assessment/Narrative         The placement was negative for: blood aspirated, painful injection and site bleeding       Paresthesias: No.       Bolus given via needle..        Secured via.        Insertion/Infusion Method: Single Shot       Complications: none    Medication(s) Administered   Bupivacaine 0.5% w/ 1:400K Epi (Injection) - Injection   30 mL - 6/4/2024 12:14:00 PM   Comments:  Bupivacaine 0.5% with 1:400,000 epi 30ml   Patient sedated but commutative throughout the procedure.   Patient tolerated well.    Ultrasound  "Interpretation, peripheral nerve block    1.  Under ultrasound guidance, the needle was inserted and placed in close proximity to the nerve.  2. Ultrasound was also used to visualize the spread of the anesthetic in close proximity to the nerve being blocked.  3. The nerve(s) appeared anatomically normal.   4. There were no apparent abnormal pathological findings.  5. A permanent ultrasound image was saved n the patient's record.    The surgeon has given a verbal order transferring this pt to me for a performance of regional analgesia block for post op pain control. It is requested of me because I am trained and qualifed to perform this block and the surgeon is nether trained nor qualified to perform this procedure.           FOR [facility] ([facility]) ONLY:   Pain Team Contact information: please page the Pain Team Via [employer]. Search \"Pain\". During daytime hours, please page the attending first. At night please page the resident first.      "

## 2024-06-04 NOTE — CONSULTS
[facility]    ORTHOPEDIC CONSULTATION NOTE     Patient: Jamee Douglas 89 year oldfemale   Admit Date: 6/3/2024          Today's Date: 6/4/2024  Attending: Ana Maria Sahu MD HPI  The patient is a 89 year old female who initially presented after a fall while going to visit her PCP's yesterday.  She endorses having some congestion and upper respiratory symptoms at that time.  Unfortunately she fell onto her left side sustaining immediate pain with inability to weight-bear.  Taken by EMS to the emergency department and radiographs demonstrated displaced subcapital femoral neck fracture.  Subsequently admitted for further perioperative workup clearance and surgical planning.  She does have a history of stroke in 2020 with residual left-sided weakness.  Also hyperlipidemia, osteoarthritis, glaucoma, as well as more frequent falls as of recent per family members.  Her daughter and DPOA Olinda is also present.  Jamee has been relatively independent despite her age.  She was recently admitted in March after a fall and a subarachnoid hemorrhage as well.  Olinda endorses that they did have her set up for an assisted living facility soon, but those plans are likely to change given current circumstances.  Her pain is controlled at rest.  Denies and numbness or tingling.  No other concerns at this time.     Medical History  Past Medical History:   Diagnosis Date    Glaucoma     Hyperlipidemia LDL goal < 130     LBP (low back pain)     Osteoarthritis        Surgical History  Past Surgical History:   Procedure Laterality Date    ARTHROPLASTY KNEE      HYSTERECTOMY      SURGICAL HISTORY OF -       rights shoulder    SURGICAL HISTORY OF -       glaucoma surgery    SURGICAL HISTORY OF -       back surgery       Medications  Current Facility-Administered Medications   Medication Dose Route Frequency Provider Last Rate Last Admin    acetaminophen (TYLENOL) tablet 650 mg  650 mg Oral Q4H PRN  Ana Maria Sahu MD        Or    acetaminophen (TYLENOL) Suppository 650 mg  650 mg Rectal Q4H PRN Ana Maria Sahu MD        acetaminophen (TYLENOL) tablet 1,000 mg  1,000 mg Oral TID Ana Maria Sahu MD   1,000 mg at 06/04/24 0842    atorvastatin (LIPITOR) tablet 40 mg  40 mg Oral QPM Ana Maria Sahu MD   40 mg at 06/03/24 2131    calcium carbonate (TUMS) chewable tablet 1,000 mg  1,000 mg Oral 4x Daily PRN Ana Maria Sahu MD        colestipol (COLESTID) tablet 1 g  1 g Oral BID Ana Maria Sahu MD   1 g at 06/04/24 0617    enoxaparin ANTICOAGULANT (LOVENOX) injection 30 mg  30 mg Subcutaneous Q24H Ana Maria Sahu MD   30 mg at 06/03/24 2132    hydrALAZINE (APRESOLINE) injection 10 mg  10 mg Intravenous Q6H PRN Ana Maria Sahu MD        lidocaine (LMX4) cream   Topical Q1H PRN Ana Maria Sahu MD        lidocaine 1 % 0.1-1 mL  0.1-1 mL Other Q1H PRN Ana Maria Sahu MD        naloxone (NARCAN) injection 0.2 mg  0.2 mg Intravenous Q2 Min PRN Ana Maria Sahu MD        Or    naloxone (NARCAN) injection 0.4 mg  0.4 mg Intravenous Q2 Min PRN Ana Maria Sahu MD        Or    naloxone (NARCAN) injection 0.2 mg  0.2 mg Intramuscular Q2 Min PRN Ana Maria Sahu MD        Or    naloxone (NARCAN) injection 0.4 mg  0.4 mg Intramuscular Q2 Min PRN Ana Maria Sahu MD        ondansetron (ZOFRAN ODT) ODT tab 4 mg  4 mg Oral Q6H PRN Ana Maria Sahu MD        Or    ondansetron (ZOFRAN) injection 4 mg  4 mg Intravenous Q6H PRN Ana Maria Sahu MD        oxyCODONE (ROXICODONE) tablet 5 mg  5 mg Oral Q4H PRN Maria G Sahutha P, MD   5 mg at 06/04/24 0616    oxyCODONE IR (ROXICODONE) half-tab 2.5 mg  2.5 mg Oral Q4H PRAna Maria Crespo MD senna-docusate (SENOKOT-S/PERICOLACE) 8.6-50 MG per tablet 1 tablet  1 tablet Oral BID Ana Maria Becerra MD        Or    alpesh-docusate (SENOKOT-S/PERICOLACE) 8.6-50 MG per tablet 2 tablet  2 tablet Oral BID Ana Maria Becerra MD         sertraline (ZOLOFT) tablet 25 mg  25 mg Oral Daily Ana Maria Sahu MD   25 mg at 06/04/24 0842    sodium chloride (PF) 0.9% PF flush 3 mL  3 mL Intracatheter Q8H Ana Maria Sahu MD   3 mL at 06/03/24 2134    sodium chloride (PF) 0.9% PF flush 3 mL  3 mL Intracatheter q1 min prn Ana Maria Sahu MD        sodium chloride 0.9 % infusion   Intravenous Continuous Ana Maria Sahu  mL/hr at 06/04/24 0859 New Bag at 06/04/24 0859       Allergies  Allergies   Allergen Reactions    Pcn [Penicillins] Swelling     Patient reported swelling around mouth and hands        Family History  Family History   Problem Relation Age of Onset    Other - See Comments Mother         POLYCYTHEMIA VERA    Prostate Cancer Father     Colon Cancer Brother     Lung Cancer Sister        Social History  Social History     Socioeconomic History    Marital status:    Tobacco Use    Smoking status: Never    Smokeless tobacco: Never   Substance and Sexual Activity    Alcohol use: Yes     Comment: Rare    Drug use: No    Sexual activity: Never       ROS  Left hip pain. All other systems were reviewed and found to be negative    Physical Exam  BP (!) 152/75 (BP Location: Right arm)   Pulse 101   Temp 98.2  F (36.8  C) (Oral)   Resp 20   Wt 56 kg (123 lb 7.3 oz)   LMP 01/01/1970   SpO2 (!) 89%   BMI 22.58 kg/m    General: appears to be in no acute distress  Psychiatric: alert & oriented x3, appropriate responses to questions, pleasant mood and affect  Chest: breathing unlabored - she is in a couple liters supplemental oxygen on mask blowby  LLE tender hip, motion deferred, skin is intact, no bruising is present, nontender knee and ankle, sensation is intact baseline, she is able to demonstrate DF/PF/EHL and has no distal deficits from prior stroke    Imaging  Pelvis and hip radiographs demonstrate a displaced subcapital femoral neck fracture     Laboratory Values  Lab Results   Component Value Date    WBC 11.3 (H) 06/04/2024     HGB 10.9 (L) 06/04/2024    HCT 34.3 (L) 06/04/2024    MCV 94 06/04/2024     (L) 06/04/2024    INR 1.08 06/03/2024     Lab Results   Component Value Date     06/04/2024    CO2 22 06/04/2024    BUN 25.2 (H) 06/04/2024         PROBLEMS:     Active Problems:    Closed fracture of neck of left femur, initial encounter (H)      ASSESSMENT AND PLAN:      89 year old female fall yesterday 6/3/24  -LEFT displaced subcapital femoral neck fracture  -+COVID with mild upper respiratory symptoms  -discussed treatment with Jamee and her daughter Olinda (DPEUGENE) and recommended surgery with hemiarthroplasty to aid with pain control, mobilization, and restoration of function.  We also reviewed realistic expectations regarding return to full function which is unlikely, especially given the fact that she has experienced more frequent falls as of late, and this was prior to fracture.    -medical clearance appreciated, labs reviewed Hb 10.9, WBC 11.3, Plt 122, electrolytes reviewed  -Risks, benefits, alternatives, and anticipated postoperative course were discussed. Risks of surgery include, but are not limited to, bleeding, infection, wound healing complications, neurovascular injury, pain, stiffness, arthritis, instability or dislocation, limb length discrepancy, symptomatic hardware, thromboembolic phenomena, heart attack, stroke, anesthetic and medical complications, and death. Benefits of surgery were discussed including improved chance of pain relief and restoration of function/mobilization, and prevention of risks associated with nonoperative hip fracture management. We also discussed therapeutic alternatives to surgery, including non-operative management, which I did not recommend. They understand these risks and benefits and wish to proceed. Keep NPO pending surgery. Please see operative note for detailed post-operative plan, including post-op weightbearing status.     I have explained in a language understandable  to the patient or substitute decision maker:    - The procedure the patient is having and why they need it  - The possible risks of the procedure  - Reasonable alternatives to this procedure, the relevant risks, benefits, and uncertainties  - The significant risks and problems specific to this patient     I have given the patient/substitute decision-maker an opportunity to:  - Ask questions about any of the above information  - Raise any other concerns     I believe the patient/substitute decision-maker understood the above information    Edis Danielle DO, MSc  6/4/2024 11:30 AM

## 2024-06-04 NOTE — ANESTHESIA PREPROCEDURE EVALUATION
Anesthesia Pre-Procedure Evaluation    Patient: Jamee Douglas   MRN: [id number] : 1932        Procedure : Procedure(s):  LEFT HIP HEMIARTHROPLASTY          Past Medical History:   Diagnosis Date    Glaucoma     Hyperlipidemia LDL goal < 130     LBP (low back pain)     Osteoarthritis       Past Surgical History:   Procedure Laterality Date    ARTHROPLASTY KNEE      HYSTERECTOMY      SURGICAL HISTORY OF -       rights shoulder    SURGICAL HISTORY OF -       glaucoma surgery    SURGICAL HISTORY OF -       back surgery      Allergies   Allergen Reactions    Pcn [Penicillins] Swelling     Patient reported swelling around mouth and hands      Social History     Tobacco Use    Smoking status: Never    Smokeless tobacco: Never   Substance Use Topics    Alcohol use: Yes     Comment: Rare      Wt Readings from Last 1 Encounters:   24 56 kg (123 lb 7.3 oz)        Anesthesia Evaluation   Pt has had prior anesthetic.     No history of anesthetic complications       ROS/MED HX  ENT/Pulmonary:    (-) tobacco use and sleep apnea   Neurologic:     (+)          CVA,  with deficits, - left side weakness.                (-) no seizures   Cardiovascular:     (+) Dyslipidemia hypertension- -   -  - -             fainting (syncope).                         METS/Exercise Tolerance:     Hematologic:       Musculoskeletal:   (+)  arthritis,   fracture, Fracture location: LLE,         GI/Hepatic:    (-) GERD and liver disease   Renal/Genitourinary:    (-) renal disease   Endo:    (-) Type II DM and thyroid disease   Psychiatric/Substance Use:     (+) psychiatric history depression       Infectious Disease: Comment: COVID + (6/3/24)      Malignancy:       Other:               OUTSIDE LABS:  CBC:   Lab Results   Component Value Date    WBC 11.3 (H) 2024    WBC 11.6 (H) 2024    HGB 10.9 (L) 2024    HGB 12.7 2024    HCT 34.3 (L) 2024    HCT 39.9 2024     (L) 2024      06/03/2024     BMP:   Lab Results   Component Value Date     06/03/2024     04/04/2024    POTASSIUM 4.4 06/03/2024    POTASSIUM 3.8 04/04/2024    CHLORIDE 102 06/03/2024    CHLORIDE 106 04/04/2024    CO2 24 06/03/2024    CO2 24 04/04/2024    BUN 28.1 (H) 06/03/2024    BUN 19.0 04/04/2024    CR 1.10 (H) 06/03/2024    CR 1.10 (H) 06/03/2024     (H) 06/03/2024    GLC 92 04/04/2024     COAGS:   Lab Results   Component Value Date    INR 1.08 06/03/2024     POC:   Lab Results   Component Value Date    BGM 97 02/21/2021     HEPATIC:   Lab Results   Component Value Date    ALBUMIN 4.3 06/03/2024    PROTTOTAL 7.3 06/03/2024    ALT 33 06/03/2024    AST 38 06/03/2024    ALKPHOS 62 06/03/2024    BILITOTAL 0.6 06/03/2024     OTHER:   Lab Results   Component Value Date    A1C 5.8 (H) 02/01/2021    POORNIMA 9.2 06/03/2024    MAG 1.7 02/05/2021    LIPASE 25 09/22/2023    TSH 1.79 03/30/2021       Anesthesia Plan    ASA Status:  3    NPO Status:  NPO Appropriate    Anesthesia Type: General.     - Airway: ETT   Induction: Intravenous.   Maintenance: Balanced.   Techniques and Equipment:     - Airway: Video-Laryngoscope       Consents    Anesthesia Plan(s) and associated risks, benefits, and realistic alternatives discussed. Questions answered and patient/representative(s) expressed understanding.     - Discussed:     - Discussed with:  Patient            Postoperative Care    Pain management: Multi-modal analgesia, Peripheral nerve block (Single Shot).   PONV prophylaxis: Ondansetron (or other 5HT-3), Dexamethasone or Solumedrol, Background Propofol Infusion     Comments:               Justen Fofana MD    I have reviewed the pertinent notes and labs in the chart from the past 30 days and (re)examined the patient.  Any updates or changes from those notes are reflected in this note.             # Thrombocytopenia: Lowest platelets = 122 in last 2 days, will monitor for bleeding

## 2024-06-04 NOTE — ANESTHESIA CARE TRANSFER NOTE
Patient: Jamee Douglas    Procedure: Procedure(s):  LEFT HIP HEMIARTHROPLASTY       Diagnosis: Closed fracture of neck of left femur, initial encounter (H) [S72.002A]  Diagnosis Additional Information: No value filed.    Anesthesia Type:   General     Note:    Oropharynx: oropharynx clear of all foreign objects and spontaneously breathing  Level of Consciousness: awake  Oxygen Supplementation: face mask  Level of Supplemental Oxygen (L/min / FiO2): 6  Independent Airway: airway patency satisfactory and stable  Dentition: dentition unchanged  Vital Signs Stable: post-procedure vital signs reviewed and stable  Report to RN Given: handoff report given  Patient transferred to: PACU    Handoff Report: Identifed the Patient, Identified the Reponsible Provider, Reviewed the pertinent medical history, Discussed the surgical course, Reviewed Intra-OP anesthesia mangement and issues during anesthesia, Set expectations for post-procedure period and Allowed opportunity for questions and acknowledgement of understanding      Vitals:  Vitals Value Taken Time   /81    Temp     Pulse 96    Resp 16    SpO2 100        Electronically Signed By: MANOHAR Smith CRNA  June 4, 2024  2:48 PM

## 2024-06-04 NOTE — ANESTHESIA PROCEDURE NOTES
Airway       Patient location during procedure: OR       Procedure Start/Stop Times: 6/4/2024 12:25 PM  Staff -        CRNA: Darius Mcadams APRN CRNA       Performed By: CRNA  Consent for Airway        Urgency: elective  Indications and Patient Condition       Indications for airway management: mateus-procedural       Induction type:intravenous       Mask difficulty assessment: 1 - vent by mask    Final Airway Details       Final airway type: endotracheal airway       Successful airway: ETT - single  Endotracheal Airway Details        ETT size (mm): 7.0       Cuffed: yes       Successful intubation technique: video laryngoscopy       VL Blade Size: Shahid 3       Grade View of Cords: 1       Adjucts: stylet       Position: Right       Measured from: gums/teeth       Secured at (cm): 21       Bite block used: None    Post intubation assessment        Placement verified by: capnometry, equal breath sounds and chest rise        Number of attempts at approach: 1       Number of other approaches attempted: 0       Secured with: tape       Ease of procedure: easy       Dentition: Intact and Unchanged    Medication(s) Administered   Medication Administration Time: 6/4/2024 12:25 PM

## 2024-06-05 ENCOUNTER — APPOINTMENT (OUTPATIENT)
Dept: PHYSICAL THERAPY | Facility: CLINIC | Age: 89
DRG: 521 | End: 2024-06-05
Payer: COMMERCIAL

## 2024-06-05 ENCOUNTER — APPOINTMENT (OUTPATIENT)
Dept: SPEECH THERAPY | Facility: CLINIC | Age: 89
DRG: 521 | End: 2024-06-05
Attending: STUDENT IN AN ORGANIZED HEALTH CARE EDUCATION/TRAINING PROGRAM
Payer: COMMERCIAL

## 2024-06-05 LAB
ABO/RH(D): NORMAL
ANION GAP SERPL CALCULATED.3IONS-SCNC: 11 MMOL/L (ref 7–15)
ANTIBODY SCREEN: NEGATIVE
ATRIAL RATE - MUSE: 86 BPM
BUN SERPL-MCNC: 25.3 MG/DL (ref 8–23)
CALCIUM SERPL-MCNC: 7.9 MG/DL (ref 8.2–9.6)
CHLORIDE SERPL-SCNC: 106 MMOL/L (ref 98–107)
CREAT SERPL-MCNC: 0.9 MG/DL (ref 0.51–0.95)
DEPRECATED HCO3 PLAS-SCNC: 21 MMOL/L (ref 22–29)
DIASTOLIC BLOOD PRESSURE - MUSE: NORMAL MMHG
EGFRCR SERPLBLD CKD-EPI 2021: 60 ML/MIN/1.73M2
ERYTHROCYTE [DISTWIDTH] IN BLOOD BY AUTOMATED COUNT: 12.7 % (ref 10–15)
GLUCOSE BLDC GLUCOMTR-MCNC: 104 MG/DL (ref 70–99)
GLUCOSE SERPL-MCNC: 111 MG/DL (ref 70–99)
HCT VFR BLD AUTO: 24 % (ref 35–47)
HGB BLD-MCNC: 7.3 G/DL (ref 11.7–15.7)
HGB BLD-MCNC: 7.7 G/DL (ref 11.7–15.7)
INTERPRETATION ECG - MUSE: NORMAL
MCH RBC QN AUTO: 30.6 PG (ref 26.5–33)
MCHC RBC AUTO-ENTMCNC: 32.1 G/DL (ref 31.5–36.5)
MCV RBC AUTO: 95 FL (ref 78–100)
P AXIS - MUSE: 56 DEGREES
PLATELET # BLD AUTO: 118 10E3/UL (ref 150–450)
POTASSIUM SERPL-SCNC: 3.8 MMOL/L (ref 3.4–5.3)
PR INTERVAL - MUSE: 150 MS
QRS DURATION - MUSE: 90 MS
QT - MUSE: 398 MS
QTC - MUSE: 476 MS
R AXIS - MUSE: 46 DEGREES
RBC # BLD AUTO: 2.52 10E6/UL (ref 3.8–5.2)
SODIUM SERPL-SCNC: 138 MMOL/L (ref 135–145)
SPECIMEN EXPIRATION DATE: NORMAL
SYSTOLIC BLOOD PRESSURE - MUSE: NORMAL MMHG
T AXIS - MUSE: -14 DEGREES
VENTRICULAR RATE- MUSE: 86 BPM
WBC # BLD AUTO: 10.9 10E3/UL (ref 4–11)

## 2024-06-05 PROCEDURE — 85027 COMPLETE CBC AUTOMATED: CPT | Performed by: STUDENT IN AN ORGANIZED HEALTH CARE EDUCATION/TRAINING PROGRAM

## 2024-06-05 PROCEDURE — 93005 ELECTROCARDIOGRAM TRACING: CPT

## 2024-06-05 PROCEDURE — 99233 SBSQ HOSP IP/OBS HIGH 50: CPT | Performed by: STUDENT IN AN ORGANIZED HEALTH CARE EDUCATION/TRAINING PROGRAM

## 2024-06-05 PROCEDURE — 86900 BLOOD TYPING SEROLOGIC ABO: CPT | Performed by: STUDENT IN AN ORGANIZED HEALTH CARE EDUCATION/TRAINING PROGRAM

## 2024-06-05 PROCEDURE — 250N000011 HC RX IP 250 OP 636: Performed by: HOSPITALIST

## 2024-06-05 PROCEDURE — 92610 EVALUATE SWALLOWING FUNCTION: CPT | Mod: GN

## 2024-06-05 PROCEDURE — 92526 ORAL FUNCTION THERAPY: CPT | Mod: GN

## 2024-06-05 PROCEDURE — 97110 THERAPEUTIC EXERCISES: CPT | Mod: GP

## 2024-06-05 PROCEDURE — 80048 BASIC METABOLIC PNL TOTAL CA: CPT | Performed by: STUDENT IN AN ORGANIZED HEALTH CARE EDUCATION/TRAINING PROGRAM

## 2024-06-05 PROCEDURE — 250N000013 HC RX MED GY IP 250 OP 250 PS 637: Performed by: STUDENT IN AN ORGANIZED HEALTH CARE EDUCATION/TRAINING PROGRAM

## 2024-06-05 PROCEDURE — 120N000001 HC R&B MED SURG/OB

## 2024-06-05 PROCEDURE — 250N000011 HC RX IP 250 OP 636: Performed by: STUDENT IN AN ORGANIZED HEALTH CARE EDUCATION/TRAINING PROGRAM

## 2024-06-05 PROCEDURE — 86923 COMPATIBILITY TEST ELECTRIC: CPT | Performed by: STUDENT IN AN ORGANIZED HEALTH CARE EDUCATION/TRAINING PROGRAM

## 2024-06-05 PROCEDURE — 85018 HEMOGLOBIN: CPT | Performed by: STUDENT IN AN ORGANIZED HEALTH CARE EDUCATION/TRAINING PROGRAM

## 2024-06-05 PROCEDURE — 97530 THERAPEUTIC ACTIVITIES: CPT | Mod: GP

## 2024-06-05 PROCEDURE — 36415 COLL VENOUS BLD VENIPUNCTURE: CPT | Performed by: STUDENT IN AN ORGANIZED HEALTH CARE EDUCATION/TRAINING PROGRAM

## 2024-06-05 PROCEDURE — 250N000013 HC RX MED GY IP 250 OP 250 PS 637: Performed by: HOSPITALIST

## 2024-06-05 PROCEDURE — 250N000013 HC RX MED GY IP 250 OP 250 PS 637: Performed by: PHYSICIAN ASSISTANT

## 2024-06-05 PROCEDURE — 93010 ELECTROCARDIOGRAM REPORT: CPT | Performed by: INTERNAL MEDICINE

## 2024-06-05 RX ORDER — OXYCODONE HYDROCHLORIDE 5 MG/1
2.5-5 TABLET ORAL EVERY 4 HOURS PRN
Qty: 15 TABLET | Refills: 0 | Status: SHIPPED | OUTPATIENT
Start: 2024-06-05

## 2024-06-05 RX ORDER — CHOLECALCIFEROL (VITAMIN D3) 50 MCG
50 TABLET ORAL DAILY
Qty: 42 TABLET | Refills: 0 | DISCHARGE
Start: 2024-06-06 | End: 2024-07-18

## 2024-06-05 RX ORDER — AMOXICILLIN 250 MG
1 CAPSULE ORAL 2 TIMES DAILY PRN
Qty: 30 TABLET | Refills: 0 | DISCHARGE
Start: 2024-06-05

## 2024-06-05 RX ORDER — ENOXAPARIN SODIUM 100 MG/ML
40 INJECTION SUBCUTANEOUS EVERY 24 HOURS
DISCHARGE
Start: 2024-06-05 | End: 2024-07-05

## 2024-06-05 RX ORDER — ACETAMINOPHEN 500 MG
1000 TABLET ORAL 3 TIMES DAILY
Qty: 100 TABLET | Refills: 0 | DISCHARGE
Start: 2024-06-05

## 2024-06-05 RX ADMIN — ENOXAPARIN SODIUM 40 MG: 40 INJECTION SUBCUTANEOUS at 23:08

## 2024-06-05 RX ADMIN — SERTRALINE HYDROCHLORIDE 25 MG: 25 TABLET ORAL at 09:21

## 2024-06-05 RX ADMIN — POLYETHYLENE GLYCOL 3350 17 G: 17 POWDER, FOR SOLUTION ORAL at 09:21

## 2024-06-05 RX ADMIN — Medication 50 MCG: at 09:21

## 2024-06-05 RX ADMIN — BENZOCAINE 6 MG-MENTHOL 10 MG LOZENGES 1 LOZENGE: at 13:43

## 2024-06-05 RX ADMIN — SENNOSIDES AND DOCUSATE SODIUM 1 TABLET: 50; 8.6 TABLET ORAL at 23:08

## 2024-06-05 RX ADMIN — ACETAMINOPHEN 1000 MG: 500 TABLET, FILM COATED ORAL at 09:21

## 2024-06-05 RX ADMIN — MONTELUKAST SODIUM 1 G: 4 TABLET, CHEWABLE ORAL at 13:43

## 2024-06-05 RX ADMIN — OXYCODONE HYDROCHLORIDE 2.5 MG: 5 TABLET ORAL at 09:21

## 2024-06-05 RX ADMIN — SENNOSIDES AND DOCUSATE SODIUM 1 TABLET: 50; 8.6 TABLET ORAL at 09:21

## 2024-06-05 RX ADMIN — OXYCODONE HYDROCHLORIDE 2.5 MG: 5 TABLET ORAL at 16:52

## 2024-06-05 RX ADMIN — ACETAMINOPHEN 1000 MG: 500 TABLET, FILM COATED ORAL at 16:52

## 2024-06-05 RX ADMIN — ACETAMINOPHEN 1000 MG: 500 TABLET, FILM COATED ORAL at 00:27

## 2024-06-05 RX ADMIN — MONTELUKAST SODIUM 1 G: 4 TABLET, CHEWABLE ORAL at 06:10

## 2024-06-05 RX ADMIN — ATORVASTATIN CALCIUM 40 MG: 40 TABLET, FILM COATED ORAL at 23:08

## 2024-06-05 RX ADMIN — ACETAMINOPHEN 1000 MG: 500 TABLET, FILM COATED ORAL at 23:08

## 2024-06-05 RX ADMIN — CEFAZOLIN 1 G: 1 INJECTION, POWDER, FOR SOLUTION INTRAMUSCULAR; INTRAVENOUS at 06:10

## 2024-06-05 RX ADMIN — ENOXAPARIN SODIUM 40 MG: 40 INJECTION SUBCUTANEOUS at 00:27

## 2024-06-05 ASSESSMENT — ACTIVITIES OF DAILY LIVING (ADL)
ADLS_ACUITY_SCORE: 34
ADLS_ACUITY_SCORE: 35
ADLS_ACUITY_SCORE: 34
ADLS_ACUITY_SCORE: 35
ADLS_ACUITY_SCORE: 34
ADLS_ACUITY_SCORE: 34

## 2024-06-05 NOTE — PROGRESS NOTES
[facility]    Medicine Progress Note - Hospitalist Service    Date of Admission:  6/3/2024    Assessment & Plan     Jamee Douglas is a 89 year old female with a previous history of stroke in 2020 with residual left-sided weakness, hyperlipidemia, osteoarthritis and glaucoma who was visiting primary clinic for cold symptoms and while exiting the building, the door closed on her and she had a fall and landed on the left side of her body. Was unable to get up after this or put weight on left leg. EMS was called and patient was transferred to [facility].     Left hip fracture s/p mechanical fall s/p hemiarthroplasty 6/4/24  History of recurrent falls  Went to doctor for cold symptoms. Suffered a mechanical fall while at doctors office. Pelvic x-ray showed left femoral neck fracture with 3 cm displacement and impaction.  Moderate degree of apex superolateral and anterior angulation. Chest x-ray with no acute findings. Underwent hip hemiarthroplasty on 6/4.   - Oral oxycodone, Tylenol and IV Dilaudid ordered.  - Ortho consulted, recommendations appreciated   - DVT prophylaxis per ortho team post-operatively, currently on lovenox    - Advance diet as tolerated after surgery    - PT OT ordered.    Acute Blood Loss Anemia  Baseline hemoglobin seems to be between 9-10. Hgb on admission 12.7. Then dropped to 10.9 after IV fluids, suspected some hemodilution because all cell lines dropped. Patient then went to OR on 6/4. Hemoglobin on 6/5 dropped to 7.7. Recheck 7.3. She remains hemodynamically stable.    - Add on Type and screen   - Continue to monitor hemoglobin closely, repeat this evening  - Conditional transfusion order for hemoglobin < 7.0  - Obtained consent from patient/daughter over phone to reduce covid exposure   - If hemoglobin drops below 7.0, hold lovenox    COVID19  Sore Throat  Denies cough or shortness of breath. Has a sore throat. Found to be COVID positive. Vitally stable.    - supportive treatment  - cepacol available     New T-wave inversions, improved   I assumed care 6/4. I reviewed pre-operative EKG showing possible new T-wave versions in inferior and lateral leads. These are new compared to EKG from March. No troponin was collected. Patient denied any chest pain or anginal equivalents prior to surgical intervention. Preop evaluation-revised cardiac risk index is at 1 point giving her a class II risk with 6% 30-day risk of death, MI and cardiac arrest. Repeat EKG after surgery showing improvement of these T-wave changes. I discussed further cardiac risk stratification with her Daughter Olinda. We could consider inpatient echocardiogram versus outpatient follow up to consider stress test. Daughter agrees with outpatient follow up for further CAD risk   - Outpatient follow up for CV risk stratification if patient desires after recovering from surgery      History of stroke with residual left-sided weakness  -Patient is on atorvastatin 40 mg daily.  Continue for now.  -At baseline she is able to walk long distances with the help of a walker.     Depression-on sertraline 25 mg daily.     Hypertension-patient is not on any antihypertensives at home.  Currently systolic blood pressures over 180 likely secondary to anxiety and pain from the fall.  On IV as needed hydralazine for SBP greater than 160. Blood pressure more stable on 6/4.      Chronic diarrhea-on colestipol 1 g 2 times daily, 4 hours prior to eating.    Right frontal superior sulcus subarachnoid hemorrhage  Per chart review the patient suffered a subarachnoid hemorrhage on 3/21/24. Therapeutic anti-coagulation is contraindicated.   - low threshold for CT head for any change in mental status given drop in hemoglobin and history of subarachnoid hemorrhage in the past three months           Diet: Combination Diet Regular Diet; Mildly Thick (level 2)  ADAT  DVT Prophylaxis: Defer to orthopedic surgery  Pierce Catheter: PRESENT,  indication: Surgical procedure  Lines: None     Cardiac Monitoring: None  Code Status: Full Code      Clinically Significant Risk Factors                # Thrombocytopenia: Lowest platelets = 118 in last 2 days, will monitor for bleeding   # Hypertension: Noted on problem list             #Precipitous drop in Hgb/Hct: Lowest Hgb this hospitalization: 7.3 g/dL. Will continue to monitor and treat/transfuse as appropriate.         # Financial/Environmental Concerns:           Disposition Plan     Medically Ready for Discharge: Anticipated Tomorrow         Casimiro Pérez,   Hospitalist Service  [facility]  Securely message with [employer] (more info)  Text page via [employer] Paging/Directory   ______________________________________________________________________    Interval History     - No acute events overnight  - Patient reports to be doing fair today  - Pain is better compared to before surgery  - Is hungry and wants to eat  - No other acute concerns at this time     Physical Exam   Vital Signs: Temp: 98.7  F (37.1  C) Temp src: Oral BP: 133/65 Pulse: 85   Resp: 16 SpO2: 96 % O2 Device: Nasal cannula Oxygen Delivery: 3 LPM  Weight: 122 lbs 9.21 oz    General Appearance: Awake Alert, sitting up in bed  Respiratory: Clear anteriorly, no wheezes or rales   Cardiovascular: Regular rate and rhythm, no murmurs, rubs, or gallops  GI: Abdomen soft, bowel sounds present  Other: Normal affect. Moving all extremities, no visible bleeding    Medical Decision Making       55 MINUTES SPENT BY ME on the date of service doing chart review, history, exam, documentation & further activities per the note.      Data   ------------------------- PAST 24 HR DATA REVIEWED -----------------------------------------------    I have personally reviewed the following data over the past 24 hrs:    10.9  \   7.3 (L)   / 118 (L)     138 106 25.3 (H) /  111 (H)   3.8 21 (L) 0.90 \       Imaging results reviewed over the past  24 hrs:   Recent Results (from the past 24 hour(s))   XR Pelvis Port 1/2 Views    Narrative    EXAM: XR PELVIS PORT 1/2 VIEWS  LOCATION: [facility]  DATE: 6/4/2024    INDICATION: Status post Hip surgery  COMPARISON: None available.      Impression    IMPRESSION: Left hip hemiarthroplasty. Negative for postoperative purposes.

## 2024-06-05 NOTE — PROGRESS NOTES
Brief Progress Note    Reviewed significant drop in hemoglobin on AM labs. Confirmed on recheck at 7.3. Suspect blood loss related to procedure. Patient is hemodynamically stable. She is working with PT this afternoon. I updated patients daughter on this result. Plan as follows.    - Consent obtained for RBC transfusion  - Recheck hemoglobin at 2200 and tomorrow AM  - If hemoglobin < 7.0, conditional transfusion ordered    - Recommend reaching out to house officer if any change in clinical status or change in hemodynamic stability  - Would have low threshold to obtain CT scan of head and CAP w/ contrast to evaluate for bleeding  - Discussed plan with nursing staff    Casimiro Pérez DO

## 2024-06-05 NOTE — PROGRESS NOTES
06/04/24 1900   Appointment Info   Signing Clinician's Name / Credentials (PT) Tushar Celeste DPT   Rehab Comments (PT) WBAT LLE, NO: IR, flex>90, active ABD   Living Environment   People in Home alone   Current Living Arrangements independent living facility   Home Accessibility no concerns   Transportation Anticipated family or friend will provide;health plan transportation   Living Environment Comments Pt lives in [facility] but family had just signed papers for pt to move to retirement for increased cares   Self-Care   Usual Activity Tolerance good   Current Activity Tolerance poor   Equipment Currently Used at Home walker, rolling   Fall history within last six months yes   Number of times patient has fallen within last six months 2   Activity/Exercise/Self-Care Comment At baseline pt is Mod-I at her ILF with mobility using 4WW and platform walker for mobility, IND with ADL's   General Information   Onset of Illness/Injury or Date of Surgery 06/04/24   Referring Physician Olga Yarbrough PA-C   Pertinent History of Current Problem (include personal factors and/or comorbidities that impact the POC) Jamee Douglas is a 89 year old female with a previous history of stroke in 2020 with residual left-sided weakness, hyperlipidemia, osteoarthritis and glaucoma who was visiting primary clinic for cold symptoms and while exiting the building, the door closed on her and she had a fall and landed on the left side of her body. Was unable to get up after this or put weight on left leg. EMS was called and patient was transferred to [facility]. Pt is POD#0 for L hemiarthoplasty   Existing Precautions/Restrictions fall;no active hip ABD;no hip IR;90 degree hip flexion   Weight-Bearing Status - LLE weight-bearing as tolerated   Weight-Bearing Status - RLE full weight-bearing   Cognition   Affect/Mental Status (Cognition) WNL   Orientation Status (Cognition) oriented x 4   Follows Commands (Cognition) WNL   Pain Assessment    Patient Currently in Pain Yes, see Vital Sign flowsheet  (4/10)   Integumentary/Edema   Integumentary/Edema no deficits were identifed   Integumentary/Edema Comments dressing over L hip appears CDI   Posture    Posture Forward head position;Protracted shoulders;Kyphosis   Posture Comments sitting posture pt tends to flex forward and lean to R side   Range of Motion (ROM)   Range of Motion ROM deficits secondary to surgical procedure;ROM deficits secondary to pain;ROM deficits secondary to weakness   ROM Comment Deficits with L hip due to pain and post op precautions, otherwise appears WFL   Strength (Manual Muscle Testing)   Strength (Manual Muscle Testing) Deficits observed during functional mobility;Able to perform R SLR   Strength Comments Pt has residual L side weakness from previous stroke, significant weakness in LLE unable to SLR or extend knee in sitting position, weak quad contraction, LUE strength is weaker than R but strength in functional and pt has good    Bed Mobility   Comment, (Bed Mobility) ModA-MaxA 1-2   Transfers   Comment, (Transfers) unable to perform at this time safely   Gait/Stairs (Locomotion)   Comment, (Gait/Stairs) did not observe   Balance   Balance Comments sitting balance poor pt needing to hold bedrail with RUE, leans to R side and intermittently needs ModA to maintain upright but pt unable to hold true midline without assist   Sensory Examination   Sensory Perception Comments pt reports bilat LE numbness   Clinical Impression   Criteria for Skilled Therapeutic Intervention Yes, treatment indicated   PT Diagnosis (PT) impaired mobility   Influenced by the following impairments pain, deficits with functional ROM, strength, balance, hip precautiions   Functional limitations due to impairments all functional mobility   Clinical Presentation (PT Evaluation Complexity) stable   Clinical Presentation Rationale PMH and clinical judgement   Clinical Decision Making (Complexity) low  complexity   Planned Therapy Interventions (PT) balance training;bed mobility training;cryotherapy;gait training;home exercise program;patient/family education;postural re-education;ROM (range of motion);strengthening;stretching;transfer training;progressive activity/exercise;wheelchair management/propulsion training   Risk & Benefits of therapy have been explained evaluation/treatment results reviewed;care plan/treatment goals reviewed;risks/benefits reviewed;current/potential barriers reviewed;participants voiced agreement with care plan;participants included;patient   PT Total Evaluation Time   PT Eval, Low Complexity Minutes (90490) 10   Physical Therapy Goals   PT Frequency 5x/week   PT Predicted Duration/Target Date for Goal Attainment 06/11/24   PT Goals Transfers;Bed Mobility;Gait   PT: Bed Mobility Supervision/stand-by assist;Supine to/from sit   PT: Transfers Minimal assist;Sit to/from stand;Bed to/from chair;Assistive device   PT: Gait Minimal assist;Rolling walker;25 feet   Interventions   Interventions Quick Adds Neuromuscular Re-ed;Gait Training;Therapeutic Activity;Therapeutic Procedure   Therapeutic Procedure/Exercise   Ther. Procedure: strength, endurance, ROM, flexibillity Minutes (72552) 1   Symptoms Noted During/After Treatment none   Treatment Detail/Skilled Intervention Pt educated on benefits of APs on circulation, cued to perform x30 and continue to perform throughout the day   Therapeutic Activity   Therapeutic Activities: dynamic activities to improve functional performance Minutes (26944) 24   Symptoms Noted During/After Treatment Increased pain   Treatment Detail/Skilled Intervention Greeted pt supine in bed. Eval completed. Educated on orders for WBAT and no: IR, active ABD, flex>90. Left pt with hip precautions handout and pt verbalized understanding. Pt with hip ABD pillow donned upon arrival, dependent to doff hip ABD for activity. With HOB elevated supine>sit ModA-MaxA verbal and  tactile cues provided throughout, use of bedrail, and maintaining hip precautions, difficult for pt to perform due to global weakness. Upon sitting pt needing ModA to scoot forward with draw sheet, pt with heavy R-side lean in sitting, cues for midline and upright, pt needing ModA-MaxA to maintain sitting upright as soon as assist lifted pt reverts to leaning towards R side. Worked on sitting balance for 5-10 min, pt felt herself beginning to slide forward, called for assist but nurse and NA unable to come quickly enough, MaxA used to safely scoot pt back and lift legs back into bed to put pt supine. Pt very fatigued afterwards and weak, no assist avail so unsafe to continue mobility past sitting. NA arrived and total assist x 2 with drawsheet to repo pt back towards HOB. Dependent to don Hip ABD pillow again. Vitals checked once pt supine and pt hypotensive with BP 91/75, pt asymptomatic, sating at 98% on 3 LPM NC, HR 90's.   PT Discharge Planning   PT Plan progress bed mobility, LE strengthening, attempt transfer with assist x 2 and FWW, gait as able   PT Discharge Recommendation (DC Rec)   (defer to ortho)   PT Rationale for DC Rec Pt is significantly below baseline of being Mod-I at her ILF using a walker, was planning on moving to [place]. Currently pt demonstrates global weakness, balance deficits, hip precautions as barrier, and is a high falls risk. Pt will likely need continued rehab in TCU to address above deficits and progress independence prior to moving into her [facility]   PT Brief overview of current status ModA-MaxA 1-2 for bed mobility   Total Session Time   Timed Code Treatment Minutes 25   Total Session Time (sum of timed and untimed services) 35

## 2024-06-05 NOTE — PROGRESS NOTES
Patient vital signs are at baseline: Yes  Patient able to ambulate as they were prior to admission or with assist devices provided by therapies during their stay:  No,  Reason: Refused to ambulate during the shift.   Patient MUST void prior to discharge:  Yes via noel catheter   Patient able to tolerate oral intake:  Yes  Pain has adequate pain control using Oral analgesics:  Yes  Does patient have an identified :  Yes  Has goal D/C date and time been discussed with patient:  No,  Reason: Pending    Pt is A&O x4, forgetful. Pt refused to ambulate during the shift. Turned and repositioned for comfort. Wage in place. VSS on 3L NC. PIV infusing NS at 50 ml/hr continuous. Schedule Tylenol given for pain management. Left hip dressing CDI. Pt denied pain at this time.

## 2024-06-05 NOTE — PROGRESS NOTES
Patient is AO X 4; VSS; hypotensive - possible as a result of oxycodone administration.   Dr. Pérez is aware.   Jamee has had dysphagia since her stroke. Pills have to be crushed and administered in pudding. I discussed dysphagia with Dr. Pérez - she is currently on soft diet until SLP finalizes the consult.   Jamee is in no acute distress; pain is well controlled, no nausea, no vomiting, no SOB, CMS is fine, surgical site is CDI.

## 2024-06-05 NOTE — PROGRESS NOTES
[facility]  Inpatient Clinical Swallow Evaluation    06/05/24 8549   Appointment Info   Signing Clinician's Name / Credentials (SLP) Emma Hernandez MS CCC SLP   General Information   Onset of Illness/Injury or Date of Surgery 06/03/24   Referring Physician Casimiro Pérez,    Patient/Family Therapy Goal Statement (SLP) None stated   Pertinent History of Current Problem Per provider documentation - Jamee Douglas is a 89 year old female with a previous history of stroke in 2020 with residual left-sided weakness, hyperlipidemia, osteoarthritis and glaucoma who was visiting primary clinic for cold symptoms and while exiting the building, the door closed on her and she had a fall and landed on the left side of her body.  Was unable to get up after this or bear weight on her left leg.  EMS was called and patient was transferred to [facility]   General Observations Pt is alert and agreeable. Her daughter is present at onset of evaluation but leaves for work.   Type of Evaluation   Type of Evaluation Swallow Evaluation   Oral Motor   Oral Musculature generally intact   Structural Abnormalities none present   Mucosal Quality adequate   Dentition (Oral Motor)   Dentition (Oral Motor) adequate dentition   Facial Symmetry (Oral Motor)   Facial Symmetry (Oral Motor) WNL   Lip Function (Oral Motor)   Lip Range of Motion (Oral Motor) WNL   Tongue Function (Oral Motor)   Tongue ROM (Oral Motor) WNL   Jaw Function (Oral Motor)   Jaw Function (Oral Motor) WNL   Vocal Quality/Secretion Management (Oral Motor)   Vocal Quality (Oral Motor) dysphonic;hoarse  (weak)   Secretion Management (Oral Motor) WNL   General Swallowing Observations   Past History of Dysphagia Pt was seen by SLP dept in the past following a stroke and was discharged with recommedations for regular/thin. Daughter today reports she has trouble with water, holding it in her mouth and swishing it around before swallowing. She denies that pt  does this with other liquids like coffee, juice, etc. Pt reports nasal drip as well.   Respiratory Support nasal cannula   Current Diet/Method of Nutritional Intake (General Swallowing Observations, NIS) easy to chew (level 7)   Swallowing Evaluation Clinical swallow evaluation   Clinical Swallow Evaluation   Feeding Assistance set up only required   Clinical Swallow Evaluation Textures Trialed thin liquids;mildly thick liquids;solid foods;pureed   Clinical Swallow Eval: Thin Liquid Texture Trial   Mode of Presentation, Thin Liquids cup;straw;self-fed   Volume of Liquid or Food Presented 2 oz   Oral Phase of Swallow WFL   Pharyngeal Phase of Swallow impaired;throat clearing   Diagnostic Statement Throat clear noted in 5/6 trials of thin liquids regardless of presentation via cup or straw. Pt attributes it to nasal drip   Clinical Swallow Eval: Mildly Thick Liquids   Mode of Presentation straw;self-fed   Volume Presented 6 oz   Oral Phase WFL   Pharyngeal Phase intact   Diagnostic Statement No overt s/sx of aspiration, no throat clear evident across trials.   Clinical Swallow Evaluation: Puree Solid Texture Trial   Diagnostic Statement Pt declined, doesn't like presented option   Clinical Swallow Evaluation: Solid Food Texture Trial   Mode of Presentation self-fed   Volume Presented 1/2 cracker   Oral Phase WFL   Pharyngeal Phase intact   Diagnostic Statement No overt s/sx of aspiration. Slightly prolonged mastication as pt reports it is very dry   Esophageal Phase of Swallow   Patient reports or presents with symptoms of esophageal dysphagia No   Swallowing Recommendations   Diet Consistency Recommendations regular diet;mildly thick liquids (level 2)   Supervision Level for Intake close supervision needed   Mode of Delivery Recommendations bolus size, small;slow rate of intake   Swallowing Maneuver Recommendations alternate food and liquid intake   Monitoring/Assistance Required (Eating/Swallowing) cue for  finger/lingual sweep if oral pocketing present;monitor for cough or change in vocal quality with intake;stop eating activities when fatigue is present   Recommended Feeding/Eating Techniques (Swallow Eval) maintain upright sitting position for eating;maintain upright posture during/after eating for 30 minutes;minimize distractions during oral intake   Medication Administration Recommendations, Swallowing (SLP) Whole with mildly thick liquids as tolerated. Pt prefers not crushed if she is able to swallow them whole ok   Instrumental Assessment Recommendations VFSS (videofluoroscopic swallowing study)  (Pending clinical presentation may need to consider VFSS)   General Therapy Interventions   Planned Therapy Interventions Dysphagia Treatment   Clinical Impression   Criteria for Skilled Therapeutic Interventions Met (SLP Eval) Yes, treatment indicated   SLP Diagnosis Oropharyngeal dysphagia   Risks & Benefits of therapy have been explained evaluation/treatment results reviewed;care plan/treatment goals reviewed;participants voiced agreement with care plan;participants included;patient   Clinical Impression Comments Pt seen for clinical swallow evaluation. Her vocal quality is weak and hoarse initially, after drinking liquids improved strength is noted. Pt reports nasal drip and denies any difficulty swallowing. Her daughter notes her to have some trouble with water saying she holds it and swishes it around. With thin water trials today, pt presents with a consistent throat clear across trials and pt attributes it to nasal drip. However, when given mildly thick liquids throat clearing was eliminated. Tolerance of regular textures given extra time and pt appropriately used a liquid wash.     Oropharyngeal dysphagia. Recommend regular diet and mildly thick liquids - whole pills with mildly thick liquids as tolerated. Pt should be positioned upright, take small bites/sips, and alternate solids and liquids. Pt also may have  sips of regular/nonthickened water between meals and after oral cares if desired - water should not be consumed with other foods/liquid or pills. Initiate SLP services for dysphagia.   SLP Total Evaluation Time   Eval: oral/pharyngeal swallow function, clinical swallow Minutes ([id number]) 10

## 2024-06-06 LAB
ANION GAP SERPL CALCULATED.3IONS-SCNC: 10 MMOL/L (ref 7–15)
BUN SERPL-MCNC: 22.6 MG/DL (ref 8–23)
CALCIUM SERPL-MCNC: 8.3 MG/DL (ref 8.2–9.6)
CHLORIDE SERPL-SCNC: 106 MMOL/L (ref 98–107)
CREAT SERPL-MCNC: 0.86 MG/DL (ref 0.51–0.95)
DEPRECATED HCO3 PLAS-SCNC: 23 MMOL/L (ref 22–29)
EGFRCR SERPLBLD CKD-EPI 2021: 63 ML/MIN/1.73M2
ERYTHROCYTE [DISTWIDTH] IN BLOOD BY AUTOMATED COUNT: 12.8 % (ref 10–15)
GLUCOSE SERPL-MCNC: 86 MG/DL (ref 70–99)
HCT VFR BLD AUTO: 22.7 % (ref 35–47)
HGB BLD-MCNC: 7.3 G/DL (ref 11.7–15.7)
HGB BLD-MCNC: 7.3 G/DL (ref 11.7–15.7)
MCH RBC QN AUTO: 30.5 PG (ref 26.5–33)
MCHC RBC AUTO-ENTMCNC: 32.2 G/DL (ref 31.5–36.5)
MCV RBC AUTO: 95 FL (ref 78–100)
PLATELET # BLD AUTO: 131 10E3/UL (ref 150–450)
POTASSIUM SERPL-SCNC: 3.6 MMOL/L (ref 3.4–5.3)
RBC # BLD AUTO: 2.39 10E6/UL (ref 3.8–5.2)
SODIUM SERPL-SCNC: 139 MMOL/L (ref 135–145)
VIT D+METAB SERPL-MCNC: 45 NG/ML (ref 20–50)
WBC # BLD AUTO: 8.7 10E3/UL (ref 4–11)

## 2024-06-06 PROCEDURE — 85027 COMPLETE CBC AUTOMATED: CPT | Performed by: STUDENT IN AN ORGANIZED HEALTH CARE EDUCATION/TRAINING PROGRAM

## 2024-06-06 PROCEDURE — 250N000013 HC RX MED GY IP 250 OP 250 PS 637: Performed by: HOSPITALIST

## 2024-06-06 PROCEDURE — 250N000013 HC RX MED GY IP 250 OP 250 PS 637: Performed by: STUDENT IN AN ORGANIZED HEALTH CARE EDUCATION/TRAINING PROGRAM

## 2024-06-06 PROCEDURE — 250N000011 HC RX IP 250 OP 636: Mod: JZ | Performed by: HOSPITALIST

## 2024-06-06 PROCEDURE — 0SRS019 REPLACEMENT OF LEFT HIP JOINT, FEMORAL SURFACE WITH METAL SYNTHETIC SUBSTITUTE, CEMENTED, OPEN APPROACH: ICD-10-PCS | Performed by: ORTHOPAEDIC SURGERY

## 2024-06-06 PROCEDURE — 120N000001 HC R&B MED SURG/OB

## 2024-06-06 PROCEDURE — 80048 BASIC METABOLIC PNL TOTAL CA: CPT | Performed by: STUDENT IN AN ORGANIZED HEALTH CARE EDUCATION/TRAINING PROGRAM

## 2024-06-06 PROCEDURE — 36415 COLL VENOUS BLD VENIPUNCTURE: CPT | Performed by: STUDENT IN AN ORGANIZED HEALTH CARE EDUCATION/TRAINING PROGRAM

## 2024-06-06 PROCEDURE — 250N000013 HC RX MED GY IP 250 OP 250 PS 637: Performed by: PHYSICIAN ASSISTANT

## 2024-06-06 PROCEDURE — 99232 SBSQ HOSP IP/OBS MODERATE 35: CPT | Performed by: STUDENT IN AN ORGANIZED HEALTH CARE EDUCATION/TRAINING PROGRAM

## 2024-06-06 RX ORDER — DORZOLAMIDE HYDROCHLORIDE AND TIMOLOL MALEATE 20; 5 MG/ML; MG/ML
1 SOLUTION/ DROPS OPHTHALMIC 2 TIMES DAILY
Status: DISCONTINUED | OUTPATIENT
Start: 2024-06-06 | End: 2024-06-11 | Stop reason: HOSPADM

## 2024-06-06 RX ADMIN — SENNOSIDES AND DOCUSATE SODIUM 1 TABLET: 50; 8.6 TABLET ORAL at 10:17

## 2024-06-06 RX ADMIN — ACETAMINOPHEN 1000 MG: 500 TABLET, FILM COATED ORAL at 10:17

## 2024-06-06 RX ADMIN — ACETAMINOPHEN 1000 MG: 500 TABLET, FILM COATED ORAL at 16:33

## 2024-06-06 RX ADMIN — ATORVASTATIN CALCIUM 40 MG: 40 TABLET, FILM COATED ORAL at 21:02

## 2024-06-06 RX ADMIN — POLYETHYLENE GLYCOL 3350 17 G: 17 POWDER, FOR SOLUTION ORAL at 10:18

## 2024-06-06 RX ADMIN — Medication 50 MCG: at 10:17

## 2024-06-06 RX ADMIN — SENNOSIDES AND DOCUSATE SODIUM 1 TABLET: 50; 8.6 TABLET ORAL at 21:02

## 2024-06-06 RX ADMIN — ACETAMINOPHEN 1000 MG: 500 TABLET, FILM COATED ORAL at 21:21

## 2024-06-06 RX ADMIN — ACETAMINOPHEN 650 MG: 325 TABLET, FILM COATED ORAL at 01:26

## 2024-06-06 RX ADMIN — DORZOLAMIDE HYDROCHLORIDE AND TIMOLOL MALEATE 1 DROP: 20; 5 SOLUTION/ DROPS OPHTHALMIC at 10:24

## 2024-06-06 RX ADMIN — DORZOLAMIDE HYDROCHLORIDE AND TIMOLOL MALEATE 1 DROP: 20; 5 SOLUTION/ DROPS OPHTHALMIC at 21:20

## 2024-06-06 RX ADMIN — BENZOCAINE 6 MG-MENTHOL 10 MG LOZENGES 1 LOZENGE: at 16:09

## 2024-06-06 RX ADMIN — MONTELUKAST SODIUM 1 G: 4 TABLET, CHEWABLE ORAL at 06:42

## 2024-06-06 RX ADMIN — ENOXAPARIN SODIUM 40 MG: 40 INJECTION SUBCUTANEOUS at 21:26

## 2024-06-06 RX ADMIN — SERTRALINE HYDROCHLORIDE 25 MG: 25 TABLET ORAL at 10:18

## 2024-06-06 ASSESSMENT — ACTIVITIES OF DAILY LIVING (ADL)
ADLS_ACUITY_SCORE: 34
ADLS_ACUITY_SCORE: 34
ADLS_ACUITY_SCORE: 36
ADLS_ACUITY_SCORE: 34
ADLS_ACUITY_SCORE: 36
ADLS_ACUITY_SCORE: 34
ADLS_ACUITY_SCORE: 34
ADLS_ACUITY_SCORE: 36
ADLS_ACUITY_SCORE: 36
ADLS_ACUITY_SCORE: 34
ADLS_ACUITY_SCORE: 36
ADLS_ACUITY_SCORE: 34
ADLS_ACUITY_SCORE: 33
ADLS_ACUITY_SCORE: 34
ADLS_ACUITY_SCORE: 36
ADLS_ACUITY_SCORE: 34
ADLS_ACUITY_SCORE: 34
ADLS_ACUITY_SCORE: 36
ADLS_ACUITY_SCORE: 33
DEPENDENT_IADLS:: CLEANING;COOKING;LAUNDRY;SHOPPING;MEAL PREPARATION;MEDICATION MANAGEMENT;MONEY MANAGEMENT;TRANSPORTATION

## 2024-06-06 NOTE — PLAN OF CARE
Goal Outcome Evaluation:    Patient vital signs are at baseline: Yes  Patient able to ambulate as they were prior to admission or with assist devices provided by therapies during their stay:  No,  Reason:  Pt able to pivot to the commode Ax2, GB/W, but has yet to ambulate.   Patient MUST void prior to discharge:  Yes  Patient able to tolerate oral intake:  Yes  Pain has adequate pain control using Oral analgesics:  Yes  Does patient have an identified :  Yes  Has goal D/C date and time been discussed with patient:  No,  Reason:  Discharge pending.     A&Ox4. Pain managed with scheduled tylenol. COVID precautions maintained. Possible discharge to TCU tomorrow.

## 2024-06-06 NOTE — PROGRESS NOTES
Patient vital signs are at baseline: Yes  Patient able to ambulate as they were prior to admission or with assist devices provided by therapies during their stay:  No,  Reason: Refused to ambulate during the shift.   Patient MUST void prior to discharge:  Yes via noel catheter   Patient able to tolerate oral intake:  Yes  Pain has adequate pain control using Oral analgesics:  Yes  Does patient have an identified :  Yes  Has goal D/C date and time been discussed with patient:  No,  Reason: Pending    Pt is A&O x4, forgetful. Pt refused to ambulate early in shift, . Turned and repositioned for comfort. Wage in place. VSS on 3L NC. PIV SL. Schedule Tylenol given for pain management. Left hip dressing CDI. Pt denied pain at this time. Hbg dropped from 10.3 to 7.3 recheck this evening, condition transfuse orders in place.

## 2024-06-06 NOTE — PLAN OF CARE
Goal Outcome Evaluation:      Date/Time:2300-7:30    Trauma      Diagnosis: LEFT HIP HEMIARTHROPLASTY     POD#: 2  Mental Status:A&O , but confused  Activity/dangle ASS of 2 with lift  Diet: Regular with Mildly thicken  Pain: Tylenol  Pierce/Voiding: Extender cath/ incontinent  Tele/Restraints/Iso: special prec  02/LDA: LUIS  D/C Date: TBD    Other Info: PIV-SL, dressing CDI, wedge and pillow in place. HB 7.3, please look at for morning lab.

## 2024-06-06 NOTE — PLAN OF CARE
Date/Time 06/06/24 [id number]    Trauma/Ortho/Medical (Choose one) ortho     Diagnosis:LHA  POD#:3  Mental Status: A& O x 4 green  Activity/dangle: Dangle at bedside this shift, needs to stand, worked on postural positioning while sitting   Diet:Regular with mild thick  Pain:Denies, has tylenol  Pierce/Voiding: [place]   Tele/Restraints/Iso: Covid ISO, Maintain special precautions   02/LDA:L hip wound   D/C Date:6/7 pending placement and Hbg bounce back   Other Info: Hx of R CVA- L sided weakness

## 2024-06-06 NOTE — PROGRESS NOTES
Orthopedic Surgery  Jamee Douglas  06/06/2024     Admit Date:  6/3/2024  POD: 2 Days Post-Op   Procedure(s):  LEFT HIP HEMIARTHROPLASTY  Covid positive      Patient resting comfortably in bed.    Pain controlled.  Tolerating oral intake.    Denies nausea or vomiting  Denies chest pain or shortness of breath.   Does have a dry throat.     Temp:  [97.8  F (36.6  C)-98.5  F (36.9  C)] 97.8  F (36.6  C)  Pulse:  [] 101  Resp:  [14-16] 14  BP: (112-172)/(53-84) 139/68  SpO2:  [92 %-97 %] 97 %    Alert and oriented  Dressing is clean, dry, and intact.   Minimal erythema of the surrounding skin.   Bilateral calves are soft, non-tender.  Left lower extremity is NVI.  Sensation intact bilateral lower extremities  Patient able to resist dorsi and plantar flexion, chronic weakness on left from prior stroke.   +Dp pulse    Labs:  Recent Labs   Lab Test 06/06/24  0725 06/05/24  1416 06/05/24  0719 06/04/24  0910   WBC 8.7  --  10.9 11.3*   HGB 7.3*  7.3* 7.3* 7.7* 10.9*   *  --  118* 122*     Recent Labs   Lab Test 06/03/24  1354 03/21/24  1010 02/01/21  1006   INR 1.08 1.03 1.06       1. PLAN:   Continue lovenox x 4 weeks for DVT prophylaxis.     Mobilize with PT/OT    WBAT left LE with posterior precautions (no active abduction, no flexion > 90, no internal rotation).  Abduction pillow when in bed.      Continue current pain regiment.   Dressings: Keep intact.  Change if >60% saturated or peeling off.    Follow-up: 2 weeks post-op with Dr Danielle team    2. Disposition   Anticipate d/c to TCU likely when medically cleared and progressing in PT.    Filomena Inman PA-C

## 2024-06-06 NOTE — PROGRESS NOTES
[facility]    Medicine Progress Note - Hospitalist Service    Date of Admission:  6/3/2024    Assessment & Plan     Jamee Douglas is a 89 year old female with a previous history of stroke in 2020 with residual left-sided weakness, hyperlipidemia, osteoarthritis and glaucoma who was visiting primary clinic for cold symptoms and while exiting the building, the door closed on her and she had a fall and landed on the left side of her body. Was unable to get up after this or put weight on left leg. EMS was called and patient was transferred to [facility].     Left hip fracture s/p mechanical fall s/p hemiarthroplasty 6/4/24  History of recurrent falls  Went to doctor for cold symptoms. Suffered a mechanical fall while at doctors office. Pelvic x-ray showed left femoral neck fracture with 3 cm displacement and impaction.  Moderate degree of apex superolateral and anterior angulation. Chest x-ray with no acute findings. Underwent hip hemiarthroplasty on 6/4.   - Oral oxycodone, Tylenol and IV Dilaudid ordered.  - Ortho consulted, recommendations appreciated   - DVT prophylaxis per ortho team post-operatively, currently on lovenox    - Advance diet as tolerated after surgery    - PT OT ordered.    Acute Blood Loss Anemia  Baseline hemoglobin seems to be between 9-10. Hgb on admission 12.7. Then dropped to 10.9 after IV fluids, suspected some hemodilution because all cell lines dropped. Patient then went to OR on 6/4. Hemoglobin on 6/5 dropped to 7.7. Recheck 7.3 - 7.3. She remains hemodynamically stable. Currently low suspicion for active bleeding. Per chart review the patient suffered a subarachnoid hemorrhage on 3/21/24. Therapeutic anti-coagulation is contraindicated.   - Add on Type and screen   - Conditional transfusion order for hemoglobin < 7.0  - Obtained consent from patient/daughter over phone to reduce covid exposure   - If hemoglobin drops below 7.0, hold lovenox  - Daily  CBC    Possible hospital acquired delirium  Right frontal superior sulcus subarachnoid hemorrhage  History of stroke with residual left-sided weakness  I am told that patient had an episode of confusion last night witnessed by her daughter. This morning patient is back at her baseline per daughter. She has multiple reasons to have delirium including her age, COVID infection, recent fracture with surgery. Other possibilities would include TIA, CVA, or recrudescence of stroke. Since patient is at her baseline this morning will elect to continue to monitor mental status closely.   - prn seroquel for delirium started 6/6   - low threshold for CT head for any change in mental status given drop in hemoglobin and history of subarachnoid hemorrhage in the past three months   - Patient is on atorvastatin 40 mg daily.  Continue for now.  - At baseline she is able to walk long distances with the help of a walker.    COVID19  Sore Throat  Denies cough or shortness of breath. Has a sore throat. Found to be COVID positive. Vitally stable. Off oxygen.   - supportive treatment  - cepacol available     New T-wave inversions, improved   I assumed care 6/4. I reviewed pre-operative EKG showing possible new T-wave versions in inferior and lateral leads. These are new compared to EKG from March. No troponin was collected. Patient denied any chest pain or anginal equivalents prior to surgical intervention. Preop evaluation-revised cardiac risk index is at 1 point giving her a class II risk with 6% 30-day risk of death, MI and cardiac arrest. Repeat EKG after surgery showing improvement of these T-wave changes. I discussed further cardiac risk stratification with her Daughter Olinda. We could consider inpatient echocardiogram versus outpatient follow up to consider stress test. Daughter agrees with outpatient follow up for further CAD risk   - Outpatient follow up for CV risk stratification if patient desires after recovering from surgery       Depression-on sertraline 25 mg daily.     Hypertension-patient is not on any antihypertensives at home.  Currently systolic blood pressures over 180 likely secondary to anxiety and pain from the fall.  On IV as needed hydralazine for SBP greater than 160. Blood pressure more stable on 6/4.      Chronic diarrhea-on colestipol 1 g 2 times daily, 4 hours prior to eating.          Diet: Combination Diet Regular Diet; Mildly Thick (level 2)  ADAT  DVT Prophylaxis: Defer to orthopedic surgery  Pierce Catheter: Not present  Lines: None     Cardiac Monitoring: None  Code Status: Full Code      Clinically Significant Risk Factors                # Thrombocytopenia: Lowest platelets = 118 in last 2 days, will monitor for bleeding   # Hypertension: Noted on problem list             #Precipitous drop in Hgb/Hct: Lowest Hgb this hospitalization: 7.3 g/dL. Will continue to monitor and treat/transfuse as appropriate.         # Financial/Environmental Concerns: none         Disposition Plan     Medically Ready for Discharge: Anticipated Tomorrow         Casimiro Pérez,   Hospitalist Service  [facility]  Securely message with [employer] (more info)  Text page via [employer] Paging/Directory   ______________________________________________________________________    Interval History     - No acute events overnight  - Patient reports to be doing fair today  - Pain is well controlled  - Will work with PT today  - Updated daughter over the phone   - No other acute concerns     Physical Exam   Vital Signs: Temp: 97.8  F (36.6  C) Temp src: Axillary BP: 139/68 Pulse: 101   Resp: 14 SpO2: 97 % O2 Device: None (Room air)    Weight: 122 lbs 9.21 oz    General Appearance: Awake Alert, sitting up in bed  Respiratory: Clear anteriorly, no wheezes or rales   Cardiovascular: Regular rate and rhythm, no murmurs, rubs, or gallops  GI: Abdomen soft, bowel sounds present  Other: Normal affect. Moving all extremities, no  visible bleeding    Medical Decision Making       45 MINUTES SPENT BY ME on the date of service doing chart review, history, exam, documentation & further activities per the note.      Data   ------------------------- PAST 24 HR DATA REVIEWED -----------------------------------------------    I have personally reviewed the following data over the past 24 hrs:    8.7  \   7.3 (L); 7.3 (L)   / 131 (L)     139 106 22.6 /  86   3.6 23 0.86 \       Imaging results reviewed over the past 24 hrs:   No results found for this or any previous visit (from the past 24 hour(s)).

## 2024-06-06 NOTE — CONSULTS
Care Management Initial Consult    General Information  Assessment completed with: Children, Daughter Olinda  Type of CM/SW Visit: Initial Assessment    Primary Care Provider verified and updated as needed: Yes   Readmission within the last 30 days:        Reason for Consult: discharge planning  Advance Care Planning: Advance Care Planning Reviewed: present on chart          Communication Assessment  Patient's communication style: spoken language (English or Bilingual)    Hearing Difficulty or Deaf: yes   Wear Glasses or Blind: yes    Cognitive  Cognitive/Neuro/Behavioral: WDL                      Living Environment:   People in home: alone     Current living Arrangements: independent living facility ([facility] & moving to [facility] 6/25/24)      Able to return to prior arrangements: no       Family/Social Support:  Care provided by: self, homecare agency (Private pay HHA)  Provides care for: no one  Marital Status: Single  Children          Description of Support System: Supportive, Involved         Current Resources:   Patient receiving home care services: Yes  Skilled Home Care Services: Home Health Aid (Private Pay HHA)  Community Resources: None  Equipment currently used at home: walker, rolling  Supplies currently used at home: Hearing Aid Batteries    Employment/Financial:  Employment Status: retired        Financial Concerns: none           Does the patient's insurance plan have a 3 day qualifying hospital stay waiver?  No    Lifestyle & Psychosocial Needs:  Social Determinants of Health     Food Insecurity: Not on file   Depression: Not at risk (9/29/2023)    Received from [facility], [facility]    PHQ-2     PHQ-2 Score: 1   Housing Stability: Not on file   Tobacco Use: Low Risk  (4/1/2024)    Patient History     Smoking Tobacco Use: Never     Smokeless Tobacco Use: Never     Passive Exposure: Not on file   Financial Resource Strain: Not on file   Alcohol Use: Not on file    Transportation Needs: Not on file   Physical Activity: Not on file   Interpersonal Safety: Not on file   Stress: Not on file   Social Connections: Not on file   Health Literacy: Not on file       Functional Status:  Prior to admission patient needed assistance:   Dependent ADLs:: Ambulation-walker, Bathing  Dependent IADLs:: Cleaning, Cooking, Laundry, Shopping, Meal Preparation, Medication Management, Money Management, Transportation       Mental Health Status:  Mental Health Status: No Current Concerns       Chemical Dependency Status:                Values/Beliefs:  Spiritual, Cultural Beliefs, Sikhism Practices, Values that affect care:                 Additional Information:  Writer called Dtr Olinda and introduced self and role in discharge planning.  Note patient is Covid + and CTS does not enter the room.  Olinda shared that Jamee has frequent falls and memory loss at times.  Plan is  for her to move  from [facility] to [facility] in [facility] on 6/25/24.  Patient has been needing more help at [facility] ([place]) so  private pay HHA was hired.  Jamee has been to [facility] in the past and most likely will keep  that as a choice.    Writer advised Olinda to make 3 to 5 choices of TCU and referral  will  be sent so that a plan is in place when Jamee is medically ready to be discharged we will  have a plan in place.   Writer emailed the [facility] TCU list to [email or web address]   Await reply from  Olinda  CTS will  continue to follow for discharge plan    Amanda Rodriguez RN

## 2024-06-07 ENCOUNTER — APPOINTMENT (OUTPATIENT)
Dept: CT IMAGING | Facility: CLINIC | Age: 89
DRG: 521 | End: 2024-06-07
Attending: STUDENT IN AN ORGANIZED HEALTH CARE EDUCATION/TRAINING PROGRAM
Payer: COMMERCIAL

## 2024-06-07 LAB
BLD PROD TYP BPU: NORMAL
BLOOD COMPONENT TYPE: NORMAL
CODING SYSTEM: NORMAL
CROSSMATCH: NORMAL
ERYTHROCYTE [DISTWIDTH] IN BLOOD BY AUTOMATED COUNT: 12.7 % (ref 10–15)
GLUCOSE BLDC GLUCOMTR-MCNC: 100 MG/DL (ref 70–99)
HCT VFR BLD AUTO: 23.1 % (ref 35–47)
HGB BLD-MCNC: 7.2 G/DL (ref 11.7–15.7)
ISSUE DATE AND TIME: NORMAL
MCH RBC QN AUTO: 29 PG (ref 26.5–33)
MCHC RBC AUTO-ENTMCNC: 31.2 G/DL (ref 31.5–36.5)
MCV RBC AUTO: 93 FL (ref 78–100)
PLATELET # BLD AUTO: 161 10E3/UL (ref 150–450)
RBC # BLD AUTO: 2.48 10E6/UL (ref 3.8–5.2)
UNIT ABO/RH: NORMAL
UNIT NUMBER: NORMAL
UNIT STATUS: NORMAL
UNIT TYPE ISBT: 5100
WBC # BLD AUTO: 9.2 10E3/UL (ref 4–11)

## 2024-06-07 PROCEDURE — 250N000013 HC RX MED GY IP 250 OP 250 PS 637: Performed by: PHYSICIAN ASSISTANT

## 2024-06-07 PROCEDURE — 250N000011 HC RX IP 250 OP 636: Mod: JZ | Performed by: HOSPITALIST

## 2024-06-07 PROCEDURE — 250N000013 HC RX MED GY IP 250 OP 250 PS 637: Performed by: STUDENT IN AN ORGANIZED HEALTH CARE EDUCATION/TRAINING PROGRAM

## 2024-06-07 PROCEDURE — 120N000001 HC R&B MED SURG/OB

## 2024-06-07 PROCEDURE — 70450 CT HEAD/BRAIN W/O DYE: CPT

## 2024-06-07 PROCEDURE — 85027 COMPLETE CBC AUTOMATED: CPT | Performed by: STUDENT IN AN ORGANIZED HEALTH CARE EDUCATION/TRAINING PROGRAM

## 2024-06-07 PROCEDURE — 36415 COLL VENOUS BLD VENIPUNCTURE: CPT | Performed by: STUDENT IN AN ORGANIZED HEALTH CARE EDUCATION/TRAINING PROGRAM

## 2024-06-07 PROCEDURE — P9016 RBC LEUKOCYTES REDUCED: HCPCS | Performed by: STUDENT IN AN ORGANIZED HEALTH CARE EDUCATION/TRAINING PROGRAM

## 2024-06-07 PROCEDURE — 99233 SBSQ HOSP IP/OBS HIGH 50: CPT | Performed by: STUDENT IN AN ORGANIZED HEALTH CARE EDUCATION/TRAINING PROGRAM

## 2024-06-07 PROCEDURE — 250N000013 HC RX MED GY IP 250 OP 250 PS 637: Performed by: HOSPITALIST

## 2024-06-07 RX ORDER — METHOCARBAMOL 500 MG/1
500 TABLET, FILM COATED ORAL 3 TIMES DAILY PRN
Status: DISCONTINUED | OUTPATIENT
Start: 2024-06-07 | End: 2024-06-11 | Stop reason: HOSPADM

## 2024-06-07 RX ORDER — QUETIAPINE FUMARATE 25 MG/1
12.5 TABLET, FILM COATED ORAL 2 TIMES DAILY PRN
DISCHARGE
Start: 2024-06-07 | End: 2024-06-10

## 2024-06-07 RX ADMIN — ACETAMINOPHEN 1000 MG: 500 TABLET, FILM COATED ORAL at 21:19

## 2024-06-07 RX ADMIN — QUETIAPINE 12.5 MG: 25 TABLET, FILM COATED ORAL at 09:53

## 2024-06-07 RX ADMIN — OXYCODONE HYDROCHLORIDE 2.5 MG: 5 TABLET ORAL at 21:19

## 2024-06-07 RX ADMIN — ACETAMINOPHEN 1000 MG: 500 TABLET, FILM COATED ORAL at 09:26

## 2024-06-07 RX ADMIN — SENNOSIDES AND DOCUSATE SODIUM 1 TABLET: 50; 8.6 TABLET ORAL at 09:26

## 2024-06-07 RX ADMIN — HYDRALAZINE HYDROCHLORIDE 10 MG: 20 INJECTION INTRAMUSCULAR; INTRAVENOUS at 21:31

## 2024-06-07 RX ADMIN — SENNOSIDES AND DOCUSATE SODIUM 1 TABLET: 50; 8.6 TABLET ORAL at 21:18

## 2024-06-07 RX ADMIN — POLYETHYLENE GLYCOL 3350 17 G: 17 POWDER, FOR SOLUTION ORAL at 09:26

## 2024-06-07 RX ADMIN — ENOXAPARIN SODIUM 40 MG: 40 INJECTION SUBCUTANEOUS at 21:22

## 2024-06-07 RX ADMIN — DORZOLAMIDE HYDROCHLORIDE AND TIMOLOL MALEATE 1 DROP: 20; 5 SOLUTION/ DROPS OPHTHALMIC at 09:31

## 2024-06-07 RX ADMIN — ATORVASTATIN CALCIUM 40 MG: 40 TABLET, FILM COATED ORAL at 21:18

## 2024-06-07 RX ADMIN — SERTRALINE HYDROCHLORIDE 25 MG: 25 TABLET ORAL at 09:30

## 2024-06-07 RX ADMIN — MONTELUKAST SODIUM 1 G: 4 TABLET, CHEWABLE ORAL at 13:58

## 2024-06-07 RX ADMIN — OXYCODONE HYDROCHLORIDE 2.5 MG: 5 TABLET ORAL at 09:26

## 2024-06-07 RX ADMIN — MONTELUKAST SODIUM 1 G: 4 TABLET, CHEWABLE ORAL at 07:04

## 2024-06-07 RX ADMIN — ACETAMINOPHEN 1000 MG: 500 TABLET, FILM COATED ORAL at 16:22

## 2024-06-07 RX ADMIN — DORZOLAMIDE HYDROCHLORIDE AND TIMOLOL MALEATE 1 DROP: 20; 5 SOLUTION/ DROPS OPHTHALMIC at 21:25

## 2024-06-07 RX ADMIN — Medication 50 MCG: at 09:26

## 2024-06-07 RX ADMIN — QUETIAPINE 12.5 MG: 25 TABLET, FILM COATED ORAL at 01:14

## 2024-06-07 ASSESSMENT — ACTIVITIES OF DAILY LIVING (ADL)
ADLS_ACUITY_SCORE: 48
ADLS_ACUITY_SCORE: 42
ADLS_ACUITY_SCORE: 49
ADLS_ACUITY_SCORE: 42
ADLS_ACUITY_SCORE: 50
ADLS_ACUITY_SCORE: 48
ADLS_ACUITY_SCORE: 36
ADLS_ACUITY_SCORE: 45
ADLS_ACUITY_SCORE: 44
ADLS_ACUITY_SCORE: 49
ADLS_ACUITY_SCORE: 50
ADLS_ACUITY_SCORE: 48
ADLS_ACUITY_SCORE: 36
ADLS_ACUITY_SCORE: 42
ADLS_ACUITY_SCORE: 42
ADLS_ACUITY_SCORE: 50
ADLS_ACUITY_SCORE: 50
ADLS_ACUITY_SCORE: 49
ADLS_ACUITY_SCORE: 42
ADLS_ACUITY_SCORE: 41
ADLS_ACUITY_SCORE: 42
ADLS_ACUITY_SCORE: 49
ADLS_ACUITY_SCORE: 49

## 2024-06-07 NOTE — PLAN OF CARE
Goal Outcome Evaluation:       Diagnosis: Left hip hemiarthroplasty  Orientation/Cognitive: A&OX4/anxiety  Mobility Level: Ax2 GB/W pivot to bedside commode  Pain Management: denies, declined  prn pain med.   Tele/VS/O2: VSS@RA  Diet: Regular, mildly thick  Bowel/Bladder: purewick/up to BSC  Drains/Devices: PIV SL  ISO/TYPE: COVID, special precautions maintained   Other info: prn Quetiapine administered for anxiety, effective.

## 2024-06-07 NOTE — PROGRESS NOTES
Ortho Attending  -chart check, postop left hip pollo, appreciate ongoing PA rounding, some pain control issues relayed though continues to improve, Hb low but stable, will transfuse if symptomatic or <7, last few values 7.2, 7.3, 7.3  -continue PT, encourage safe mobilization, posterior hip precautions, lovenox for VTE ppx, and dressing changes as needed,   -appreciate SW for TCU placement   -follow up with me 2wks postop as scheduled, call with questions    Dr Danielle     ---------------------------------------------------------------------------------    Orthopedic Surgery  Jamee Douglas  06/07/2024     Admit Date:  6/3/2024  POD: 3 Days Post-Op   Procedure(s):  LEFT HIP HEMIARTHROPLASTY  Covid positive      Patient resting in bed, endorsing pain this morning. We discussed with bedside nursing, plan to add muscle relaxer today for better pain control.   Tolerating oral intake.    Denies nausea or vomiting  Denies chest pain or shortness of breath.   No acute events overnight.     Temp:  [97.2  F (36.2  C)-98  F (36.7  C)] 98  F (36.7  C)  Pulse:  [82-92] 92  Resp:  [16-18] 16  BP: (148-171)/(73-99) 164/85  SpO2:  [95 %-97 %] 97 %    Alert and oriented  Dressing is clean, dry, and intact.   Minimal erythema of the surrounding skin.   Bilateral calves are soft, non-tender.  Left lower extremity is NVI.  Sensation intact bilateral lower extremities  Patient able to resist dorsi and plantar flexion, chronic weakness on left from prior stroke.   3+Dp pulse    Labs:  Recent Labs   Lab Test 06/07/24  0816 06/06/24  0725 06/05/24  1416 06/05/24  0719   WBC 9.2 8.7  --  10.9   HGB 7.2* 7.3*  7.3* 7.3* 7.7*    131*  --  118*     Recent Labs   Lab Test 06/03/24  1354 03/21/24  1010 02/01/21  1006   INR 1.08 1.03 1.06       1. PLAN:   Monitor hemoglobin. Transfuse per protocol when Hgb falls below 7.0    Continue lovenox x 4 weeks for DVT prophylaxis.     Mobilize with PT/OT    WBAT left LE with posterior  precautions (no active abduction, no flexion > 90, no internal rotation).  Abduction pillow when in bed.      Continue current pain regimen.   Dressings: Keep intact.  Change if >60% saturated or peeling off.    Follow-up: 2 weeks post-op with Dr Danielle team    2. Disposition   Anticipate d/c to TCU likely when medically cleared and progressing in PT.    Anneliese Soni PA-C

## 2024-06-07 NOTE — PLAN OF CARE
Goal Outcome Evaluation:         Date/Time: 06/07/2024 ([id number])    Trauma/Ortho/Medical (Choose one): Trauma    Diagnosis: Left Hip Hemiarthoplasty  POD#: 3  Mental Status: A&O x4, Drowsy @ times- Updated hospitalist  Activity/dangle: Up with assist of 2/gb/walker or alon steady to chair  Diet: Regular with mild thick liquid  Pain: Oxycodone, tylenol, ice application  Pierce/Voiding: BSC, incontinent @ times  Tele/Restraints/Iso: Special Precaution  02/LDA: IV- SL  D/C Date: Pending TCU  Other Info: One unit of PRBC given this shift for hemoglobin 7.2., recheck tomorrow morning per hospitalist. Ordered head CT.

## 2024-06-07 NOTE — PROGRESS NOTES
[facility]    Medicine Progress Note - Hospitalist Service    Date of Admission:  6/3/2024    Assessment & Plan     Jamee Douglas is a 89 year old female with a previous history of stroke in 2020 with residual left-sided weakness, hyperlipidemia, osteoarthritis and glaucoma who was visiting primary clinic for cold symptoms and while exiting the building, the door closed on her and she had a fall and landed on the left side of her body. Was unable to get up after this or put weight on left leg. EMS was called and patient was transferred to [facility]. Anticipate medically stable for discharge in 1-2 days.      Left hip fracture s/p mechanical fall s/p hemiarthroplasty 6/4/24  History of recurrent falls  Went to doctor for cold symptoms. Suffered a mechanical fall while at doctors office. Pelvic x-ray showed left femoral neck fracture with 3 cm displacement and impaction.  Moderate degree of apex superolateral and anterior angulation. Chest x-ray with no acute findings. Underwent hip hemiarthroplasty on 6/4.   - Oral oxycodone, Tylenol and IV Dilaudid ordered.  - Ortho consulted, recommendations appreciated   - DVT prophylaxis per ortho team post-operatively, currently on lovenox    - Advance diet as tolerated after surgery    - PT OT ordered.    Acute Blood Loss Anemia  Baseline hemoglobin seems to be between 9-10. Hgb on admission 12.7. Then dropped to 10.9 after IV fluids, suspected some hemodilution because all cell lines dropped. Patient then went to OR on 6/4. Hemoglobin on 6/5 dropped to 7.7. Recheck 7.3 - 7.3 - 7.2. She remains hemodynamically stable but very fatigued. Currently low suspicion for active bleeding. Per chart review the patient suffered a subarachnoid hemorrhage on 3/21/24. Therapeutic anti-coagulation is contraindicated.   - Transfuse 1 unit RBC on 6/7/24 for symptomatic anemia, relative contraindication to IV iron with current covid infection   - Continue  lovenox for VTE prophylaxis   - Obtained consent from patient's daughter    - Repeat hemoglobin after transfusion     Possible hospital acquired delirium  Right frontal superior sulcus subarachnoid hemorrhage  History of stroke with residual left-sided weakness  I am told that patient had an episode of confusion last night witnessed by her daughter. This morning patient is back at her baseline per daughter. She has multiple reasons to have delirium including her age, COVID infection, recent fracture with surgery. Other possibilities would include TIA, CVA, or recrudescence of stroke. Since patient is at her baseline this morning will elect to continue to monitor mental status closely. Daughter states patient has been more forgetful the last couple of days and more fatigued.  - prn seroquel for delirium started 6/6   - Obtain CT head w/o IV contrast on 6/7  - delirium precautions    COVID19  Sore Throat  Denies cough or shortness of breath. Has a sore throat. Found to be COVID positive. Vitally stable. Off oxygen.   - supportive treatment  - cepacol available     New T-wave inversions, improved   I assumed care 6/4. I reviewed pre-operative EKG showing possible new T-wave versions in inferior and lateral leads. These are new compared to EKG from March. No troponin was collected. Patient denied any chest pain or anginal equivalents prior to surgical intervention. Preop evaluation-revised cardiac risk index is at 1 point giving her a class II risk with 6% 30-day risk of death, MI and cardiac arrest. Repeat EKG after surgery showing improvement of these T-wave changes. I discussed further cardiac risk stratification with her Daughter Olinda. We could consider inpatient echocardiogram versus outpatient follow up to consider stress test. Daughter agrees with outpatient follow up for further CAD risk   - Outpatient follow up for CV risk stratification if patient desires after recovering from surgery      Depression-on  sertraline 25 mg daily.     Hypertension-patient is not on any antihypertensives at home.  Currently systolic blood pressures over 180 likely secondary to anxiety and pain from the fall.  On IV as needed hydralazine for SBP greater than 160. Blood pressure more stable on 6/4.      Chronic diarrhea-on colestipol 1 g 2 times daily, 4 hours prior to eating.          Diet: Combination Diet Regular Diet; Mildly Thick (level 2)  ADAT  DVT Prophylaxis: Defer to orthopedic surgery  Pierce Catheter: Not present  Lines: None     Cardiac Monitoring: None  Code Status: Full Code      Clinically Significant Risk Factors                  # Hypertension: Noted on problem list             #Precipitous drop in Hgb/Hct: Lowest Hgb this hospitalization: 7.2 g/dL. Will continue to monitor and treat/transfuse as appropriate.         # Financial/Environmental Concerns: none         Disposition Plan     Medically Ready for Discharge: Anticipated Tomorrow         Casimiro Pérez DO  Hospitalist Service  [facility]  Securely message with [employer] (more info)  Text page via [employer] Paging/Directory   ______________________________________________________________________    Interval History     - Patient received prn seroquel overnight  - Today is looking more fatigued   - She is denying significant pain  - No shortness of breath  - No other acute concerns   - Updated patient daughter over the phone     Physical Exam   Vital Signs: Temp: 97.8  F (36.6  C) Temp src: Oral BP: 109/56 Pulse: 75   Resp: 16 SpO2: 95 % O2 Device: None (Room air) Oxygen Delivery: 2 LPM  Weight: 124 lbs 5.43 oz    General Appearance: Awake Alert, sitting up in bed  Respiratory: Clear anteriorly, no wheezes or rales   Cardiovascular: Regular rate and rhythm, no murmurs, rubs, or gallops  GI: Abdomen soft, bowel sounds present  Other: Normal affect. Moving all extremities, no visible bleeding  Alert and oriented to self, location,and month. Not  oriented to year     Medical Decision Making       52 MINUTES SPENT BY ME on the date of service doing chart review, history, exam, documentation & further activities per the note.      Data   ------------------------- PAST 24 HR DATA REVIEWED -----------------------------------------------    I have personally reviewed the following data over the past 24 hrs:    9.2  \   7.2 (L)   / 161     N/A N/A N/A /  N/A   N/A N/A N/A \       Imaging results reviewed over the past 24 hrs:   No results found for this or any previous visit (from the past 24 hour(s)).

## 2024-06-07 NOTE — PROVIDER NOTIFICATION
MD Notification    Notified Person: MD    Notified Person Name: Dr. Pérez    Notification Date/Time: 06/07/2024 @ 0903    Notification Interaction: vocera    Purpose of Notification: FYI, per patient and daughter- patient refusing mild thick liquid., was not on thick liquid @ home. Educated them. Also, updated Speech to assess the patient. Thanks     Orders Received: MD acknowledged.     Comments: @ 1303: Paged hospitalist: Patient is quite drowsy @ times, not sure if it is from seroquel or oxycodone, but her pain is a lot better than this morning. MD acknowledged.    Addendum:@ 1507: paged hospitalist whether hemoglobin needs to be recheck post PRBC transfusion.

## 2024-06-07 NOTE — PROGRESS NOTES
Care Management Follow Up    Length of Stay (days): 4    Expected Discharge Date: 06/08/2024     Concerns to be Addressed: discharge planning     Patient plan of care discussed at interdisciplinary rounds: Yes    Anticipated Discharge Disposition: Transitional Care     Anticipated Discharge Services:    Anticipated Discharge DME:      Patient/family educated on Medicare website which has current facility and service quality ratings: yes  Education Provided on the Discharge Plan:    Patient/Family in Agreement with the Plan: yes    Referrals Placed by CM/SW:    Private pay costs discussed: Not applicable    Additional Information:  Message to [facility] regarding bed. Writer informed that patient will not discharge today. Writer updated [place] [facility] that she is not medically ready and was told that their next available bed is Monday. SW will follow up with facility on Monday for placement. Email sent to Olinda (daughter) to update. JUNE will follow up with her on Monday.    BRITTANI Trinh

## 2024-06-08 ENCOUNTER — APPOINTMENT (OUTPATIENT)
Dept: SPEECH THERAPY | Facility: CLINIC | Age: 89
DRG: 521 | End: 2024-06-08
Payer: COMMERCIAL

## 2024-06-08 LAB
ANION GAP SERPL CALCULATED.3IONS-SCNC: 13 MMOL/L (ref 7–15)
BUN SERPL-MCNC: 19.4 MG/DL (ref 8–23)
CALCIUM SERPL-MCNC: 8.3 MG/DL (ref 8.2–9.6)
CHLORIDE SERPL-SCNC: 102 MMOL/L (ref 98–107)
CREAT SERPL-MCNC: 0.77 MG/DL (ref 0.51–0.95)
DEPRECATED HCO3 PLAS-SCNC: 22 MMOL/L (ref 22–29)
EGFRCR SERPLBLD CKD-EPI 2021: 72 ML/MIN/1.73M2
ERYTHROCYTE [DISTWIDTH] IN BLOOD BY AUTOMATED COUNT: 13.3 % (ref 10–15)
GLUCOSE SERPL-MCNC: 116 MG/DL (ref 70–99)
HCT VFR BLD AUTO: 28.1 % (ref 35–47)
HGB BLD-MCNC: 9.1 G/DL (ref 11.7–15.7)
MCH RBC QN AUTO: 29.9 PG (ref 26.5–33)
MCHC RBC AUTO-ENTMCNC: 32.4 G/DL (ref 31.5–36.5)
MCV RBC AUTO: 92 FL (ref 78–100)
PLATELET # BLD AUTO: 221 10E3/UL (ref 150–450)
POTASSIUM SERPL-SCNC: 3.6 MMOL/L (ref 3.4–5.3)
RBC # BLD AUTO: 3.04 10E6/UL (ref 3.8–5.2)
SODIUM SERPL-SCNC: 137 MMOL/L (ref 135–145)
WBC # BLD AUTO: 10.2 10E3/UL (ref 4–11)

## 2024-06-08 PROCEDURE — 250N000011 HC RX IP 250 OP 636: Mod: JZ | Performed by: HOSPITALIST

## 2024-06-08 PROCEDURE — 250N000013 HC RX MED GY IP 250 OP 250 PS 637: Performed by: STUDENT IN AN ORGANIZED HEALTH CARE EDUCATION/TRAINING PROGRAM

## 2024-06-08 PROCEDURE — 120N000001 HC R&B MED SURG/OB

## 2024-06-08 PROCEDURE — 85027 COMPLETE CBC AUTOMATED: CPT | Performed by: STUDENT IN AN ORGANIZED HEALTH CARE EDUCATION/TRAINING PROGRAM

## 2024-06-08 PROCEDURE — 36415 COLL VENOUS BLD VENIPUNCTURE: CPT | Performed by: STUDENT IN AN ORGANIZED HEALTH CARE EDUCATION/TRAINING PROGRAM

## 2024-06-08 PROCEDURE — 250N000013 HC RX MED GY IP 250 OP 250 PS 637: Performed by: PHYSICIAN ASSISTANT

## 2024-06-08 PROCEDURE — 92526 ORAL FUNCTION THERAPY: CPT | Mod: GN | Performed by: SPEECH-LANGUAGE PATHOLOGIST

## 2024-06-08 PROCEDURE — 99232 SBSQ HOSP IP/OBS MODERATE 35: CPT | Performed by: STUDENT IN AN ORGANIZED HEALTH CARE EDUCATION/TRAINING PROGRAM

## 2024-06-08 PROCEDURE — 250N000013 HC RX MED GY IP 250 OP 250 PS 637: Performed by: HOSPITALIST

## 2024-06-08 PROCEDURE — 80048 BASIC METABOLIC PNL TOTAL CA: CPT | Performed by: STUDENT IN AN ORGANIZED HEALTH CARE EDUCATION/TRAINING PROGRAM

## 2024-06-08 RX ADMIN — ACETAMINOPHEN 1000 MG: 500 TABLET, FILM COATED ORAL at 15:43

## 2024-06-08 RX ADMIN — DORZOLAMIDE HYDROCHLORIDE AND TIMOLOL MALEATE 1 DROP: 20; 5 SOLUTION/ DROPS OPHTHALMIC at 08:37

## 2024-06-08 RX ADMIN — HYDRALAZINE HYDROCHLORIDE 10 MG: 20 INJECTION INTRAMUSCULAR; INTRAVENOUS at 15:57

## 2024-06-08 RX ADMIN — ACETAMINOPHEN 1000 MG: 500 TABLET, FILM COATED ORAL at 08:31

## 2024-06-08 RX ADMIN — Medication 50 MCG: at 08:36

## 2024-06-08 RX ADMIN — MONTELUKAST SODIUM 1 G: 4 TABLET, CHEWABLE ORAL at 15:43

## 2024-06-08 RX ADMIN — ATORVASTATIN CALCIUM 40 MG: 40 TABLET, FILM COATED ORAL at 19:50

## 2024-06-08 RX ADMIN — SENNOSIDES AND DOCUSATE SODIUM 1 TABLET: 50; 8.6 TABLET ORAL at 19:50

## 2024-06-08 RX ADMIN — POLYETHYLENE GLYCOL 3350 17 G: 17 POWDER, FOR SOLUTION ORAL at 08:30

## 2024-06-08 RX ADMIN — SENNOSIDES AND DOCUSATE SODIUM 1 TABLET: 50; 8.6 TABLET ORAL at 08:30

## 2024-06-08 RX ADMIN — ACETAMINOPHEN 1000 MG: 500 TABLET, FILM COATED ORAL at 21:56

## 2024-06-08 RX ADMIN — DORZOLAMIDE HYDROCHLORIDE AND TIMOLOL MALEATE 1 DROP: 20; 5 SOLUTION/ DROPS OPHTHALMIC at 20:23

## 2024-06-08 RX ADMIN — OXYCODONE HYDROCHLORIDE 2.5 MG: 5 TABLET ORAL at 19:51

## 2024-06-08 RX ADMIN — MONTELUKAST SODIUM 1 G: 4 TABLET, CHEWABLE ORAL at 05:33

## 2024-06-08 RX ADMIN — SERTRALINE HYDROCHLORIDE 25 MG: 25 TABLET ORAL at 08:31

## 2024-06-08 RX ADMIN — ENOXAPARIN SODIUM 40 MG: 40 INJECTION SUBCUTANEOUS at 21:58

## 2024-06-08 ASSESSMENT — ACTIVITIES OF DAILY LIVING (ADL)
ADLS_ACUITY_SCORE: 49
ADLS_ACUITY_SCORE: 45
ADLS_ACUITY_SCORE: 45
ADLS_ACUITY_SCORE: 46
ADLS_ACUITY_SCORE: 49
ADLS_ACUITY_SCORE: 49
ADLS_ACUITY_SCORE: 45
ADLS_ACUITY_SCORE: 49
ADLS_ACUITY_SCORE: 44
ADLS_ACUITY_SCORE: 49
ADLS_ACUITY_SCORE: 49
ADLS_ACUITY_SCORE: 45
ADLS_ACUITY_SCORE: 45
ADLS_ACUITY_SCORE: 49
ADLS_ACUITY_SCORE: 45
ADLS_ACUITY_SCORE: 45
ADLS_ACUITY_SCORE: 49

## 2024-06-08 NOTE — PROGRESS NOTES
Pt 22 yo female with hx of schizoaffective DO was bib mother for increasing auditory hallucinations which have persisted last 3 months.  (Pt reports hearing maternal grandmother telling pt. paternal grandmother is ashamed of her because she was born out of wedlock...which mother says is untrue.)    Pt has been neglecting ADLs and isolating from family and friends.  Pt is afraid of 'losing her mind' and feels 'out of control'.   Pt also afraid that she may become suicidal; albeit, currently denies.                  Pt cooperative with search and interview... pleasant, but appears distracted at times.  Pt currently lives at [facility] in [place].   Pt's goal is med adjustment.   [facility]    Medicine Progress Note - Hospitalist Service    Date of Admission:  6/3/2024    Assessment & Plan     Jamee Douglas is a 89 year old female with a previous history of stroke in 2020 with residual left-sided weakness, hyperlipidemia, osteoarthritis and glaucoma who was visiting primary clinic for cold symptoms and while exiting the building, the door closed on her and she had a fall and landed on the left side of her body. Was unable to get up after this or put weight on left leg. EMS was called and patient was transferred to [facility]. Now medically stable for discharge to TCU.      Left hip fracture s/p mechanical fall s/p hemiarthroplasty 6/4/24  History of recurrent falls  Went to doctor for cold symptoms. Suffered a mechanical fall while at doctors office. Pelvic x-ray showed left femoral neck fracture with 3 cm displacement and impaction.  Moderate degree of apex superolateral and anterior angulation. Chest x-ray with no acute findings. Underwent hip hemiarthroplasty on 6/4.   - Oral oxycodone, Tylenol and IV Dilaudid ordered.  - Ortho consulted, recommendations appreciated   - DVT prophylaxis per ortho team post-operatively, currently on lovenox    - Advance diet as tolerated after surgery    - PT OT ordered.    Acute Blood Loss Anemia  Baseline hemoglobin seems to be between 9-10. Hgb on admission 12.7. Then dropped to 10.9 after IV fluids, suspected some hemodilution because all cell lines dropped. Patient then went to OR on 6/4. Hemoglobin on 6/5 dropped to 7.7. Recheck 7.3 - 7.3 - 7.2. She remains hemodynamically stable but very fatigued. Currently low suspicion for active bleeding. Per chart review the patient suffered a subarachnoid hemorrhage on 3/21/24. Therapeutic anti-coagulation is contraindicated. Transfused 1 unit RBC on 6/7/24 for symptomatic anemia, relative contraindication to IV iron with current covid infection. Feeling much better today after  transfusion.  - Continue lovenox for VTE prophylaxis   - Repeat CBC in one week at TCU      Possible hospital acquired delirium  Right frontal superior sulcus subarachnoid hemorrhage  History of stroke with residual left-sided weakness  I am told that patient had an episode of confusion last night witnessed by her daughter. This morning patient is back at her baseline per daughter. She has multiple reasons to have delirium including her age, COVID infection, recent fracture with surgery. Other possibilities would include TIA, CVA, or recrudescence of stroke. Since patient is at her baseline this morning will elect to continue to monitor mental status closely. Daughter states patient has been more forgetful the last couple of days and more fatigued. Head CT w/o contrast on 6/7 with no acute findings.  - prn seroquel for delirium started 6/6   - delirium precautions    COVID19  Sore Throat  Denies cough or shortness of breath. Has a sore throat. Found to be COVID positive. Vitally stable. Off oxygen.   - supportive treatment  - cepacol available     New T-wave inversions, improved   I assumed care 6/4. I reviewed pre-operative EKG showing possible new T-wave versions in inferior and lateral leads. These are new compared to EKG from March. No troponin was collected. Patient denied any chest pain or anginal equivalents prior to surgical intervention. Preop evaluation-revised cardiac risk index is at 1 point giving her a class II risk with 6% 30-day risk of death, MI and cardiac arrest. Repeat EKG after surgery showing improvement of these T-wave changes. I discussed further cardiac risk stratification with her Daughter Olinda. We could consider inpatient echocardiogram versus outpatient follow up to consider stress test. Daughter agrees with outpatient follow up for further CAD risk   - Outpatient follow up for CV risk stratification if patient desires after recovering from surgery      Depression-on sertraline 25 mg  daily.     Hypertension-patient is not on any antihypertensives at home.  Currently systolic blood pressures over 180 likely secondary to anxiety and pain from the fall.  On IV as needed hydralazine for SBP greater than 160. Blood pressure more stable on 6/4.      Chronic diarrhea-on colestipol 1 g 2 times daily, 4 hours prior to eating.          Diet: Combination Diet Regular Diet; Mildly Thick (level 2)  ADAT  DVT Prophylaxis: Defer to orthopedic surgery  Pierce Catheter: Not present  Lines: None     Cardiac Monitoring: None  Code Status: Full Code      Clinically Significant Risk Factors                  # Hypertension: Noted on problem list             #Precipitous drop in Hgb/Hct: Lowest Hgb this hospitalization: 7.2 g/dL. Will continue to monitor and treat/transfuse as appropriate.         # Financial/Environmental Concerns: none         Disposition Plan     Medically Ready for Discharge: Anticipated Tomorrow         Casimiro Pérez DO  Hospitalist Service  [facility]  Securely message with [employer] (more info)  Text page via [employer] Paging/Directory   ______________________________________________________________________    Interval History     - Feeling much better today  - Pain well controlled  - Less fatigued  - Updated daughter over the phone  - No other acute concerns      Physical Exam   Vital Signs: Temp: 98.5  F (36.9  C) Temp src: Oral BP: (!) 142/78 Pulse: 99   Resp: 16 SpO2: 98 % O2 Device: None (Room air)    Weight: 118 lbs 13.25 oz    General Appearance: Awake Alert, sitting up in bed  Respiratory: Clear anteriorly, no wheezes or rales   Cardiovascular: Regular rate and rhythm, no murmurs, rubs, or gallops  GI: Abdomen soft, bowel sounds present  Other: Normal affect. Moving all extremities, no visible bleeding  Alert and oriented to self, location,and month. Not oriented to year     Medical Decision Making       38 MINUTES SPENT BY ME on the date of service doing chart  review, history, exam, documentation & further activities per the note.      Data   ------------------------- PAST 24 HR DATA REVIEWED -----------------------------------------------    I have personally reviewed the following data over the past 24 hrs:    10.2  \   9.1 (L)   / 221     137 102 19.4 /  116 (H)   3.6 22 0.77 \       Imaging results reviewed over the past 24 hrs:   Recent Results (from the past 24 hour(s))   CT Head w/o Contrast    Narrative    EXAM: CT HEAD W/O CONTRAST  LOCATION: [facility]  DATE: 6/7/2024    INDICATION: Confusion  COMPARISON: 3/21/2024  TECHNIQUE: Routine CT Head without IV contrast. Multiplanar reformats. Dose reduction techniques were used.    FINDINGS:  INTRACRANIAL CONTENTS: No intracranial hemorrhage, extraaxial collection, or mass effect.  Small chronic infarctions anterior aspect basal ganglia bilaterally. Moderate presumed chronic small vessel ischemic changes. Mild atrophy which is more prominent,   mild to moderate in frontal lobes bilaterally.     VISUALIZED ORBITS/SINUSES/MASTOIDS: No intraorbital abnormality. No paranasal sinus mucosal disease. No middle ear or mastoid effusion.    BONES/SOFT TISSUES: No acute abnormality.      Impression    IMPRESSION:  1.  No acute intracranial process.

## 2024-06-08 NOTE — PLAN OF CARE
Goal Outcome Evaluation:    Date/Time: 6/7/24 @ [id number]    Trauma/Ortho/Medical (Choose one) Ortho    Diagnosis:L hip hemiarthroplasty  POD#: 4  Mental Status:A and O x 4, forgetful at times  Activity/dangle: Assist of 2 Billie gomez  Diet: Regular; mildly thickened liquid  Pain: Managed pain with schduled Tylenol and PRN Oxycodone  Pierce/Voiding: External catheter  Tele/Restraints/Iso: Special precaution maintained.  02/LDA: FLORES JIN SL R wrist  D/C Date: To be determined  Other Info: PRN Hydralazine given. Dressing CDI. Takes pills 1 at a time. Head CT done, result relayed to hospitalist.

## 2024-06-08 NOTE — PLAN OF CARE
Goal Outcome Evaluation:       Date/Time: 06/08/2024 ([id number])    Trauma/Ortho/Medical (Choose one): Trauma    Diagnosis: Left Hip Hemiarthoplasty  POD#: 4  Mental Status: A&O x4, forgetful @ times  Activity/dangle: Up with assist of 2/gb/walker or alon steady (when tired) to chair/ BSC  Diet: Regular, advanced to Thin liquid this afternoon per SLP.  Pain: denies, on scheduled Tylenol  Pierce/Voiding: BSC, incontinent @ times  Tele/Restraints/Iso: Special Precaution  02/LDA: IV- SL  D/C Date: Pending  Other Info: [place], bilateral hearing aids. More alert today. Up in chair for meals. Had an episode of emesis x1 after dinner, refused antiemetic.

## 2024-06-08 NOTE — PROGRESS NOTES
Orthopedic Surgery  Jamee Douglas  06/08/2024     Admit Date:  6/3/2024  POD: 4 Days Post-Op   Procedure(s):  LEFT HIP HEMIARTHROPLASTY  Covid positive      Patient resting comfortably in bed and denies any pain currently.  Tolerating oral intake.    Denies nausea or vomiting  Denies chest pain or shortness of breath.   No acute events overnight.  No new concerns or complaints at this time related to her hip.    Temp:  [97.3  F (36.3  C)-98.5  F (36.9  C)] 98.5  F (36.9  C)  Pulse:  [60-99] 99  Resp:  [16-17] 16  BP: ()/(43-83) 142/78  SpO2:  [92 %-100 %] 98 %    Alert and oriented  Dressing is clean, dry, and intact.   Minimal erythema of the surrounding skin.   Bilateral calves are soft, non-tender.  Left lower extremity is NVI.  Sensation intact bilateral lower extremities  Patient able to resist dorsi and plantar flexion, chronic weakness on left from prior stroke.   3+Dp pulse    Labs:  Recent Labs   Lab Test 06/07/24  0816 06/06/24  0725 06/05/24  1416 06/05/24  0719   WBC 9.2 8.7  --  10.9   HGB 7.2* 7.3*  7.3* 7.3* 7.7*    131*  --  118*     Recent Labs   Lab Test 06/03/24  1354 03/21/24  1010 02/01/21  1006   INR 1.08 1.03 1.06       1. PLAN:   Monitor hemoglobin. Transfuse per protocol if Hgb <7.0.  Stable at 7.2 today.   Continue lovenox x 4 weeks for DVT prophylaxis.     Mobilize with PT/OT    WBAT left LE with posterior precautions (no active abduction, no flexion > 90, no internal rotation).  Abduction pillow when in bed.      Continue current pain regimen.   Dressings: Keep intact.  Change if >60% saturated or peeling off.    Follow-up: 2 weeks post-op with Dr. Danielle team    2. Disposition   Anticipate d/c to TCU likely when medically cleared and progressing in PT.       hSelly Starks PA-C

## 2024-06-09 LAB
CREAT SERPL-MCNC: 0.77 MG/DL (ref 0.51–0.95)
EGFRCR SERPLBLD CKD-EPI 2021: 72 ML/MIN/1.73M2
PLATELET # BLD AUTO: 286 10E3/UL (ref 150–450)

## 2024-06-09 PROCEDURE — 85049 AUTOMATED PLATELET COUNT: CPT | Performed by: HOSPITALIST

## 2024-06-09 PROCEDURE — 36415 COLL VENOUS BLD VENIPUNCTURE: CPT | Performed by: HOSPITALIST

## 2024-06-09 PROCEDURE — 82565 ASSAY OF CREATININE: CPT | Performed by: HOSPITALIST

## 2024-06-09 PROCEDURE — 250N000011 HC RX IP 250 OP 636: Mod: JZ | Performed by: HOSPITALIST

## 2024-06-09 PROCEDURE — 120N000001 HC R&B MED SURG/OB

## 2024-06-09 PROCEDURE — 250N000013 HC RX MED GY IP 250 OP 250 PS 637: Performed by: PHYSICIAN ASSISTANT

## 2024-06-09 PROCEDURE — 250N000013 HC RX MED GY IP 250 OP 250 PS 637: Performed by: HOSPITALIST

## 2024-06-09 PROCEDURE — 99231 SBSQ HOSP IP/OBS SF/LOW 25: CPT | Performed by: STUDENT IN AN ORGANIZED HEALTH CARE EDUCATION/TRAINING PROGRAM

## 2024-06-09 PROCEDURE — 250N000013 HC RX MED GY IP 250 OP 250 PS 637: Performed by: STUDENT IN AN ORGANIZED HEALTH CARE EDUCATION/TRAINING PROGRAM

## 2024-06-09 RX ADMIN — MONTELUKAST SODIUM 1 G: 4 TABLET, CHEWABLE ORAL at 05:16

## 2024-06-09 RX ADMIN — POLYETHYLENE GLYCOL 3350 17 G: 17 POWDER, FOR SOLUTION ORAL at 08:28

## 2024-06-09 RX ADMIN — SENNOSIDES AND DOCUSATE SODIUM 2 TABLET: 50; 8.6 TABLET ORAL at 20:27

## 2024-06-09 RX ADMIN — OXYCODONE HYDROCHLORIDE 2.5 MG: 5 TABLET ORAL at 20:27

## 2024-06-09 RX ADMIN — ATORVASTATIN CALCIUM 40 MG: 40 TABLET, FILM COATED ORAL at 20:27

## 2024-06-09 RX ADMIN — DORZOLAMIDE HYDROCHLORIDE AND TIMOLOL MALEATE 1 DROP: 20; 5 SOLUTION/ DROPS OPHTHALMIC at 08:30

## 2024-06-09 RX ADMIN — MONTELUKAST SODIUM 1 G: 4 TABLET, CHEWABLE ORAL at 12:35

## 2024-06-09 RX ADMIN — Medication 50 MCG: at 08:28

## 2024-06-09 RX ADMIN — ACETAMINOPHEN 1000 MG: 500 TABLET, FILM COATED ORAL at 08:27

## 2024-06-09 RX ADMIN — ONDANSETRON 4 MG: 2 INJECTION INTRAMUSCULAR; INTRAVENOUS at 08:35

## 2024-06-09 RX ADMIN — SENNOSIDES AND DOCUSATE SODIUM 1 TABLET: 50; 8.6 TABLET ORAL at 08:28

## 2024-06-09 RX ADMIN — SENNOSIDES AND DOCUSATE SODIUM 2 TABLET: 50; 8.6 TABLET ORAL at 05:33

## 2024-06-09 RX ADMIN — ACETAMINOPHEN 1000 MG: 500 TABLET, FILM COATED ORAL at 21:46

## 2024-06-09 RX ADMIN — SERTRALINE HYDROCHLORIDE 25 MG: 25 TABLET ORAL at 08:28

## 2024-06-09 RX ADMIN — ENOXAPARIN SODIUM 40 MG: 40 INJECTION SUBCUTANEOUS at 21:47

## 2024-06-09 RX ADMIN — DORZOLAMIDE HYDROCHLORIDE AND TIMOLOL MALEATE 1 DROP: 20; 5 SOLUTION/ DROPS OPHTHALMIC at 20:29

## 2024-06-09 ASSESSMENT — ACTIVITIES OF DAILY LIVING (ADL)
ADLS_ACUITY_SCORE: 44
ADLS_ACUITY_SCORE: 49
ADLS_ACUITY_SCORE: 44
ADLS_ACUITY_SCORE: 49
ADLS_ACUITY_SCORE: 44
ADLS_ACUITY_SCORE: 49
ADLS_ACUITY_SCORE: 44
ADLS_ACUITY_SCORE: 49
ADLS_ACUITY_SCORE: 40
ADLS_ACUITY_SCORE: 40
ADLS_ACUITY_SCORE: 44
ADLS_ACUITY_SCORE: 44
ADLS_ACUITY_SCORE: 49
ADLS_ACUITY_SCORE: 44
ADLS_ACUITY_SCORE: 44

## 2024-06-09 NOTE — PLAN OF CARE
Goal Outcome Evaluation:       Date/Time: 06/09/2024 ([id number])    Trauma/Ortho/Medical (Choose one): Trauma    Diagnosis: Left Hip hemiarthroplasty  POD#: 5  Mental Status: A&O x4, forgetful @ times  Activity/dangle: Up with one assist/gb/walker to BSC/ chair  Diet: Regular  Pain: Denies, scheduled tylenol  Pierce/Voiding: BSC, incontinent @ times  Tele/Restraints/Iso: Special Precaution  02/LDA: IV-SL  D/C Date: TCU on Monday  Other Info: [place]- bilateral hearing aids @ bedside. Emesis x1, denies abdominal discomfort, managed with Zofran x1, MD notified @ bedside.

## 2024-06-09 NOTE — PROGRESS NOTES
[facility]    Medicine Progress Note - Hospitalist Service    Date of Admission:  6/3/2024    Assessment & Plan     Jamee Douglas is a 89 year old female with a previous history of stroke in 2020 with residual left-sided weakness, hyperlipidemia, osteoarthritis and glaucoma who was visiting primary clinic for cold symptoms and while exiting the building, the door closed on her and she had a fall and landed on the left side of her body. Was unable to get up after this or put weight on left leg. EMS was called and patient was transferred to [facility]. Now medically stable for discharge to TCU, anticipate 6/10.     Left hip fracture s/p mechanical fall s/p hemiarthroplasty 6/4/24  History of recurrent falls  Went to doctor for cold symptoms. Suffered a mechanical fall while at doctors office. Pelvic x-ray showed left femoral neck fracture with 3 cm displacement and impaction.  Moderate degree of apex superolateral and anterior angulation. Chest x-ray with no acute findings. Underwent hip hemiarthroplasty on 6/4.   - Oral oxycodone, Tylenol and IV Dilaudid ordered.  - Ortho consulted, recommendations appreciated   - DVT prophylaxis per ortho team post-operatively, currently on lovenox    - Advance diet as tolerated after surgery    - PT OT ordered.    Acute Blood Loss Anemia, stable  Baseline hemoglobin seems to be between 9-10. Hgb on admission 12.7. Then dropped to 10.9 after IV fluids, suspected some hemodilution because all cell lines dropped. Patient then went to OR on 6/4. Hemoglobin on 6/5 dropped to 7.7. Recheck 7.3 - 7.3 - 7.2. She remains hemodynamically stable but very fatigued. Currently low suspicion for active bleeding. Per chart review the patient suffered a subarachnoid hemorrhage on 3/21/24. Therapeutic anti-coagulation is contraindicated. Transfused 1 unit RBC on 6/7/24 for symptomatic anemia, relative contraindication to IV iron with current covid infection. Feeling  Patient placed on continuous pulse ox, blood pressure, and cardiac monitor. Call light within reach of patient.    much better today after transfusion.  - Continue lovenox for VTE prophylaxis   - Repeat CBC in one week at TCU      Possible hospital acquired delirium  Right frontal superior sulcus subarachnoid hemorrhage  History of stroke with residual left-sided weakness  I am told that patient had an episode of confusion last night witnessed by her daughter. This morning patient is back at her baseline per daughter. She has multiple reasons to have delirium including her age, COVID infection, recent fracture with surgery. Other possibilities would include TIA, CVA, or recrudescence of stroke. Since patient is at her baseline this morning will elect to continue to monitor mental status closely. Daughter states patient has been more forgetful the last couple of days and more fatigued. Head CT w/o contrast on 6/7 with no acute findings. Mental status has been waxing and waning. She has always been alert and oriented when I have visited her each day.   - prn seroquel for delirium started 6/6   - delirium precautions    COVID19  Sore Throat  Denies cough or shortness of breath. Has a sore throat. Found to be COVID positive. Vitally stable. Off oxygen.   - supportive treatment  - cepacol available     New T-wave inversions, improved   I assumed care 6/4. I reviewed pre-operative EKG showing possible new T-wave versions in inferior and lateral leads. These are new compared to EKG from March. No troponin was collected. Patient denied any chest pain or anginal equivalents prior to surgical intervention. Preop evaluation-revised cardiac risk index is at 1 point giving her a class II risk with 6% 30-day risk of death, MI and cardiac arrest. Repeat EKG after surgery showing improvement of these T-wave changes. I discussed further cardiac risk stratification with her Daughter Olinda. We could consider inpatient echocardiogram versus outpatient follow up to consider stress test. Daughter agrees with outpatient follow up for further CAD risk    - Outpatient follow up for CV risk stratification if patient desires after recovering from surgery      Depression-on sertraline 25 mg daily.     Hypertension-patient is not on any antihypertensives at home.  Currently systolic blood pressures over 180 likely secondary to anxiety and pain from the fall.  On IV as needed hydralazine for SBP greater than 160. Blood pressure more stable on 6/4.      Chronic diarrhea-on colestipol 1 g 2 times daily, 4 hours prior to eating.          Diet: Combination Diet Regular Diet; Thin Liquids (level 0)  ADAT  DVT Prophylaxis: Defer to orthopedic surgery  Pierce Catheter: Not present  Lines: None     Cardiac Monitoring: None  Code Status: Full Code      Clinically Significant Risk Factors                  # Hypertension: Noted on problem list             #Precipitous drop in Hgb/Hct: Lowest Hgb this hospitalization: 7.2 g/dL. Will continue to monitor and treat/transfuse as appropriate.         # Financial/Environmental Concerns: none         Disposition Plan     Medically Ready for Discharge: Anticipated Tomorrow         Casimiro Pérez, DO  Hospitalist Service  [facility]  Securely message with [employer] (more info)  Text page via [employer] Paging/Directory   ______________________________________________________________________    Interval History     - No acute events overnight  - Patient wondering when she can take out foam between legs  - Discussed that it is there to avoid dislocation of new hip replacement  - No pain  - COVID symptoms improving  - No other acute concerns     Physical Exam   Vital Signs: Temp: 97.8  F (36.6  C) Temp src: Oral BP: (!) 152/64 Pulse: 84   Resp: 16 SpO2: 96 % O2 Device: None (Room air)    Weight: 118 lbs 13.25 oz    General Appearance: Awake Alert, sitting up in bed  Respiratory: Clear anteriorly, no wheezes or rales   Cardiovascular: Regular rate and rhythm, no murmurs, rubs, or gallops  GI: Abdomen soft, bowel sounds  present  Other: Normal affect. Moving all extremities, no visible bleeding  Alert and oriented to self, location,and month. Not oriented to year     Medical Decision Making       38 MINUTES SPENT BY ME on the date of service doing chart review, history, exam, documentation & further activities per the note.      Data   ------------------------- PAST 24 HR DATA REVIEWED -----------------------------------------------    I have personally reviewed the following data over the past 24 hrs:    10.2  \   9.1 (L)   / 221     137 102 19.4 /  116 (H)   3.6 22 0.77 \       Imaging results reviewed over the past 24 hrs:   No results found for this or any previous visit (from the past 24 hour(s)).       weakness/vertigo/during menses only

## 2024-06-09 NOTE — PLAN OF CARE
Goal Outcome Evaluation:       Date/Time: 6/8/24 @ 1585-5689     Trauma/Ortho/Medical (Choose one) Ortho     Diagnosis:L hip hemiarthroplasty  POD#: 5  Mental Status:A and O x 4, forgetful at times  Activity/dangle: Assist of 1 GB and walker to [facility]  Diet: Regular.  Pain: Managed pain with schduled Tylenol and PRN Oxycodone.  Pierce/Voiding: External catheter.  Tele/Restraints/Iso: Special precaution maintained.  02/LDA: FLORES PIV SL R wrist  D/C Date: To be determined.  Other Info: Dressing CDI. Takes pills 1 at a time. [place]. Hip abduction pillow in place. PRN Senna-Docusate given.

## 2024-06-10 ENCOUNTER — APPOINTMENT (OUTPATIENT)
Dept: PHYSICAL THERAPY | Facility: CLINIC | Age: 89
DRG: 521 | End: 2024-06-10
Payer: COMMERCIAL

## 2024-06-10 PROCEDURE — 250N000013 HC RX MED GY IP 250 OP 250 PS 637: Performed by: PHYSICIAN ASSISTANT

## 2024-06-10 PROCEDURE — 97110 THERAPEUTIC EXERCISES: CPT | Mod: GP

## 2024-06-10 PROCEDURE — 120N000001 HC R&B MED SURG/OB

## 2024-06-10 PROCEDURE — 999N000111 HC STATISTIC OT IP EVAL DEFER: Performed by: OCCUPATIONAL THERAPIST

## 2024-06-10 PROCEDURE — 99232 SBSQ HOSP IP/OBS MODERATE 35: CPT | Performed by: STUDENT IN AN ORGANIZED HEALTH CARE EDUCATION/TRAINING PROGRAM

## 2024-06-10 PROCEDURE — 250N000011 HC RX IP 250 OP 636: Mod: JZ | Performed by: HOSPITALIST

## 2024-06-10 PROCEDURE — 250N000013 HC RX MED GY IP 250 OP 250 PS 637: Performed by: HOSPITALIST

## 2024-06-10 PROCEDURE — 97530 THERAPEUTIC ACTIVITIES: CPT | Mod: GP

## 2024-06-10 PROCEDURE — 250N000013 HC RX MED GY IP 250 OP 250 PS 637: Performed by: STUDENT IN AN ORGANIZED HEALTH CARE EDUCATION/TRAINING PROGRAM

## 2024-06-10 RX ORDER — QUETIAPINE FUMARATE 25 MG/1
12.5 TABLET, FILM COATED ORAL
DISCHARGE
Start: 2024-06-10 | End: 2024-06-17

## 2024-06-10 RX ADMIN — SENNOSIDES AND DOCUSATE SODIUM 1 TABLET: 50; 8.6 TABLET ORAL at 20:06

## 2024-06-10 RX ADMIN — ATORVASTATIN CALCIUM 40 MG: 40 TABLET, FILM COATED ORAL at 20:06

## 2024-06-10 RX ADMIN — DORZOLAMIDE HYDROCHLORIDE AND TIMOLOL MALEATE 1 DROP: 20; 5 SOLUTION/ DROPS OPHTHALMIC at 09:05

## 2024-06-10 RX ADMIN — ACETAMINOPHEN 1000 MG: 500 TABLET, FILM COATED ORAL at 16:47

## 2024-06-10 RX ADMIN — POLYETHYLENE GLYCOL 3350 17 G: 17 POWDER, FOR SOLUTION ORAL at 09:04

## 2024-06-10 RX ADMIN — SENNOSIDES AND DOCUSATE SODIUM 1 TABLET: 50; 8.6 TABLET ORAL at 09:04

## 2024-06-10 RX ADMIN — Medication 50 MCG: at 09:04

## 2024-06-10 RX ADMIN — ENOXAPARIN SODIUM 40 MG: 40 INJECTION SUBCUTANEOUS at 21:26

## 2024-06-10 RX ADMIN — ACETAMINOPHEN 1000 MG: 500 TABLET, FILM COATED ORAL at 21:26

## 2024-06-10 RX ADMIN — MONTELUKAST SODIUM 1 G: 4 TABLET, CHEWABLE ORAL at 12:20

## 2024-06-10 RX ADMIN — SERTRALINE HYDROCHLORIDE 25 MG: 25 TABLET ORAL at 09:04

## 2024-06-10 RX ADMIN — DORZOLAMIDE HYDROCHLORIDE AND TIMOLOL MALEATE 1 DROP: 20; 5 SOLUTION/ DROPS OPHTHALMIC at 20:06

## 2024-06-10 RX ADMIN — MONTELUKAST SODIUM 1 G: 4 TABLET, CHEWABLE ORAL at 05:23

## 2024-06-10 RX ADMIN — ACETAMINOPHEN 1000 MG: 500 TABLET, FILM COATED ORAL at 09:04

## 2024-06-10 ASSESSMENT — ACTIVITIES OF DAILY LIVING (ADL)
ADLS_ACUITY_SCORE: 46
ADLS_ACUITY_SCORE: 44
ADLS_ACUITY_SCORE: 46
ADLS_ACUITY_SCORE: 44
ADLS_ACUITY_SCORE: 46
ADLS_ACUITY_SCORE: 44
ADLS_ACUITY_SCORE: 46
ADLS_ACUITY_SCORE: 46

## 2024-06-10 NOTE — PROGRESS NOTES
Orthopedic Surgery  Jamee Douglas  06/09/2024     Admit Date:  6/3/2024  POD: 5 Days Post-Op   Procedure(s):  LEFT HIP HEMIARTHROPLASTY  Covid positive      Patient resting comfortably in bed and denies any pain currently.  No new concerns or complaints at this time.  Tolerating oral intake.    Denies nausea or vomiting  Denies chest pain or shortness of breath.   No acute events overnight.    Temp:  [97.5  F (36.4  C)-97.8  F (36.6  C)] 97.8  F (36.6  C)  Pulse:  [84-97] 97  Resp:  [16] 16  BP: (149-159)/(64-85) 159/85  SpO2:  [96 %-97 %] 97 %    Alert and oriented, in no distress.  Dressing is clean, dry, and intact.  Abduction pillow in place.  Minimal erythema and ecchymosis of the surrounding skin.   Bilateral calves are soft, non-tender.  Left lower extremity is NVI.  Sensation intact bilateral lower extremities  Patient able to resist dorsi and plantar flexion  3+Dp pulse    Labs:  Recent Labs   Lab Test 06/09/24  0757 06/08/24  1102 06/07/24  0816 06/06/24  0725   WBC  --  10.2 9.2 8.7   HGB  --  9.1* 7.2* 7.3*  7.3*    221 161 131*     Recent Labs   Lab Test 06/03/24  1354 03/21/24  1010 02/01/21  1006   INR 1.08 1.03 1.06       1. PLAN:   Hemoglobin has been improving and is 9.1 today.   Continue lovenox x 4 weeks for DVT prophylaxis.     Continue to mobilize with PT/OT    WBAT left LE with posterior precautions (no active abduction, no flexion > 90, no internal rotation).  Abduction pillow when in bed.      Continue current pain regimen.  Well controlled.   Dressings: Keep intact.  Change if >60% saturated or peeling off.    Follow-up: 2 weeks post-op with Dr. Danielle team    2. Disposition   Anticipate d/c to TCU on 6/10.      Shelly Starks PA-C

## 2024-06-10 NOTE — PLAN OF CARE
Goal Outcome Evaluation:      Date/Time: 6/9/24 @ 1950-5685     Trauma/Ortho/Medical (Choose one) Ortho     Diagnosis:L hip hemiarthroplasty  POD#: 6  Mental Status:A and O x 4, forgetful at times  Activity/dangle: Assist of 1 GB and walker to BS  Diet: Regular.  Pain: Managed pain with schduled Tylenol and PRN Oxycodone.  Pierce/Voiding: External catheter.  Tele/Restraints/Iso: Special precaution maintained.  02/LDA: FLORES JIN SL R wrist  D/C Date: Possible discharge today to TCU.  Other Info: Dressing CDI. Takes pills 1 at a time. [place]. Hip abduction pillow in place.

## 2024-06-10 NOTE — PROGRESS NOTES
Care Management Follow Up    Length of Stay (days): 7    Expected Discharge Date: 06/10/2024     Concerns to be Addressed: discharge planning     Patient plan of care discussed at interdisciplinary rounds: Yes    Anticipated Discharge Disposition: Transitional Care     Anticipated Discharge Services:    Anticipated Discharge DME:      Patient/family educated on Medicare website which has current facility and service quality ratings: yes  Education Provided on the Discharge Plan:    Patient/Family in Agreement with the Plan: yes    Referrals Placed by CM/SW: Senior Linkage Line, Post Acute Facilities, Transportation, Communication hand-offs to next level of Care Providers  Private pay costs discussed: Not applicable    Additional Information:  Patient accepted to [facility]. [facility] declined patient due to no beds. Writer messaged facility asking if today was still going to work for discharge and what time is preferred. PAS completed.    PAS-RR    D: Per DHS regulation, SW completed and submitted PAS-RR to [place] [employer] Direct Connect via the [employer] Parking [employer] LinkAge Line.  PAS-RR confirmation # is : [id number]    I: SW spoke with patient and they are aware a PAS-RR has been submitted.  SW reviewed with patient that they may be contacted for a follow up appointment within 10 days of hospital discharge if their SNF stay is < 30 days.  Contact information for [facility] was also provided.    A: patient verbalized understanding.    P: Further questions may be directed to [facility] Line at #[phone], option #4 for PAS-RR staff.    1210: Looked in [facility] and [facility] has now declined patient. Call and message sent to ask why they no longer can accept her. Writer called daughter Olnida and left a voicemail with this information and requested additional choices. Email also sent to Olinda with same information. Updated Dr. Pérez as discharge orders have been entered. Call to [facility] as message  was that the earliest they'd have a bed is Tuesday. Also called [facility] and left a voicemail to see if one is open tomorrow since patient is still looking for placement.    Olinda called back and said that if [facility] can't accept her, [facility] in [place] would be her next choice. Referral sent for review.    3505: [facility] has a bed tomorrow for patient after 1300. Call to Olinda who confirmed they would need a ride set up. Writer explained that due to Covid patient would need to go by stretcher for precautions. Olinda understands and wanted to call patient to update her. Call to [facility] and stretcher scheduled for 6/11/24 between [phone]. Updated [facility] via [facility].    BRITTANI Trinh

## 2024-06-10 NOTE — PLAN OF CARE
OT: Order received, chart reviewed and discussed with care team. Per discussion with team and PT screen, pt will require TCU at discharge. PT to address functional transfers and activity tolerance while inpatient, to avoid duplication of services OT will defer to next level of care. Defer discharge recommendations to PT. Will complete orders.

## 2024-06-10 NOTE — PLAN OF CARE
Goal Outcome Evaluation:       Pt A&Ox4, forgetful. VSS on RA. Regular diet, takes pills one at a time. Up 1A GB&W, voiding in BSC/Incontinent. Baseline L-sided weakness from previous CVA. L hip aquacell CDI, Baseline neuropathy on BLE. Pain controlled w/ scheduled Tylenol, PRNs available. R PIV SL. Special precautions maintained. Discharge to [facility] TCU tomorrow w/ stretcher ride scheduled between [id number]. Continue plan of care.

## 2024-06-10 NOTE — PROGRESS NOTES
[facility]    Medicine Progress Note - Hospitalist Service    Date of Admission:  6/3/2024    Assessment & Plan     Jamee Douglas is a 89 year old female with a previous history of stroke in 2020 with residual left-sided weakness, hyperlipidemia, osteoarthritis and glaucoma who was visiting primary clinic for cold symptoms and while exiting the building, the door closed on her and she had a fall and landed on the left side of her body. Was unable to get up after this or put weight on left leg. EMS was called and patient was transferred to [facility]. Now medically stable for discharge to TCU, anticipate 6/10 or 6/11.     Left hip fracture s/p mechanical fall s/p hemiarthroplasty 6/4/24  History of recurrent falls  Went to doctor for cold symptoms. Suffered a mechanical fall while at doctors office. Pelvic x-ray showed left femoral neck fracture with 3 cm displacement and impaction.  Moderate degree of apex superolateral and anterior angulation. Chest x-ray with no acute findings. Underwent hip hemiarthroplasty on 6/4.   - Oral oxycodone, Tylenol and IV Dilaudid ordered.  - Ortho consulted, recommendations appreciated   - DVT prophylaxis per ortho team post-operatively, currently on lovenox    - Advance diet as tolerated after surgery    - PT OT ordered.    Acute Blood Loss Anemia, stable  Baseline hemoglobin seems to be between 9-10. Hgb on admission 12.7. Then dropped to 10.9 after IV fluids, suspected some hemodilution because all cell lines dropped. Patient then went to OR on 6/4. Hemoglobin on 6/5 dropped to 7.7. Recheck 7.3 - 7.3 - 7.2. She remains hemodynamically stable but very fatigued. Currently low suspicion for active bleeding. Per chart review the patient suffered a subarachnoid hemorrhage on 3/21/24. Therapeutic anti-coagulation is contraindicated. Transfused 1 unit RBC on 6/7/24 for symptomatic anemia, relative contraindication to IV iron with current covid infection.  Feeling much better today after transfusion.  - Continue lovenox for VTE prophylaxis plan for 4 weeks total after surgery   - Repeat CBC in one week at TCU or earlier if any acute clinical change     Possible hospital acquired delirium  Right frontal superior sulcus subarachnoid hemorrhage  History of stroke with residual left-sided weakness  I am told that patient had an episode of confusion last night witnessed by her daughter. This morning patient is back at her baseline per daughter. She has multiple reasons to have delirium including her age, COVID infection, recent fracture with surgery. Other possibilities would include TIA, CVA, or recrudescence of stroke. Since patient is at her baseline this morning will elect to continue to monitor mental status closely. Daughter states patient has been more forgetful the last couple of days and more fatigued. Head CT w/o contrast on 6/7 with no acute findings. Mental status has been waxing and waning. She has always been alert and oriented when I have visited her each day.   - prn seroquel for delirium started 6/6   - delirium precautions    COVID19  Sore Throat  Denies cough or shortness of breath. Has a sore throat. Found to be COVID positive. Vitally stable. Off oxygen.   - supportive treatment  - cepacol available     New T-wave inversions, improved   I assumed care 6/4. I reviewed pre-operative EKG showing possible new T-wave versions in inferior and lateral leads. These are new compared to EKG from March. No troponin was collected. Patient denied any chest pain or anginal equivalents prior to surgical intervention. Preop evaluation-revised cardiac risk index is at 1 point giving her a class II risk with 6% 30-day risk of death, MI and cardiac arrest. Repeat EKG after surgery showing improvement of these T-wave changes. I discussed further cardiac risk stratification with her Daughter Olinda. We could consider inpatient echocardiogram versus outpatient follow up to  consider stress test. Daughter agrees with outpatient follow up for further CAD risk   - Outpatient follow up for CV risk stratification if patient desires after recovering from surgery      Depression-on sertraline 25 mg daily.     Hypertension-patient is not on any antihypertensives at home.  Currently systolic blood pressures over 180 likely secondary to anxiety and pain from the fall.  On IV as needed hydralazine for SBP greater than 160. Blood pressure more stable on 6/4.      Chronic diarrhea-on colestipol 1 g 2 times daily, 4 hours prior to eating.          Diet: Combination Diet Regular Diet; Thin Liquids (level 0)  Diet  ADAT  DVT Prophylaxis: Defer to orthopedic surgery  Pierce Catheter: Not present  Lines: None     Cardiac Monitoring: None  Code Status: Full Code      Clinically Significant Risk Factors                  # Hypertension: Noted on problem list             #Precipitous drop in Hgb/Hct: Lowest Hgb this hospitalization: 7.2 g/dL. Will continue to monitor and treat/transfuse as appropriate.         # Financial/Environmental Concerns: none         Disposition Plan     Medically Ready for Discharge: Anticipated Tomorrow         Casimiro Pérez DO  Hospitalist Service  [facility]  Securely message with [employer] (more info)  Text page via [employer] Paging/Directory   ______________________________________________________________________    Interval History     - No acute events overnight  - Sitting up in chair  - Having no pain  - No other acute concerns   - Ready to discharge     Physical Exam   Vital Signs: Temp: 97.5  F (36.4  C) Temp src: Oral BP: 120/76 Pulse: 79   Resp: 16 SpO2: 100 % O2 Device: None (Room air)    Weight: 125 lbs 14.12 oz    General Appearance: Awake Alert, sitting up in bed  Respiratory: Normal respiratory effort  Cardiovascular: Regular rate and rhythm, no murmurs, rubs, or gallops  GI: Abdomen soft, bowel sounds present  Other: Normal affect. Moving all  extremities, no visible bleeding  Alert and oriented to self, location,and month. Not oriented to year     Medical Decision Making       33 MINUTES SPENT BY ME on the date of service doing chart review, history, exam, documentation & further activities per the note.      Data   ------------------------- PAST 24 HR DATA REVIEWED -----------------------------------------------        Imaging results reviewed over the past 24 hrs:   No results found for this or any previous visit (from the past 24 hour(s)).

## 2024-06-11 ENCOUNTER — APPOINTMENT (OUTPATIENT)
Dept: SPEECH THERAPY | Facility: CLINIC | Age: 89
DRG: 521 | End: 2024-06-11
Payer: COMMERCIAL

## 2024-06-11 VITALS
DIASTOLIC BLOOD PRESSURE: 71 MMHG | TEMPERATURE: 97.6 F | WEIGHT: 123.68 LBS | HEART RATE: 94 BPM | SYSTOLIC BLOOD PRESSURE: 142 MMHG | OXYGEN SATURATION: 98 % | BODY MASS INDEX: 22.62 KG/M2 | RESPIRATION RATE: 16 BRPM

## 2024-06-11 PROCEDURE — 250N000013 HC RX MED GY IP 250 OP 250 PS 637: Performed by: STUDENT IN AN ORGANIZED HEALTH CARE EDUCATION/TRAINING PROGRAM

## 2024-06-11 PROCEDURE — 250N000013 HC RX MED GY IP 250 OP 250 PS 637: Performed by: PHYSICIAN ASSISTANT

## 2024-06-11 PROCEDURE — 92526 ORAL FUNCTION THERAPY: CPT | Mod: GN

## 2024-06-11 PROCEDURE — 99239 HOSP IP/OBS DSCHRG MGMT >30: CPT | Performed by: INTERNAL MEDICINE

## 2024-06-11 PROCEDURE — 250N000013 HC RX MED GY IP 250 OP 250 PS 637: Performed by: HOSPITALIST

## 2024-06-11 RX ADMIN — MONTELUKAST SODIUM 1 G: 4 TABLET, CHEWABLE ORAL at 12:18

## 2024-06-11 RX ADMIN — DORZOLAMIDE HYDROCHLORIDE AND TIMOLOL MALEATE 1 DROP: 20; 5 SOLUTION/ DROPS OPHTHALMIC at 09:35

## 2024-06-11 RX ADMIN — MONTELUKAST SODIUM 1 G: 4 TABLET, CHEWABLE ORAL at 05:48

## 2024-06-11 RX ADMIN — SENNOSIDES AND DOCUSATE SODIUM 1 TABLET: 50; 8.6 TABLET ORAL at 09:35

## 2024-06-11 RX ADMIN — SERTRALINE HYDROCHLORIDE 25 MG: 25 TABLET ORAL at 09:35

## 2024-06-11 RX ADMIN — Medication 50 MCG: at 09:35

## 2024-06-11 RX ADMIN — ACETAMINOPHEN 1000 MG: 500 TABLET, FILM COATED ORAL at 09:34

## 2024-06-11 RX ADMIN — POLYETHYLENE GLYCOL 3350 17 G: 17 POWDER, FOR SOLUTION ORAL at 09:34

## 2024-06-11 ASSESSMENT — ACTIVITIES OF DAILY LIVING (ADL)
ADLS_ACUITY_SCORE: 48
ADLS_ACUITY_SCORE: 46
ADLS_ACUITY_SCORE: 48
ADLS_ACUITY_SCORE: 46
ADLS_ACUITY_SCORE: 48
ADLS_ACUITY_SCORE: 46
ADLS_ACUITY_SCORE: 48
ADLS_ACUITY_SCORE: 48

## 2024-06-11 NOTE — PLAN OF CARE
Physical Therapy Discharge Summary    Reason for therapy discharge:    Discharged to transitional care facility.    Progress towards therapy goal(s). [facility] for goal details.  Goals not met.  Barriers to achieving goals:   discharge from facility.    Therapy recommendation(s):    Continued therapy is recommended.  Rationale/Recommendations:  To progress independence and safety with functional mobility.

## 2024-06-11 NOTE — PLAN OF CARE
Speech Language Therapy Discharge Summary    Reason for therapy discharge:    Discharged to transitional care facility.    Progress towards therapy goal(s). See goals on Care Plan in [facility] electronic health record for goal details.  Goals not met.  Barriers to achieving goals:   discharge from facility.    Therapy recommendation(s):    Continued therapy is recommended.  Rationale/Recommendations:  Recommend regular diet and thin liquids - whole pills with puree. Pt should be positioned upright, take small bites/sips, and alternate solids and liquids. Intermittent s/s of aspiration with thin liquids persist, however pt refusing thickened liquids. Pt following safe swallow strategies with overall mild cues. Recommend ongoing SLP services for dysphagia including strategy training and diet modifications as indicated.

## 2024-06-11 NOTE — PLAN OF CARE
Goal Outcome Evaluation:       Pt A&Ox4, forgetful. VSS on RA. Regular diet, takes pills one at a time. Up 1A GB&W, voiding in BSC/Incontinent. Baseline L-sided weakness from previous CVA. L hip aquacell CDI, Baseline neuropathy on BLE. Pain controlled w/ scheduled Tylenol, PRNs available. Special precautions maintained. PIV removed, belongings and discharge paperwork sent with patient. Discharge to [facility] TCU today w/ stretcher ride scheduled between [id number]. All consulting providers signed off. Continue plan of care.

## 2024-06-11 NOTE — PROGRESS NOTES
Care Management Discharge Note    Discharge Date: 06/11/2024       Discharge Disposition: Transitional Care    Discharge Services:      Discharge DME:      Discharge Transportation: family or friend will provide    Private pay costs discussed: private room/amenity fees and transportation costs    Does the patient's insurance plan have a 3 day qualifying hospital stay waiver?  No    PAS Confirmation Code: [id number]  Patient/family educated on Medicare website which has current facility and service quality ratings: yes    Education Provided on the Discharge Plan:    Persons Notified of Discharge Plans: yes  Patient/Family in Agreement with the Plan: yes    Handoff Referral Completed: No    Additional Information:  Patient to discharge today to [facility] via [facility] wheelchair between [phone]. Orders requested. PAS previously completed.     Orders received for discharge and faxed. Noted to facility time for transport and called daughter Olinda. Writer left a voicemail for Olinda indicating the time of transport and provided call back information if she has questions.     Spoke to patient over the phone and she is aware of the plan and in agreement.    BRITTANI Trinh

## 2024-06-11 NOTE — PLAN OF CARE
Diagnosis: Left hip hemiarthroplasty  POD#: 7  Mental Status: A&Ox4, forgetful  Activity/dangle up 1 GB walker  Diet: regular  Pain: denies  Pierce/Voiding: incontinent @ times, BSC  Tele/Restraints/Iso: special precautions  02/LDA: FLORES SRIVASTAVA  D/C Date: [facility] 1330-1415  Other Info: BLE numbness-baseline. L sided weakness-baseline previous CVA. Dressing CDI

## 2024-06-12 ENCOUNTER — PATIENT OUTREACH (OUTPATIENT)
Dept: CARE COORDINATION | Facility: CLINIC | Age: 89
End: 2024-06-12
Payer: COMMERCIAL

## 2024-06-12 NOTE — PROGRESS NOTES
[facility]    Background: Transitional Care Management program identified per system criteria and reviewed by [facility] team for possible outreach.    Assessment: Upon chart review, [facility] Team member will not proceed with patient outreach related to this episode of Transitional Care Management program due to reason below:    Patient has discharged to a Memory Care, Long-term Care, Assisted Living or Group Home where patient is receiving on-site support with their daily cares, including support with hospital follow up plan.    Plan: Transitional Care Management episode addressed appropriately per reason noted above.      Ruthann Carrera MA  [facility] [place], [facility]    *Connected Care Resource Team does NOT follow patient ongoing. Referrals are identified based on internal discharge reports and the outreach is to ensure patient has an understanding of their discharge instructions.

## 2024-06-13 NOTE — DISCHARGE SUMMARY
[facility]  Hospitalist Discharge Summary      Date of Admission:  6/3/2024  Date of Discharge:  6/11/2024  2:20 PM  Discharging Provider: Michelle Centeno MD  Discharge Service: Hospitalist Service    Discharge Diagnoses     Acute displaced and impacted left femoral neck fracture-secondary to mechanical fall, s/p left hip hemiarthroplasty, 6/4/2024, Dr. Danielle    Acute blood loss anemia due to surgical blood loss, s/p 1 unit of PRBC    Acute metabolic encephalopathy due to delirium  -Resolved    Acute illness due to COVID-19    New T wave inversions-improved    Chronic major depression    Benign essential hypertension    Chronic diarrhea    Right frontal superior sulcus subarachnoid hemorrhage, 3/2024-secondary to fall    Previous CVA with residual left-sided hemiparesis     Chronic glaucoma    Hyperlipidemia    Clinically Significant Risk Factors          Follow-ups Needed After Discharge   Follow-up Appointments     Follow Up and recommended labs and tests      Please call as soon as possible to make an appointment to be seen in Dr. Edis Danielle's clinic at 2 weeks postop for a recheck of your surgical   site, possible repeat x-rays, and wound care. If you are at a TCU at this   time and they have x-ray capabilities, you may complete your wound care   and x-rays at your TCU and have them send your images to Dr. Danielle's   office.     Dr. Danielle's care coordinator is Nery. Please contact her at   [phone] to schedule an appointment. Dr. Danielle's care team's fax   number is [phone].     Dr. Danielle sees patients at 3 clinic locations:  [facility]  [address], [place] [address]  [facility]  [address] [place] [address] [place] [address]  [facility]  [address], [place] [address]      Please call the on-call phone number [phone] during evenings, nights   and weekends for any  urgent needs. Prescription refills must be done   during business hours by contacting your surgical team.        Follow Up and recommended labs and tests      - You will continue physical therapy at TCU  - You will need advanced respiratory precautions for 10 days after   diagnosis of COVID, last day will be 6/12  - Follow up with orthopedic surgery as instructed        {Additional follow-up instructions/to-do's for PCP    :    Unresulted Labs Ordered in the Past 30 Days of this Admission       No orders found from 5/4/2024 to 6/4/2024.            Discharge Disposition   Discharged to short-term care facility  Condition at discharge: Stable    Hospital Course     Jamee Douglas is a 89-year-old female with history of CVA (2020) with residual left hemiparesis, hyperlipidemia, who presented on 6/3/2024 due to left hip fracture secondary to mechanical fall.    She was visiting her primary care clinic for cold symptoms and while exiting the building, the door closed on her causing her to fall and landing on her left side.  She was unable to get up on her own.  EMS were called and she was transferred to [facility] ER.    Acute displaced and impacted left femoral neck fracture-secondary to mechanical fall, s/p left hip hemiarthroplasty, 6/4/2024, Dr. Danielle  -X-ray on admission showed acute left femoral neck fracture with displacement and impaction.  -She was seen in consultation with orthopedics and underwent left hip hemiarthroplasty on 6/4  -discharged to TCU for rehab  -Pain control with oxycodone 2.5-5 mg every 4 hours as needed, Tylenol 1000 mg 3 times daily  - DVT prophylaxis-Lovenox 40 mg daily for 1 month  - Follow-up: 2 weeks post-op with Dr. Danielle team       Acute blood loss anemia due to surgical blood loss  -Baseline hemoglobin 9-10.  Hemoglobin was 12.7 on admission, decreased to 10.9 with IV fluids.  After surgery, hemoglobin decreased to 7.2.  She was transfused 1 unit of PRBC.    Acute metabolic  encephalopathy due to delirium  -Developed increased confusion during hospital stay that has since resolved.  Head CT on 6/7 showed no acute findings  -Mental status appears to be back to baseline by time of discharge    Acute illness due to COVID-19  -Patient had been experiencing some sore throat and cold symptoms prior to admission for which she was seen at PCP clinic.  She tested positive for COVID-19 on admission.  -Remained stable.  No infiltrate on chest x-ray.  -Managed with supportive care    New T wave inversions  -Preop EKG showed possible new T wave inversions in inferior and lateral leads.  Troponin was not checked.  Patient denied any chest pain or anginal symptoms.  Repeat EKG after surgery showed improvement in T waves.  -Echo was considered and discussed with patient and daughter who opted for outpatient follow-up with PCP    Chronic major depression  -Continue sertraline    Benign essential hypertension  -Not on any medications    Chronic diarrhea  -Continue colestipol 1 g twice daily    Right frontal superior sulcus subarachnoid hemorrhage, 3/2024-secondary to fall  -Head CT this admission showed no acute changes    Previous CVA with residual left-sided hemiparesis   -Noted.    Chronic glaucoma  -Continue glaucoma eyedrops    Hyperlipidemia  -Continue atorvastatin    Consultations This Hospital Stay   ORTHOPEDIC SURGERY IP CONSULT  ORTHOPEDIC SURGERY IP CONSULT  PHYSICAL THERAPY ADULT IP CONSULT  OCCUPATIONAL THERAPY ADULT IP CONSULT  PHYSICAL THERAPY ADULT IP CONSULT  OCCUPATIONAL THERAPY ADULT IP CONSULT  SPEECH LANGUAGE PATH ADULT IP CONSULT  CARE MANAGEMENT / SOCIAL WORK IP CONSULT  PHYSICAL THERAPY ADULT IP CONSULT  OCCUPATIONAL THERAPY ADULT IP CONSULT  PHYSICAL THERAPY ADULT IP CONSULT  OCCUPATIONAL THERAPY ADULT IP CONSULT    Code Status   Prior    Time Spent on this Encounter   Michelle OLIVARES MD, personally saw the patient today and spent greater than 30 minutes discharging this  patient.       Michelle Centeno MD  [facility]  [address] [facility] [address]  Phone: [phone]  Fax: [phone]  ______________________________________________________________________    Physical Exam   Vital Signs:                    Weight: 123 lbs 10.85 oz    Constitutional - awake and alert, resting in bed, in no acute distress  Cardiovascular - regular rate and rhythm, no murmurs, no edema  Pulmonary - lungs are clear to auscultation bilaterally, no wheezing or rhonchi  GI - abdomen is soft, nontender, nondistended, no hepatosplenomegaly or masses  Integumentary - skin is warm and dry, no rashes or ulcers  Neurological - awake, alert and oriented x3.  Moving all 4 extremities, normal speech, no focal deficits       Primary Care Physician   ERINN BRYAN    Discharge Orders      Mantoux instructions    Give two-step Mantoux (PPD) Per Facility Policy Yes     Wound care (specify)    Site:   left hip  Instructions:  Do not submerge in water. Cover for showers. Change if >60% saturation.     Follow Up and recommended labs and tests    Please call as soon as possible to make an appointment to be seen in Dr. dEis Danielle's clinic at 2 weeks postop for a recheck of your surgical site, possible repeat x-rays, and wound care. If you are at a TCU at this time and they have x-ray capabilities, you may complete your wound care and x-rays at your TCU and have them send your images to Dr. Danielle's office.     Dr. Danielle's care coordinator is Nery. Please contact her at [phone] to schedule an appointment. Dr. Danielle's care team's fax number is [phone].     Dr. Danielle sees patients at 3 clinic locations:  [facility]  [address], [place] [address]  [facility]  [address] [place] [address] [address], [place] [address]  [facility]  [address], [place] [address]      Please call  the on-call phone number [phone] during evenings, nights and weekends for any urgent needs. Prescription refills must be done during business hours by contacting your surgical team.     Weight bearing status    Weight bearing as tolerated.     Posterior hip precaution: no internal rotation, no flexion greater than 90 degrees, no active abduction, no adduction past midline     Reason for your hospital stay    Status post left hemiarthroplasty for femoral neck fracture (6/4/2024)     Activity - Up with assistive device     General info for SNF    Length of Stay Estimate: Short Term Care: Estimated # of Days <30  Condition at Discharge: Improving  Level of care:skilled   Rehabilitation Potential: Fair  Admission H&P remains valid and up-to-date: Yes  Recent Chemotherapy: N/A  Use Nursing Home Standing Orders: Yes     Follow Up and recommended labs and tests    - You will continue physical therapy at TCU  - You will need advanced respiratory precautions for 10 days after diagnosis of COVID, last day will be 6/12  - Follow up with orthopedic surgery as instructed     Reason for your hospital stay    You were admitted for a fall and hip fracture     Physical Therapy Adult Consult    Evaluate and treat as clinically indicated.    Reason:  Status post left hemiarthroplasty for femoral neck fracture (6/4/2024)     Occupational Therapy Adult Consult    Evaluate and treat as clinically indicated.    Reason:  Status post left hemiarthroplasty for femoral neck fracture (6/4/2024)     Airborne and Contact Isolation    Last day 6/12. Stop precautions on 6/13     Fall precautions     Hip precautions     Crutches DME    DME Documentation: Describe the reason for need to support medical necessity: Impaired gait status post hip surgery. I, the undersigned, certify that the above prescribed supplies are medically necessary for this patient and is both reasonable and necessary in reference to accepted standards of medical practice  in the treatment of this patient's condition and is not prescribed as a convenience.     Cane DME    DME Documentation: Describe the reason for need to support medical necessity: Impaired gait status post hip surgery. I, the undersigned, certify that the above prescribed supplies are medically necessary for this patient and is both reasonable and necessary in reference to accepted standards of medical practice in the treatment of this patient's condition and is not prescribed as a convenience.     Walker DME    DME Documentation: Describe the reason for need to support medical necessity: Impaired gait status post hip surgery. I, the undersigned, certify that the above prescribed supplies are medically necessary for this patient and is both reasonable and necessary in reference to accepted standards of medical practice in the treatment of this patient's condition and is not prescribed as a convenience.     Diet    Follow this diet upon discharge: Orders Placed This Encounter      Combination Diet Regular Diet; Thin Liquids (level 0)       Significant Results and Procedures   Most Recent 3 CBC's:  Recent Labs   Lab Test 06/09/24  0757 06/08/24  1102 06/07/24  0816 06/06/24  0725   WBC  --  10.2 9.2 8.7   HGB  --  9.1* 7.2* 7.3*  7.3*   MCV  --  92 93 95    221 161 131*     Most Recent 3 BMP's:  Recent Labs   Lab Test 06/09/24  0757 06/08/24  1102 06/07/24  1357 06/06/24  0725 06/05/24  0719   NA  --  137  --  139 138   POTASSIUM  --  3.6  --  3.6 3.8   CHLORIDE  --  102  --  106 106   CO2  --  22  --  23 21*   BUN  --  19.4  --  22.6 25.3*   CR 0.77 0.77  --  0.86 0.90   ANIONGAP  --  13  --  10 11   POORNIMA  --  8.3  --  8.3 7.9*   GLC  --  116* 100* 86 111*   ,   Results for orders placed or performed during the hospital encounter of 06/03/24   XR Pelvis and Hip Left 1 View    Narrative    PELVIS AND HIP LEFT ONE VIEW Carolyn 3, 2024 2:15 PM     HISTORY: Fall, pain.    COMPARISON: None.      Impression     IMPRESSION: Left femoral neck fracture. There is 3 cm of displacement  and impaction. There is a moderate degree of apex superolateral and  anterior angulation. Incidentally noted are degenerative and  postoperative changes in the spine and diffuse bone demineralization.    ROSEMARY TOVAR MD         SYSTEM ID:  [id number]   XR Chest 1 View    Narrative    CHEST ONE VIEW  6/3/2024 2:15 PM     HISTORY: Cough.    COMPARISON: March 21, 2024      Impression    IMPRESSION: No acute disease.    JOSELUIS KENT MD         SYSTEM ID:  [id number]   XR Pelvis Port 1/2 Views    Narrative    EXAM: XR PELVIS PORT 1/2 VIEWS  LOCATION: [facility]  DATE: 6/4/2024    INDICATION: Status post Hip surgery  COMPARISON: None available.      Impression    IMPRESSION: Left hip hemiarthroplasty. Negative for postoperative purposes.   CT Head w/o Contrast    Narrative    EXAM: CT HEAD W/O CONTRAST  LOCATION: [facility]  DATE: 6/7/2024    INDICATION: Confusion  COMPARISON: 3/21/2024  TECHNIQUE: Routine CT Head without IV contrast. Multiplanar reformats. Dose reduction techniques were used.    FINDINGS:  INTRACRANIAL CONTENTS: No intracranial hemorrhage, extraaxial collection, or mass effect.  Small chronic infarctions anterior aspect basal ganglia bilaterally. Moderate presumed chronic small vessel ischemic changes. Mild atrophy which is more prominent,   mild to moderate in frontal lobes bilaterally.     VISUALIZED ORBITS/SINUSES/MASTOIDS: No intraorbital abnormality. No paranasal sinus mucosal disease. No middle ear or mastoid effusion.    BONES/SOFT TISSUES: No acute abnormality.      Impression    IMPRESSION:  1.  No acute intracranial process.       Discharge Medications   Discharge Medication List as of 6/11/2024 12:57 PM        START taking these medications    Details   enoxaparin ANTICOAGULANT (LOVENOX) 40 MG/0.4ML syringe Inject 0.4 mLs (40 mg) Subcutaneous every 24 hours for 30  days, Transitional      oxyCODONE (ROXICODONE) 5 MG tablet Take 0.5-1 tablets (2.5-5 mg) by mouth every 4 hours as needed (IF pain not managed with non-pharmacological and non-opioid interventions) Take one half tablet for pain rated 1-6 (2.5 mg) on pain scale, and one tab (5 mg) for pain rated 7-10, Disp-15 tab let, R-0, Local Print      senna-docusate (SENOKOT-S/PERICOLACE) 8.6-50 MG tablet Take 1 tablet by mouth 2 times daily as needed for constipation, Disp-30 tablet, R-0, Transitional           CONTINUE these medications which have CHANGED    Details   acetaminophen (TYLENOL) 500 MG tablet Take 2 tablets (1,000 mg) by mouth 3 times daily, Disp-100 tablet, R-0, Transitional      QUEtiapine (SEROQUEL) 25 MG tablet Take 0.5 tablets (12.5 mg) by mouth nightly as needed (anxiety, insomnia), Transitional      vitamin D3 (CHOLECALCIFEROL) 50 mcg (2000 units) tablet Take 1 tablet (50 mcg) by mouth daily for 42 days, Disp-42 tablet, R-0, Transitional           CONTINUE these medications which have NOT CHANGED    Details   atorvastatin (LIPITOR) 40 MG tablet Take 1 tablet (40 mg) by mouth every evening, Disp-30 tablet, R-0, E-Prescribe      brimonidine (ALPHAGAN) 0.2 % ophthalmic solution Place 1 drop into the right eye 2 times daily, Historical      colestipol (COLESTID) 1 g tablet Take 1 g by mouth 2 times daily Take 4 hours before eating, ~0600, 1400, Historical      Cyanocobalamin (B-12 PO) Take 1 tablet by mouth 2 times daily, Historical      dorzolamide-timolol (COSOPT) 22.3-6.8 MG/ML ophthalmic solution Place 1 drop into both eyes 2 times daily, Historical      sertraline (ZOLOFT) 25 MG tablet Take 25 mg by mouth daily, Historical      UNKNOWN TO PATIENT Tylenol PM. 1 tab at bedtime. Dose unknown, Historical           Allergies   Allergies   Allergen Reactions    Pcn [Penicillins] Swelling     Patient reported swelling around mouth and hands

## 2024-10-06 ENCOUNTER — APPOINTMENT (OUTPATIENT)
Dept: CT IMAGING | Facility: CLINIC | Age: 89
End: 2024-10-06
Attending: EMERGENCY MEDICINE
Payer: COMMERCIAL

## 2024-10-06 ENCOUNTER — HOSPITAL ENCOUNTER (EMERGENCY)
Facility: CLINIC | Age: 89
Discharge: HOME | End: 2024-10-06
Attending: EMERGENCY MEDICINE | Admitting: EMERGENCY MEDICINE
Payer: COMMERCIAL

## 2024-10-06 VITALS
RESPIRATION RATE: 14 BRPM | HEIGHT: 63 IN | SYSTOLIC BLOOD PRESSURE: 170 MMHG | HEART RATE: 84 BPM | TEMPERATURE: 99.3 F | BODY MASS INDEX: 18.78 KG/M2 | DIASTOLIC BLOOD PRESSURE: 78 MMHG | WEIGHT: 106 LBS | OXYGEN SATURATION: 96 %

## 2024-10-06 DIAGNOSIS — N28.9 ACUTE RENAL INSUFFICIENCY: ICD-10-CM

## 2024-10-06 DIAGNOSIS — R29.898 LEFT LEG WEAKNESS: ICD-10-CM

## 2024-10-06 DIAGNOSIS — G81.94 LEFT HEMIPARESIS (H): ICD-10-CM

## 2024-10-06 DIAGNOSIS — R79.89 PRERENAL AZOTEMIA: ICD-10-CM

## 2024-10-06 DIAGNOSIS — E86.0 DEHYDRATION: ICD-10-CM

## 2024-10-06 DIAGNOSIS — R25.1 TREMOR: ICD-10-CM

## 2024-10-06 LAB
ALBUMIN SERPL BCG-MCNC: 4 G/DL (ref 3.5–5.2)
ALP SERPL-CCNC: 49 U/L (ref 40–150)
ALT SERPL W P-5'-P-CCNC: 13 U/L (ref 0–50)
ANION GAP SERPL CALCULATED.3IONS-SCNC: 9 MMOL/L (ref 7–15)
AST SERPL W P-5'-P-CCNC: 22 U/L (ref 0–45)
BASOPHILS # BLD AUTO: 0.1 10E3/UL (ref 0–0.2)
BASOPHILS NFR BLD AUTO: 1 %
BILIRUB SERPL-MCNC: 0.3 MG/DL
BUN SERPL-MCNC: 31.1 MG/DL (ref 8–23)
CALCIUM SERPL-MCNC: 9.3 MG/DL (ref 8.8–10.4)
CHLORIDE SERPL-SCNC: 100 MMOL/L (ref 98–107)
CREAT SERPL-MCNC: 1.09 MG/DL (ref 0.51–0.95)
EGFRCR SERPLBLD CKD-EPI 2021: 47 ML/MIN/1.73M2
EOSINOPHIL # BLD AUTO: 0.1 10E3/UL (ref 0–0.7)
EOSINOPHIL NFR BLD AUTO: 2 %
ERYTHROCYTE [DISTWIDTH] IN BLOOD BY AUTOMATED COUNT: 12.7 % (ref 10–15)
GLUCOSE SERPL-MCNC: 100 MG/DL (ref 70–99)
HCO3 SERPL-SCNC: 28 MMOL/L (ref 22–29)
HCT VFR BLD AUTO: 36.4 % (ref 35–47)
HGB BLD-MCNC: 11.5 G/DL (ref 11.7–15.7)
HOLD SPECIMEN: NORMAL
IMM GRANULOCYTES # BLD: 0 10E3/UL
IMM GRANULOCYTES NFR BLD: 0 %
LYMPHOCYTES # BLD AUTO: 1.9 10E3/UL (ref 0.8–5.3)
LYMPHOCYTES NFR BLD AUTO: 26 %
MCH RBC QN AUTO: 29.6 PG (ref 26.5–33)
MCHC RBC AUTO-ENTMCNC: 31.6 G/DL (ref 31.5–36.5)
MCV RBC AUTO: 94 FL (ref 78–100)
MONOCYTES # BLD AUTO: 0.5 10E3/UL (ref 0–1.3)
MONOCYTES NFR BLD AUTO: 7 %
NEUTROPHILS # BLD AUTO: 4.7 10E3/UL (ref 1.6–8.3)
NEUTROPHILS NFR BLD AUTO: 65 %
NRBC # BLD AUTO: 0 10E3/UL
NRBC BLD AUTO-RTO: 0 /100
PLATELET # BLD AUTO: 215 10E3/UL (ref 150–450)
POTASSIUM SERPL-SCNC: 4.7 MMOL/L (ref 3.4–5.3)
PROT SERPL-MCNC: 6.2 G/DL (ref 6.4–8.3)
RBC # BLD AUTO: 3.88 10E6/UL (ref 3.8–5.2)
SODIUM SERPL-SCNC: 137 MMOL/L (ref 135–145)
WBC # BLD AUTO: 7.3 10E3/UL (ref 4–11)

## 2024-10-06 PROCEDURE — 85025 COMPLETE CBC W/AUTO DIFF WBC: CPT | Performed by: EMERGENCY MEDICINE

## 2024-10-06 PROCEDURE — 82040 ASSAY OF SERUM ALBUMIN: CPT | Performed by: EMERGENCY MEDICINE

## 2024-10-06 PROCEDURE — 96360 HYDRATION IV INFUSION INIT: CPT

## 2024-10-06 PROCEDURE — 36415 COLL VENOUS BLD VENIPUNCTURE: CPT | Performed by: EMERGENCY MEDICINE

## 2024-10-06 PROCEDURE — 258N000003 HC RX IP 258 OP 636: Performed by: EMERGENCY MEDICINE

## 2024-10-06 PROCEDURE — 99284 EMERGENCY DEPT VISIT MOD MDM: CPT | Mod: 25

## 2024-10-06 PROCEDURE — 70450 CT HEAD/BRAIN W/O DYE: CPT

## 2024-10-06 RX ADMIN — SODIUM CHLORIDE 1000 ML: 9 INJECTION, SOLUTION INTRAVENOUS at 15:01

## 2024-10-06 ASSESSMENT — COLUMBIA-SUICIDE SEVERITY RATING SCALE - C-SSRS
2. HAVE YOU ACTUALLY HAD ANY THOUGHTS OF KILLING YOURSELF IN THE PAST MONTH?: NO
1. IN THE PAST MONTH, HAVE YOU WISHED YOU WERE DEAD OR WISHED YOU COULD GO TO SLEEP AND NOT WAKE UP?: NO
6. HAVE YOU EVER DONE ANYTHING, STARTED TO DO ANYTHING, OR PREPARED TO DO ANYTHING TO END YOUR LIFE?: NO

## 2024-10-06 ASSESSMENT — ACTIVITIES OF DAILY LIVING (ADL)
ADLS_ACUITY_SCORE: 40

## 2024-10-06 NOTE — DISCHARGE INSTRUCTIONS
Increase your level of hydration, you did have some dehydration today  Use great care when you are up using your walker as your left leg is significantly weak from your old stroke, we do not wish for you to fall as you could suffer a significant injury.  Continue with physical therapy to maintain your level of muscular function in the left leg  If you develop any uncontrollable seizure activity in the left lower extremity, you will need to see a neurologist to get an antiepileptic medication initiated.  I believe now your tremor simply relates to atrophy and weakness from your old stroke.

## 2024-10-06 NOTE — ED NOTES
Patient on the call light noted that she feels like she can go and was placed on a purewick. Patient will call when she has urinated.

## 2024-10-06 NOTE — ED NOTES
Bed: ED09  Expected date:   Expected time:   Means of arrival:   Comments:  [id number] F leg tremors 15 ETa

## 2024-10-06 NOTE — ED TRIAGE NOTES
Patient BIBA from [facility] for generalized weakness and new leg tremors. Patient is having a hard time walking. Patient reports having her Covid booster last week.     Triage Assessment (Adult)       Row Name 10/06/24 1322          Triage Assessment    Airway WDL WDL        Respiratory WDL    Respiratory WDL WDL        Skin Circulation/Temperature WDL    Skin Circulation/Temperature WDL WDL        Cardiac WDL    Cardiac WDL WDL        Peripheral/Neurovascular WDL    Peripheral Neurovascular WDL WDL        Cognitive/Neuro/Behavioral WDL    Cognitive/Neuro/Behavioral WDL WDL        [place] Coma Scale    Best Eye Response 4-->(E4) spontaneous     Best Motor Response 6-->(M6) obeys commands     Best Verbal Response 5-->(V5) oriented     Prashant Coma Scale Score 15

## 2024-10-06 NOTE — ED PROVIDER NOTES
Emergency Department Note      History of Present Illness     Chief Complaint   Generalized Weakness      HPI   Jamee Douglas is a 89 year old female with a history of lumbar radiculopathy, SAH, stroke, hyperlipidemia, and hypertension who presents to the ED via EMS from her assisted living facility with her daughter for evaluation of generalized weakness. The patient states she had a left hip fracture in June 2024 which required a partial hip replacement and a short term TCU stay with physical therapy. She feel she has been recovering well with a recent move into assisted living after the fracture. She had her COVID and flu booster vaccines about 8 days ago and became generally weak afterwards which has been worsening with time. Notes a left lower extremity with standing during this time as well. No tremors while resting. She uses a walker to ambulate independently at baseline but has been having difficulty doing so with these symptoms. Notes a history of stroke in 2020 with residual left-sided hemiplegia and mild dysphasia. Her daughter states she has been experiencing these left lower extremity tremors with dehydration and illness since the stroke. She is concerned for recurrent falls due to her symptoms.    Independent Historian   Daughter as detailed above.    Review of External Notes   I reviewed the discharge summary from 7/11/24 for a displacement left femoral neck fracture. She was noted to have anemia and underwent 1 unit of packed red blood cell fusion. She had delirium and COVID infection. It was noted she had a right frontal superior SAH in March 2024 from a fall. She also had a previous CVA with left sided hemiparesis.    Past Medical History     Medical History and Problem List   TIA  Glaucoma  HLD  Anemia  SAH  Ischemic right MCA stroke  HTN  OA  Prediabetes  Syncope  Lumbar radiculopathy  Left hemiplegia  Low back pain  Cataracts     Medications   Aspirin 81  "mg  Lipitor  Colestid  Cosopt  Oxycodone  Seroquel  Sertraline     Surgical History   Knee arthroplasty  Hysterectomy  ORIF, hip (L)  Unspecified shoulder surgery (R)  Glaucoma surgery   Unspecified back surgery    Physical Exam     Patient Vitals for the past 24 hrs:   BP Temp Temp src Pulse Resp SpO2 Height Weight   10/06/24 1315 104/64 99.3  F (37.4  C) Oral 78 12 99 % 1.6 m (5' 3\") 48.1 kg (106 lb)     Physical Exam  General: Resting on the ED bed  Head:  The scalp, face, and head appear normal  Eyes:  The pupils are equal, round, and reactive to light    There is no nystagmus    Extraocular muscles are intact    Conjunctivae and sclerae are normal  ENT:    The nose is normal    Pinnae are normal    The oropharynx is normal  Neck:  Normal range of motion    There is no rigidity noted  CV:  Regular rate  Normal underlying rhythm     Normal S1/S2    No pathological murmur detected  Resp:  Lungs are clear    There is no tachypnea    Non-labored    No rales    No wheezing   GI:  Abdomen is soft, there is no rigidity    No distension/tympani    No rebound tenderness     Non-surgical without peritoneal features at this time  MS:  Normal muscular tone    Symmetric motor strength    No major joint effusions    No asymmetric leg swelling    No calf tenderness  Skin:  No rash or acute skin lesions noted  Neuro:  Speech is normal and fluent, there is no aphasia    No motor deficits    Cranial nerves are intact  Patient has significant motor weakness in the LLE from prior stroke. The bilateral arms and right leg have normal strength. There is no tremor noted in the left leg currently, this only happens, reportedly, when she tries to stand on her left leg.   Psych: Awake. Alert.      Normal affect  Appropriate interactions    Diagnostics     Lab Results   Labs Ordered and Resulted from Time of ED Arrival to Time of ED Departure   COMPREHENSIVE METABOLIC PANEL - Abnormal       Result Value    Sodium 137      Potassium 4.7   "    Carbon Dioxide (CO2) 28      Anion Gap 9      Urea Nitrogen 31.1 (*)     Creatinine 1.09 (*)     GFR Estimate 47 (*)     Calcium 9.3      Chloride 100      Glucose 100 (*)     Alkaline Phosphatase 49      AST 22      ALT 13      Protein Total 6.2 (*)     Albumin 4.0      Bilirubin Total 0.3     CBC WITH PLATELETS AND DIFFERENTIAL - Abnormal    WBC Count 7.3      RBC Count 3.88      Hemoglobin 11.5 (*)     Hematocrit 36.4      MCV 94      MCH 29.6      MCHC 31.6      RDW 12.7      Platelet Count 215      % Neutrophils 65      % Lymphocytes 26      % Monocytes 7      % Eosinophils 2      % Basophils 1      % Immature Granulocytes 0      NRBCs per 100 WBC 0      Absolute Neutrophils 4.7      Absolute Lymphocytes 1.9      Absolute Monocytes 0.5      Absolute Eosinophils 0.1      Absolute Basophils 0.1      Absolute Immature Granulocytes 0.0      Absolute NRBCs 0.0     BLOOD CULTURE       Imaging   Head CT w/o contrast   Final Result   IMPRESSION:   1.  No CT evidence for acute intracranial process.   2.  Brain atrophy and presumed chronic microvascular ischemic changes as above.        Independent Interpretation   None    ED Course      Medications Administered   Medications - No data to display    Procedures   Procedures     Discussion of Management   None    ED Course   ED Course as of 10/06/24 1359   Sun Oct 06, 2024   1350 I obtained history and performed a physical exam as noted above.        Additional Documentation  None    Medical Decision Making / Diagnosis     CMS Diagnoses: None    MIPS       None    [facility]   Jamee Douglas is a 89 year old female presents the emergency department with mild general weakness.  The patient does not complain of significant headache, fever, chills, sweats, or other infectious complaints.  There is no abdominal pain.  She has no urinary symptoms.  The patient's chief concern was the fact that she develops some mild tremoring and fidgetiness in her left leg when she gets up  to walk.  She is status post an old ischemic stroke as well as a traumatic subarachnoid hemorrhage.  She has a known chronic left hemiparesis.  Although she suffered significant recovery of muscular function in the left arm, her left leg has been significantly more weak.  She still is receiving occupational therapy and physical therapy at the [facility] center.  The patient uses a walker at baseline.  She has had no other recent falls.  Workup was conducted in the emergency department which did not show any etiologies for new onset weakness.  There was no hyponatremia, hypokalemia, there was dehydration/acute renal insufficiency and the patient was hydrated with 1 L of crystalloid.  The patient underwent CT scan of the brain which shows no evidence of acute pathology.  Old lacunar infarcts are noted.  There is no bleed.  The patient's left lower extremity is noted to be chronically weak with approximately 3/5 strength in all muscles tested in the lower extremity compared to the contralateral normal right leg which has 5 out of 5 muscular strength.  This is not a new finding.  I suspect when she gets up to try to use the leg it is slightly atrophic and very weak and gets a bit more wobbly on her.  I noted that we will not be able to fix this.  Her physical therapist can continue to try to maintain the level of muscular function that she has.  There is no indication of focal motor seizure activity, is the only time this happens is when she is getting up to try to stand on the leg.  If she develops posterior stroke focal motor epilepsy in this leg, and antiepileptic agent and an EEG would be indicated.  No other life-threatening etiologies were detected.  The patient will be brought back to the care facility by her daughter.    Disposition   The patient was discharged.     Diagnosis     ICD-10-CM    1. Left leg weakness  R29.898       2. Left hemiparesis (H)  G81.94       3. Dehydration  E86.0       4. Acute renal  insufficiency  N28.9       5. Prerenal azotemia  R79.89       6. Tremor  R25.1              Scribe Disclosure:  I, Emma Ocampo, am serving as a scribe at 1:42 PM on 10/6/2024 to document services personally performed by Casimiro Noe MD based on my observations and the provider's statements to me.        Casimiro Noe MD  10/06/24 1673

## 2024-10-06 NOTE — ED NOTES
Checked on the patient and updated her and the family in the room that we need a urine sample. The patient notes that she is too weak to stand up to a commode and so a purewick was offered when she is able to go. The patient reports that she would like to ring us when she can go and that she does not feel like she can go right now.

## 2024-10-25 ENCOUNTER — HOSPITAL ENCOUNTER (EMERGENCY)
Facility: CLINIC | Age: 89
Discharge: HOME | End: 2024-10-25
Attending: EMERGENCY MEDICINE | Admitting: EMERGENCY MEDICINE
Payer: COMMERCIAL

## 2024-10-25 VITALS
TEMPERATURE: 98.5 F | OXYGEN SATURATION: 99 % | HEART RATE: 80 BPM | RESPIRATION RATE: 18 BRPM | DIASTOLIC BLOOD PRESSURE: 76 MMHG | SYSTOLIC BLOOD PRESSURE: 128 MMHG

## 2024-10-25 DIAGNOSIS — Z86.73 HISTORY OF STROKE: ICD-10-CM

## 2024-10-25 DIAGNOSIS — R29.898 LEFT LEG WEAKNESS: ICD-10-CM

## 2024-10-25 PROCEDURE — 99283 EMERGENCY DEPT VISIT LOW MDM: CPT

## 2024-10-25 ASSESSMENT — ACTIVITIES OF DAILY LIVING (ADL)
ADLS_ACUITY_SCORE: 0

## 2024-10-25 NOTE — ED NOTES
Report called to Vir[facility] nurse at facility and they were informed that patient is being discharged back to the facility.

## 2024-10-25 NOTE — DISCHARGE INSTRUCTIONS
The cause of your symptoms today is not clear, but fortunately, it seems very unlikely to represent any immediately dangerous process such as new stroke, bleeding in the brain, or other process needing further testing or changes in treatment at this time.    If you wish to have further studies done regarding your left leg, you may wish to see the [facility] to discuss nerve studies, etc.

## 2024-10-25 NOTE — ED NOTES
Patient stood and pivoted to commode with assist of daughter and tech.  Placed call light infront of her, and told her to hit the call light once she is done.

## 2024-10-25 NOTE — ED PROVIDER NOTES
Emergency Department Note      History of Present Illness     Chief Complaint:  L leg shakiness    HPI   Jamee Douglas is a 89 year old female who presents by EMS from her assisted living facility for evaluation of shakiness in her left leg that she experienced earlier today while walking to get food at her care facility.  She has a history of right MCA stroke, which left her with left-sided weakness including some baseline left leg weakness, this is not worse today.  She occasionally gets shakiness in her left leg, previously has been seen for this, it has not been thought to be due to a seizure.  Symptoms lasted about 1/2-hour and then resolved.  She feels back to baseline.  No new pain.  No new weakness.  Her left arm and right sided extremities had no symptoms today.  No headache.  No trauma.  No fevers.  No vomiting or urinary symptoms.  No other concerns.    Independent Historian: Daughter at bedside who states the patient has a history of anxiety.  She has episodes like this on occasion, they have been somewhat more frequent, she has not seen a neurologist recently.  She states that a prior physician suggest that she might benefit from peripheral nerve studies.  Daughter states that she struggles with anxiety.  Patient normally uses a rolling walker.    EMS, who reports that her blood sugar was 127.    Review of External Notes: I personally reviewed prior records including emergency department note from October 6 at which time she was seen under very similar circumstances, head CT was negative.  She previously had an ischemic stroke in 2020.    Past Medical History     Medical History and Problem List   Past Medical History:   Diagnosis Date    Glaucoma     Hyperlipidemia LDL goal < 130     LBP (low back pain)     Osteoarthritis      Medications   acetaminophen (TYLENOL) 500 MG tablet  atorvastatin (LIPITOR) 40 MG tablet  brimonidine (ALPHAGAN) 0.2 % ophthalmic solution  colestipol (COLESTID) 1 g  tablet  Cyanocobalamin (B-12 PO)  dorzolamide-timolol (COSOPT) 22.3-6.8 MG/ML ophthalmic solution  oxyCODONE (ROXICODONE) 5 MG tablet  QUEtiapine (SEROQUEL) 25 MG tablet  senna-docusate (SENOKOT-S/PERICOLACE) 8.6-50 MG tablet  sertraline (ZOLOFT) 25 MG tablet  UNKNOWN TO PATIENT    Surgical History   Past Surgical History:   Procedure Laterality Date    ARTHROPLASTY KNEE      HYSTERECTOMY      OPEN REDUCTION INTERNAL FIXATION HIP UNIPOLAR Left 6/4/2024    Procedure: LEFT HIP HEMIARTHROPLASTY;  Surgeon: Edis Danielle MD;  Location:  OR    SURGICAL HISTORY OF -       rights shoulder    SURGICAL HISTORY OF -       glaucoma surgery    SURGICAL HISTORY OF -       back surgery     Physical Exam     Patient Vitals for the past 24 hrs:   BP Temp Temp src Pulse Resp SpO2   10/25/24 1152 128/76 98.5  F (36.9  C) Oral 80 18 99 %     Physical Exam  General: Chronically ill but nontoxic-appearing woman recumbent in the gurney in room 10, daughter at bedside  HENT: mucous membranes moist  CV: rate as above, regular rhythm  Resp: normal effort, speaks in full phrases, no stridor, no cough observed  GI: abdomen soft and nontender, no guarding  MSK: no bony tenderness  Skin: appropriately warm and dry  Neuro: awake, alert, clear speech, fully oriented, face symmetric,  normal, 4/5 strength in LLE (baseline), 5/5 strength in other extremities, no nuchal rigidity, ambulatory with walker  Psych: slightly anxious, pleasant, cooperative    Diagnostics     ED Course      Medications Administered   Medications - No data to display    ED Course and Discussion of Management        Additional Documentation  None    Medical Decision Making / Diagnosis   Medical Decision Making:  Patient presents with a recurrent transient shakiness in her left leg.  She has a history of stroke which left her with weakness in this leg, weakness is not new.  Considered seizure but think this is quite unlikely.  She has had episodes like this on a number  of occasions over a long period of time.  I considered the possibility of new ischemic stroke, seizure, metabolic abnormality, infection and others.  No signs of acute vascular compromise such as arterial insufficiency or DVT.  Her left leg is appropriately warm and well-perfused.  I discussed with patient and her very pleasant and helpful daughter at bedside the possibility of performing further workup.  I explained that without an MRI, I cannot definitively rule out a new stroke though my clinical suspicion is very low.  We also discussed the possibility of blood counts and other workup that could be available here in the emergency department, though given that patient is back to baseline, her age of 89 years, her current ability to walk which was demonstrated here in the ER, and commendation with patient and daughter preference, we instead agreed upon a plan for her simply be discharged with outpatient follow-up.  They will consider seeing a neurologist to consider further workup or monitoring and treatment of the symptoms.  At this time patient is at her baseline.  She will be discharged back to her care facility.  She is welcome back for acute troubles at any hour.    Disposition   Discharged    Diagnosis     ICD-10-CM    1. Left leg weakness (baseline) R29.898     Shakiness (episodic, recurrent)      2. History of stroke  Z86.73          10/25/2024   MD Keren Ayala Jeffrey Alan, MD  10/25/24 1552

## 2024-10-25 NOTE — ED TRIAGE NOTES
Pt BIB EMS, from [facility]), with tremors and weakness in her left leg which was making it difficult to ambulate. Pt reports this is a chronic issue. At baseline, pt ambulates with a RW.     Pt has a hx of a left hip surgery from a previous fall.     .

## 2024-10-25 NOTE — ED NOTES
Bed: ED10  Expected date:   Expected time:   Means of arrival:   Comments:  [phone] F hip pain eta 1130

## 2024-11-21 ENCOUNTER — APPOINTMENT (OUTPATIENT)
Dept: CT IMAGING | Facility: CLINIC | Age: 89
End: 2024-11-21
Attending: STUDENT IN AN ORGANIZED HEALTH CARE EDUCATION/TRAINING PROGRAM
Payer: COMMERCIAL

## 2024-11-21 ENCOUNTER — HOSPITAL ENCOUNTER (EMERGENCY)
Facility: CLINIC | Age: 89
Discharge: HOME | End: 2024-11-21
Attending: STUDENT IN AN ORGANIZED HEALTH CARE EDUCATION/TRAINING PROGRAM
Payer: COMMERCIAL

## 2024-11-21 VITALS
DIASTOLIC BLOOD PRESSURE: 85 MMHG | TEMPERATURE: 97.1 F | HEART RATE: 84 BPM | SYSTOLIC BLOOD PRESSURE: 184 MMHG | OXYGEN SATURATION: 96 % | RESPIRATION RATE: 18 BRPM

## 2024-11-21 DIAGNOSIS — R11.0 NAUSEA: ICD-10-CM

## 2024-11-21 DIAGNOSIS — N39.0 ACUTE UTI: ICD-10-CM

## 2024-11-21 LAB
ALBUMIN SERPL BCG-MCNC: 3.6 G/DL (ref 3.5–5.2)
ALBUMIN UR-MCNC: NEGATIVE MG/DL
ALP SERPL-CCNC: 41 U/L (ref 40–150)
ALT SERPL W P-5'-P-CCNC: 16 U/L (ref 0–50)
ANION GAP SERPL CALCULATED.3IONS-SCNC: 13 MMOL/L (ref 7–15)
APPEARANCE UR: CLEAR
AST SERPL W P-5'-P-CCNC: 30 U/L (ref 0–45)
ATRIAL RATE - MUSE: 79 BPM
BASOPHILS # BLD AUTO: 0.1 10E3/UL (ref 0–0.2)
BASOPHILS NFR BLD AUTO: 1 %
BILIRUB SERPL-MCNC: 0.5 MG/DL
BILIRUB UR QL STRIP: NEGATIVE
BUN SERPL-MCNC: 21.1 MG/DL (ref 8–23)
CALCIUM SERPL-MCNC: 8.1 MG/DL (ref 8.8–10.4)
CHLORIDE SERPL-SCNC: 107 MMOL/L (ref 98–107)
COLOR UR AUTO: ABNORMAL
CREAT SERPL-MCNC: 0.91 MG/DL (ref 0.51–0.95)
DIASTOLIC BLOOD PRESSURE - MUSE: NORMAL MMHG
EGFRCR SERPLBLD CKD-EPI 2021: 59 ML/MIN/1.73M2
EOSINOPHIL # BLD AUTO: 0.1 10E3/UL (ref 0–0.7)
EOSINOPHIL NFR BLD AUTO: 1 %
ERYTHROCYTE [DISTWIDTH] IN BLOOD BY AUTOMATED COUNT: 13.1 % (ref 10–15)
FLUAV RNA SPEC QL NAA+PROBE: NEGATIVE
FLUBV RNA RESP QL NAA+PROBE: NEGATIVE
GLUCOSE SERPL-MCNC: 76 MG/DL (ref 70–99)
GLUCOSE UR STRIP-MCNC: NEGATIVE MG/DL
HCO3 SERPL-SCNC: 21 MMOL/L (ref 22–29)
HCT VFR BLD AUTO: 35.2 % (ref 35–47)
HGB BLD-MCNC: 11.3 G/DL (ref 11.7–15.7)
HGB UR QL STRIP: NEGATIVE
IMM GRANULOCYTES # BLD: 0 10E3/UL
IMM GRANULOCYTES NFR BLD: 0 %
INTERPRETATION ECG - MUSE: NORMAL
KETONES UR STRIP-MCNC: NEGATIVE MG/DL
LACTATE SERPL-SCNC: 0.7 MMOL/L (ref 0.7–2)
LEUKOCYTE ESTERASE UR QL STRIP: ABNORMAL
LYMPHOCYTES # BLD AUTO: 1.6 10E3/UL (ref 0.8–5.3)
LYMPHOCYTES NFR BLD AUTO: 24 %
MCH RBC QN AUTO: 29.7 PG (ref 26.5–33)
MCHC RBC AUTO-ENTMCNC: 32.1 G/DL (ref 31.5–36.5)
MCV RBC AUTO: 93 FL (ref 78–100)
MONOCYTES # BLD AUTO: 0.5 10E3/UL (ref 0–1.3)
MONOCYTES NFR BLD AUTO: 7 %
NEUTROPHILS # BLD AUTO: 4.5 10E3/UL (ref 1.6–8.3)
NEUTROPHILS NFR BLD AUTO: 68 %
NITRATE UR QL: NEGATIVE
NRBC # BLD AUTO: 0 10E3/UL
NRBC BLD AUTO-RTO: 0 /100
P AXIS - MUSE: 75 DEGREES
PH UR STRIP: 6.5 [PH] (ref 5–7)
PLATELET # BLD AUTO: 177 10E3/UL (ref 150–450)
POTASSIUM SERPL-SCNC: 4.1 MMOL/L (ref 3.4–5.3)
PR INTERVAL - MUSE: 156 MS
PROT SERPL-MCNC: 5.7 G/DL (ref 6.4–8.3)
QRS DURATION - MUSE: 106 MS
QT - MUSE: 396 MS
QTC - MUSE: 454 MS
R AXIS - MUSE: 68 DEGREES
RBC # BLD AUTO: 3.8 10E6/UL (ref 3.8–5.2)
RBC URINE: 3 /HPF
RSV RNA SPEC NAA+PROBE: NEGATIVE
SARS-COV-2 RNA RESP QL NAA+PROBE: NEGATIVE
SODIUM SERPL-SCNC: 141 MMOL/L (ref 135–145)
SP GR UR STRIP: 1.02 (ref 1–1.03)
SQUAMOUS EPITHELIAL: 2 /HPF
SYSTOLIC BLOOD PRESSURE - MUSE: NORMAL MMHG
T AXIS - MUSE: 33 DEGREES
TROPONIN T SERPL HS-MCNC: 32 NG/L
TROPONIN T SERPL HS-MCNC: 35 NG/L
UROBILINOGEN UR STRIP-MCNC: NORMAL MG/DL
VENTRICULAR RATE- MUSE: 79 BPM
WBC # BLD AUTO: 6.6 10E3/UL (ref 4–11)
WBC URINE: 27 /HPF

## 2024-11-21 PROCEDURE — 258N000003 HC RX IP 258 OP 636: Performed by: STUDENT IN AN ORGANIZED HEALTH CARE EDUCATION/TRAINING PROGRAM

## 2024-11-21 PROCEDURE — 36415 COLL VENOUS BLD VENIPUNCTURE: CPT | Performed by: STUDENT IN AN ORGANIZED HEALTH CARE EDUCATION/TRAINING PROGRAM

## 2024-11-21 PROCEDURE — 81001 URINALYSIS AUTO W/SCOPE: CPT | Performed by: STUDENT IN AN ORGANIZED HEALTH CARE EDUCATION/TRAINING PROGRAM

## 2024-11-21 PROCEDURE — 85025 COMPLETE CBC W/AUTO DIFF WBC: CPT | Performed by: STUDENT IN AN ORGANIZED HEALTH CARE EDUCATION/TRAINING PROGRAM

## 2024-11-21 PROCEDURE — 82040 ASSAY OF SERUM ALBUMIN: CPT | Performed by: STUDENT IN AN ORGANIZED HEALTH CARE EDUCATION/TRAINING PROGRAM

## 2024-11-21 PROCEDURE — 82247 BILIRUBIN TOTAL: CPT | Performed by: STUDENT IN AN ORGANIZED HEALTH CARE EDUCATION/TRAINING PROGRAM

## 2024-11-21 PROCEDURE — 83605 ASSAY OF LACTIC ACID: CPT | Performed by: STUDENT IN AN ORGANIZED HEALTH CARE EDUCATION/TRAINING PROGRAM

## 2024-11-21 PROCEDURE — 87076 CULTURE ANAEROBE IDENT EACH: CPT | Performed by: STUDENT IN AN ORGANIZED HEALTH CARE EDUCATION/TRAINING PROGRAM

## 2024-11-21 PROCEDURE — 80053 COMPREHEN METABOLIC PANEL: CPT | Performed by: STUDENT IN AN ORGANIZED HEALTH CARE EDUCATION/TRAINING PROGRAM

## 2024-11-21 PROCEDURE — 84484 ASSAY OF TROPONIN QUANT: CPT | Performed by: STUDENT IN AN ORGANIZED HEALTH CARE EDUCATION/TRAINING PROGRAM

## 2024-11-21 PROCEDURE — 96360 HYDRATION IV INFUSION INIT: CPT | Mod: 59

## 2024-11-21 PROCEDURE — 250N000011 HC RX IP 250 OP 636: Performed by: STUDENT IN AN ORGANIZED HEALTH CARE EDUCATION/TRAINING PROGRAM

## 2024-11-21 PROCEDURE — 74177 CT ABD & PELVIS W/CONTRAST: CPT

## 2024-11-21 PROCEDURE — 82435 ASSAY OF BLOOD CHLORIDE: CPT | Performed by: STUDENT IN AN ORGANIZED HEALTH CARE EDUCATION/TRAINING PROGRAM

## 2024-11-21 PROCEDURE — 99285 EMERGENCY DEPT VISIT HI MDM: CPT | Mod: 25

## 2024-11-21 PROCEDURE — 87637 SARSCOV2&INF A&B&RSV AMP PRB: CPT | Performed by: STUDENT IN AN ORGANIZED HEALTH CARE EDUCATION/TRAINING PROGRAM

## 2024-11-21 PROCEDURE — 82310 ASSAY OF CALCIUM: CPT | Performed by: STUDENT IN AN ORGANIZED HEALTH CARE EDUCATION/TRAINING PROGRAM

## 2024-11-21 PROCEDURE — 93005 ELECTROCARDIOGRAM TRACING: CPT

## 2024-11-21 PROCEDURE — 250N000009 HC RX 250: Performed by: STUDENT IN AN ORGANIZED HEALTH CARE EDUCATION/TRAINING PROGRAM

## 2024-11-21 PROCEDURE — 82374 ASSAY BLOOD CARBON DIOXIDE: CPT | Performed by: STUDENT IN AN ORGANIZED HEALTH CARE EDUCATION/TRAINING PROGRAM

## 2024-11-21 PROCEDURE — 87086 URINE CULTURE/COLONY COUNT: CPT | Performed by: STUDENT IN AN ORGANIZED HEALTH CARE EDUCATION/TRAINING PROGRAM

## 2024-11-21 RX ORDER — ONDANSETRON 4 MG/1
4 TABLET, ORALLY DISINTEGRATING ORAL EVERY 8 HOURS PRN
Qty: 8 TABLET | Refills: 0 | Status: SHIPPED | OUTPATIENT
Start: 2024-11-21 | End: 2024-11-24

## 2024-11-21 RX ORDER — IOPAMIDOL 755 MG/ML
53 INJECTION, SOLUTION INTRAVASCULAR ONCE
Status: COMPLETED | OUTPATIENT
Start: 2024-11-21 | End: 2024-11-21

## 2024-11-21 RX ORDER — CEPHALEXIN 500 MG/1
500 CAPSULE ORAL 3 TIMES DAILY
Qty: 21 CAPSULE | Refills: 0 | Status: SHIPPED | OUTPATIENT
Start: 2024-11-21 | End: 2024-11-28

## 2024-11-21 RX ORDER — CEFTRIAXONE 1 G/1
1 INJECTION, POWDER, FOR SOLUTION INTRAMUSCULAR; INTRAVENOUS ONCE
Status: COMPLETED | OUTPATIENT
Start: 2024-11-21 | End: 2024-11-21

## 2024-11-21 RX ADMIN — IOPAMIDOL 53 ML: 755 INJECTION, SOLUTION INTRAVENOUS at 17:50

## 2024-11-21 RX ADMIN — CEFTRIAXONE SODIUM 1 G: 1 INJECTION, POWDER, FOR SOLUTION INTRAMUSCULAR; INTRAVENOUS at 19:47

## 2024-11-21 RX ADMIN — SODIUM CHLORIDE 500 ML: 9 INJECTION, SOLUTION INTRAVENOUS at 17:20

## 2024-11-21 RX ADMIN — SODIUM CHLORIDE 60 ML: 9 INJECTION, SOLUTION INTRAVENOUS at 17:50

## 2024-11-21 ASSESSMENT — ACTIVITIES OF DAILY LIVING (ADL)
ADLS_ACUITY_SCORE: 0

## 2024-11-21 NOTE — ED TRIAGE NOTES
Pt BIBA from nursing home, independent living with nausea x 3 days, no vomiting. BSG 91, zofran x 8 mg

## 2024-11-21 NOTE — ED PROVIDER NOTES
Emergency Department Note      History of Present Illness     Chief Complaint   Nausea      HPI   Jamee Douglas is a 89 year old female who presents to the emergency department for 3 days of nausea.  She also endorses watery stools intermittently for the past few months.  In the past few days, she has had intermittent abdominal cramping.  She received Zofran in the ambulance and symptoms are resolved.  She denies current abdominal pain.  Denies dysuria, hematuria, or urinary frequency.  She reports she has been eating well, but her daughter reports she does not drink water very well.  She is frequently dehydrated.  Daughter is concerned about her blood pressure.  Earlier this week when she was at physical therapy, it was 80s over 40s however today at her living facility her systolic blood pressure was 198.  No fevers, chills, cough, vomiting, headache, vision changes, dizziness, chest pain, or shortness of breath.  She was started on a binding agent for diarrhea by her GI specialist. No other recent medication changes.    Daughter also endorses 5 to 7 pounds of weight loss this past month.  No personal history of cancer.  Denies heavy alcohol intake.  She used to be a smoker but this was many years ago.  Denies history of congestive heart failure or heart attack.    Independent Historian   Daughter as detailed above.    Review of External Notes   I reviewed emergency medicine note from 10/25/2024 when she was seen for shakiness in her leg.  She was also seen 10/6/2024 for similar symptoms.  Had negative head CT.  Previous ischemic stroke in 2020. I reviewed the patient's discharge summary by Ana Maria Sahu MD, from 6/11 after being admitted for a left femur neck closed fracture.     Past Medical History     Medical History and Problem List   Past Medical History:   Diagnosis Date    Glaucoma     Hyperlipidemia LDL goal < 130     LBP (low back pain)     Osteoarthritis      Medications   acetaminophen  (TYLENOL) 500 MG tablet  atorvastatin (LIPITOR) 40 MG tablet  brimonidine (ALPHAGAN) 0.2 % ophthalmic solution  colestipol (COLESTID) 1 g tablet  Cyanocobalamin (B-12 PO)  dorzolamide-timolol (COSOPT) 22.3-6.8 MG/ML ophthalmic solution  oxyCODONE (ROXICODONE) 5 MG tablet  QUEtiapine (SEROQUEL) 25 MG tablet  senna-docusate (SENOKOT-S/PERICOLACE) 8.6-50 MG tablet  sertraline (ZOLOFT) 25 MG tablet  UNKNOWN TO PATIENT        Surgical History   Past Surgical History:   Procedure Laterality Date    ARTHROPLASTY KNEE      HYSTERECTOMY      OPEN REDUCTION INTERNAL FIXATION HIP UNIPOLAR Left 6/4/2024    Procedure: LEFT HIP HEMIARTHROPLASTY;  Surgeon: Edis Danielle MD;  Location:  OR    SURGICAL HISTORY OF -       rights shoulder    SURGICAL HISTORY OF -       glaucoma surgery    SURGICAL HISTORY OF -       back surgery       Physical Exam     Patient Vitals for the past 24 hrs:   BP Temp Pulse Resp SpO2   11/21/24 2240 (!) 184/85 -- 84 -- --   11/21/24 1714 (!) 145/67 -- 80 18 96 %   11/21/24 1227 -- -- -- -- 96 %   11/21/24 1226 90/64 97.1  F (36.2  C) 82 16 92 %     Physical Exam  Vital signs and nursing notes reviewed.    General:  Alert and oriented, no acute distress. Resting on bed with daughter at bedside.   Skin: Skin is warm and dry.   HEENT:   Head: Normocephalic, atraumatic. Facial features symmetric.   Eyes: Conjunctiva pink, sclera white. EOMs grossly intact.   Ears: Auricles without lesion, erythema, or edema.   Nose: Symmetric with no discharge.  Mouth and throat: Lips are moist with no lesions or edema, tongue is dry.  Neck: Normal range of motion.   CV:  Heart RRR with no murmurs or extra heart sounds. 2+ radial and tibialis posterior pulses bilaterally. No peripheral edema.  Pulm/Chest: Chest wall expansion symmetric with no increased effort of breathing. Lungs clear and equal to auscultation bilaterally.   Abd: Abdomen is soft and nontender to palpation in all 4 quadrants with no guarding or rebound.    M/S: Moves all extremities spontaneously.  Psych: Normal mood and affect. Behavior is normal.     Diagnostics     Lab Results   Labs Ordered and Resulted from Time of ED Arrival to Time of ED Departure   COMPREHENSIVE METABOLIC PANEL - Abnormal       Result Value    Sodium 141      Potassium 4.1      Carbon Dioxide (CO2) 21 (*)     Anion Gap 13      Urea Nitrogen 21.1      Creatinine 0.91      GFR Estimate 59 (*)     Calcium 8.1 (*)     Chloride 107      Glucose 76      Alkaline Phosphatase 41      AST 30      ALT 16      Protein Total 5.7 (*)     Albumin 3.6      Bilirubin Total 0.5     ROUTINE UA WITH MICROSCOPIC REFLEX TO CULTURE - Abnormal    Color Urine Light Yellow      Appearance Urine Clear      Glucose Urine Negative      Bilirubin Urine Negative      Ketones Urine Negative      Specific Gravity Urine 1.022      Blood Urine Negative      pH Urine 6.5      Protein Albumin Urine Negative      Urobilinogen Urine Normal      Nitrite Urine Negative      Leukocyte Esterase Urine Large (*)     RBC Urine 3 (*)     WBC Urine 27 (*)     Squamous Epithelials Urine 2 (*)    TROPONIN T, HIGH SENSITIVITY - Abnormal    Troponin T, High Sensitivity 35 (*)    CBC WITH PLATELETS AND DIFFERENTIAL - Abnormal    WBC Count 6.6      RBC Count 3.80      Hemoglobin 11.3 (*)     Hematocrit 35.2      MCV 93      MCH 29.7      MCHC 32.1      RDW 13.1      Platelet Count 177      % Neutrophils 68      % Lymphocytes 24      % Monocytes 7      % Eosinophils 1      % Basophils 1      % Immature Granulocytes 0      NRBCs per 100 WBC 0      Absolute Neutrophils 4.5      Absolute Lymphocytes 1.6      Absolute Monocytes 0.5      Absolute Eosinophils 0.1      Absolute Basophils 0.1      Absolute Immature Granulocytes 0.0      Absolute NRBCs 0.0     TROPONIN T, HIGH SENSITIVITY - Abnormal    Troponin T, High Sensitivity 32 (*)    LACTIC ACID WHOLE BLOOD WITH 1X REPEAT IN 2 HR WHEN >2 - Normal    Lactic Acid, Initial 0.7     INFLUENZA A/B, RSV  AND SARS-COV2 PCR - Normal    Influenza A PCR Negative      Influenza B PCR Negative      RSV PCR Negative      SARS CoV2 PCR Negative     TROPONIN T, HIGH SENSITIVITY   URINE CULTURE     Imaging   CT Abdomen Pelvis w Contrast   Final Result   IMPRESSION:    1.  No etiology for symptoms evident. Nothing obstructive or inflammatory involving bowel.        EKG   ECG results from 11/21/24   EKG 12 lead     Value    Systolic Blood Pressure     Diastolic Blood Pressure     Ventricular Rate 79    Atrial Rate 79    CA Interval 156    QRS Duration 106        QTc 454    P Axis 75    R AXIS 68    T Axis 33    Interpretation ECG      Sinus rhythm  Possible Left atrial enlargement  Left ventricular hypertrophy ( Sokolow-Salas , Fieldon product , Romhilt-Hansen )  T wave abnormality, consider inferior ischemia  Abnormal ECG  When compared with ECG of 05-Jun-2024 07:00,  No significant change was found  Confirmed by GENERATED REPORT, COMPUTER ([id number]),  Anshul Wills ([id number]) on 11/21/2024 7:46:59 PM     Similar to previous 6/5/2024    Independent Interpretation   None    ED Course      Medications Administered   Medications   sodium chloride 0.9% BOLUS 500 mL (has no administration in time range)     Procedures   Procedures     Discussion of Management   None    ED Course   ED Course as of 11/21/24 2241   Thu Nov 21, 2024   1506 I initially assessed the patient and obtained the above history and physical exam.     1923 I reassessed the patient and updated them on results and plan of care.        Additional Documentation  None    Medical Decision Making / Diagnosis     CMS Diagnoses: None    MIPS       None    MDM   Jamee Douglas is a 89 year old female who presents to the emergency department for 3 days of nausea.  See HPI.  Vitals are stable here in the ED.  Her daughter reports low blood pressure earlier in the week during physical therapy and high blood pressure this morning. On exam, she is well-appearing and  nontoxic.  Her workup is notable for urinary tract infection.  Her abdominal symptoms are entirely resolved with antiemetic provided by EMS.  She has not had any vomiting.  She does have loose stools but this is baseline for her.  Out of an abundance of caution, CT abdomen and pelvis was obtained which is fortunately negative for any acute etiology including no kidney stone, hydronephrosis, bowel obstruction or inflammatory change, etc.  Labs are reassuring including CBC without leukocytosis.  Anemia is stable and she has not had melena or blood in her stools.  CMP without significant electrolyte, metabolic, renal, or hepatic abnormalities.  COVID, flu, RSV testing is negative.  No lactic acidosis or sepsis.  Her urine does reveal a urinary tract infection.  First dose of ceftriaxone provided here in the ED.  Urine culture is pending.  She will discharge home with Keflex.  ACS is considered.  EKG is without ischemic changes compared to previous.  2 serial troponins are flat at 35 and 32.  She is not having exertional symptoms.  She has no history of coronary artery disease or CHF.  She is not having chest pain, shortness of breath, or other anginal symptoms.  Very low suspicion for acute myocardial injury at this time.  This was reviewed with the patient and her daughter.  Stress test or further cardiac workup in the emergent setting.  Using reasonable clinical judgment, patient is safe for discharge home back to her facility.  They will follow-up with her primary care provider for recheck of labs and urine symptoms.  They will return to the ED for fevers, persistent vomiting or inability to tolerate oral intake, worsening abdominal pain, chest pain or shortness of breath, or any further emergent concerns.  Antibiotic sent for home as well as antiemetic.  Patient and daughter agreeable to plan and had questions answered.    Disposition   The patient was discharged.     Diagnosis     ICD-10-CM    1. Acute UTI  N39.0        2. Nausea  R11.0          Discharge Medications   New Prescriptions    CEPHALEXIN (KEFLEX) 500 MG CAPSULE    Take 1 capsule (500 mg) by mouth 3 times daily for 7 days.    ONDANSETRON (ZOFRAN ODT) 4 MG ODT TAB    Take 1 tablet (4 mg) by mouth every 8 hours as needed for nausea or vomiting.     Scribe Disclosure:  I, Reed Bang, am serving as a scribe at 3:20 PM on 11/21/2024 to document services personally performed by Meena Edward PA-C, based on my observations and the provider's statements to me.     Meena Edward PA-C on 11/21/2024 at 10:49 PM         Meena Edward PA-C  11/21/24 [id number]

## 2024-11-22 NOTE — DISCHARGE INSTRUCTIONS
Take antibiotic as prescribed for urinary tract infection.  Antinausea medicine also sent to your pharmacy. Urine culture is pending and you will be contacted if a change in antibiotic is indicated  Follow-up with your primary care provider for recheck of your symptoms and labs  Return to the ED if you develop chest pain or shortness of breath, high fevers, persistent vomiting or inability to tolerate oral intake, or any further emergent concerns.  Discharge Instructions  Urinary Tract Infection  You or your child have been diagnosed with a urinary tract infection, or UTI. The urinary tract includes the kidneys (which make urine/pee), ureters (the tubes that carry urine/pee from the kidneys to the bladder), the bladder (which stores urine/pee), and urethra (the tube that carries urine/pee out of the bladder). Urinary tract infections occur when bacteria travel up the urethra into the bladder (bladder infection) and, in some cases, from there into the kidneys (kidney infection).  Generally, every Emergency Department visit should have a follow-up clinic visit with either a primary or a specialty clinic/provider. Please follow-up as instructed by your emergency provider today.  Return to the Emergency Department if:  You or your child have severe back pain.  You or your child are vomiting (throwing up) so that you cannot take your medicine.  You or your child have a new fever (had not previously had a fever) over 101 F.  You or your child have confusion or are very weak, or feel very ill.  Your child seems much more ill, will not wake up, will not respond right, or is crying for a long time and will not calm down.  You or your child are showing signs of dehydration. These signs may include decreased urination (pee), dry mouth/gums/tongue, or decreased activity.    Follow-up with your provider:   Children under 24 months need to be seen by their regular provider within one week after a diagnosis of a UTI. It may be  necessary to do some more tests to look at the child s kidney or bladder.  You should begin to feel better within 24 - 48 hours of starting your antibiotic; follow-up with your regular clinic/doctor/provider if this is not the case.    Treatment:   You will be treated with an antibiotic to kill the bacteria. We have to make an educated guess, based on what we know about common bacteria and antibiotics, as to which antibiotic will work for your infection. We will be correct most times but there will be some cases where the antibiotic chosen is not correct (see urine cultures below).  Take a pain medication such as acetaminophen (Tylenol ) or ibuprofen (Advil , Motrin , Nuprin ).  Phenazopyridine (Pyridium , Uristat ) is a prescription medication that numbs the bladder to reduce the burning pain of some UTIs.  The same medication is available in a non-prescription version (Azo-Standard , Urodol ). This medication will change the color of the urine and tears (usually blue or orange). If you wear contacts, do not wear them while taking this medication as they may be stained by the medication.    Urine Cultures:  If indicated, a urine culture may have been performed today. This test generally takes 24-48 hours to complete so the results are not known at this time. The results can confirm that an infection is present but also determine which antibiotic is effective for the specific bacteria that is causing the infection. If your urine culture shows that the antibiotic you were given today will not work to treat your infection, we will attempt to contact you to make arrangements to change the antibiotic. If the culture confirms that the antibiotic is effective for your infection, you will not be contacted. We often recommend follow-up with your regular physician/provider on the culture results regardless of this process.    Antibiotic Warning:   If you have been placed on antibiotics - watch for signs of allergic reaction.  " These include rash, lip swelling, difficulty breathing, wheezing, and dizziness.  If you develop any of these symptoms, stop the antibiotic immediately and go to an emergency room or urgent care for evaluation.    Probiotics: If you have been given an antibiotic, you may want to also take a probiotic pill or eat yogurt with live cultures. Probiotics have \"good bacteria\" to help your intestines stay healthy. Studies have shown that probiotics help prevent diarrhea and other intestine problems (including C. diff infection) when you take antibiotics. You can buy these without a prescription in the pharmacy section of the store.   If you were given a prescription for medicine here today, be sure to read all of the information (including the package insert) that comes with your prescription.  This will include important information about the medicine, its side effects, and any warnings that you need to know about.  The pharmacist who fills the prescription can provide more information and answer questions you may have about the medicine.  If you have questions or concerns that the pharmacist cannot address, please call or return to the Emergency Department.   Remember that you can always come back to the Emergency Department if you are not able to see your regular provider in the amount of time listed above, if you get any new symptoms, or if there is anything that worries you.    "

## 2024-11-23 ENCOUNTER — TELEPHONE (OUTPATIENT)
Dept: NURSING | Facility: CLINIC | Age: 89
End: 2024-11-23
Payer: COMMERCIAL

## 2024-11-23 NOTE — TELEPHONE ENCOUNTER
[facility]    Reason for call: Lab Result Notification     Lab Result (including Rx patient on, if applicable).  If culture, copy of lab report at bottom.  Lab Result: Urine Culture - See Below    ED Rx: cephALEXin (KEFLEX) 500 MG capsule - Take 1 capsule (500 mg) by mouth 3 times daily for 7 days (NOT TESTED)    Per Nehemiah:     Creatinine Level (mg/dl)   Creatinine   Date Value Ref Range Status   11/21/2024 0.91 0.51 - 0.95 mg/dL Final   02/22/2021 0.74 0.52 - 1.04 mg/dL Final    Creatinine clearance (ml/min), if applicable    Serum creatinine: 0.91 mg/dL 11/21/24 1609  Estimated creatinine clearance: 30 mL/min    ED Symptoms:  Presented to the ED with 3 days of nausea, watery stools, and abdominal cramping.      Current Symptoms: Unable to assess.       RN Recommendations/Instructions per [place] ED lab result protocol:   [facility] ED lab result protocol utilized: Urine Culture  Continue antibiotic/medication as prescribed: Yes - Cephalexin pending patient assessment.     Unable to reach patient/caregiver.     Left voicemail message requesting a call back to [phone] between 9 a.m. and 5:30 p.m. for patient's ED/UC lab results.      Letter pended to be sent via [employer] mail.        JONATHAN MARIE RN

## 2024-11-26 NOTE — TELEPHONE ENCOUNTER
[facility]    Reason for call: Lab Result Notification     Lab Result (including Rx patient on, if applicable).  If culture, copy of lab report at bottom.  Lab Result: Final Urine Culture Report on 11/23/24  [facility] Emergency Dept discharge antibiotic prescribed: Cephalexin (Keflex) 500 mg capsule, 1 capsule (500 mg) by mouth 3 times daily for 7 days.   Bacterial Growth: 50,000-100,000 CFU/mL [facility] carrero     Patient's current Symptoms:   No uti sx's.  Is feeling much better  Has appt tomorrow with her PCP    RN Recommendations/Instructions per [place] ED lab result protocol:   [facility] ED lab result protocol utilized: urine culture  RN contacted [facility] and requested susceptibility to Cefazolin/Oxacillin    Patient/care giver notified to contact your PCP clinic or return to the Emergency department if your:  Symptoms worsen or other concerning symptoms.        Romero Wells RN

## 2024-11-27 LAB
BACTERIA UR CULT: ABNORMAL

## 2024-11-30 LAB
BACTERIA UR CULT: ABNORMAL
BACTERIA UR CULT: ABNORMAL

## 2025-01-10 ENCOUNTER — HOSPITAL ENCOUNTER (EMERGENCY)
Facility: CLINIC | Age: OVER 89
Discharge: HOME | End: 2025-01-10
Attending: STUDENT IN AN ORGANIZED HEALTH CARE EDUCATION/TRAINING PROGRAM | Admitting: STUDENT IN AN ORGANIZED HEALTH CARE EDUCATION/TRAINING PROGRAM
Payer: COMMERCIAL

## 2025-01-10 ENCOUNTER — APPOINTMENT (OUTPATIENT)
Dept: GENERAL RADIOLOGY | Facility: CLINIC | Age: OVER 89
End: 2025-01-10
Attending: STUDENT IN AN ORGANIZED HEALTH CARE EDUCATION/TRAINING PROGRAM
Payer: COMMERCIAL

## 2025-01-10 ENCOUNTER — APPOINTMENT (OUTPATIENT)
Dept: CT IMAGING | Facility: CLINIC | Age: OVER 89
End: 2025-01-10
Attending: STUDENT IN AN ORGANIZED HEALTH CARE EDUCATION/TRAINING PROGRAM
Payer: COMMERCIAL

## 2025-01-10 VITALS
WEIGHT: 150.2 LBS | TEMPERATURE: 97.4 F | BODY MASS INDEX: 26.61 KG/M2 | OXYGEN SATURATION: 98 % | RESPIRATION RATE: 14 BRPM | SYSTOLIC BLOOD PRESSURE: 178 MMHG | HEIGHT: 63 IN | DIASTOLIC BLOOD PRESSURE: 91 MMHG | HEART RATE: 110 BPM
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
## 2025-01-10 DIAGNOSIS — M53.3 PAIN IN THE COCCYX: ICD-10-CM

## 2025-01-10 DIAGNOSIS — D72.829 LEUKOCYTOSIS, UNSPECIFIED TYPE: ICD-10-CM

## 2025-01-10 DIAGNOSIS — N18.31 STAGE 3A CHRONIC KIDNEY DISEASE (H): ICD-10-CM

## 2025-01-10 LAB
ALBUMIN SERPL BCG-MCNC: 4.6 G/DL (ref 3.5–5.2)
ALP SERPL-CCNC: 78 U/L (ref 40–150)
ALT SERPL W P-5'-P-CCNC: 23 U/L (ref 0–50)
ANION GAP SERPL CALCULATED.3IONS-SCNC: 15 MMOL/L (ref 7–15)
AST SERPL W P-5'-P-CCNC: 33 U/L (ref 0–45)
ATRIAL RATE - MUSE: 81 BPM
BASOPHILS # BLD AUTO: 0.1 10E3/UL (ref 0–0.2)
BASOPHILS NFR BLD AUTO: 1 %
BILIRUB SERPL-MCNC: 0.5 MG/DL
BUN SERPL-MCNC: 28.5 MG/DL (ref 8–23)
CALCIUM SERPL-MCNC: 9.9 MG/DL (ref 8.8–10.4)
CHLORIDE SERPL-SCNC: 100 MMOL/L (ref 98–107)
CREAT SERPL-MCNC: 1.07 MG/DL (ref 0.51–0.95)
DIASTOLIC BLOOD PRESSURE - MUSE: NORMAL MMHG
EGFRCR SERPLBLD CKD-EPI 2021: 48 ML/MIN/1.73M2
EOSINOPHIL # BLD AUTO: 0 10E3/UL (ref 0–0.7)
EOSINOPHIL NFR BLD AUTO: 0 %
ERYTHROCYTE [DISTWIDTH] IN BLOOD BY AUTOMATED COUNT: 12.6 % (ref 10–15)
GLUCOSE SERPL-MCNC: 107 MG/DL (ref 70–99)
HCO3 SERPL-SCNC: 25 MMOL/L (ref 22–29)
HCT VFR BLD AUTO: 41.1 % (ref 35–47)
HGB BLD-MCNC: 13.3 G/DL (ref 11.7–15.7)
IMM GRANULOCYTES # BLD: 0 10E3/UL
IMM GRANULOCYTES NFR BLD: 0 %
INTERPRETATION ECG - MUSE: NORMAL
LYMPHOCYTES # BLD AUTO: 1.1 10E3/UL (ref 0.8–5.3)
LYMPHOCYTES NFR BLD AUTO: 9 %
MCH RBC QN AUTO: 30.5 PG (ref 26.5–33)
MCHC RBC AUTO-ENTMCNC: 32.4 G/DL (ref 31.5–36.5)
MCV RBC AUTO: 94 FL (ref 78–100)
MONOCYTES # BLD AUTO: 0.7 10E3/UL (ref 0–1.3)
MONOCYTES NFR BLD AUTO: 6 %
NEUTROPHILS # BLD AUTO: 10.1 10E3/UL (ref 1.6–8.3)
NEUTROPHILS NFR BLD AUTO: 84 %
NRBC # BLD AUTO: 0 10E3/UL
NRBC BLD AUTO-RTO: 0 /100
P AXIS - MUSE: 83 DEGREES
PLATELET # BLD AUTO: 218 10E3/UL (ref 150–450)
POTASSIUM SERPL-SCNC: 4 MMOL/L (ref 3.4–5.3)
PR INTERVAL - MUSE: 136 MS
PROT SERPL-MCNC: 7.8 G/DL (ref 6.4–8.3)
QRS DURATION - MUSE: 84 MS
QT - MUSE: 402 MS
QTC - MUSE: 466 MS
R AXIS - MUSE: 74 DEGREES
RBC # BLD AUTO: 4.36 10E6/UL (ref 3.8–5.2)
SODIUM SERPL-SCNC: 140 MMOL/L (ref 135–145)
SYSTOLIC BLOOD PRESSURE - MUSE: NORMAL MMHG
T AXIS - MUSE: -26 DEGREES
VENTRICULAR RATE- MUSE: 81 BPM
WBC # BLD AUTO: 12.1 10E3/UL (ref 4–11)

## 2025-01-10 PROCEDURE — 72170 X-RAY EXAM OF PELVIS: CPT

## 2025-01-10 PROCEDURE — 85014 HEMATOCRIT: CPT | Performed by: STUDENT IN AN ORGANIZED HEALTH CARE EDUCATION/TRAINING PROGRAM

## 2025-01-10 PROCEDURE — 99285 EMERGENCY DEPT VISIT HI MDM: CPT | Mod: 25 | Performed by: STUDENT IN AN ORGANIZED HEALTH CARE EDUCATION/TRAINING PROGRAM

## 2025-01-10 PROCEDURE — 82310 ASSAY OF CALCIUM: CPT | Performed by: STUDENT IN AN ORGANIZED HEALTH CARE EDUCATION/TRAINING PROGRAM

## 2025-01-10 PROCEDURE — 80053 COMPREHEN METABOLIC PANEL: CPT | Performed by: STUDENT IN AN ORGANIZED HEALTH CARE EDUCATION/TRAINING PROGRAM

## 2025-01-10 PROCEDURE — 85004 AUTOMATED DIFF WBC COUNT: CPT | Performed by: STUDENT IN AN ORGANIZED HEALTH CARE EDUCATION/TRAINING PROGRAM

## 2025-01-10 PROCEDURE — 70450 CT HEAD/BRAIN W/O DYE: CPT

## 2025-01-10 PROCEDURE — 93005 ELECTROCARDIOGRAM TRACING: CPT | Performed by: STUDENT IN AN ORGANIZED HEALTH CARE EDUCATION/TRAINING PROGRAM

## 2025-01-10 PROCEDURE — 72220 X-RAY EXAM SACRUM TAILBONE: CPT

## 2025-01-10 PROCEDURE — 36415 COLL VENOUS BLD VENIPUNCTURE: CPT | Performed by: STUDENT IN AN ORGANIZED HEALTH CARE EDUCATION/TRAINING PROGRAM

## 2025-01-10 ASSESSMENT — ACTIVITIES OF DAILY LIVING (ADL)
ADLS_ACUITY_SCORE: 66

## 2025-01-10 NOTE — ED TRIAGE NOTES
Patient BIBA from her facility has a hx of falls and had an unwitnessed fall this morning. Patient denies LOC and blood thinners. For EMS patient denies any pain,  and VSS.     Triage Assessment (Adult)       Row Name 01/10/25 0929          Triage Assessment    Airway WDL WDL        Respiratory WDL    Respiratory WDL WDL        Skin Circulation/Temperature WDL    Skin Circulation/Temperature WDL WDL        Cardiac WDL    Cardiac WDL WDL        Peripheral/Neurovascular WDL    Peripheral Neurovascular WDL WDL        Cognitive/Neuro/Behavioral WDL    Cognitive/Neuro/Behavioral WDL WDL

## 2025-01-10 NOTE — ED PROVIDER NOTES
"  Emergency Department Note      History of Present Illness     Chief Complaint   Fall    HPI   Jamee Douglas is a very pleasant 89 year old female with history of TIA, hyperlipidemia, hypertension, history of subarachnoid hemorrhage, left hemiplegia and recent multiple falls presenting from assisted living facility for evaluation of a fall. She states that she woke up in her usual state of health and went to make breakfast when she took a sudden, unwitnessed fall earlier today. She did not remember exact events leading up to a fall. She denies any chest pain, shortness of breath, lightheadedness, dizziness or palpitations before the fall. Unsure of head trauma. She otherwise denies any laceration or new recent bruises. She has been endorsing tailbone pain since the fall. She has been able to walk at baseline after the fall. Jamee's daughter reports history of recent multiple falls. She lives at an assisted living facility however manages her medications independently. She has been using stand up walker at baseline.     Independent Historian   Daughter as detailed above.    Review of External Notes   I personally reviewed notes from the patient's progress note dated  12/19/2024 . This provided me with information regarding patient's baseline medical problems.     I personally reviewed the patient's chart, including available medication list and available past medical history, past surgical history, family history, and social history.    Physical Exam     Patient Vitals for the past 24 hrs:   BP Temp Temp src Pulse Resp SpO2 Height Weight   01/10/25 0939 (!) 178/91 97.4  F (36.3  C) Oral 110 14 98 % 1.6 m (5' 3\") 68.1 kg (150 lb 3.2 oz)     Physical Exam  Vitals and nursing note reviewed.   Constitutional:       Appearance: She is underweight.   HENT:      Head: Atraumatic.      Comments: No Cleaning sign or raccoon eyes.  Eyes:      Extraocular Movements: Extraocular movements intact.      Pupils: Pupils are " equal, round, and reactive to light.   Cardiovascular:      Rate and Rhythm: Regular rhythm. Tachycardia present.   Pulmonary:      Effort: Pulmonary effort is normal.      Breath sounds: Normal breath sounds.   Abdominal:      Palpations: Abdomen is soft.      Tenderness: There is no abdominal tenderness.   Musculoskeletal:         General: Tenderness (Mild tenderness over the coccyx and left hip.  Otherwise no tenderness palpation throughout extremities or chest wall or back.) present. No swelling, deformity or signs of injury. Normal range of motion.      Cervical back: Normal range of motion. No tenderness.   Skin:     General: Skin is warm and dry.   Neurological:      General: No focal deficit present.      Mental Status: She is alert and oriented to person, place, and time.      Sensory: No sensory deficit.      Motor: No weakness.      Comments: Hard of hearing         Diagnostics     Lab Results   Labs Ordered and Resulted from Time of ED Arrival to Time of ED Departure   COMPREHENSIVE METABOLIC PANEL - Abnormal       Result Value    Sodium 140      Potassium 4.0      Carbon Dioxide (CO2) 25      Anion Gap 15      Urea Nitrogen 28.5 (*)     Creatinine 1.07 (*)     GFR Estimate 48 (*)     Calcium 9.9      Chloride 100      Glucose 107 (*)     Alkaline Phosphatase 78      AST 33      ALT 23      Protein Total 7.8      Albumin 4.6      Bilirubin Total 0.5     CBC WITH PLATELETS AND DIFFERENTIAL - Abnormal    WBC Count 12.1 (*)     RBC Count 4.36      Hemoglobin 13.3      Hematocrit 41.1      MCV 94      MCH 30.5      MCHC 32.4      RDW 12.6      Platelet Count 218      % Neutrophils 84      % Lymphocytes 9      % Monocytes 6      % Eosinophils 0      % Basophils 1      % Immature Granulocytes 0      NRBCs per 100 WBC 0      Absolute Neutrophils 10.1 (*)     Absolute Lymphocytes 1.1      Absolute Monocytes 0.7      Absolute Eosinophils 0.0      Absolute Basophils 0.1      Absolute Immature Granulocytes 0.0       Absolute NRBCs 0.0         Imaging   XR Sacrum and Coccyx 2 Views   Final Result   IMPRESSION: No acute displaced fracture. If there is high clinical concern for occult fracture, consider MRI which is more sensitive. Evaluation of sacrum is limited secondary to overlying bowel gas. Normal alignment of the right hip with mild joint    space narrowing. Left hip hemiarthroplasty without evidence of loosening or periprosthetic fracture. Diffuse osseous demineralization. Degenerative changes of the lower lumbar spine with spinal fixation hardware.      XR Pelvis 1/2 Views   Final Result   IMPRESSION: No acute displaced fracture. If there is high clinical concern for occult fracture, consider MRI which is more sensitive. Evaluation of sacrum is limited secondary to overlying bowel gas. Normal alignment of the right hip with mild joint    space narrowing. Left hip hemiarthroplasty without evidence of loosening or periprosthetic fracture. Diffuse osseous demineralization. Degenerative changes of the lower lumbar spine with spinal fixation hardware.      CT Head w/o Contrast   Final Result   IMPRESSION:   1.  No CT evidence for acute intracranial process.   2.  Brain atrophy and presumed chronic microvascular ischemic changes as above.          EKG   ECG results from 01/10/25   EKG 12-lead, tracing only     Value    Systolic Blood Pressure     Diastolic Blood Pressure     Ventricular Rate 81    Atrial Rate 81    KS Interval 136    QRS Duration 84        QTc 466    P Axis 83    R AXIS 74    T Axis -26    Interpretation ECG      Sinus rhythm  Minimal voltage criteria for LVH  T wave inversions in inferior leads  No significant change was found compared with ECG of 21-Nov-2024 19:25,  Interepreted by me at 1205         Independent Interpretation - See ED Course Below    ED Course      Medications Administered   Medications - No data to display    Procedures   Procedures   None performed    Discussion of Management - See  ED Course Below    ED Course   Independent Interpretation / Discussion of Management / Repeat Assessments  ED Course as of 01/10/25 1711   Fri Fabricio 10, 2025   1122 I obtained the history and examined the patient as above.    1209 XR Pelvis 1/2 Views  Independent interpretation: Reassuring against fracture   1210 CT Head w/o Contrast  Independent interpretation: Reassuring against intracranial hemorrhage   1210 XR Sacrum and Coccyx 2 Views  Independent interpretation: No evidence of sacral fracture       Additional Documentation  None    Medical Decision Making / Diagnosis     CMS Diagnoses: None    MIPS       None    MDM   Patient presenting with fall.  Vital signs notable for tachycardia and elevated blood pressure but otherwise reassuring.  Tachycardia resolved by the time of my assessment.  Patient does not recall the fall.  Uncertain etiology.  Given patient's age, did obtain head CT to rule out intracranial hemorrhage.  This was reassuring.  I obtained x-rays of the patient's pelvis and sacrum given pain around the left hip and tailbone.  These showed no acute fracture.  Patient has been ambulatory since the fall.  Do not feel that advanced imaging with CT or MRI is indicated at this time.  Given uncertain etiology, I obtained an EKG to evaluate for cardiac arrhythmia as cause of syncope.  This was reassuring.  I also obtained screening labs.  These showed renal function at recent baseline, mild leukocytosis.  Leukocytosis possibly secondary to stress demargination in the setting of pain.  Also considered occult infection.  Patient does not currently have any infectious symptoms, however.  I did attempt to obtain a urine but after numerous hours, the patient was unable to produce a sample for us.  She was able to drink fluids.  Do not feel she has any urinary outflow obstruction, probably just dehydrated.  Ultimately, the patient and her daughter wish to leave.  I felt this was reasonable.  I recommended they  follow-up closely with primary care clinician for reevaluation and return to the emergency department should she develop any urinary symptoms, have recurrent falls, or any additional concerns.    Disposition   The patient was discharged.     Diagnosis     ICD-10-CM    1. Fall at home, initial encounter  W19.XXXA     Y92.009       2. Pain in the coccyx  M53.3       3. Stage 3a chronic kidney disease (H)  N18.31       4. Leukocytosis, unspecified type  D72.829            Discharge Medications   Discharge Medication List as of 1/10/2025  3:07 PM        Scribe Disclosure:  Madhavi OLIVARES, am serving as a scribe at 11:25 AM on 1/10/2025 to document services personally performed by Viral Driscoll MD based on my observations and the provider's statements to me.      Viral Driscoll MD  01/10/25 3708

## (undated) DEVICE — SU FIBERWIRE 2 38"  AR-7200

## (undated) DEVICE — BLADE SAW SAGITTAL THK1.13 MM L90 MM X W18 MM 4118-127-090

## (undated) DEVICE — SOLUTION WOUND CLEANSING 3/4OZ 10% PVP EA-L3011FB-50

## (undated) DEVICE — CLEANSER JET LAVAGE IRRISEPT 0.05% CHG IRRISEPT45USA

## (undated) DEVICE — DRAPE CONVERTORS U-DRAPE 60X72" 8476

## (undated) DEVICE — DRSG AQUACEL AG HYDROFIBER  3.5X10" 422605

## (undated) DEVICE — SOL WATER IRRIG 1000ML BOTTLE 2F7114

## (undated) DEVICE — DRSG AQUACEL AG 3.5X9.75" HYDROFIBER 412011

## (undated) DEVICE — SU VICRYL 3-0 SH 8X18" UND J864D

## (undated) DEVICE — STPL SKIN 35W ROTATING HEAD PRW35

## (undated) DEVICE — DRAPE SHEET REV FOLD 3/4 9349

## (undated) DEVICE — SUCTION IRR SYSTEM W/O TIP INTERPULSE HANDPIECE 0210-100-000

## (undated) DEVICE — SU VICRYL 1 MO-4 18" J702D

## (undated) DEVICE — HOOD SURG T7PLUS PEEL AWAY FACE SHIELD STRL LF 0416-801-100

## (undated) DEVICE — IMM PILLOW ABDUCT HIP MED 31143061

## (undated) DEVICE — BONE CEMENT BIOPREP FEMORAL PREP PLUG INSERTER 0206-710-000

## (undated) DEVICE — BONE CLEANING TIP INTERPULSE  0210-010-000

## (undated) DEVICE — SU VICRYL 1 CP-1 27" J468H

## (undated) DEVICE — PACK TOTAL HIP W/POUCH SOP15HPFSM

## (undated) DEVICE — LINEN TOWEL PACK X5 5464

## (undated) DEVICE — DECANTER BAG 2002S

## (undated) RX ORDER — ONDANSETRON 2 MG/ML
INJECTION INTRAMUSCULAR; INTRAVENOUS
Status: DISPENSED
Start: 2024-06-04

## (undated) RX ORDER — PROPOFOL 10 MG/ML
INJECTION, EMULSION INTRAVENOUS
Status: DISPENSED
Start: 2024-06-04

## (undated) RX ORDER — EPHEDRINE SULFATE 50 MG/ML
INJECTION, SOLUTION INTRAMUSCULAR; INTRAVENOUS; SUBCUTANEOUS
Status: DISPENSED
Start: 2024-06-04

## (undated) RX ORDER — FENTANYL CITRATE 0.05 MG/ML
INJECTION, SOLUTION INTRAMUSCULAR; INTRAVENOUS
Status: DISPENSED
Start: 2024-06-04

## (undated) RX ORDER — DEXAMETHASONE SODIUM PHOSPHATE 4 MG/ML
INJECTION, SOLUTION INTRA-ARTICULAR; INTRALESIONAL; INTRAMUSCULAR; INTRAVENOUS; SOFT TISSUE
Status: DISPENSED
Start: 2024-06-04

## (undated) RX ORDER — FENTANYL CITRATE 50 UG/ML
INJECTION, SOLUTION INTRAMUSCULAR; INTRAVENOUS
Status: DISPENSED
Start: 2024-06-04